# Patient Record
Sex: FEMALE | Race: WHITE | NOT HISPANIC OR LATINO | Employment: FULL TIME | URBAN - NONMETROPOLITAN AREA
[De-identification: names, ages, dates, MRNs, and addresses within clinical notes are randomized per-mention and may not be internally consistent; named-entity substitution may affect disease eponyms.]

---

## 2017-01-09 DIAGNOSIS — B00.1 COLD SORE: Primary | ICD-10-CM

## 2017-01-10 RX ORDER — VALACYCLOVIR HYDROCHLORIDE 500 MG/1
TABLET, FILM COATED ORAL
Qty: 6 TABLET | Refills: 3 | Status: SHIPPED | OUTPATIENT
Start: 2017-01-10 | End: 2017-04-10

## 2017-01-25 ENCOUNTER — OFFICE VISIT (OUTPATIENT)
Dept: FAMILY MEDICINE | Facility: OTHER | Age: 27
End: 2017-01-25
Attending: FAMILY MEDICINE
Payer: COMMERCIAL

## 2017-01-25 VITALS
SYSTOLIC BLOOD PRESSURE: 132 MMHG | WEIGHT: 177 LBS | HEIGHT: 63 IN | RESPIRATION RATE: 16 BRPM | TEMPERATURE: 98.7 F | BODY MASS INDEX: 31.36 KG/M2 | DIASTOLIC BLOOD PRESSURE: 86 MMHG | HEART RATE: 80 BPM

## 2017-01-25 DIAGNOSIS — E28.2 PCOS (POLYCYSTIC OVARIAN SYNDROME): ICD-10-CM

## 2017-01-25 DIAGNOSIS — E11.9 TYPE 2 DIABETES MELLITUS WITHOUT COMPLICATION, WITHOUT LONG-TERM CURRENT USE OF INSULIN (H): ICD-10-CM

## 2017-01-25 DIAGNOSIS — E61.1 IRON DEFICIENCY: ICD-10-CM

## 2017-01-25 DIAGNOSIS — E53.8 VITAMIN B12 DEFICIENCY (NON ANEMIC): Primary | ICD-10-CM

## 2017-01-25 DIAGNOSIS — E55.9 HYPOVITAMINOSIS D: ICD-10-CM

## 2017-01-25 LAB
ALBUMIN SERPL-MCNC: 4.2 G/DL (ref 3.4–5)
ALP SERPL-CCNC: 70 U/L (ref 40–150)
ALT SERPL W P-5'-P-CCNC: 30 U/L (ref 0–50)
ANION GAP SERPL CALCULATED.3IONS-SCNC: 8 MMOL/L (ref 3–14)
AST SERPL W P-5'-P-CCNC: 12 U/L (ref 0–45)
BILIRUB SERPL-MCNC: 0.2 MG/DL (ref 0.2–1.3)
BUN SERPL-MCNC: 11 MG/DL (ref 7–30)
CALCIUM SERPL-MCNC: 9.1 MG/DL (ref 8.5–10.1)
CHLORIDE SERPL-SCNC: 107 MMOL/L (ref 94–109)
CO2 SERPL-SCNC: 23 MMOL/L (ref 20–32)
CREAT SERPL-MCNC: 0.82 MG/DL (ref 0.52–1.04)
EST. AVERAGE GLUCOSE BLD GHB EST-MCNC: 120 MG/DL
GFR SERPL CREATININE-BSD FRML MDRD: 82 ML/MIN/1.7M2
GLUCOSE SERPL-MCNC: 95 MG/DL (ref 70–99)
HBA1C MFR BLD: 5.8 % (ref 4.3–6)
IRON SATN MFR SERPL: 7 % (ref 15–46)
IRON SERPL-MCNC: 27 UG/DL (ref 35–180)
POTASSIUM SERPL-SCNC: 3.6 MMOL/L (ref 3.4–5.3)
PROT SERPL-MCNC: 8.6 G/DL (ref 6.8–8.8)
SODIUM SERPL-SCNC: 138 MMOL/L (ref 133–144)
TIBC SERPL-MCNC: 396 UG/DL (ref 240–430)
TSH SERPL DL<=0.005 MIU/L-ACNC: 0.85 MU/L (ref 0.4–4)

## 2017-01-25 PROCEDURE — 80053 COMPREHEN METABOLIC PANEL: CPT | Performed by: FAMILY MEDICINE

## 2017-01-25 PROCEDURE — 83550 IRON BINDING TEST: CPT | Performed by: FAMILY MEDICINE

## 2017-01-25 PROCEDURE — 83540 ASSAY OF IRON: CPT | Performed by: FAMILY MEDICINE

## 2017-01-25 PROCEDURE — 36415 COLL VENOUS BLD VENIPUNCTURE: CPT | Performed by: FAMILY MEDICINE

## 2017-01-25 PROCEDURE — 83036 HEMOGLOBIN GLYCOSYLATED A1C: CPT | Performed by: FAMILY MEDICINE

## 2017-01-25 PROCEDURE — 99000 SPECIMEN HANDLING OFFICE-LAB: CPT | Performed by: FAMILY MEDICINE

## 2017-01-25 PROCEDURE — 82306 VITAMIN D 25 HYDROXY: CPT | Mod: 90 | Performed by: FAMILY MEDICINE

## 2017-01-25 PROCEDURE — 99214 OFFICE O/P EST MOD 30 MIN: CPT | Performed by: FAMILY MEDICINE

## 2017-01-25 PROCEDURE — 84443 ASSAY THYROID STIM HORMONE: CPT | Performed by: FAMILY MEDICINE

## 2017-01-25 PROCEDURE — 82607 VITAMIN B-12: CPT | Mod: 90 | Performed by: FAMILY MEDICINE

## 2017-01-25 ASSESSMENT — PAIN SCALES - GENERAL: PAINLEVEL: NO PAIN (0)

## 2017-01-25 NOTE — NURSING NOTE
"Chief Complaint   Patient presents with     Other       Initial /86 mmHg  Pulse 80  Temp(Src) 98.7  F (37.1  C) (Tympanic)  Resp 16  Ht 5' 3.25\" (1.607 m)  Wt 177 lb (80.287 kg)  BMI 31.09 kg/m2 Estimated body mass index is 31.09 kg/(m^2) as calculated from the following:    Height as of this encounter: 5' 3.25\" (1.607 m).    Weight as of this encounter: 177 lb (80.287 kg).  BP completed using cuff size: brook Paredes    "

## 2017-01-25 NOTE — PATIENT INSTRUCTIONS
1.  Smarty pants vitamins (purple color) with activated (methyl b12 and methyl folate) -- 4 gummies daily  2.  Continue progesterone  3.  Check with insurance to see if follicle study is covered by insurance

## 2017-01-25 NOTE — PROGRESS NOTES
SUBJECTIVE:                                                    Radha Carl is a 26 year old female who presents to clinic today for the following health issues:    PCOS        Duration: years    Description (location/character/radiation): n/a    Intensity:  mild    Accompanying signs and symptoms: none    History (similar episodes/previous evaluation): None    Precipitating or alleviating factors: None  Therapies tried and outcome: charting, NAC, vitamin b6      Pt would like vitamin b12 rechecked.  Her last injection was Dec 14 and will need new orders.      Diabetes Follow-up    Patient is checking blood sugars: once daily.  Results are as follows:    Diabetic concerns: None     Symptoms of hypoglycemia (low blood sugar): none     Paresthesias (numbness or burning in feet) or sores: No     Date of last diabetic eye exam: UTD     Hypothyroidism Follow-up      Since last visit, patient describes the following symptoms: Weight stable, no hair loss, no skin changes, no constipation, no loose stools     She is under a lot of stress right now    Problem list and histories reviewed & adjusted, as indicated.  Additional history: as documented    Current Outpatient Prescriptions   Medication Sig Dispense Refill     progesterone (PROMETRIUM) 100 MG capsule Take 3 capsules (300 mg) by mouth daily Starting on peak +3 for 10 days out of month 90 capsule 3     valACYclovir (VALTREX) 500 MG tablet TAKE ONE TABLET BY MOUTH TWICE DAILY 6 tablet 3     vitamin D (ERGOCALCIFEROL) 98268 UNIT capsule TAKE ONE CAPSULE BY MOUTH ONCE A WEEK ON  THE  SAME  DAY 4 capsule 0     lisdexamfetamine (VYVANSE) 20 MG capsule Take 1 capsule (20 mg) by mouth every morning 30 capsule 0     ferrous sulfate (IRON) 325 (65 FE) MG tablet Take 1 tablet by mouth every 48 hours       penciclovir (DENAVIR) 1 % cream Apply topically every 2 hours (while awake) 1.5 g 3     ACETYLCYSTEINE PO Take 600 mg by mouth 2 times daily       Pyridoxine HCl  "(VITAMIN  B-6) 500 MG TABS Take 1 tablet by mouth daily       blood glucose (ONE TOUCH ULTRA) test strip Use to test blood sugar 3 times daily or as directed. 1 Month 12     blood glucose monitoring (ONE TOUCH DELICA) lancets Use to test blood sugar 3 times daily or as directed. 1 Box 4     ranitidine (ZANTAC) 150 MG tablet Take 1 tablet by mouth as needed       acetaminophen (TYLENOL) 500 MG tablet Take 2 tablets by mouth. Every 6 hours as needed       ibuprofen (ADVIL,MOTRIN) 200 MG tablet Take 2 tablets by mouth. Every 6 hours as needed with food       [DISCONTINUED] norethindrone-ethinyl estradiol (MICROGESTIN) 1-20 MG-MCG per tablet Take 1 tablet by mouth daily.       Problem list, Medication list, Allergies, and Medical/Social/Surgical histories reviewed in EPIC and updated as appropriate.    ROS:  Constitutional, HEENT, cardiovascular, pulmonary, gi and gu systems are negative, except as otherwise noted.    OBJECTIVE:                                                    /86 mmHg  Pulse 80  Temp(Src) 98.7  F (37.1  C) (Tympanic)  Resp 16  Ht 5' 3.25\" (1.607 m)  Wt 177 lb (80.287 kg)  BMI 31.09 kg/m2  Body mass index is 31.09 kg/(m^2).  GENERAL APPEARANCE: healthy, alert and no distress  RESP: lungs clear to auscultation - no rales, rhonchi or wheezes  CV: regular rates and rhythm, normal S1 S2, no S3 or S4 and no murmur, click or rub  PSYCH: mentation appears normal and affect normal/bright       ASSESSMENT/PLAN:                                                    1. Vitamin B12 deficiency (non anemic)  Needs recheck she has not been taking methyl b12  - Vitamin B12    2. Type 2 diabetes mellitus without complication, without long-term current use of insulin (H)  stable  - Hemoglobin A1c  - TSH with free T4 reflex  - Comprehensive metabolic panel    3. PCOS (polycystic ovarian syndrome)  Continue progesterone, may need to look into follicle u/s study; they will check with insurance  - Comprehensive " metabolic panel  - progesterone (PROMETRIUM) 100 MG capsule; Take 3 capsules (300 mg) by mouth daily Starting on peak +3 for 10 days out of month  Dispense: 90 capsule; Refill: 3    4. Iron deficiency    - Iron and iron binding capacity    5. Hypovitaminosis D    - Vitamin D Deficiency    Patient was agreeable to this plan and had no further questions.  See Patient Instructions    Muriel Jones MD  [facility]

## 2017-01-26 LAB — VIT B12 SERPL-MCNC: 937 PG/ML (ref 193–986)

## 2017-01-27 ENCOUNTER — MYC MEDICAL ADVICE (OUTPATIENT)
Dept: FAMILY MEDICINE | Facility: OTHER | Age: 27
End: 2017-01-27

## 2017-01-27 LAB — DEPRECATED CALCIDIOL+CALCIFEROL SERPL-MC: 27 UG/L (ref 20–75)

## 2017-02-03 ENCOUNTER — TELEPHONE (OUTPATIENT)
Dept: FAMILY MEDICINE | Facility: OTHER | Age: 27
End: 2017-02-03

## 2017-02-03 DIAGNOSIS — E28.2 PCOS (POLYCYSTIC OVARIAN SYNDROME): Primary | ICD-10-CM

## 2017-02-03 DIAGNOSIS — E28.8 LUTEINIZED UNRUPTURED FOLLICLE SYNDROME: ICD-10-CM

## 2017-02-03 NOTE — TELEPHONE ENCOUNTER
She was going to check with her insurance for coverage  It will be very expensive because it is a series of ultrasounds not just one

## 2017-02-03 NOTE — TELEPHONE ENCOUNTER
Pt is wondering about the follicle u/s, she states she would like to have it done ever if insurance does not cover.

## 2017-02-06 NOTE — TELEPHONE ENCOUNTER
No not yet, I hadn't talked to radiology yet b/c if it was covered by insurance it wouldn't matter

## 2017-02-07 ENCOUNTER — OFFICE VISIT (OUTPATIENT)
Dept: CARE COORDINATION | Facility: OTHER | Age: 27
End: 2017-02-07
Attending: PODIATRIST
Payer: COMMERCIAL

## 2017-02-07 VITALS — HEART RATE: 84 BPM | RESPIRATION RATE: 99 BRPM | SYSTOLIC BLOOD PRESSURE: 142 MMHG | DIASTOLIC BLOOD PRESSURE: 90 MMHG

## 2017-02-07 DIAGNOSIS — F90.0 ATTENTION DEFICIT HYPERACTIVITY DISORDER (ADHD), PREDOMINANTLY INATTENTIVE TYPE: Primary | ICD-10-CM

## 2017-02-07 PROCEDURE — 99212 OFFICE O/P EST SF 10 MIN: CPT | Performed by: PODIATRIST

## 2017-02-07 RX ORDER — LISDEXAMFETAMINE DIMESYLATE 20 MG/1
20 CAPSULE ORAL EVERY MORNING
Qty: 30 CAPSULE | Refills: 0 | Status: SHIPPED | OUTPATIENT
Start: 2017-02-07 | End: 2017-03-10

## 2017-02-07 NOTE — TELEPHONE ENCOUNTER
zion      Last Written Prescription Date: 12/27/16  Last Fill Quantity: 30,  # refills: 0   Last Office Visit with [facility], [facility] or [facility] prescribing provider: 1/25/17

## 2017-02-09 NOTE — TELEPHONE ENCOUNTER
Pt notified that the written RX is ready at the [facility] [place]  registration to be picked up.

## 2017-02-13 ENCOUNTER — TELEPHONE (OUTPATIENT)
Dept: FAMILY MEDICINE | Facility: OTHER | Age: 27
End: 2017-02-13

## 2017-02-13 DIAGNOSIS — E28.8 LUTEINIZED UNRUPTURED FOLLICLE SYNDROME: ICD-10-CM

## 2017-02-13 DIAGNOSIS — E28.2 PCOS (POLYCYSTIC OVARIAN SYNDROME): Primary | ICD-10-CM

## 2017-02-13 NOTE — TELEPHONE ENCOUNTER
Pt called and states that pt will need her ultrasounds as a standing order for her to have it daily.  Please sign off.

## 2017-02-17 ENCOUNTER — TELEPHONE (OUTPATIENT)
Dept: ULTRASOUND IMAGING | Facility: HOSPITAL | Age: 27
End: 2017-02-17

## 2017-02-20 ENCOUNTER — TELEPHONE (OUTPATIENT)
Dept: FAMILY MEDICINE | Facility: OTHER | Age: 27
End: 2017-02-20

## 2017-02-20 DIAGNOSIS — E28.2 PCOS (POLYCYSTIC OVARIAN SYNDROME): ICD-10-CM

## 2017-02-20 DIAGNOSIS — E28.8 LUTEINIZED UNRUPTURED FOLLICLE SYNDROME: ICD-10-CM

## 2017-02-20 NOTE — TELEPHONE ENCOUNTER
Sienna in Radiology states pt cancelled today's US because she states she is not ovulating. Want to know what you are looking for on daily USs? Please advise

## 2017-02-22 ENCOUNTER — HOSPITAL ENCOUNTER (OUTPATIENT)
Dept: ULTRASOUND IMAGING | Facility: HOSPITAL | Age: 27
Discharge: HOME | End: 2017-02-22
Attending: FAMILY MEDICINE | Admitting: FAMILY MEDICINE
Payer: COMMERCIAL

## 2017-02-22 PROCEDURE — 76830 TRANSVAGINAL US NON-OB: CPT | Mod: TC | Performed by: RADIOLOGY

## 2017-02-22 PROCEDURE — 76856 US EXAM PELVIC COMPLETE: CPT | Mod: TC | Performed by: RADIOLOGY

## 2017-02-23 ENCOUNTER — HOSPITAL ENCOUNTER (OUTPATIENT)
Dept: ULTRASOUND IMAGING | Facility: HOSPITAL | Age: 27
Discharge: HOME | End: 2017-02-23
Attending: FAMILY MEDICINE | Admitting: FAMILY MEDICINE
Payer: COMMERCIAL

## 2017-02-23 PROCEDURE — 76830 TRANSVAGINAL US NON-OB: CPT | Mod: TC

## 2017-02-24 ENCOUNTER — MYC MEDICAL ADVICE (OUTPATIENT)
Dept: FAMILY MEDICINE | Facility: OTHER | Age: 27
End: 2017-02-24

## 2017-02-24 ENCOUNTER — HOSPITAL ENCOUNTER (OUTPATIENT)
Dept: ULTRASOUND IMAGING | Facility: HOSPITAL | Age: 27
End: 2017-02-24
Attending: FAMILY MEDICINE
Payer: COMMERCIAL

## 2017-02-24 NOTE — TELEPHONE ENCOUNTER
Pt had first U/S this morning and they told her that all other ultrasounds would be cancelled and not needed.  Please advise.

## 2017-03-02 ENCOUNTER — OFFICE VISIT (OUTPATIENT)
Dept: FAMILY MEDICINE | Facility: OTHER | Age: 27
End: 2017-03-02
Attending: FAMILY MEDICINE
Payer: COMMERCIAL

## 2017-03-02 VITALS
DIASTOLIC BLOOD PRESSURE: 78 MMHG | TEMPERATURE: 98.6 F | WEIGHT: 174 LBS | SYSTOLIC BLOOD PRESSURE: 120 MMHG | BODY MASS INDEX: 30.83 KG/M2 | RESPIRATION RATE: 14 BRPM | HEIGHT: 63 IN | HEART RATE: 72 BPM

## 2017-03-02 DIAGNOSIS — N83.209 CYST OF OVARY, UNSPECIFIED LATERALITY: ICD-10-CM

## 2017-03-02 DIAGNOSIS — Q51.3 BICORNATE UTERUS: Primary | ICD-10-CM

## 2017-03-02 PROCEDURE — 99213 OFFICE O/P EST LOW 20 MIN: CPT | Performed by: FAMILY MEDICINE

## 2017-03-02 ASSESSMENT — PAIN SCALES - GENERAL: PAINLEVEL: NO PAIN (0)

## 2017-03-02 NOTE — NURSING NOTE
"Chief Complaint   Patient presents with     Clinic Care Coordination - Follow-up     charting follow up      *_* Health Care Directive *_*     declined        Initial /78 (BP Location: Left arm, Patient Position: Chair, Cuff Size: Adult Large)  Pulse 72  Temp 98.6  F (37  C) (Tympanic)  Resp 14  Ht 5' 3.24\" (1.606 m)  Wt 174 lb (78.9 kg)  BMI 30.59 kg/m2 Estimated body mass index is 30.59 kg/(m^2) as calculated from the following:    Height as of this encounter: 5' 3.24\" (1.606 m).    Weight as of this encounter: 174 lb (78.9 kg).  Medication Reconciliation: nicole HANNAH      "

## 2017-03-02 NOTE — PROGRESS NOTES
SUBJECTIVE:                                                    Radha Carl is a 26 year old female who presents to clinic today for the following health issues:       Natural family planning       Duration: 3 years    Description (location/character/radiation):          Intensity:  na    Accompanying signs and symptoms: Na    History (similar episodes/previous evaluation): None    Precipitating or alleviating factors: None    Therapies tried and outcome: see chart     Follow up on follicle ultrasound study    Problem list and histories reviewed & adjusted, as indicated.  Additional history: as documented    Current Outpatient Prescriptions   Medication Sig Dispense Refill     lisdexamfetamine (VYVANSE) 20 MG capsule Take 1 capsule (20 mg) by mouth every morning 30 capsule 0     vitamin D (ERGOCALCIFEROL) 26681 UNIT capsule TAKE ONE CAPSULE BY MOUTH ONCE A WEEK 4 capsule 0     progesterone (PROMETRIUM) 100 MG capsule Take 3 capsules (300 mg) by mouth daily Starting on peak +3 for 10 days out of month 90 capsule 3     valACYclovir (VALTREX) 500 MG tablet TAKE ONE TABLET BY MOUTH TWICE DAILY 6 tablet 3     ferrous sulfate (IRON) 325 (65 FE) MG tablet Take 1 tablet by mouth every 48 hours       penciclovir (DENAVIR) 1 % cream Apply topically every 2 hours (while awake) 1.5 g 3     ACETYLCYSTEINE PO Take 600 mg by mouth 2 times daily       Pyridoxine HCl (VITAMIN  B-6) 500 MG TABS Take 1 tablet by mouth daily       blood glucose (ONE TOUCH ULTRA) test strip Use to test blood sugar 3 times daily or as directed. 1 Month 12     blood glucose monitoring (ONE TOUCH DELICA) lancets Use to test blood sugar 3 times daily or as directed. 1 Box 4     ranitidine (ZANTAC) 150 MG tablet Take 1 tablet by mouth as needed       acetaminophen (TYLENOL) 500 MG tablet Take 2 tablets by mouth. Every 6 hours as needed       ibuprofen (ADVIL,MOTRIN) 200 MG tablet Take 2 tablets by mouth. Every 6 hours as needed with food        "[DISCONTINUED] norethindrone-ethinyl estradiol (MICROGESTIN) 1-20 MG-MCG per tablet Take 1 tablet by mouth daily.       Labs reviewed in EPIC    Reviewed and updated as needed this visit by clinical staff  Tobacco  Allergies  Meds  Med Hx  Surg Hx  Fam Hx  Soc Hx      Reviewed and updated as needed this visit by Provider         ROS:  Constitutional, HEENT, cardiovascular, pulmonary, gi and gu systems are negative, except as otherwise noted.    OBJECTIVE:                                                    /78 (BP Location: Left arm, Patient Position: Chair, Cuff Size: Adult Large)  Pulse 72  Temp 98.6  F (37  C) (Tympanic)  Resp 14  Ht 5' 3.24\" (1.606 m)  Wt 174 lb (78.9 kg)  BMI 30.59 kg/m2  Body mass index is 30.59 kg/(m^2).  GENERAL APPEARANCE: healthy, alert and no distress  PSYCH: mentation appears normal and affect normal/bright       ASSESSMENT/PLAN:                                                    1. Bicornate uterus  Follow up after MRI, and will possible send down to  for further eval  - MR Pelvis w/o Contrast; Future  - MR Pelvis w/o Contrast    2. Cyst of ovary, unspecified laterality    - MR Pelvis w/o Contrast; Future  - MR Pelvis w/o Contrast    Patient was agreeable to this plan and had no further questions.  See Patient Instructions    Muriel Jones MD  [facility] [place]  "

## 2017-03-02 NOTE — MR AVS SNAPSHOT
"              After Visit Summary   3/2/2017    Radha Carl    MRN: [id number]           Patient Information     Date Of Birth          1990        Visit Information        Provider Department      3/2/2017 11:15 AM Muriel Jones MD [facility]        Today's Diagnoses     Bicornate uterus    -  1    Cyst of ovary, unspecified laterality           Follow-ups after your visit        Who to contact     If you have questions or need follow up information about today's clinic visit or your schedule please contact [facility] directly at [phone].  Normal or non-critical lab and imaging results will be communicated to you by Visualanthart, letter or phone within 4 business days after the clinic has received the results. If you do not hear from us within 7 days, please contact the clinic through Visualanthart or phone. If you have a critical or abnormal lab result, we will notify you by phone as soon as possible.  Submit refill requests through [employer] or call your pharmacy and they will forward the refill request to us. Please allow 3 business days for your refill to be completed.          Additional Information About Your Visit        MyChart Information     [employer] gives you secure access to your electronic health record. If you see a primary care provider, you can also send messages to your care team and make appointments. If you have questions, please call your primary care clinic.  If you do not have a primary care provider, please call [phone] and they will assist you.        Care EveryWhere ID     This is your Care EveryWhere ID. This could be used by other organizations to access your [place] medical records  [id number]        Your Vitals Were     Pulse Temperature Respirations Height BMI (Body Mass Index)       72 98.6  F (37  C) (Tympanic) 14 5' 3.24\" (1.606 m) 30.59 kg/m2        Blood Pressure from Last 3 Encounters:   03/02/17 120/78   02/07/17 142/90   01/25/17 " 132/86    Weight from Last 3 Encounters:   03/02/17 174 lb (78.9 kg)   01/25/17 177 lb (80.3 kg)   12/13/16 173 lb (78.5 kg)              We Performed the Following     MR Pelvis w/o Contrast        Primary Care Provider Office Phone # Fax #    Muriel Jones [phone]       [facility] [place] [address]  [facility] [place] [address]        Thank you!     [facility]  [facility]. Our goal is always to provide you with excellent care. Hearing back from our patients is one way we can continue to improve our services. Please take a few minutes to complete the written survey that you may receive in the mail after your visit with us. Thank you!             Your Updated Medication List - Protect others around you: Learn how to safely use, store and throw away your medicines at www.disposemymeds.org.          This list is accurate as of: 3/2/17  1:26 PM.  Always use your most recent med list.                   Brand Name Dispense Instructions for use    ACETYLCYSTEINE PO      Take 600 mg by mouth 2 times daily       blood glucose monitoring lancets     1 Box    Use to test blood sugar 3 times daily or as directed.       blood glucose monitoring test strip    ONE TOUCH ULTRA    1 Month    Use to test blood sugar 3 times daily or as directed.       ferrous sulfate 325 (65 FE) MG tablet    IRON     Take 1 tablet by mouth every 48 hours       ibuprofen 200 MG tablet    ADVIL/MOTRIN     Take 2 tablets by mouth. Every 6 hours as needed with food       lisdexamfetamine 20 MG capsule    VYVANSE    30 capsule    Take 1 capsule (20 mg) by mouth every morning       penciclovir 1 % cream    DENAVIR    1.5 g    Apply topically every 2 hours (while awake)       progesterone 100 MG capsule    PROMETRIUM    90 capsule    Take 3 capsules (300 mg) by mouth daily Starting on peak +3 for 10 days out of month       TYLENOL 500 MG tablet   Generic drug:  acetaminophen      Take 2  tablets by mouth. Every 6 hours as needed       valACYclovir 500 MG tablet    VALTREX    6 tablet    TAKE ONE TABLET BY MOUTH TWICE DAILY       vitamin  B-6 500 MG Tabs      Take 1 tablet by mouth daily       vitamin D 47944 UNIT capsule    ERGOCALCIFEROL    4 capsule    TAKE ONE CAPSULE BY MOUTH ONCE A WEEK       ZANTAC 150 MG tablet   Generic drug:  ranitidine      Take 1 tablet by mouth as needed

## 2017-03-03 DIAGNOSIS — Z01.812 BLOOD TESTS PRIOR TO TREATMENT OR PROCEDURE: Primary | ICD-10-CM

## 2017-03-08 ENCOUNTER — HOSPITAL ENCOUNTER (OUTPATIENT)
Dept: MRI IMAGING | Facility: HOSPITAL | Age: 27
Discharge: HOME | End: 2017-03-08
Attending: FAMILY MEDICINE | Admitting: FAMILY MEDICINE
Payer: COMMERCIAL

## 2017-03-08 PROCEDURE — 72197 MRI PELVIS W/O & W/DYE: CPT | Mod: TC | Performed by: RADIOLOGY

## 2017-03-08 PROCEDURE — A9585 GADOBUTROL INJECTION: HCPCS | Mod: TC

## 2017-03-08 RX ORDER — GADOBUTROL 604.72 MG/ML
7.5 INJECTION INTRAVENOUS ONCE
Status: COMPLETED | OUTPATIENT
Start: 2017-03-08 | End: 2017-03-08

## 2017-03-08 RX ADMIN — GADOBUTROL 7.5 ML: 604.72 INJECTION INTRAVENOUS at 13:35

## 2017-03-10 DIAGNOSIS — F90.0 ATTENTION DEFICIT HYPERACTIVITY DISORDER (ADHD), PREDOMINANTLY INATTENTIVE TYPE: ICD-10-CM

## 2017-03-10 RX ORDER — LISDEXAMFETAMINE DIMESYLATE 20 MG/1
20 CAPSULE ORAL EVERY MORNING
Qty: 30 CAPSULE | Refills: 0 | Status: SHIPPED | OUTPATIENT
Start: 2017-03-10 | End: 2017-04-11

## 2017-03-10 NOTE — TELEPHONE ENCOUNTER
Left message that the written RX is ready at the [facility] [place]  registration to be picked up.

## 2017-03-10 NOTE — TELEPHONE ENCOUNTER
Reason for call:  Medication    1. Medication Name? vyvanse  2. Is this request for a refill? Yes  3. What Pharmacy do you use? [facility]  4. Have you contacted your pharmacy?yesIf yes, when?3/8  (Please note that the turn-around-time for prescriptions is 72 business hours; I am sending your request at this time. SEND TO  [facility]  )  Description: need refill called pharmacy  Was an appointment offered for this a call? No   Preferred method for responding to this messageTelephone Call  If we cannot reach you directly, may we leave a detailed response at the number you provided? yes  Can this message wait until your PCP/Provider returns if not available today? Not applicable

## 2017-03-14 ENCOUNTER — OFFICE VISIT (OUTPATIENT)
Dept: FAMILY MEDICINE | Facility: OTHER | Age: 27
End: 2017-03-14
Attending: FAMILY MEDICINE
Payer: COMMERCIAL

## 2017-03-14 VITALS
WEIGHT: 176 LBS | TEMPERATURE: 97.6 F | RESPIRATION RATE: 17 BRPM | DIASTOLIC BLOOD PRESSURE: 76 MMHG | SYSTOLIC BLOOD PRESSURE: 122 MMHG | BODY MASS INDEX: 30.94 KG/M2 | HEART RATE: 78 BPM

## 2017-03-14 DIAGNOSIS — N83.291 COMPLEX CYST OF RIGHT OVARY: Primary | ICD-10-CM

## 2017-03-14 PROCEDURE — 99213 OFFICE O/P EST LOW 20 MIN: CPT | Performed by: FAMILY MEDICINE

## 2017-03-14 ASSESSMENT — PAIN SCALES - GENERAL: PAINLEVEL: NO PAIN (0)

## 2017-03-14 NOTE — NURSING NOTE
"Chief Complaint   Patient presents with     Results       Initial /76  Pulse 78  Temp 97.6  F (36.4  C)  Resp 17  Wt 176 lb (79.8 kg)  BMI 30.94 kg/m2 Estimated body mass index is 30.94 kg/(m^2) as calculated from the following:    Height as of 3/2/17: 5' 3.24\" (1.606 m).    Weight as of this encounter: 176 lb (79.8 kg).  Medication Reconciliation: complete  "

## 2017-03-14 NOTE — PROGRESS NOTES
SUBJECTIVE:                                                    Radha Carl is a 26 year old female who presents to clinic today for the following health issues:        Ovarian cyst,       Duration: follow up     Description (location/character/radiation): n/a     Intensity:  mild    Accompanying signs and symptoms:     History (similar episodes/previous evaluation): here to review MRI results     Precipitating or alleviating factors: None    Therapies tried and outcome: see chart       Problem list and histories reviewed & adjusted, as indicated.  Additional history: as documented    Current Outpatient Prescriptions   Medication Sig Dispense Refill     lisdexamfetamine (VYVANSE) 20 MG capsule Take 1 capsule (20 mg) by mouth every morning 30 capsule 0     vitamin D (ERGOCALCIFEROL) 44437 UNIT capsule TAKE ONE CAPSULE BY MOUTH ONCE A WEEK 4 capsule 0     progesterone (PROMETRIUM) 100 MG capsule Take 3 capsules (300 mg) by mouth daily Starting on peak +3 for 10 days out of month 90 capsule 3     valACYclovir (VALTREX) 500 MG tablet TAKE ONE TABLET BY MOUTH TWICE DAILY 6 tablet 3     ferrous sulfate (IRON) 325 (65 FE) MG tablet Take 1 tablet by mouth every 48 hours       penciclovir (DENAVIR) 1 % cream Apply topically every 2 hours (while awake) 1.5 g 3     ACETYLCYSTEINE PO Take 600 mg by mouth 2 times daily       Pyridoxine HCl (VITAMIN  B-6) 500 MG TABS Take 1 tablet by mouth daily       blood glucose (ONE TOUCH ULTRA) test strip Use to test blood sugar 3 times daily or as directed. 1 Month 12     blood glucose monitoring (ONE TOUCH DELICA) lancets Use to test blood sugar 3 times daily or as directed. 1 Box 4     ranitidine (ZANTAC) 150 MG tablet Take 1 tablet by mouth as needed       acetaminophen (TYLENOL) 500 MG tablet Take 2 tablets by mouth. Every 6 hours as needed       ibuprofen (ADVIL,MOTRIN) 200 MG tablet Take 2 tablets by mouth. Every 6 hours as needed with food       [DISCONTINUED]  norethindrone-ethinyl estradiol (MICROGESTIN) 1-20 MG-MCG per tablet Take 1 tablet by mouth daily.       Labs reviewed in EPIC    ROS:  Constitutional, HEENT, cardiovascular, pulmonary, gi and gu systems are negative, except as otherwise noted.    OBJECTIVE:                                                    /76  Pulse 78  Temp 97.6  F (36.4  C)  Resp 17  Wt 176 lb (79.8 kg)  BMI 30.94 kg/m2  Body mass index is 30.94 kg/(m^2).  GENERAL APPEARANCE: healthy, alert and no distress  PSYCH: mentation appears normal and affect normal/bright       ASSESSMENT/PLAN:                                                    1. Complex cyst of right ovary  Resolved now, f/u 2 mos  Discussed MRI with Dr rao, arcuate uterus ok, no need to deal with fibroid at this time.    Patient was agreeable to this plan and had no further questions.  See Patient Instructions    Muriel Jones MD  [facility]

## 2017-03-14 NOTE — MR AVS SNAPSHOT
After Visit Summary   3/14/2017    Radha Carl    MRN: [id number]           Patient Information     Date Of Birth          1990        Visit Information        Provider Department      3/14/2017 3:30 PM Muriel Jones MD [facility]        Today's Diagnoses     Complex cyst of right ovary    -  1       Follow-ups after your visit        Who to contact     If you have questions or need follow up information about today's clinic visit or your schedule please contact [facility] directly at [phone].  Normal or non-critical lab and imaging results will be communicated to you by Kiiohart, letter or phone within 4 business days after the clinic has received the results. If you do not hear from us within 7 days, please contact the clinic through CallRestot or phone. If you have a critical or abnormal lab result, we will notify you by phone as soon as possible.  Submit refill requests through [employer] or call your pharmacy and they will forward the refill request to us. Please allow 3 business days for your refill to be completed.          Additional Information About Your Visit        MyChart Information     [employer] gives you secure access to your electronic health record. If you see a primary care provider, you can also send messages to your care team and make appointments. If you have questions, please call your primary care clinic.  If you do not have a primary care provider, please call [phone] and they will assist you.        Care EveryWhere ID     This is your Care EveryWhere ID. This could be used by other organizations to access your [place] medical records  [id number]        Your Vitals Were     Pulse Temperature Respirations BMI (Body Mass Index)          78 97.6  F (36.4  C) 17 30.94 kg/m2         Blood Pressure from Last 3 Encounters:   03/14/17 122/76   03/02/17 120/78   02/07/17 142/90    Weight from Last 3 Encounters:   03/14/17 176 lb (79.8  kg)   03/02/17 174 lb (78.9 kg)   01/25/17 177 lb (80.3 kg)              Today, you had the following     No orders found for display       Primary Care Provider Office Phone # Fax #    Muriel Jones [phone]       [facility] [place] [address]  [place] [address]        Thank you!     Thank you for choosing [facility] [place]  for your care. Our goal is always to provide you with excellent care. Hearing back from our patients is one way we can continue to improve our services. Please take a few minutes to complete the written survey that you may receive in the mail after your visit with us. Thank you!             Your Updated Medication List - Protect others around you: Learn how to safely use, store and throw away your medicines at www.disposemymeds.org.          This list is accurate as of: 3/14/17  5:19 PM.  Always use your most recent med list.                   Brand Name Dispense Instructions for use    ACETYLCYSTEINE PO      Take 600 mg by mouth 2 times daily       blood glucose monitoring lancets     1 Box    Use to test blood sugar 3 times daily or as directed.       blood glucose monitoring test strip    ONE TOUCH ULTRA    1 Month    Use to test blood sugar 3 times daily or as directed.       ferrous sulfate 325 (65 FE) MG tablet    IRON     Take 1 tablet by mouth every 48 hours       ibuprofen 200 MG tablet    ADVIL/MOTRIN     Take 2 tablets by mouth. Every 6 hours as needed with food       lisdexamfetamine 20 MG capsule    VYVANSE    30 capsule    Take 1 capsule (20 mg) by mouth every morning       penciclovir 1 % cream    DENAVIR    1.5 g    Apply topically every 2 hours (while awake)       progesterone 100 MG capsule    PROMETRIUM    90 capsule    Take 3 capsules (300 mg) by mouth daily Starting on peak +3 for 10 days out of month       TYLENOL 500 MG tablet   Generic drug:  acetaminophen      Take 2 tablets by mouth. Every 6 hours as needed       valACYclovir  500 MG tablet    VALTREX    6 tablet    TAKE ONE TABLET BY MOUTH TWICE DAILY       vitamin  B-6 500 MG Tabs      Take 1 tablet by mouth daily       vitamin D 00568 UNIT capsule    ERGOCALCIFEROL    4 capsule    TAKE ONE CAPSULE BY MOUTH ONCE A WEEK       ZANTAC 150 MG tablet   Generic drug:  ranitidine      Take 1 tablet by mouth as needed

## 2017-04-10 DIAGNOSIS — B00.1 COLD SORE: ICD-10-CM

## 2017-04-11 DIAGNOSIS — F90.0 ATTENTION DEFICIT HYPERACTIVITY DISORDER (ADHD), PREDOMINANTLY INATTENTIVE TYPE: ICD-10-CM

## 2017-04-11 RX ORDER — VALACYCLOVIR HYDROCHLORIDE 500 MG/1
TABLET, FILM COATED ORAL
Qty: 6 TABLET | Refills: 1 | Status: SHIPPED | OUTPATIENT
Start: 2017-04-11 | End: 2017-05-24

## 2017-04-11 NOTE — TELEPHONE ENCOUNTER
zion      Last Written Prescription Date: 3/10/17  Last Fill Quantity: 30,  # refills: 0   Last Office Visit with [facility], [facility] or [facility] prescribing provider: 3/14/17

## 2017-04-12 RX ORDER — LISDEXAMFETAMINE DIMESYLATE 20 MG/1
20 CAPSULE ORAL EVERY MORNING
Qty: 30 CAPSULE | Refills: 0 | Status: SHIPPED | OUTPATIENT
Start: 2017-04-12 | End: 2017-05-22

## 2017-04-12 NOTE — TELEPHONE ENCOUNTER
Left message that the written RX is ready at the [facility] [place]  registration to be picked up.

## 2017-04-18 ENCOUNTER — TELEPHONE (OUTPATIENT)
Dept: FAMILY MEDICINE | Facility: OTHER | Age: 27
End: 2017-04-18

## 2017-04-18 NOTE — TELEPHONE ENCOUNTER
Patient phoned back to give explanation on WHY she needed to be seen: She fell down flight of stairs and is having leg/arm/neck pain

## 2017-04-18 NOTE — TELEPHONE ENCOUNTER
1:11 PM    Reason for Call: OVERBOOK    Patient is having the following symptoms: PT is requesting to be seen this Thursday, she prefers after 3 pm/ Personal    The patient is requesting an appointment for Thursday with Dr. Jones.    Was an appointment offered for this call? Yes    Preferred method for responding to this message: Telephone Call [phone]    If we cannot reach you directly, may we leave a detailed response at the number you provided? Yes    Can this message wait until your PCP/provider returns, if unavailable today? YES/ PT prefers a call back as soon as possible    Laura Downs

## 2017-04-21 ENCOUNTER — OFFICE VISIT (OUTPATIENT)
Dept: FAMILY MEDICINE | Facility: OTHER | Age: 27
End: 2017-04-21
Attending: FAMILY MEDICINE
Payer: COMMERCIAL

## 2017-04-21 VITALS
BODY MASS INDEX: 30.77 KG/M2 | OXYGEN SATURATION: 100 % | HEART RATE: 92 BPM | TEMPERATURE: 98.5 F | DIASTOLIC BLOOD PRESSURE: 74 MMHG | WEIGHT: 175 LBS | SYSTOLIC BLOOD PRESSURE: 123 MMHG | RESPIRATION RATE: 17 BRPM

## 2017-04-21 DIAGNOSIS — M76.31 IT BAND SYNDROME, RIGHT: ICD-10-CM

## 2017-04-21 DIAGNOSIS — M54.2 CERVICALGIA: Primary | ICD-10-CM

## 2017-04-21 PROCEDURE — 99213 OFFICE O/P EST LOW 20 MIN: CPT | Performed by: FAMILY MEDICINE

## 2017-04-21 ASSESSMENT — PATIENT HEALTH QUESTIONNAIRE - PHQ9: 5. POOR APPETITE OR OVEREATING: SEVERAL DAYS

## 2017-04-21 ASSESSMENT — ANXIETY QUESTIONNAIRES
2. NOT BEING ABLE TO STOP OR CONTROL WORRYING: NOT AT ALL
3. WORRYING TOO MUCH ABOUT DIFFERENT THINGS: NOT AT ALL
7. FEELING AFRAID AS IF SOMETHING AWFUL MIGHT HAPPEN: NOT AT ALL
6. BECOMING EASILY ANNOYED OR IRRITABLE: SEVERAL DAYS
IF YOU CHECKED OFF ANY PROBLEMS ON THIS QUESTIONNAIRE, HOW DIFFICULT HAVE THESE PROBLEMS MADE IT FOR YOU TO DO YOUR WORK, TAKE CARE OF THINGS AT HOME, OR GET ALONG WITH OTHER PEOPLE: NOT DIFFICULT AT ALL
5. BEING SO RESTLESS THAT IT IS HARD TO SIT STILL: NOT AT ALL
GAD7 TOTAL SCORE: 2
1. FEELING NERVOUS, ANXIOUS, OR ON EDGE: NOT AT ALL

## 2017-04-21 ASSESSMENT — PAIN SCALES - GENERAL: PAINLEVEL: NO PAIN (0)

## 2017-04-21 NOTE — PROGRESS NOTES
SUBJECTIVE:                                                    Radha Carl is a 26 year old female who presents to clinic today for the following health issues:        Musculoskeletal problem/pain      Duration: Monday    Description  Location:  Neck     Intensity:  mild    Accompanying signs and symptoms: right shoulder, neck, headaches     History  Previous similar problem: no   Previous evaluation:  none    Precipitating or alleviating factors:  Trauma or overuse: YES- patient fell down stairs   Aggravating factors include: none    Therapies tried and outcome: NSAID - IBU with some relief        Problem list and histories reviewed & adjusted, as indicated.  Additional history: as documented    Current Outpatient Prescriptions   Medication Sig Dispense Refill     lisdexamfetamine (VYVANSE) 20 MG capsule Take 1 capsule (20 mg) by mouth every morning 30 capsule 0     valACYclovir (VALTREX) 500 MG tablet TAKE ONE TABLET BY MOUTH TWICE DAILY 6 tablet 1     vitamin D (ERGOCALCIFEROL) 80274 UNIT capsule TAKE ONE CAPSULE BY MOUTH ONCE A WEEK 4 capsule 0     progesterone (PROMETRIUM) 100 MG capsule Take 3 capsules (300 mg) by mouth daily Starting on peak +3 for 10 days out of month 90 capsule 3     ferrous sulfate (IRON) 325 (65 FE) MG tablet Take 1 tablet by mouth every 48 hours       penciclovir (DENAVIR) 1 % cream Apply topically every 2 hours (while awake) 1.5 g 3     ACETYLCYSTEINE PO Take 600 mg by mouth 2 times daily       Pyridoxine HCl (VITAMIN  B-6) 500 MG TABS Take 1 tablet by mouth daily       blood glucose (ONE TOUCH ULTRA) test strip Use to test blood sugar 3 times daily or as directed. 1 Month 12     blood glucose monitoring (ONE TOUCH DELICA) lancets Use to test blood sugar 3 times daily or as directed. 1 Box 4     ranitidine (ZANTAC) 150 MG tablet Take 1 tablet by mouth as needed       acetaminophen (TYLENOL) 500 MG tablet Take 2 tablets by mouth. Every 6 hours as needed       ibuprofen  (ADVIL,MOTRIN) 200 MG tablet Take 2 tablets by mouth. Every 6 hours as needed with food       [DISCONTINUED] norethindrone-ethinyl estradiol (MICROGESTIN) 1-20 MG-MCG per tablet Take 1 tablet by mouth daily.       Labs reviewed in EPIC    ROS:  Constitutional, HEENT, cardiovascular, pulmonary, gi and gu systems are negative, except as otherwise noted.    OBJECTIVE:                                                    /74  Pulse 92  Temp 98.5  F (36.9  C)  Resp 17  Wt 175 lb (79.4 kg)  SpO2 100%  BMI 30.77 kg/m2  Body mass index is 30.77 kg/(m^2).  GENERAL APPEARANCE: healthy, alert and no distress  RESP: lungs clear to auscultation - no rales, rhonchi or wheezes  CV: regular rates and rhythm, normal S1 S2, no S3 or S4 and no murmur, click or rub  PSYCH: mentation appears normal and affect normal/bright       ASSESSMENT/PLAN:                                                    1. Cervicalgia  S/p fall  - PHYSICAL THERAPY REFERRAL    2. IT band syndrome, right  Chronic, feels like her right side is always weak  - PHYSICAL THERAPY REFERRAL    Patient was agreeable to this plan and had no further questions.  See Patient Instructions    Muriel Jones MD  [facility]

## 2017-04-21 NOTE — MR AVS SNAPSHOT
After Visit Summary   4/21/2017    Radha Carl    MRN: [id number]           Patient Information     Date Of Birth          1990        Visit Information        Provider Department      4/21/2017 2:45 PM Muriel Jones MD [facility]        Today's Diagnoses     Cervicalgia    -  1    IT band syndrome, right           Follow-ups after your visit        Additional Services     PHYSICAL THERAPY REFERRAL       *This therapy referral will be filtered to a centralized scheduling office at [facility] and the patient will receive a call to schedule an appointment at a [facility] location most convenient for them. *     [facility] provides Physical Therapy evaluation and treatment and many specialty services across the [facility] system.  If requesting a specialty program, please choose from the list below.    If you have not heard from the scheduling office within 2 business days, please call [phone] for all locations, with the exception of [place], please call [phone].  Treatment: Evaluation & Treatment  Special Instructions/Modalities:   Special Programs:   Prefers [facility]    Please be aware that coverage of these services is subject to the terms and limitations of your health insurance plan.  Call member services at your health plan with any benefit or coverage questions.      **Note to Provider:  If you are referring outside of [facility] for the therapy appointment, please list the name of the location in the  special instructions  above, print the referral and give to the patient to schedule the appointment.                  Who to contact     If you have questions or need follow up information about today's clinic visit or your schedule please contact [facility] directly at [phone].  Normal or non-critical lab and imaging results will be communicated to you by MyChart, letter or phone within 4 business days  after the clinic has received the results. If you do not hear from us within 7 days, please contact the clinic through [facility] or phone. If you have a critical or abnormal lab result, we will notify you by phone as soon as possible.  Submit refill requests through [facility] or call your pharmacy and they will forward the refill request to us. Please allow 3 business days for your refill to be completed.          Additional Information About Your Visit        [employer] Information     [facility] gives you secure access to your electronic health record. If you see a primary care provider, you can also send messages to your care team and make appointments. If you have questions, please call your primary care clinic.  If you do not have a primary care provider, please call [phone] and they will assist you.        Care EveryWhere ID     This is your Care EveryWhere ID. This could be used by other organizations to access your [place] medical records  [id number]        Your Vitals Were     Pulse Temperature Respirations Pulse Oximetry BMI (Body Mass Index)       92 98.5  F (36.9  C) 17 100% 30.77 kg/m2        Blood Pressure from Last 3 Encounters:   04/21/17 123/74   03/14/17 122/76   03/02/17 120/78    Weight from Last 3 Encounters:   04/21/17 175 lb (79.4 kg)   03/14/17 176 lb (79.8 kg)   03/02/17 174 lb (78.9 kg)              We Performed the Following     PHYSICAL THERAPY REFERRAL        Primary Care Provider Office Phone # Fax #    Muriel Jones [phone]       [facility] [place] [address] [place], [place] [address]  [facility] [id number]        Thank you!     [facility]  [facility]. Our goal is always to provide you with excellent care. Hearing back from our patients is one way we can continue to improve our services. Please take a few minutes to complete the written survey that you may receive in the mail after your visit with us. Thank you!             Your Updated  Medication List - Protect others around you: Learn how to safely use, store and throw away your medicines at www.disposemymeds.org.          This list is accurate as of: 4/21/17  4:11 PM.  Always use your most recent med list.                   Brand Name Dispense Instructions for use    ACETYLCYSTEINE PO      Take 600 mg by mouth 2 times daily       blood glucose monitoring lancets     1 Box    Use to test blood sugar 3 times daily or as directed.       blood glucose monitoring test strip    ONE TOUCH ULTRA    1 Month    Use to test blood sugar 3 times daily or as directed.       ferrous sulfate 325 (65 FE) MG tablet    IRON     Take 1 tablet by mouth every 48 hours       ibuprofen 200 MG tablet    ADVIL/MOTRIN     Take 2 tablets by mouth. Every 6 hours as needed with food       lisdexamfetamine 20 MG capsule    VYVANSE    30 capsule    Take 1 capsule (20 mg) by mouth every morning       penciclovir 1 % cream    DENAVIR    1.5 g    Apply topically every 2 hours (while awake)       progesterone 100 MG capsule    PROMETRIUM    90 capsule    Take 3 capsules (300 mg) by mouth daily Starting on peak +3 for 10 days out of month       TYLENOL 500 MG tablet   Generic drug:  acetaminophen      Take 2 tablets by mouth. Every 6 hours as needed       valACYclovir 500 MG tablet    VALTREX    6 tablet    TAKE ONE TABLET BY MOUTH TWICE DAILY       vitamin  B-6 500 MG Tabs      Take 1 tablet by mouth daily       vitamin D 17446 UNIT capsule    ERGOCALCIFEROL    4 capsule    TAKE ONE CAPSULE BY MOUTH ONCE A WEEK       ZANTAC 150 MG tablet   Generic drug:  ranitidine      Take 1 tablet by mouth as needed

## 2017-04-21 NOTE — NURSING NOTE
"Chief Complaint   Patient presents with     Neck Pain       Initial /74  Pulse 92  Temp 98.5  F (36.9  C)  Resp 17  Wt 175 lb (79.4 kg)  SpO2 100%  BMI 30.77 kg/m2 Estimated body mass index is 30.77 kg/(m^2) as calculated from the following:    Height as of 3/2/17: 5' 3.24\" (1.606 m).    Weight as of this encounter: 175 lb (79.4 kg).  Medication Reconciliation: complete  "

## 2017-04-22 ASSESSMENT — ANXIETY QUESTIONNAIRES: GAD7 TOTAL SCORE: 2

## 2017-04-22 ASSESSMENT — PATIENT HEALTH QUESTIONNAIRE - PHQ9: SUM OF ALL RESPONSES TO PHQ QUESTIONS 1-9: 4

## 2017-05-02 ENCOUNTER — HOSPITAL ENCOUNTER (OUTPATIENT)
Dept: PHYSICAL THERAPY | Facility: HOSPITAL | Age: 27
Setting detail: THERAPIES SERIES
End: 2017-05-02
Attending: FAMILY MEDICINE
Payer: COMMERCIAL

## 2017-05-02 PROCEDURE — 97530 THERAPEUTIC ACTIVITIES: CPT | Mod: GP

## 2017-05-02 PROCEDURE — 97140 MANUAL THERAPY 1/> REGIONS: CPT | Mod: GP

## 2017-05-02 PROCEDURE — 40000718 ZZHC STATISTIC PT DEPARTMENT ORTHO VISIT

## 2017-05-02 PROCEDURE — 97161 PT EVAL LOW COMPLEX 20 MIN: CPT | Mod: GP

## 2017-05-02 NOTE — PROGRESS NOTES
05/02/17 1300   Neck Disability Index (  Ben VAZQUEZ. and Zachary DOMINIQUE. 1991. All rights reserved.; used with permission)   SECTION 1 - PAIN INTENSITY 3   SECTION 2 - PERSONAL CARE 0   SECTION 3 - LIFTING 0   SECTION 4 - READING 2   SECTION 5 - HEADACHES 3   SECTION 6 - CONCENTRATION 1   SECTION 7 - WORK 2   SECTION 8 - DRIVING 2   SECTION 9 - SLEEPING 2   SECTION 10 - RECREATION 1   Count 10   Sum 16   Raw Score: /50 32   Neck Disability Index Score: (%) 32 %

## 2017-05-03 NOTE — PROGRESS NOTES
05/02/17 1107   General Information   Type of Visit Initial OP Ortho PT Evaluation   Start of Care Date 05/02/17   Referring Physician Dr. Jones   Patient/Family Goals Statement Decreased cervical pain, decreased HA, increased R LE strength   Orders Evaluate and Treat   Date of Order 04/21/17   Insurance Type [employer]   Insurance Comments/Visits Authorized 60   Medical Diagnosis Cervicalgia, IT Band Syndrome, R   Surgical/Medical history reviewed Yes   Precautions/Limitations no known precautions/limitations   General Information Comments PMH: Type II Diabetes   Body Part(s)   Body Part(s) Cervical Spine;Hip   Presentation and Etiology   Pertinent history of current problem (include personal factors and/or comorbidities that impact the POC) Patient had fall several weeks ago on stairs and fell landing directly on gluts. Had increased pain on R side of neck and into R UE from that fall and pain has continued. Mainly pain on that R side, but occasional mild pain on L of neck, but Right sided symptoms is primary region of complaint. Is getting intermittent headaches on R side in temporal region sometimes including subocciput region. Patient was getting dizziness originally with HA and this has now resolved. Patient states that R LE is giving patient problems. Patient states she feels less stable and increased weakness on R leg. No significant trauma, but patient had fall several years ago with injury to ankle. Patient is in school and is reading and studying often in cervial flexion and this is aggravating. States that she has noted R LE weakness for several years. Does not feel as confident on that side.    Impairments A. Pain;F. Decreased strength and endurance;H. Impaired gait;G. Impaired balance   Functional Limitations perform activities of daily living;perform required work activities;perform desired leisure / sports activities   Symptom Location Cervical region, R upper shoulder, R LE   How/Where did it  occur With a fall   Onset date of current episode/exacerbation (several weeks ago)   Chronicity New   Pain rating (0-10 point scale) Other   Best (/10) 3   Worst (/10) 8   Pain rating comment 3-4/10 on average   Pain quality B. Dull;A. Sharp   Frequency of pain/symptoms C. With activity   Pain/symptoms are: Worse during the night   Pain/symptoms exacerbated by G. Certain positions  (school tasks)   Pain/symptoms eased by I. OTC medication(s)   Progression of symptoms since onset: Unchanged   Prior Level of Function   Prior Level of Function-Mobility Was not having cervical pain prior to fall. Had been dealing with R LE weakness for past several years. Mainly functional weakness versus true weakness pattern.    Current Level of Function   Patient role/employment history A. Employed   Employment Comments  at clinic, going to school for nursing   Fall Risk Screen   Per patient - Fall 2 or more times in past year? No   Per patient - Fall with injury in past year? Yes   Is patient a fall risk? No   Knee Objective Findings   Side (if bilateral, select both right and left) Right   Gait/Locomotion No obvious gait impairment   Right Knee Flexion Strength 4+/5   Right Knee Extension Strength 4+/5   Right Hip Abduction Strength 4+/5   Knee ROM Comment Full motion present.    Right Hamstring Flexibility No significant tension noted.    Knee Flexibility Comments Patient positive for hip adduction drop test. Positive SLR for HS length. Negative Forward flexion, able to touch hands to floor.    Knee Special Test Comments Weakness present at bilateral hip ER with R hip weakness greater than L. R hip IR weakness also present.    Hip Objective Findings   Side (if bilateral, select both right and left) Right   Hip Flexibility Comments No significant limitation of hip mobility other than positive adduction drop test bilaterally.    CARLOS Test Negative   FADIR Test Negative   Accessory Motion/Joint Mobility WNL    Obers/ITB Flexibility Positive bilaterally.    Right Hamstring Flexibility No significant tension appreciated with no difference between R and L LE.    Cervical Spine   Observation No acute distress   Integumentary  Red response at scalenes   Posture Mild forward head and forward rounded shoulders. Patient is a student so is often in position of cervical flexion.    Cervical Flexion ROM Full   Cervical Extension ROM Full   Cervical Right Side Bending ROM Full   Cervical Left Side Bending ROM 70% limited by R cervical muscle tension   Cervical Right Rotation ROM Full   Cervical Left Rotation ROM 80% limited by R cervical muscle tension   Thoracic Flexion ROM Full   Thoracic Extension ROM Full   Shoulder AROM Screen Full painfree motion present.    Cervical/Thoracic/Shoulder ROM Comments Limitation present with cervical L sided motions due to muscle tension and pain on R. Full shoulder motion prsent.    Shoulder Shrug (C2-C4) Strength 5   Shoulder Abd (C5) Strength 5   Shoulder Add (C7) Strength 5   Shoulder ER (C5, C6) Strength 5   Shoulder IR (C5, C6) Strength 5   Elbow Flexion (C5, C6) Strength 5   Elbow Extension (C7) Strength 5   Wrist Extension (C6) Strength 4/5 on R, 5/5 on L   Wrist Flexion (C7) Strength 5   Thumb Abd (C8) Strength 5   5th Finger Add (T1) Strength 5   Shoulder/Wrist/Hand Strength Comments Mild weakness present at wrist extension on R compared to L. All other motions full strength.    Upper Trapezius Flexibility Tension present R greater than L   Levator Scapula Flexibility Tension present on R   Scalene Flexibility Significant tension present at R scalenes, red response noted after palpation to R scalenes   Pectoralis Minor Flexibility No significant tension noted.    Segmental Mobility-Cervical No significant hypomobility appreciated today.    Palpation Tenderness and red response with palpation to scalenes. Tenderness at R cervical paraspinals and suboccipitals with palpation. Point  tenderness to palpation to R UT.    UE Neural Tension UE Neural Tension Tests   ULTT II (Median and Musculocutaneous and Axillary) Negative   ULTT III (Radial) Negative   ULTT IV (Ulnar) Negative   Planned Therapy Interventions   Planned Therapy Interventions strengthening;stretching;ROM;balance training   Planned Therapy Interventions Comment Ther ex and manual to decrease cervical pain, increase mobility and restore motion. Strengthening and functional strengthening to LEs.    Planned Modality Interventions   Planned Modality Interventions Ultrasound;Traction   Planned Modality Interventions Comments as needed.    Clinical Impression   Criteria for Skilled Therapeutic Interventions Met yes, treatment indicated   PT Diagnosis Patient presents with subacute cervical strain and long standing history of R LE functional weakness.    Influenced by the following impairments Cervical tension, pain, muscle tension, functional weakness and decreaed dynamic stability at R LE.    Functional limitations due to impairments Pain with studying tasks, HA with sleeping, Cervical tension with work tasks, R LE instability with daily activities with falls history.    Clinical Presentation Stable/Uncomplicated   Clinical Presentation Rationale CLinical judgement   Clinical Decision Making (Complexity) Low complexity   Therapy Frequency (1-2x/week)   Predicted Duration of Therapy Intervention (days/wks) 8 weeks   Risk & Benefits of therapy have been explained Yes   Patient, Family & other staff in agreement with plan of care Yes   Clinical Impression Comments Patient presents with subacute cervical strain from recent fall resulting in muscle tension, pain and HA. Patient also describes decreased functional strength and stability of R LE. This has been something that she has been dealing with and noticing for past several years. Patient does have falls history in the past several years as well. With most recent on stairs resulting in  cervical strain. Patient does not have buckling of R LE, but feels less stable and weaker. TIme not permitting for functional LE screen. Will perform screens at next session. Patient tests at 5/5 strength for major muscle groups but may have functional weakness or decreased endurance between the LEs. PMH: lists MS as diagnosis, but patient does not mention on intake. Will ask additional questions at next session.    Education Assessment   Barriers to Learning No barriers   ORTHO GOALS   PT Ortho Eval Goals 1;3;2;4   Ortho Goal 1   Goal Identifier STG 1   Goal Description Patient will be independent and compliant with HEP.    Target Date 05/17/17   Ortho Goal 2   Goal Identifier LTG 1   Goal Description Patient will be able to perform essential work duties and study tasks without limitation of cervical pain or HA during or after.    Target Date 06/28/17   Ortho Goal 3   Goal Identifier LTG 2   Goal Description Patient will be able to consistently sleep through out the night without limitation from HA or cervical pain.    Target Date 06/28/17   Ortho Goal 4   Goal Identifier LTG 3   Goal Description Patient will have full and compareable neuromuscular control and dynamic stability of R LE compared to L allowing patient to have full confidence and strength in R LE during functional activities including bending and squatting.    Target Date 06/28/17   Total Evaluation Time   Total Evaluation Time 30   I certify the need for these services furnished under this plan of treatment and while under my care. (Physician co-signature of this document indicates review and certification of the therapy plan).

## 2017-05-18 ENCOUNTER — TELEPHONE (OUTPATIENT)
Dept: FAMILY MEDICINE | Facility: OTHER | Age: 27
End: 2017-05-18

## 2017-05-18 ENCOUNTER — MYC MEDICAL ADVICE (OUTPATIENT)
Dept: FAMILY MEDICINE | Facility: OTHER | Age: 27
End: 2017-05-18

## 2017-05-18 NOTE — TELEPHONE ENCOUNTER
11:15 AM    Reason for Call: OVERBOOK    Patient is having the following symptoms: bleeding mole for 1 day.    The patient is requesting an appointment for Friday, May19th am possible, has other appt at 10:00 with  Dr Jones.    Was an appointment offered for this call? No    Preferred method for responding to this message: Telephone Call    If we cannot reach you directly, may we leave a detailed response at the number you provided? Yes    Can this message wait until your PCP/provider returns, if unavailable today? Not applicable,     Lynda Rai

## 2017-05-19 ENCOUNTER — OFFICE VISIT (OUTPATIENT)
Dept: FAMILY MEDICINE | Facility: OTHER | Age: 27
End: 2017-05-19
Attending: FAMILY MEDICINE
Payer: COMMERCIAL

## 2017-05-19 ENCOUNTER — HOSPITAL ENCOUNTER (OUTPATIENT)
Dept: PHYSICAL THERAPY | Facility: HOSPITAL | Age: 27
Setting detail: THERAPIES SERIES
End: 2017-05-19
Attending: FAMILY MEDICINE
Payer: COMMERCIAL

## 2017-05-19 VITALS
SYSTOLIC BLOOD PRESSURE: 134 MMHG | HEIGHT: 63 IN | BODY MASS INDEX: 30.3 KG/M2 | WEIGHT: 171 LBS | DIASTOLIC BLOOD PRESSURE: 84 MMHG | HEART RATE: 64 BPM

## 2017-05-19 DIAGNOSIS — D22.9 ATYPICAL NEVI: Primary | ICD-10-CM

## 2017-05-19 PROCEDURE — 88305 TISSUE EXAM BY PATHOLOGIST: CPT | Mod: TC | Performed by: FAMILY MEDICINE

## 2017-05-19 PROCEDURE — 97140 MANUAL THERAPY 1/> REGIONS: CPT | Mod: GP

## 2017-05-19 PROCEDURE — 40000718 ZZHC STATISTIC PT DEPARTMENT ORTHO VISIT

## 2017-05-19 PROCEDURE — 11300 SHAVE SKIN LESION 0.5 CM/<: CPT | Performed by: FAMILY MEDICINE

## 2017-05-19 PROCEDURE — 97110 THERAPEUTIC EXERCISES: CPT | Mod: GP

## 2017-05-19 PROCEDURE — 97750 PHYSICAL PERFORMANCE TEST: CPT | Mod: GP

## 2017-05-19 ASSESSMENT — PAIN SCALES - GENERAL: PAINLEVEL: NO PAIN (0)

## 2017-05-19 NOTE — PROGRESS NOTES
SUBJECTIVE:                                                    Radha Carl is a 26 year old female who presents to clinic today for the following health issues:      Skin lesion      Duration: yesterday     Description (location/character/radiation): right upper arm    Intensity:  mild    Accompanying signs and symptoms: bleeding    History (similar episodes/previous evaluation): None    Precipitating or alleviating factors: None    Therapies tried and outcome: None     Has had numerous lesions that she picks at and they bleed, she is concerned about cancer    Problem list and histories reviewed & adjusted, as indicated.  Additional history: as documented    Current Outpatient Prescriptions   Medication Sig Dispense Refill     lisdexamfetamine (VYVANSE) 20 MG capsule Take 1 capsule (20 mg) by mouth every morning 30 capsule 0     valACYclovir (VALTREX) 500 MG tablet TAKE ONE TABLET BY MOUTH TWICE DAILY 6 tablet 1     vitamin D (ERGOCALCIFEROL) 16035 UNIT capsule TAKE ONE CAPSULE BY MOUTH ONCE A WEEK 4 capsule 0     progesterone (PROMETRIUM) 100 MG capsule Take 3 capsules (300 mg) by mouth daily Starting on peak +3 for 10 days out of month 90 capsule 3     penciclovir (DENAVIR) 1 % cream Apply topically every 2 hours (while awake) 1.5 g 3     ACETYLCYSTEINE PO Take 600 mg by mouth 2 times daily       Pyridoxine HCl (VITAMIN  B-6) 500 MG TABS Take 1 tablet by mouth daily       blood glucose (ONE TOUCH ULTRA) test strip Use to test blood sugar 3 times daily or as directed. 1 Month 12     blood glucose monitoring (ONE TOUCH DELICA) lancets Use to test blood sugar 3 times daily or as directed. 1 Box 4     ranitidine (ZANTAC) 150 MG tablet Take 1 tablet by mouth as needed       [DISCONTINUED] norethindrone-ethinyl estradiol (MICROGESTIN) 1-20 MG-MCG per tablet Take 1 tablet by mouth daily.       acetaminophen (TYLENOL) 500 MG tablet Take 2 tablets by mouth. Every 6 hours as needed       ibuprofen (ADVIL,MOTRIN)  "200 MG tablet Take 2 tablets by mouth. Every 6 hours as needed with food       Labs reviewed in EPIC    ROS:  Constitutional, HEENT, cardiovascular, pulmonary, gi and gu systems are negative, except as otherwise noted.    OBJECTIVE:                                                    /84  Pulse 64  Ht 5' 3\" (1.6 m)  Wt 171 lb (77.6 kg)  BMI 30.29 kg/m2  Body mass index is 30.29 kg/(m^2).  GENERAL APPEARANCE: healthy, alert and no distress  SKIN: macule - scattered and papule - scattered  PSYCH: mentation appears normal and affect normal/bright       ASSESSMENT/PLAN:                                                    1. Atypical nevi  Will remove today  - SURGICAL PATHOLOGY EXAM [[id number]]    Patient was agreeable to this plan and had no further questions.  See Patient Instructions    Muriel Jones MD  [facility] [place]      Subjective: Radha Carl a 26 year old female who presents today for lesion removal. The lesion(s) is/are located on the abdomen, arms and back, number 5. She The patient reports the lesion is asymptomatic and denies other significant symptoms on ROS. Medications reviewed.    Pause for the cause has been completed prior to the prceedure.   1. Radha was identified by both name and date of birth   2. The correct site was identified   3. Site was marked by provider    4. Written informed consent correct and signed or verbal authorization  to proceed was obtained   5. Verifed necessary supplies, equipment, and diagnostics are available    6. Time out was performed immediately prior to procedure    Objective: The lesion(s) is/are of the above mentioned size and location and is/are typical. The area was prepped and appropriately anesthetized. Using the usual technique, shave excision was performed. An appropriate dressing was applied. The procedure was well tolerated and without complications.   No silver nitrate available so portable cautery was utilized    Assessment: " atypical nevus    Plan: Follow up: The specimen is labelled and sent to pathology for evaluation., The patient may return prn.. Wound care instructions provided.  Patient was instructed to be alert for any signs of cutaneous infection.

## 2017-05-19 NOTE — NURSING NOTE
"Chief Complaint   Patient presents with     Lesion       Initial /84  Pulse 64  Ht 5' 3\" (1.6 m)  Wt 171 lb (77.6 kg)  BMI 30.29 kg/m2 Estimated body mass index is 30.29 kg/(m^2) as calculated from the following:    Height as of this encounter: 5' 3\" (1.6 m).    Weight as of this encounter: 171 lb (77.6 kg).  Medication Reconciliation: complete   Tanna Paredes    "

## 2017-05-19 NOTE — MR AVS SNAPSHOT
After Visit Summary   5/19/2017    Radha Carl    MRN: [id number]           Patient Information     Date Of Birth          1990        Visit Information        Provider Department      5/19/2017 11:30 AM Muriel Jones MD [facility]        Today's Diagnoses     Atypical nevi    -  1       Follow-ups after your visit        Your next 10 appointments already scheduled     May 26, 2017  1:00 PM CDT   Treatment 60 with Elizabeth Norton, PT   [facility] ([facility] )    [address] [place] [id number]   [phone]              Who to contact     If you have questions or need follow up information about today's clinic visit or your schedule please contact [facility] directly at [phone].  Normal or non-critical lab and imaging results will be communicated to you by MyChart, letter or phone within 4 business days after the clinic has received the results. If you do not hear from us within 7 days, please contact the clinic through MyChart or phone. If you have a critical or abnormal lab result, we will notify you by phone as soon as possible.  Submit refill requests through [employer] or call your pharmacy and they will forward the refill request to us. Please allow 3 business days for your refill to be completed.          Additional Information About Your Visit        MyChart Information     [employer] gives you secure access to your electronic health record. If you see a primary care provider, you can also send messages to your care team and make appointments. If you have questions, please call your primary care clinic.  If you do not have a primary care provider, please call [phone] and they will assist you.        Care EveryWhere ID     This is your Care EveryWhere ID. This could be used by other organizations to access your [facility] medical records  [id number]        Your Vitals Were     Pulse Height BMI (Body Mass Index)     "         64 5' 3\" (1.6 m) 30.29 kg/m2          Blood Pressure from Last 3 Encounters:   05/19/17 134/84   04/21/17 123/74   03/14/17 122/76    Weight from Last 3 Encounters:   05/19/17 171 lb (77.6 kg)   04/21/17 175 lb (79.4 kg)   03/14/17 176 lb (79.8 kg)              We Performed the Following     SURGICAL PATHOLOGY EXAM [[id number]]        Primary Care Provider Office Phone # Fax #    Muriel Jones [phone]       [facility] [place] [address] [facility]  [place] [address]        Thank you!     [facility] you for choosing [facility]  for your care. Our goal is always to provide you with excellent care. Hearing back from our patients is one way we can continue to improve our services. Please take a few minutes to complete the written survey that you may receive in the mail after your visit with us. Thank you!             Your Updated Medication List - Protect others around you: Learn how to safely use, store and throw away your medicines at www.disposemymeds.org.          This list is accurate as of: 5/19/17  5:54 PM.  Always use your most recent med list.                   Brand Name Dispense Instructions for use    ACETYLCYSTEINE PO      Take 600 mg by mouth 2 times daily       blood glucose monitoring lancets     1 Box    Use to test blood sugar 3 times daily or as directed.       blood glucose monitoring test strip    ONE TOUCH ULTRA    1 Month    Use to test blood sugar 3 times daily or as directed.       ibuprofen 200 MG tablet    ADVIL/MOTRIN     Take 2 tablets by mouth. Every 6 hours as needed with food       lisdexamfetamine 20 MG capsule    VYVANSE    30 capsule    Take 1 capsule (20 mg) by mouth every morning       penciclovir 1 % cream    DENAVIR    1.5 g    Apply topically every 2 hours (while awake)       progesterone 100 MG capsule    PROMETRIUM    90 capsule    Take 3 capsules (300 mg) by mouth daily Starting on peak +3 for 10 days out of month       TYLENOL 500 MG " tablet   Generic drug:  acetaminophen      Take 2 tablets by mouth. Every 6 hours as needed       valACYclovir 500 MG tablet    VALTREX    6 tablet    TAKE ONE TABLET BY MOUTH TWICE DAILY       vitamin  B-6 500 MG Tabs      Take 1 tablet by mouth daily       vitamin D 31799 UNIT capsule    ERGOCALCIFEROL    4 capsule    TAKE ONE CAPSULE BY MOUTH ONCE A WEEK       ZANTAC 150 MG tablet   Generic drug:  ranitidine      Take 1 tablet by mouth as needed

## 2017-05-22 DIAGNOSIS — F90.0 ATTENTION DEFICIT HYPERACTIVITY DISORDER (ADHD), PREDOMINANTLY INATTENTIVE TYPE: ICD-10-CM

## 2017-05-22 RX ORDER — LISDEXAMFETAMINE DIMESYLATE 20 MG/1
20 CAPSULE ORAL EVERY MORNING
Qty: 30 CAPSULE | Refills: 0 | Status: SHIPPED | OUTPATIENT
Start: 2017-05-22 | End: 2018-06-25

## 2017-05-22 NOTE — TELEPHONE ENCOUNTER
Pt notified that the written RX is ready at the [facility]  registration to be picked up.

## 2017-05-24 DIAGNOSIS — B00.1 COLD SORE: ICD-10-CM

## 2017-05-24 LAB — COPATH REPORT: NORMAL

## 2017-05-26 ENCOUNTER — HOSPITAL ENCOUNTER (OUTPATIENT)
Dept: PHYSICAL THERAPY | Facility: HOSPITAL | Age: 27
Setting detail: THERAPIES SERIES
End: 2017-05-26
Attending: FAMILY MEDICINE
Payer: COMMERCIAL

## 2017-05-26 PROCEDURE — 97110 THERAPEUTIC EXERCISES: CPT | Mod: GP

## 2017-05-26 PROCEDURE — 40000718 ZZHC STATISTIC PT DEPARTMENT ORTHO VISIT

## 2017-05-26 RX ORDER — VALACYCLOVIR HYDROCHLORIDE 500 MG/1
TABLET, FILM COATED ORAL
Qty: 6 TABLET | Refills: 0 | Status: SHIPPED | OUTPATIENT
Start: 2017-05-26 | End: 2017-06-07

## 2017-05-26 NOTE — TELEPHONE ENCOUNTER
Valtrex      Last Written Prescription Date: 4/11/17  Last Fill Quantity: 6,  # refills: 1   Last Office Visit with [facility], P or [facility] prescribing provider: 5/19/17

## 2017-05-31 ENCOUNTER — HOSPITAL ENCOUNTER (OUTPATIENT)
Dept: PHYSICAL THERAPY | Facility: HOSPITAL | Age: 27
Setting detail: THERAPIES SERIES
End: 2017-05-31
Attending: FAMILY MEDICINE
Payer: COMMERCIAL

## 2017-05-31 PROCEDURE — 40000718 ZZHC STATISTIC PT DEPARTMENT ORTHO VISIT

## 2017-05-31 PROCEDURE — 97110 THERAPEUTIC EXERCISES: CPT | Mod: GP

## 2017-06-01 NOTE — PROGRESS NOTES
06/01/17 1200   Neck Disability Index (  [employer]ZQUE[employer]. and [employer]INI[employer]. 1991. All rights reserved.; used with permission)   SECTION 1 - PAIN INTENSITY 2   SECTION 2 - PERSONAL CARE 0   SECTION 3 - LIFTING 1   SECTION 4 - READING 1   SECTION 5 - HEADACHES 2   SECTION 6 - CONCENTRATION 1   SECTION 7 - WORK 1   SECTION 8 - DRIVING 1   SECTION 9 - SLEEPING 3   SECTION 10 - RECREATION 2   Count 10   Sum 14   Raw Score: /50 28   Neck Disability Index Score: (%) 28 %

## 2017-06-05 ENCOUNTER — HOSPITAL ENCOUNTER (OUTPATIENT)
Dept: PHYSICAL THERAPY | Facility: HOSPITAL | Age: 27
Setting detail: THERAPIES SERIES
End: 2017-06-05
Attending: FAMILY MEDICINE
Payer: COMMERCIAL

## 2017-06-05 PROCEDURE — 40000718 ZZHC STATISTIC PT DEPARTMENT ORTHO VISIT

## 2017-06-05 PROCEDURE — 97110 THERAPEUTIC EXERCISES: CPT | Mod: GP

## 2017-06-07 ENCOUNTER — OFFICE VISIT (OUTPATIENT)
Dept: FAMILY MEDICINE | Facility: OTHER | Age: 27
End: 2017-06-07
Attending: FAMILY MEDICINE
Payer: COMMERCIAL

## 2017-06-07 ENCOUNTER — HOSPITAL ENCOUNTER (OUTPATIENT)
Dept: PHYSICAL THERAPY | Facility: HOSPITAL | Age: 27
Setting detail: THERAPIES SERIES
End: 2017-06-07
Attending: FAMILY MEDICINE
Payer: COMMERCIAL

## 2017-06-07 VITALS
DIASTOLIC BLOOD PRESSURE: 76 MMHG | HEIGHT: 63 IN | WEIGHT: 171 LBS | TEMPERATURE: 97.9 F | BODY MASS INDEX: 30.3 KG/M2 | SYSTOLIC BLOOD PRESSURE: 128 MMHG | HEART RATE: 84 BPM

## 2017-06-07 DIAGNOSIS — D48.5 NEOPLASM OF UNCERTAIN BEHAVIOR OF SKIN: Primary | ICD-10-CM

## 2017-06-07 DIAGNOSIS — B00.1 COLD SORE: ICD-10-CM

## 2017-06-07 PROCEDURE — 11100 HC BIOPSY SKIN/SUBQ/MUC MEM, SINGLE LESION: CPT | Performed by: FAMILY MEDICINE

## 2017-06-07 PROCEDURE — 97110 THERAPEUTIC EXERCISES: CPT | Mod: GP

## 2017-06-07 PROCEDURE — 88305 TISSUE EXAM BY PATHOLOGIST: CPT | Mod: TC | Performed by: FAMILY MEDICINE

## 2017-06-07 PROCEDURE — 40000718 ZZHC STATISTIC PT DEPARTMENT ORTHO VISIT

## 2017-06-07 RX ORDER — VALACYCLOVIR HYDROCHLORIDE 500 MG/1
500 TABLET, FILM COATED ORAL 2 TIMES DAILY
Qty: 30 TABLET | Refills: 3 | Status: SHIPPED | OUTPATIENT
Start: 2017-06-07 | End: 2018-06-13

## 2017-06-07 ASSESSMENT — PAIN SCALES - GENERAL: PAINLEVEL: NO PAIN (0)

## 2017-06-07 NOTE — PROGRESS NOTES
SUBJECTIVE:                                                    Radha Carl is a 26 year old female who presents to clinic today for the following health issues:      Mole Removal      Duration: Unknown    Description (location/character/radiation): The mole is located on her left chest/rib area---surgical pathology showed mild atypia. She presents back for further excision    Intensity:  mild    Accompanying signs and symptoms: None    History (similar episodes/previous evaluation): None    Precipitating or alleviating factors: None    Therapies tried and outcome: None     Wants refill on valtrex    Problem list and histories reviewed & adjusted, as indicated.  Additional history: as documented    Current Outpatient Prescriptions   Medication Sig Dispense Refill     valACYclovir (VALTREX) 500 MG tablet Take 1 tablet (500 mg) by mouth 2 times daily With active lesion 30 tablet 3     lisdexamfetamine (VYVANSE) 20 MG capsule Take 1 capsule (20 mg) by mouth every morning 30 capsule 0     vitamin D (ERGOCALCIFEROL) 30839 UNIT capsule TAKE ONE CAPSULE BY MOUTH ONCE A WEEK 4 capsule 0     progesterone (PROMETRIUM) 100 MG capsule Take 3 capsules (300 mg) by mouth daily Starting on peak +3 for 10 days out of month 90 capsule 3     penciclovir (DENAVIR) 1 % cream Apply topically every 2 hours (while awake) 1.5 g 3     ACETYLCYSTEINE PO Take 600 mg by mouth 2 times daily       Pyridoxine HCl (VITAMIN  B-6) 500 MG TABS Take 1 tablet by mouth daily       blood glucose (ONE TOUCH ULTRA) test strip Use to test blood sugar 3 times daily or as directed. 1 Month 12     blood glucose monitoring (ONE TOUCH DELICA) lancets Use to test blood sugar 3 times daily or as directed. 1 Box 4     ranitidine (ZANTAC) 150 MG tablet Take 1 tablet by mouth as needed       acetaminophen (TYLENOL) 500 MG tablet Take 2 tablets by mouth. Every 6 hours as needed       ibuprofen (ADVIL,MOTRIN) 200 MG tablet Take 2 tablets by mouth. Every 6 hours  "as needed with food       [DISCONTINUED] valACYclovir (VALTREX) 500 MG tablet TAKE ONE TABLET BY MOUTH TWICE DAILY 6 tablet 0     [DISCONTINUED] norethindrone-ethinyl estradiol (MICROGESTIN) 1-20 MG-MCG per tablet Take 1 tablet by mouth daily.       Labs reviewed in EPIC    Reviewed and updated as needed this visit by clinical staff       Reviewed and updated as needed this visit by Provider         ROS:  Constitutional, HEENT, cardiovascular, pulmonary, gi and gu systems are negative, except as otherwise noted.    OBJECTIVE:                                                    /76 (Cuff Size: Adult Regular)  Pulse 84  Temp 97.9  F (36.6  C) (Tympanic)  Ht 5' 3.25\" (1.607 m)  Wt 171 lb (77.6 kg)  BMI 30.05 kg/m2  Body mass index is 30.05 kg/(m^2).  GENERAL APPEARANCE: healthy, alert and no distress  SKIN: scar - left flank/rib/back  PSYCH: mentation appears normal and affect normal/bright       ASSESSMENT/PLAN:                                                    1. Cold sore  rx sent  - valACYclovir (VALTREX) 500 MG tablet; Take 1 tablet (500 mg) by mouth 2 times daily With active lesion  Dispense: 30 tablet; Refill: 3    See procedure note    Patient was agreeable to this plan and had no further questions.  See Patient Instructions    Muriel Jones MD  [facility] [place]    Subjective: Radha Carl a 26 year old female who presents today for lesion removal. The lesion(s) is/are located on the left flank/rib/back, number 1 and measures 0.6cm She The patient reports the lesion is positive for atypical cells and denies other significant symptoms on ROS. Medications reviewed.    Pause for the cause has been completed prior to the prceedure.   1. Radha was identified by both name and date of birth   2. The correct site was identified   3. Site was marked by provider    4. Written informed consent correct and signed or verbal authorization  to proceed was obtained   5. Verifed necessary " supplies, equipment, and diagnostics are available    6. Time out was performed immediately prior to procedure    Objective: The lesion(s) is/are of the above mentioned size and location and is/are typical. The area was prepped and appropriately anesthetized. Using the usual technique, punch biopsy size 6 mm was performed. An appropriate dressing was applied. The procedure was well tolerated and without complications.     Assessment: atypical nevus    Plan: Follow up: Return for suture removal in 6 days.. Wound care instructions provided.  Patient was instructed to be alert for any signs of cutaneous infection.

## 2017-06-07 NOTE — MR AVS SNAPSHOT
After Visit Summary   6/7/2017    Radha Carl    MRN: [id number]           Patient Information     Date Of Birth          1990        Visit Information        Provider Department      6/7/2017 11:30 AM Muriel Jones MD [facility]        Today's Diagnoses     Neoplasm of uncertain behavior of skin    -  1    Cold sore           Follow-ups after your visit        Your next 10 appointments already scheduled     Jun 14, 2017  8:00 AM CDT   Treatment with Irasema Kunz, PTA   [facility] ([facility] )    [address] [address]   [phone]            Jun 16, 2017 11:00 AM CDT   Treatment with Elizabeth Norton, PT   [facility] ([facility] )    [address] [address]   [phone]            Jun 19, 2017  1:00 PM CDT   Treatment with Elizabeth Norton, PT   [facility] ([facility] )    [address] [address]   [phone]              Who to contact     If you have questions or need follow up information about today's clinic visit or your schedule please contact [facility] directly at [phone].  Normal or non-critical lab and imaging results will be communicated to you by MyChart, letter or phone within 4 business days after the clinic has received the results. If you do not hear from us within 7 days, please contact the clinic through MyChart or phone. If you have a critical or abnormal lab result, we will notify you by phone as soon as possible.  Submit refill requests through [employer] or call your pharmacy and they will forward the refill request to us. Please allow 3 business days for your refill to be completed.          Additional Information About Your Visit        MyChart Information     [employer] gives you secure access to your electronic health record. If you see a primary care provider, you can also send messages to your care team and make  "appointments. If you have questions, please call your primary care clinic.  If you do not have a primary care provider, please call [phone] and they will assist you.        Care EveryWhere ID     This is your Care EveryWhere ID. This could be used by other organizations to access your [facility] medical records  [id number]        Your Vitals Were     Pulse Temperature Height BMI (Body Mass Index)          84 97.9  F (36.6  C) (Tympanic) 5' 3.25\" (1.607 m) 30.05 kg/m2         Blood Pressure from Last 3 Encounters:   06/07/17 128/76   05/19/17 134/84   04/21/17 123/74    Weight from Last 3 Encounters:   06/07/17 171 lb (77.6 kg)   05/19/17 171 lb (77.6 kg)   04/21/17 175 lb (79.4 kg)              We Performed the Following     Single Lesion     SURGICAL PATHOLOGY EXAM [[id number]]          Today's Medication Changes          These changes are accurate as of: 6/7/17 12:00 PM.  If you have any questions, ask your nurse or doctor.               These medicines have changed or have updated prescriptions.        Dose/Directions    valACYclovir 500 MG tablet   Commonly known as:  VALTREX   This may have changed:  See the new instructions.   Used for:  Cold sore   Changed by:  Muriel Jones MD        Dose:  500 mg   Take 1 tablet (500 mg) by mouth 2 times daily With active lesion   Quantity:  30 tablet   Refills:  3            Where to get your medicines      These medications were sent to [id number] - [place] - [address]  [address] [address], [place] [address]     Phone:  [phone]     valACYclovir 500 MG tablet                Primary Care Provider Office Phone # Fax #    Muriel Jones [phone]       [facility] [place] [address] [address]        Thank you!     Thank you for choosing [facility]  for your care. Our goal is always to provide you with excellent care. Hearing back from our patients is one way we can continue to improve our " services. Please take a few minutes to complete the written survey that you may receive in the mail after your visit with us. Thank you!             Your Updated Medication List - Protect others around you: Learn how to safely use, store and throw away your medicines at www.disposemymeds.org.          This list is accurate as of: 6/7/17 12:00 PM.  Always use your most recent med list.                   Brand Name Dispense Instructions for use    ACETYLCYSTEINE PO      Take 600 mg by mouth 2 times daily       blood glucose monitoring lancets     1 Box    Use to test blood sugar 3 times daily or as directed.       blood glucose monitoring test strip    ONE TOUCH ULTRA    1 Month    Use to test blood sugar 3 times daily or as directed.       ibuprofen 200 MG tablet    ADVIL/MOTRIN     Take 2 tablets by mouth. Every 6 hours as needed with food       lisdexamfetamine 20 MG capsule    VYVANSE    30 capsule    Take 1 capsule (20 mg) by mouth every morning       penciclovir 1 % cream    DENAVIR    1.5 g    Apply topically every 2 hours (while awake)       progesterone 100 MG capsule    PROMETRIUM    90 capsule    Take 3 capsules (300 mg) by mouth daily Starting on peak +3 for 10 days out of month       TYLENOL 500 MG tablet   Generic drug:  acetaminophen      Take 2 tablets by mouth. Every 6 hours as needed       valACYclovir 500 MG tablet    VALTREX    30 tablet    Take 1 tablet (500 mg) by mouth 2 times daily With active lesion       vitamin  B-6 500 MG Tabs      Take 1 tablet by mouth daily       vitamin D 83033 UNIT capsule    ERGOCALCIFEROL    4 capsule    TAKE ONE CAPSULE BY MOUTH ONCE A WEEK       ZANTAC 150 MG tablet   Generic drug:  ranitidine      Take 1 tablet by mouth as needed

## 2017-06-07 NOTE — NURSING NOTE
"Chief Complaint   Patient presents with     Lesion Removal       Initial /76 (Cuff Size: Adult Regular)  Pulse 84  Temp 97.9  F (36.6  C) (Tympanic)  Ht 5' 3.25\" (1.607 m)  Wt 171 lb (77.6 kg)  BMI 30.05 kg/m2 Estimated body mass index is 30.05 kg/(m^2) as calculated from the following:    Height as of this encounter: 5' 3.25\" (1.607 m).    Weight as of this encounter: 171 lb (77.6 kg).  Medication Reconciliation: complete   Jacqueline Montero      "

## 2017-06-09 LAB — COPATH REPORT: NORMAL

## 2017-06-14 ENCOUNTER — HOSPITAL ENCOUNTER (OUTPATIENT)
Dept: PHYSICAL THERAPY | Facility: HOSPITAL | Age: 27
Setting detail: THERAPIES SERIES
End: 2017-06-14
Attending: FAMILY MEDICINE
Payer: COMMERCIAL

## 2017-06-14 PROCEDURE — 97110 THERAPEUTIC EXERCISES: CPT | Mod: GP

## 2017-06-14 PROCEDURE — 40000718 ZZHC STATISTIC PT DEPARTMENT ORTHO VISIT

## 2017-06-16 ENCOUNTER — HOSPITAL ENCOUNTER (OUTPATIENT)
Dept: PHYSICAL THERAPY | Facility: HOSPITAL | Age: 27
Setting detail: THERAPIES SERIES
End: 2017-06-16
Attending: FAMILY MEDICINE
Payer: COMMERCIAL

## 2017-06-16 PROCEDURE — 40000718 ZZHC STATISTIC PT DEPARTMENT ORTHO VISIT

## 2017-06-16 PROCEDURE — 97110 THERAPEUTIC EXERCISES: CPT | Mod: GP

## 2017-07-17 ENCOUNTER — TELEPHONE (OUTPATIENT)
Dept: FAMILY MEDICINE | Facility: OTHER | Age: 27
End: 2017-07-17

## 2017-07-17 ENCOUNTER — OFFICE VISIT (OUTPATIENT)
Dept: FAMILY MEDICINE | Facility: OTHER | Age: 27
End: 2017-07-17
Attending: FAMILY MEDICINE
Payer: COMMERCIAL

## 2017-07-17 VITALS
TEMPERATURE: 97.5 F | WEIGHT: 171 LBS | SYSTOLIC BLOOD PRESSURE: 130 MMHG | HEART RATE: 75 BPM | HEIGHT: 63 IN | OXYGEN SATURATION: 98 % | BODY MASS INDEX: 30.3 KG/M2 | DIASTOLIC BLOOD PRESSURE: 88 MMHG

## 2017-07-17 DIAGNOSIS — N39.0 URINARY TRACT INFECTION, SITE UNSPECIFIED: ICD-10-CM

## 2017-07-17 DIAGNOSIS — R30.0 DYSURIA: Primary | ICD-10-CM

## 2017-07-17 DIAGNOSIS — R82.90 ABNORMAL URINE FINDINGS: ICD-10-CM

## 2017-07-17 LAB
ALBUMIN UR-MCNC: NEGATIVE MG/DL
APPEARANCE UR: CLEAR
BACTERIA #/AREA URNS HPF: ABNORMAL /HPF
BILIRUB UR QL STRIP: NEGATIVE
COLOR UR AUTO: ABNORMAL
GLUCOSE UR STRIP-MCNC: NEGATIVE MG/DL
HGB UR QL STRIP: ABNORMAL
KETONES UR STRIP-MCNC: NEGATIVE MG/DL
LEUKOCYTE ESTERASE UR QL STRIP: ABNORMAL
NITRATE UR QL: POSITIVE
PH UR STRIP: 6.5 PH (ref 4.7–8)
RBC #/AREA URNS AUTO: <1 /HPF (ref 0–2)
SP GR UR STRIP: 1 (ref 1–1.03)
SQUAMOUS #/AREA URNS AUTO: 2 /HPF (ref 0–1)
URN SPEC COLLECT METH UR: ABNORMAL
UROBILINOGEN UR STRIP-MCNC: NORMAL MG/DL (ref 0–2)
WBC #/AREA URNS AUTO: 14 /HPF (ref 0–2)

## 2017-07-17 PROCEDURE — 81001 URINALYSIS AUTO W/SCOPE: CPT | Performed by: FAMILY MEDICINE

## 2017-07-17 PROCEDURE — 87088 URINE BACTERIA CULTURE: CPT | Performed by: FAMILY MEDICINE

## 2017-07-17 PROCEDURE — 99213 OFFICE O/P EST LOW 20 MIN: CPT | Performed by: FAMILY MEDICINE

## 2017-07-17 PROCEDURE — 87086 URINE CULTURE/COLONY COUNT: CPT | Performed by: FAMILY MEDICINE

## 2017-07-17 RX ORDER — CIPROFLOXACIN 250 MG/1
250 TABLET, FILM COATED ORAL 2 TIMES DAILY
Qty: 6 TABLET | Refills: 0 | Status: SHIPPED | OUTPATIENT
Start: 2017-07-17 | End: 2017-08-30

## 2017-07-17 RX ORDER — TAMSULOSIN HYDROCHLORIDE 0.4 MG/1
0.4 CAPSULE ORAL DAILY
Qty: 7 CAPSULE | Refills: 0 | Status: SHIPPED | OUTPATIENT
Start: 2017-07-17 | End: 2017-08-30

## 2017-07-17 ASSESSMENT — ANXIETY QUESTIONNAIRES
3. WORRYING TOO MUCH ABOUT DIFFERENT THINGS: NOT AT ALL
1. FEELING NERVOUS, ANXIOUS, OR ON EDGE: NOT AT ALL
5. BEING SO RESTLESS THAT IT IS HARD TO SIT STILL: NOT AT ALL
2. NOT BEING ABLE TO STOP OR CONTROL WORRYING: NOT AT ALL
7. FEELING AFRAID AS IF SOMETHING AWFUL MIGHT HAPPEN: NOT AT ALL
6. BECOMING EASILY ANNOYED OR IRRITABLE: SEVERAL DAYS
GAD7 TOTAL SCORE: 2
IF YOU CHECKED OFF ANY PROBLEMS ON THIS QUESTIONNAIRE, HOW DIFFICULT HAVE THESE PROBLEMS MADE IT FOR YOU TO DO YOUR WORK, TAKE CARE OF THINGS AT HOME, OR GET ALONG WITH OTHER PEOPLE: NOT DIFFICULT AT ALL

## 2017-07-17 ASSESSMENT — PATIENT HEALTH QUESTIONNAIRE - PHQ9: 5. POOR APPETITE OR OVEREATING: SEVERAL DAYS

## 2017-07-17 ASSESSMENT — PAIN SCALES - GENERAL: PAINLEVEL: MODERATE PAIN (4)

## 2017-07-17 NOTE — MR AVS SNAPSHOT
After Visit Summary   7/17/2017    Radha Carl    MRN: [id number]           Patient Information     Date Of Birth          1990        Visit Information        Provider Department      7/17/2017 8:45 AM Laquita Holder MD [facility]        Today's Diagnoses     Dysuria    -  1    Urinary tract infection, site unspecified          Care Instructions    Push fluids.  Complete antibiotics.  Will follow up culture/sensitivity results.  Trial of Flomax to help pass suspected stone.  If not resolving or if worsening, will need CT to evaluate for stone.          Follow-ups after your visit        Your next 10 appointments already scheduled     Aug 02, 2017 11:00 AM CDT   (Arrive by 10:45 AM)   SHORT with Muriel Jones MD   [facility] ([facility] )    [address]  [place] [address]   [phone]              Who to contact     If you have questions or need follow up information about today's clinic visit or your schedule please contact [facility] directly at [phone].  Normal or non-critical lab and imaging results will be communicated to you by MyChart, letter or phone within 4 business days after the clinic has received the results. If you do not hear from us within 7 days, please contact the clinic through [employer] or phone. If you have a critical or abnormal lab result, we will notify you by phone as soon as possible.  Submit refill requests through [employer] or call your pharmacy and they will forward the refill request to us. Please allow 3 business days for your refill to be completed.          Additional Information About Your Visit        MyChart Information     [employer] gives you secure access to your electronic health record. If you see a primary care provider, you can also send messages to your care team and make appointments. If you have questions, please call your primary care clinic.  If you do not have a  "primary care provider, please call [phone] and they will assist you.        Care EveryWhere ID     This is your Care EveryWhere ID. This could be used by other organizations to access your [facility] medical records  [id number]        Your Vitals Were     Pulse Temperature Height Pulse Oximetry BMI (Body Mass Index)       75 97.5  F (36.4  C) (Tympanic) 5' 3.25\" (1.607 m) 98% 30.05 kg/m2        Blood Pressure from Last 3 Encounters:   07/17/17 130/88   06/07/17 128/76   05/19/17 134/84    Weight from Last 3 Encounters:   07/17/17 171 lb (77.6 kg)   06/07/17 171 lb (77.6 kg)   05/19/17 171 lb (77.6 kg)              We Performed the Following     UA reflex to Microscopic and Culture - [place]          Today's Medication Changes          These changes are accurate as of: 7/17/17 10:15 AM.  If you have any questions, ask your nurse or doctor.               Start taking these medicines.        Dose/Directions    ciprofloxacin 250 MG tablet   Commonly known as:  CIPRO   Used for:  Urinary tract infection, site unspecified   Started by:  Laquita Holder MD        Dose:  250 mg   Take 1 tablet (250 mg) by mouth 2 times daily   Quantity:  6 tablet   Refills:  0       tamsulosin 0.4 MG capsule   Commonly known as:  FLOMAX   Used for:  Urinary tract infection, site unspecified   Started by:  Laquita Holder MD        Dose:  0.4 mg   Take 1 capsule (0.4 mg) by mouth daily   Quantity:  7 capsule   Refills:  0            Where to get your medicines      These medications were sent to [id number] - [place], [place] - [address]   [id number] , [place] [address]     Phone:  [phone]     ciprofloxacin 250 MG tablet    tamsulosin 0.4 MG capsule                Primary Care Provider Office Phone # Fax #    Muriel Jones [phone]       [facility] [place] [address]  [place] [address]        Equal Access to Services     [facility] ISHAN AH: Hadii isidro Haley " nura raciel nickievert dixon. So [facility] [phone].    ATENCIÓN: Si precious nicholson, tiene a jama disposición servicios gratuitos de asistencia lingüística. Chris al [phone].    We comply with applicable federal civil rights laws and [place] laws. We do not discriminate on the basis of race, color, national origin, age, disability sex, sexual orientation or gender identity.            Thank you!     Thank you for choosing [facility]  [facility] your [facility]. Our goal is always to provide you with excellent care. Hearing back from our patients is one way we can continue to improve our services. Please take a few minutes to complete the written survey that you may receive in the mail after your visit with us. Thank you!             Your Updated Medication List - Protect others around you: Learn how to safely use, store and throw away your medicines at www.disposemymeds.org.          This list is accurate as of: 7/17/17 10:15 AM.  Always use your most recent med list.                   Brand Name Dispense Instructions for use Diagnosis    ACETYLCYSTEINE PO      Take 600 mg by mouth 2 times daily        blood glucose monitoring lancets     1 Box    Use to test blood sugar 3 times daily or as directed.    Diabetes mellitus, type 2 (H)       blood glucose monitoring test strip    ONE TOUCH ULTRA    1 Month    Use to test blood sugar 3 times daily or as directed.    Diabetes mellitus, type 2 (H)       ciprofloxacin 250 MG tablet    CIPRO    6 tablet    Take 1 tablet (250 mg) by mouth 2 times daily    Urinary tract infection, site unspecified       ibuprofen 200 MG tablet    ADVIL/MOTRIN     Take 2 tablets by mouth. Every 6 hours as needed with food        lisdexamfetamine 20 MG capsule    VYVANSE    30 capsule    Take 1 capsule (20 mg) by mouth every morning    Attention deficit hyperactivity disorder (ADHD), predominantly inattentive type       penciclovir 1 % cream     DENAVIR    1.5 g    Apply topically every 2 hours (while awake)    Recurrent cold sores       progesterone 100 MG capsule    PROMETRIUM    90 capsule    Take 3 capsules (300 mg) by mouth daily Starting on peak +3 for 10 days out of month    PCOS (polycystic ovarian syndrome)       tamsulosin 0.4 MG capsule    FLOMAX    7 capsule    Take 1 capsule (0.4 mg) by mouth daily    Urinary tract infection, site unspecified       TYLENOL 500 MG tablet   Generic drug:  acetaminophen      Take 2 tablets by mouth. Every 6 hours as needed        valACYclovir 500 MG tablet    VALTREX    30 tablet    Take 1 tablet (500 mg) by mouth 2 times daily With active lesion    Cold sore       vitamin  B-6 500 MG Tabs      Take 1 tablet by mouth daily        vitamin D 01033 UNIT capsule    ERGOCALCIFEROL    4 capsule    TAKE ONE CAPSULE BY MOUTH ONCE A WEEK    MS (multiple sclerosis) (H)       ZANTAC 150 MG tablet   Generic drug:  ranitidine      Take 1 tablet by mouth as needed

## 2017-07-17 NOTE — PATIENT INSTRUCTIONS
Push fluids.  Complete antibiotics.  Will follow up culture/sensitivity results.  Trial of Flomax to help pass suspected stone.  If not resolving or if worsening, will need CT to evaluate for stone.

## 2017-07-17 NOTE — PROGRESS NOTES
SUBJECTIVE:  Radha Carl is a 26 year old female who  presents today for a possible UTI. Symptoms of frequency, back pain, nausea and voiding in small amounts have been going on for 4day(s).  Hematuria no.  sudden onsetand moderate.  There is no history of fever, chills, nausea or vomiting.  No history of vaginal or penile discharge. This patient does have a history of occasional urinary tract infections. Patient denies long duration, rigors, temperature > 101 degrees F. and Vomiting, significant nausea or diarrhea or vaginal discharge, vaginal odor, vaginal itching and dyspareunia (pain in labia/pelvis).  Urine is cloudy.      Past Medical History:   Diagnosis Date     ADHD (attention deficit hyperactivity disorder)      Cold sore      Demyelinating changes in brain (H)     saw Dr Gilbert 5/2013 -- ? of MS, but not Dx'd yet.     DM2 (diabetes mellitus, type 2) (H)      Elevated liver enzymes 2014    resolved 2015     Fibrocystic breast      History of left salpingo-oophorectomy     still has L ovary, but missing L tube     Neuropathy (H)     feet     Obesity      PCOS (polycystic ovarian syndrome)      Current Outpatient Prescriptions   Medication Sig Dispense Refill     ciprofloxacin (CIPRO) 250 MG tablet Take 1 tablet (250 mg) by mouth 2 times daily 6 tablet 0     tamsulosin (FLOMAX) 0.4 MG capsule Take 1 capsule (0.4 mg) by mouth daily 7 capsule 0     valACYclovir (VALTREX) 500 MG tablet Take 1 tablet (500 mg) by mouth 2 times daily With active lesion 30 tablet 3     lisdexamfetamine (VYVANSE) 20 MG capsule Take 1 capsule (20 mg) by mouth every morning 30 capsule 0     vitamin D (ERGOCALCIFEROL) 33189 UNIT capsule TAKE ONE CAPSULE BY MOUTH ONCE A WEEK 4 capsule 0     progesterone (PROMETRIUM) 100 MG capsule Take 3 capsules (300 mg) by mouth daily Starting on peak +3 for 10 days out of month 90 capsule 3     penciclovir (DENAVIR) 1 % cream Apply topically every 2 hours (while awake) 1.5 g 3      "ACETYLCYSTEINE PO Take 600 mg by mouth 2 times daily       Pyridoxine HCl (VITAMIN  B-6) 500 MG TABS Take 1 tablet by mouth daily       blood glucose (ONE TOUCH ULTRA) test strip Use to test blood sugar 3 times daily or as directed. 1 Month 12     blood glucose monitoring (ONE TOUCH DELICA) lancets Use to test blood sugar 3 times daily or as directed. 1 Box 4     ranitidine (ZANTAC) 150 MG tablet Take 1 tablet by mouth as needed       acetaminophen (TYLENOL) 500 MG tablet Take 2 tablets by mouth. Every 6 hours as needed       ibuprofen (ADVIL,MOTRIN) 200 MG tablet Take 2 tablets by mouth. Every 6 hours as needed with food       [DISCONTINUED] norethindrone-ethinyl estradiol (MICROGESTIN) 1-20 MG-MCG per tablet Take 1 tablet by mouth daily.       Social History   Substance Use Topics     Smoking status: Never Smoker     Smokeless tobacco: Never Used     Alcohol use 0.0 oz/week     0 Standard drinks or equivalent per week      Comment: rare       ROS:   CONSTITUTIONAL:NEGATIVE  for fever or chills  INTEGUMENTARY/SKIN: NEGATIVE for rash  GI: POSITIVE for nausea and NEGATIVE for abdominal pain generalized, constipation and diarrhea  : as above; started with with back pain that has focused on her right side; gets paroxysms of pain    OBJECTIVE:  /88 (BP Location: Left arm, Patient Position: Chair, Cuff Size: Adult Large)  Pulse 75  Temp 97.5  F (36.4  C) (Tympanic)  Ht 5' 3.25\" (1.607 m)  Wt 171 lb (77.6 kg)  SpO2 98%  BMI 30.05 kg/m2  GENERAL APPEARANCE: healthy, alert and no distress  RESP: lungs clear to auscultation - no rales, rhonchi or wheezes  CV: regular rates and rhythm, normal S1 S2, no murmur noted  ABDOMEN:  soft, nontender, no HSM or masses and bowel sounds normal  BACK: No CVA tenderness, but some lower right sided back pain  SKIN: no suspicious lesions or rashes  PSYCH: mentation appears normal and affect normal/bright    Results for orders placed or performed in visit on 07/17/17 (from the " past 24 hour(s))   UA reflex to Microscopic and Culture - [place]   Result Value Ref Range    Color Urine Light Yellow     Appearance Urine Clear     Glucose Urine Negative NEG mg/dL    Bilirubin Urine Negative NEG    Ketones Urine Negative NEG mg/dL    Specific Gravity Urine 1.004 1.003 - 1.035    Blood Urine Moderate (A) NEG    pH Urine 6.5 4.7 - 8.0 pH    Protein Albumin Urine Negative NEG mg/dL    Urobilinogen mg/dL Normal 0.0 - 2.0 mg/dL    Nitrite Urine Positive (A) NEG    Leukocyte Esterase Urine Moderate (A) NEG    Source Midstream Urine     RBC Urine <1 0 - 2 /HPF    WBC Urine 14 (H) 0 - 2 /HPF    Bacteria Urine None (A) NEG /HPF    Squamous Epithelial /HPF Urine 2 (H) 0 - 1 /HPF         ASSESSMENT:   (R30.0) Dysuria  (primary encounter diagnosis)  Plan: UA reflex to Microscopic and Culture - [place]    (N39.0) Urinary tract infection, site unspecified  Comment: possible underlying stone  Plan: ciprofloxacin (CIPRO) 250 MG tablet, tamsulosin        (FLOMAX) 0.4 MG capsule              PLAN:  Patient Instructions   Push fluids.  Complete antibiotics.  Will follow up culture/sensitivity results.  Trial of Flomax to help pass suspected stone.  If not resolving or if worsening, will need CT to evaluate for stone.      Drink plenty of fluids.  Prevention and treatment of UTI's discussed.Signs and symptoms of pyelonephritis mentioned.  Follow up with primary care provider if not improving.ut

## 2017-07-17 NOTE — NURSING NOTE
"Chief Complaint   Patient presents with     UTI     Back Pain       Initial /88 (BP Location: Left arm, Patient Position: Chair, Cuff Size: Adult Large)  Pulse 75  Temp 97.5  F (36.4  C) (Tympanic)  Ht 5' 3.25\" (1.607 m)  Wt 171 lb (77.6 kg)  SpO2 98%  BMI 30.05 kg/m2 Estimated body mass index is 30.05 kg/(m^2) as calculated from the following:    Height as of this encounter: 5' 3.25\" (1.607 m).    Weight as of this encounter: 171 lb (77.6 kg).  Medication Reconciliation: complete   Meme Bull    "

## 2017-07-17 NOTE — LETTER
Radha Carl     [facility] [address]        July 17, 2017           To Whom It May Concern:    Radha Carl  was seen on ***.  Please excuse her  until *** due to {WORK EXCUSE:026366}.        Sincerely,        Laquita Macedo MD

## 2017-07-17 NOTE — TELEPHONE ENCOUNTER
10:38 AM    Reason for Call: Phone Call    Description: Pt called and said she forgot to get a note that she was in today, if you could please call her back at [phone]    Was an appointment offered for this call? Yes    Preferred method for responding to this message: Telephone Call    If we cannot reach you directly, may we leave a detailed response at the number you provided? Yes    Can this message wait until your PCP/provider returns, if available today? Dr Laquita Holder is in    Laura Downs

## 2017-07-17 NOTE — LETTER
Radha Carl     [facility] [address]        July 17, 2017           To Whom It May Concern:    Radha Carl  was seen on 7/17/2017.  Please excuse her due to illness.        Sincerely,        Laquita Macedo MD

## 2017-07-18 ENCOUNTER — TELEPHONE (OUTPATIENT)
Dept: FAMILY MEDICINE | Facility: OTHER | Age: 27
End: 2017-07-18

## 2017-07-18 ASSESSMENT — ANXIETY QUESTIONNAIRES: GAD7 TOTAL SCORE: 2

## 2017-07-18 ASSESSMENT — PATIENT HEALTH QUESTIONNAIRE - PHQ9: SUM OF ALL RESPONSES TO PHQ QUESTIONS 1-9: 2

## 2017-07-18 NOTE — TELEPHONE ENCOUNTER
2:21 PM    Reason for Call: OVERBOOK    Patient is having the following symptoms: rolled ankle needs an xray for pt and new order   for 3 days.    The patient is requesting an appointment for future overbook for Weds or Thursday  with Karen    Was an appointment offered for this call? Yes, 08/26 but has pt appt on 08/25 and needs xrays prior    Preferred method for responding to this message: Telephone Call    If we cannot reach you directly, may we leave a detailed response at the number you provided? Yes    Can this message wait until your PCP/provider returns, if unavailable today? Yes    Lynda Rai

## 2017-07-19 ENCOUNTER — OFFICE VISIT (OUTPATIENT)
Dept: FAMILY MEDICINE | Facility: OTHER | Age: 27
End: 2017-07-19
Attending: FAMILY MEDICINE
Payer: COMMERCIAL

## 2017-07-19 VITALS
WEIGHT: 171 LBS | DIASTOLIC BLOOD PRESSURE: 82 MMHG | TEMPERATURE: 98 F | HEIGHT: 63 IN | SYSTOLIC BLOOD PRESSURE: 126 MMHG | OXYGEN SATURATION: 99 % | BODY MASS INDEX: 30.3 KG/M2 | HEART RATE: 78 BPM

## 2017-07-19 DIAGNOSIS — S93.401A SPRAIN OF RIGHT ANKLE, UNSPECIFIED LIGAMENT, INITIAL ENCOUNTER: Primary | ICD-10-CM

## 2017-07-19 DIAGNOSIS — S93.491A SPRAIN OF OTHER LIGAMENT OF RIGHT ANKLE, INITIAL ENCOUNTER: Primary | ICD-10-CM

## 2017-07-19 LAB
BACTERIA SPEC CULT: ABNORMAL
MICRO REPORT STATUS: ABNORMAL
SPECIMEN SOURCE: ABNORMAL

## 2017-07-19 PROCEDURE — 99213 OFFICE O/P EST LOW 20 MIN: CPT | Performed by: FAMILY MEDICINE

## 2017-07-19 PROCEDURE — 73610 X-RAY EXAM OF ANKLE: CPT | Mod: TC | Performed by: RADIOLOGY

## 2017-07-19 ASSESSMENT — ANXIETY QUESTIONNAIRES
6. BECOMING EASILY ANNOYED OR IRRITABLE: SEVERAL DAYS
3. WORRYING TOO MUCH ABOUT DIFFERENT THINGS: SEVERAL DAYS
5. BEING SO RESTLESS THAT IT IS HARD TO SIT STILL: SEVERAL DAYS
2. NOT BEING ABLE TO STOP OR CONTROL WORRYING: NOT AT ALL
1. FEELING NERVOUS, ANXIOUS, OR ON EDGE: NOT AT ALL
IF YOU CHECKED OFF ANY PROBLEMS ON THIS QUESTIONNAIRE, HOW DIFFICULT HAVE THESE PROBLEMS MADE IT FOR YOU TO DO YOUR WORK, TAKE CARE OF THINGS AT HOME, OR GET ALONG WITH OTHER PEOPLE: SOMEWHAT DIFFICULT
4. TROUBLE RELAXING: SEVERAL DAYS
GAD7 TOTAL SCORE: 4
7. FEELING AFRAID AS IF SOMETHING AWFUL MIGHT HAPPEN: NOT AT ALL

## 2017-07-19 ASSESSMENT — PAIN SCALES - GENERAL: PAINLEVEL: MODERATE PAIN (4)

## 2017-07-19 NOTE — MR AVS SNAPSHOT
After Visit Summary   7/19/2017    Radha Carl    MRN: [id number]           Patient Information     Date Of Birth          1990        Visit Information        Provider Department      7/19/2017 1:15 PM HC FP NURSE [facility]        Today's Diagnoses     Sprain of right ankle, unspecified ligament, initial encounter    -  1       Follow-ups after your visit        Your next 10 appointments already scheduled     Jul 25, 2017 10:00 AM CDT   Treatment with Elizabeth Norton PT   [facility] ([facility] )    [address] [id number] [phone]            Aug 02, 2017 11:00 AM CDT   (Arrive by 10:45 AM)   SHORT with Muriel Jones MD   [facility] ([facility] )    [address] [place] [address]   [phone]              Who to contact     If you have questions or need follow up information about today's clinic visit or your schedule please contact [facility] directly at [phone].  Normal or non-critical lab and imaging results will be communicated to you by MyChart, letter or phone within 4 business days after the clinic has received the results. If you do not hear from us within 7 days, please contact the clinic through [employer] or phone. If you have a critical or abnormal lab result, we will notify you by phone as soon as possible.  Submit refill requests through [employer] or call your pharmacy and they will forward the refill request to us. Please allow 3 business days for your refill to be completed.          Additional Information About Your Visit        MyChart Information     [employer] gives you secure access to your electronic health record. If you see a primary care provider, you can also send messages to your care team and make appointments. If you have questions, please call your primary care clinic.  If you do not have a primary care provider, please call [phone] and  they will assist you.        Care EveryWhere ID     This is your Care EveryWhere ID. This could be used by other organizations to access your [facility] medical records  [id number]         Blood Pressure from Last 3 Encounters:   07/19/17 126/82   07/17/17 130/88   06/07/17 128/76    Weight from Last 3 Encounters:   07/19/17 171 lb (77.6 kg)   07/17/17 171 lb (77.6 kg)   06/07/17 171 lb (77.6 kg)              Today, you had the following     No orders found for display         Today's Medication Changes          These changes are accurate as of: 7/19/17 11:59 PM.  If you have any questions, ask your nurse or doctor.               Start taking these medicines.        Dose/Directions    order for DME   Used for:  Sprain of other ligament of right ankle, initial encounter   Started by:  Muriel Jones MD        Right ankle ASO   Quantity:  1 Device   Refills:  0            Where to get your medicines      Some of these will need a paper prescription and others can be bought over the counter.  Ask your nurse if you have questions.     Bring a paper prescription for each of these medications     order for DME                Primary Care Provider Office Phone # Fax #    Muriel Jones [phone]       [facility] [place] [facility] [address]        Equal Access to Services     TOMMY OLMSTEAD AH: Hadii ratna ku hadasho Soomaali, waaxda luqadaha, qaybta kaalmada adeegyada, waxay idiin haykiaran teddy jiménez. So [facility] [phone].    ATENCIÓN: Si habla español, tiene a jama disposición servicios gratuitos de asistencia lingüística. Llame al [phone].    We comply with applicable federal civil rights laws and [place] laws. We do not discriminate on the basis of race, color, national origin, age, disability sex, sexual orientation or gender identity.            Thank you!     Thank you for choosing [facility]  [facility]. Our goal is always to provide you with  excellent care. Hearing back from our patients is one way we can continue to improve our services. Please take a few minutes to complete the written survey that you may receive in the mail after your visit with us. Thank you!             Your Updated Medication List - Protect others around you: Learn how to safely use, store and throw away your medicines at www.disposemymeds.org.          This list is accurate as of: 7/19/17 11:59 PM.  Always use your most recent med list.                   Brand Name Dispense Instructions for use Diagnosis    ACETYLCYSTEINE PO      Take 600 mg by mouth 2 times daily        blood glucose monitoring lancets     1 Box    Use to test blood sugar 3 times daily or as directed.    Diabetes mellitus, type 2 (H)       blood glucose monitoring test strip    ONE TOUCH ULTRA    1 Month    Use to test blood sugar 3 times daily or as directed.    Diabetes mellitus, type 2 (H)       ciprofloxacin 250 MG tablet    CIPRO    6 tablet    Take 1 tablet (250 mg) by mouth 2 times daily    Urinary tract infection, site unspecified       ibuprofen 200 MG tablet    ADVIL/MOTRIN     Take 2 tablets by mouth. Every 6 hours as needed with food        lisdexamfetamine 20 MG capsule    VYVANSE    30 capsule    Take 1 capsule (20 mg) by mouth every morning    Attention deficit hyperactivity disorder (ADHD), predominantly inattentive type       order for DME     1 Device    Right ankle ASO    Sprain of other ligament of right ankle, initial encounter       penciclovir 1 % cream    DENAVIR    1.5 g    Apply topically every 2 hours (while awake)    Recurrent cold sores       progesterone 100 MG capsule    PROMETRIUM    90 capsule    Take 3 capsules (300 mg) by mouth daily Starting on peak +3 for 10 days out of month    PCOS (polycystic ovarian syndrome)       tamsulosin 0.4 MG capsule    FLOMAX    7 capsule    Take 1 capsule (0.4 mg) by mouth daily    Urinary tract infection, site unspecified       TYLENOL 500 MG  tablet   Generic drug:  acetaminophen      Take 2 tablets by mouth. Every 6 hours as needed        valACYclovir 500 MG tablet    VALTREX    30 tablet    Take 1 tablet (500 mg) by mouth 2 times daily With active lesion    Cold sore       vitamin  B-6 500 MG Tabs      Take 1 tablet by mouth daily        vitamin D 23470 UNIT capsule    ERGOCALCIFEROL    4 capsule    TAKE ONE CAPSULE BY MOUTH ONCE A WEEK    MS (multiple sclerosis) (H)       ZANTAC 150 MG tablet   Generic drug:  ranitidine      Take 1 tablet by mouth as needed

## 2017-07-19 NOTE — NURSING NOTE
"Chief Complaint   Patient presents with     Ankle Trauma       Initial /82 (BP Location: Left arm, Patient Position: Chair, Cuff Size: Adult Regular)  Pulse 78  Temp 98  F (36.7  C) (Tympanic)  Ht 5' 3.25\" (1.607 m)  Wt 171 lb (77.6 kg)  SpO2 99%  BMI 30.05 kg/m2 Estimated body mass index is 30.05 kg/(m^2) as calculated from the following:    Height as of this encounter: 5' 3.25\" (1.607 m).    Weight as of this encounter: 171 lb (77.6 kg).  Medication Reconciliation: complete     Susi Garg   "

## 2017-07-19 NOTE — PROGRESS NOTES
SUBJECTIVE:                                                    Radha Carl is a 26 year old female who presents to clinic today for the following health issues:      Rolled Ankle      Duration: 7/16/17    Description (location/character/radiation): Rolled right ankle stepping off the cement wrong    Intensity:  mild    Accompanying signs and symptoms: there was swelling at first, currently no swelling or redness    History (similar episodes/previous evaluation): None    Precipitating or alleviating factors: None    Therapies tried and outcome: None     Has been doing PT which has helped but then rolled it again    Problem list and histories reviewed & adjusted, as indicated.  Additional history: as documented    Current Outpatient Prescriptions   Medication Sig Dispense Refill     order for DME Right ankle ASO 1 Device 0     ciprofloxacin (CIPRO) 250 MG tablet Take 1 tablet (250 mg) by mouth 2 times daily 6 tablet 0     tamsulosin (FLOMAX) 0.4 MG capsule Take 1 capsule (0.4 mg) by mouth daily 7 capsule 0     valACYclovir (VALTREX) 500 MG tablet Take 1 tablet (500 mg) by mouth 2 times daily With active lesion 30 tablet 3     lisdexamfetamine (VYVANSE) 20 MG capsule Take 1 capsule (20 mg) by mouth every morning 30 capsule 0     vitamin D (ERGOCALCIFEROL) 62187 UNIT capsule TAKE ONE CAPSULE BY MOUTH ONCE A WEEK 4 capsule 0     progesterone (PROMETRIUM) 100 MG capsule Take 3 capsules (300 mg) by mouth daily Starting on peak +3 for 10 days out of month 90 capsule 3     penciclovir (DENAVIR) 1 % cream Apply topically every 2 hours (while awake) 1.5 g 3     ACETYLCYSTEINE PO Take 600 mg by mouth 2 times daily       Pyridoxine HCl (VITAMIN  B-6) 500 MG TABS Take 1 tablet by mouth daily       blood glucose (ONE TOUCH ULTRA) test strip Use to test blood sugar 3 times daily or as directed. 1 Month 12     blood glucose monitoring (ONE TOUCH DELICA) lancets Use to test blood sugar 3 times daily or as directed. 1 Box 4  "    ranitidine (ZANTAC) 150 MG tablet Take 1 tablet by mouth as needed       acetaminophen (TYLENOL) 500 MG tablet Take 2 tablets by mouth. Every 6 hours as needed       ibuprofen (ADVIL,MOTRIN) 200 MG tablet Take 2 tablets by mouth. Every 6 hours as needed with food       [DISCONTINUED] norethindrone-ethinyl estradiol (MICROGESTIN) 1-20 MG-MCG per tablet Take 1 tablet by mouth daily.       Labs reviewed in EPIC    Reviewed and updated as needed this visit by clinical staffTobacco  Allergies  Meds  Med Hx  Surg Hx  Fam Hx  Soc Hx      Reviewed and updated as needed this visit by Provider         ROS:  Constitutional, HEENT, cardiovascular, pulmonary, gi and gu systems are negative, except as otherwise noted.      OBJECTIVE:                                                    /82 (BP Location: Left arm, Patient Position: Chair, Cuff Size: Adult Regular)  Pulse 78  Temp 98  F (36.7  C) (Tympanic)  Ht 5' 3.25\" (1.607 m)  Wt 171 lb (77.6 kg)  SpO2 99%  BMI 30.05 kg/m2  Body mass index is 30.05 kg/(m^2).  GENERAL APPEARANCE: healthy, alert and no distress  MS: extremities normal- no gross deformities noted  ORTHO: Lower Leg Exam: Inspection; no swelling, no ecchymosis, no deformity  Palpation: Tender: middle 1/3 tibia  Non-tender: proximal 1/3 tibia, distal 1/3 tibia, proximal fibula, distal fibula  Strength: wnl  Special tests: negative hop test  Ankle Exam:   Knee:not done  Lower leg:see above    ANKLE  Inspection:Swelling:medial , but closer to plantar surface than malleolus   Tender:lateral malleolus, medial malleolus  Non-tender:ATFL, CFL, PTFL  Range of Motion:dorsiflexion:  full, plantarflexion:  full, inversion:  full, painful, eversion:  full, painful  Strength:not tested  Special tests:negative anterior drawer, negative talar tilt, negative valgus stress  Stance: pes cavus weightbearing    FOOT  foot exam : Inspection Palpation:   Swelling: no swelling  Non-tender:throughout  Range of " Motion:flexion of toes:  full, extension of toes  full          SKIN: no suspicious lesions or rashes  PSYCH: mentation appears normal and affect normal/bright       ASSESSMENT/PLAN:                                                    1. Sprain of other ligament of right ankle, initial encounter    - XR ANKLE RT G/E 3 VW (Clinic Performed); Future  - XR ANKLE RT G/E 3 VW (Clinic Performed)  - PHYSICAL THERAPY REFERRAL  - order for DME; Right ankle ASO  Dispense: 1 Device; Refill: 0    Patient was agreeable to this plan and had no further questions.  See Patient Instructions    Muriel Jones MD  [facility]

## 2017-07-19 NOTE — MR AVS SNAPSHOT
"              After Visit Summary   7/19/2017    Radha Carl    MRN: [id number]           Patient Information     Date Of Birth          1990        Visit Information        Provider Department      7/19/2017 10:30 AM Muriel Jones MD [facility]        Today's Diagnoses     Sprain of other ligament of right ankle, initial encounter    -  1       Follow-ups after your visit        Additional Services     PHYSICAL THERAPY REFERRAL       *This therapy referral will be filtered to a centralized scheduling office at [facility] and the patient will receive a call to schedule an appointment at a [place] location most convenient for them. *     [facility] provides Physical Therapy evaluation and treatment and many specialty services across the [place] system.  If requesting a specialty program, please choose from the list below.    If you have not heard from the scheduling office within 2 business days, please call [phone] for all locations, with the exception of [place], please call [phone].  Treatment: Evaluation & Treatment  Special Instructions/Modalities:   Special Programs:     Please be aware that coverage of these services is subject to the terms and limitations of your health insurance plan.  Call member services at your health plan with any benefit or coverage questions.      **Note to Provider:  If you are referring outside of [place] for the therapy appointment, please list the name of the location in the \"special instructions\" above, print the referral and give to the patient to schedule the appointment.                  Your next 10 appointments already scheduled     Jul 25, 2017 10:00 AM CDT   Treatment with Elizabeth Norton PT   [facility] ([facility] )    [address] [place] [id number]   [phone]            Aug 02, 2017 11:00 AM CDT   (Arrive by 10:45 AM)   SHORT with Muriel Jones, " "MD   [facility] ([facility] )    [place] [address]   [phone]              Who to contact     If you have questions or need follow up information about today's clinic visit or your schedule please contact [facility] directly at [phone].  Normal or non-critical lab and imaging results will be communicated to you by MyChart, letter or phone within 4 business days after the clinic has received the results. If you do not hear from us within 7 days, please contact the clinic through [employer] or phone. If you have a critical or abnormal lab result, we will notify you by phone as soon as possible.  Submit refill requests through [employer] or call your pharmacy and they will forward the refill request to us. Please allow 3 business days for your refill to be completed.          Additional Information About Your Visit        [employer] Information     [employer] gives you secure access to your electronic health record. If you see a primary care provider, you can also send messages to your care team and make appointments. If you have questions, please call your primary care clinic.  If you do not have a primary care provider, please call [phone] and they will assist you.        Care EveryWhere ID     This is your Care EveryWhere ID. This could be used by other organizations to access your [facility] medical records  [id number]        Your Vitals Were     Pulse Temperature Height Pulse Oximetry BMI (Body Mass Index)       78 98  F (36.7  C) (Tympanic) 5' 3.25\" (1.607 m) 99% 30.05 kg/m2        Blood Pressure from Last 3 Encounters:   07/19/17 126/82   07/17/17 130/88   06/07/17 128/76    Weight from Last 3 Encounters:   07/19/17 171 lb (77.6 kg)   07/17/17 171 lb (77.6 kg)   06/07/17 171 lb (77.6 kg)              We Performed the Following     PHYSICAL THERAPY REFERRAL     XR ANKLE RT G/E 3 VW (Clinic Performed)          Today's Medication Changes        "   These changes are accurate as of: 7/19/17  1:19 PM.  If you have any questions, ask your nurse or doctor.               Start taking these medicines.        Dose/Directions    order for DME   Used for:  Sprain of other ligament of right ankle, initial encounter   Started by:  Muriel Jones MD        Right ankle ASO   Quantity:  1 Device   Refills:  0            Where to get your medicines      Some of these will need a paper prescription and others can be bought over the counter.  Ask your nurse if you have questions.     Bring a paper prescription for each of these medications     order for DME                Primary Care Provider Office Phone # Fax #    Muriel Jones [phone]       [facility] [place] [address] [facility] [address]        Equal Access to Services     [facility] Center[facility]QU[facility] : Hadii ratna starkey hadasho Soomaali, waaxda luqadaha, qaybta kaalmada adeegyada, waxay idiin haykiaran teddy monge . So [facility] [phone].    ATENCIÓN: Si habla español, tiene a jama disposición servicios gratuitos de asistencia lingüística. Llame al [phone].    We comply with applicable federal civil rights laws and [place] laws. We do not discriminate on the basis of race, color, national origin, age, disability sex, sexual orientation or gender identity.            Thank you!     [facility] you [facility]. Our goal is always to provide you with excellent care. Hearing back from our patients is one way we can continue to improve our services. Please take a few minutes to complete the written survey that you may receive in the mail after your visit with us. Thank you!             Your Updated Medication List - Protect others around you: Learn how to safely use, store and throw away your medicines at www.disposemymeds.org.          This list is accurate as of: 7/19/17  1:19 PM.  Always use your most recent med list.                   Brand Name Dispense  Instructions for use Diagnosis    ACETYLCYSTEINE PO      Take 600 mg by mouth 2 times daily        blood glucose monitoring lancets     1 Box    Use to test blood sugar 3 times daily or as directed.    Diabetes mellitus, type 2 (H)       blood glucose monitoring test strip    ONE TOUCH ULTRA    1 Month    Use to test blood sugar 3 times daily or as directed.    Diabetes mellitus, type 2 (H)       ciprofloxacin 250 MG tablet    CIPRO    6 tablet    Take 1 tablet (250 mg) by mouth 2 times daily    Urinary tract infection, site unspecified       ibuprofen 200 MG tablet    ADVIL/MOTRIN     Take 2 tablets by mouth. Every 6 hours as needed with food        lisdexamfetamine 20 MG capsule    VYVANSE    30 capsule    Take 1 capsule (20 mg) by mouth every morning    Attention deficit hyperactivity disorder (ADHD), predominantly inattentive type       order for DME     1 Device    Right ankle ASO    Sprain of other ligament of right ankle, initial encounter       penciclovir 1 % cream    DENAVIR    1.5 g    Apply topically every 2 hours (while awake)    Recurrent cold sores       progesterone 100 MG capsule    PROMETRIUM    90 capsule    Take 3 capsules (300 mg) by mouth daily Starting on peak +3 for 10 days out of month    PCOS (polycystic ovarian syndrome)       tamsulosin 0.4 MG capsule    FLOMAX    7 capsule    Take 1 capsule (0.4 mg) by mouth daily    Urinary tract infection, site unspecified       TYLENOL 500 MG tablet   Generic drug:  acetaminophen      Take 2 tablets by mouth. Every 6 hours as needed        valACYclovir 500 MG tablet    VALTREX    30 tablet    Take 1 tablet (500 mg) by mouth 2 times daily With active lesion    Cold sore       vitamin  B-6 500 MG Tabs      Take 1 tablet by mouth daily        vitamin D 22795 UNIT capsule    ERGOCALCIFEROL    4 capsule    TAKE ONE CAPSULE BY MOUTH ONCE A WEEK    MS (multiple sclerosis) (H)       ZANTAC 150 MG tablet   Generic drug:  ranitidine      Take 1 tablet by mouth  as needed

## 2017-07-20 ASSESSMENT — ANXIETY QUESTIONNAIRES: GAD7 TOTAL SCORE: 4

## 2017-07-20 ASSESSMENT — PATIENT HEALTH QUESTIONNAIRE - PHQ9: SUM OF ALL RESPONSES TO PHQ QUESTIONS 1-9: 4

## 2017-07-25 ENCOUNTER — HOSPITAL ENCOUNTER (OUTPATIENT)
Dept: PHYSICAL THERAPY | Facility: HOSPITAL | Age: 27
Setting detail: THERAPIES SERIES
End: 2017-07-25
Attending: FAMILY MEDICINE
Payer: COMMERCIAL

## 2017-07-25 PROCEDURE — 97110 THERAPEUTIC EXERCISES: CPT | Mod: GP

## 2017-07-25 PROCEDURE — 97140 MANUAL THERAPY 1/> REGIONS: CPT | Mod: GP

## 2017-07-25 PROCEDURE — 40000718 ZZHC STATISTIC PT DEPARTMENT ORTHO VISIT

## 2017-08-30 ENCOUNTER — OFFICE VISIT (OUTPATIENT)
Dept: FAMILY MEDICINE | Facility: OTHER | Age: 27
End: 2017-08-30
Attending: FAMILY MEDICINE
Payer: COMMERCIAL

## 2017-08-30 VITALS
TEMPERATURE: 98.2 F | BODY MASS INDEX: 30.48 KG/M2 | DIASTOLIC BLOOD PRESSURE: 70 MMHG | HEIGHT: 63 IN | WEIGHT: 172 LBS | HEART RATE: 91 BPM | SYSTOLIC BLOOD PRESSURE: 100 MMHG

## 2017-08-30 DIAGNOSIS — E28.2 PCOS (POLYCYSTIC OVARIAN SYNDROME): Primary | ICD-10-CM

## 2017-08-30 DIAGNOSIS — E61.1 IRON DEFICIENCY: ICD-10-CM

## 2017-08-30 DIAGNOSIS — E11.9 TYPE 2 DIABETES MELLITUS WITHOUT COMPLICATION, WITHOUT LONG-TERM CURRENT USE OF INSULIN (H): ICD-10-CM

## 2017-08-30 DIAGNOSIS — E53.8 VITAMIN B12 DEFICIENCY (NON ANEMIC): ICD-10-CM

## 2017-08-30 DIAGNOSIS — E78.5 HYPERLIPIDEMIA WITH TARGET LDL LESS THAN 100: ICD-10-CM

## 2017-08-30 PROCEDURE — 99214 OFFICE O/P EST MOD 30 MIN: CPT | Performed by: FAMILY MEDICINE

## 2017-08-30 ASSESSMENT — PATIENT HEALTH QUESTIONNAIRE - PHQ9
SUM OF ALL RESPONSES TO PHQ QUESTIONS 1-9: 4
5. POOR APPETITE OR OVEREATING: SEVERAL DAYS

## 2017-08-30 ASSESSMENT — ANXIETY QUESTIONNAIRES
6. BECOMING EASILY ANNOYED OR IRRITABLE: SEVERAL DAYS
1. FEELING NERVOUS, ANXIOUS, OR ON EDGE: SEVERAL DAYS
5. BEING SO RESTLESS THAT IT IS HARD TO SIT STILL: SEVERAL DAYS
IF YOU CHECKED OFF ANY PROBLEMS ON THIS QUESTIONNAIRE, HOW DIFFICULT HAVE THESE PROBLEMS MADE IT FOR YOU TO DO YOUR WORK, TAKE CARE OF THINGS AT HOME, OR GET ALONG WITH OTHER PEOPLE: NOT DIFFICULT AT ALL
2. NOT BEING ABLE TO STOP OR CONTROL WORRYING: SEVERAL DAYS
GAD7 TOTAL SCORE: 6
3. WORRYING TOO MUCH ABOUT DIFFERENT THINGS: SEVERAL DAYS
7. FEELING AFRAID AS IF SOMETHING AWFUL MIGHT HAPPEN: NOT AT ALL

## 2017-08-30 ASSESSMENT — PAIN SCALES - GENERAL: PAINLEVEL: MODERATE PAIN (4)

## 2017-08-30 NOTE — MR AVS SNAPSHOT
After Visit Summary   8/30/2017    Radha Carl    MRN: [id number]           Patient Information     Date Of Birth          1990        Visit Information        Provider Department      8/30/2017 2:30 PM Muriel Jones MD [facility]        Today's Diagnoses     PCOS (polycystic ovarian syndrome)    -  1    Hyperlipidemia with target LDL less than 100        Vitamin B12 deficiency (non anemic)        Type 2 diabetes mellitus without complication, without long-term current use of insulin (H)           Follow-ups after your visit        Future tests that were ordered for you today     Open Standing Orders        Priority Remaining Interval Expires Ordered    Hemoglobin A1c Routine 4/4 8/30/2018 8/30/2017          Open Future Orders        Priority Expected Expires Ordered    Vitamin B12 Routine 8/31/2017 8/30/2018 8/30/2017    Albumin Random Urine Quantitative with Creat Ratio Routine 8/31/2017 8/3/2018 8/30/2017    Lipid Profile (Chol, Trig, HDL, LDL calc) Routine 8/31/2017 8/3/2018 8/30/2017    Comprehensive metabolic panel Routine 8/31/2017 8/3/2018 8/30/2017            Who to contact     If you have questions or need follow up information about today's clinic visit or your schedule please contact [facility] directly at [phone].  Normal or non-critical lab and imaging results will be communicated to you by MyChart, letter or phone within 4 business days after the clinic has received the results. If you do not hear from us within 7 days, please contact the clinic through [employer] or phone. If you have a critical or abnormal lab result, we will notify you by phone as soon as possible.  Submit refill requests through [employer] or call your pharmacy and they will forward the refill request to us. Please allow 3 business days for your refill to be completed.          Additional Information About Your Visit        MyChart Information     [employer] gives you  "secure access to your electronic health record. If you see a primary care provider, you can also send messages to your care team and make appointments. If you have questions, please call your primary care clinic.  If you do not have a primary care provider, please call [phone] and they will assist you.        Care EveryWhere ID     This is your Care EveryWhere ID. This could be used by other organizations to access your [place] medical records  [id number]        Your Vitals Were     Pulse Temperature Height BMI (Body Mass Index)          91 98.2  F (36.8  C) (Tympanic) 5' 3\" (1.6 m) 30.47 kg/m2         Blood Pressure from Last 3 Encounters:   08/30/17 100/70   07/19/17 126/82   07/17/17 130/88    Weight from Last 3 Encounters:   08/30/17 172 lb (78 kg)   07/19/17 171 lb (77.6 kg)   07/17/17 171 lb (77.6 kg)               Primary Care Provider Office Phone # Fax #    Muriel Jones [phone]       [facility] [place] [address] [facility] [address]        Equal Access to Services     [facility]TEQUIL[facility] AH: Hadii aad ku hadasho Soomaali, waaxda luqadaha, qaybta kaalmada adeegyada, waxay daytonin hayaan adeeg moearasusan la'aan ah. So [facility] [phone].    ATENCIÓN: Si habla español, tiene a jama disposición servicios gratuitos de asistencia lingüística. Llame al [phone].    We comply with applicable federal civil rights laws and [place] laws. We do not discriminate on the basis of race, color, national origin, age, disability sex, sexual orientation or gender identity.            Thank you!     Thank you for choosing [facility] [place]  for your care. Our goal is always to provide you with excellent care. Hearing back from our patients is one way we can continue to improve our services. Please take a few minutes to complete the written survey that you may receive in the mail after your visit with us. Thank you!             Your Updated Medication List - Protect others around you: Learn " how to safely use, store and throw away your medicines at www.disposemymeds.org.          This list is accurate as of: 8/30/17  3:28 PM.  Always use your most recent med list.                   Brand Name Dispense Instructions for use Diagnosis    ACETYLCYSTEINE PO      Take 600 mg by mouth 2 times daily        blood glucose monitoring lancets     1 Box    Use to test blood sugar 3 times daily or as directed.    Diabetes mellitus, type 2 (H)       blood glucose monitoring test strip    ONE TOUCH ULTRA    1 Month    Use to test blood sugar 3 times daily or as directed.    Diabetes mellitus, type 2 (H)       ibuprofen 200 MG tablet    ADVIL/MOTRIN     Take 2 tablets by mouth. Every 6 hours as needed with food        lisdexamfetamine 20 MG capsule    VYVANSE    30 capsule    Take 1 capsule (20 mg) by mouth every morning    Attention deficit hyperactivity disorder (ADHD), predominantly inattentive type       order for DME     1 Device    Right ankle ASO    Sprain of other ligament of right ankle, initial encounter       penciclovir 1 % cream    DENAVIR    1.5 g    Apply topically every 2 hours (while awake)    Recurrent cold sores       progesterone 100 MG capsule    PROMETRIUM    90 capsule    Take 3 capsules (300 mg) by mouth daily Starting on peak +3 for 10 days out of month    PCOS (polycystic ovarian syndrome)       TYLENOL 500 MG tablet   Generic drug:  acetaminophen      Take 2 tablets by mouth. Every 6 hours as needed        valACYclovir 500 MG tablet    VALTREX    30 tablet    Take 1 tablet (500 mg) by mouth 2 times daily With active lesion    Cold sore       vitamin  B-6 500 MG Tabs      Take 1 tablet by mouth daily        vitamin D 72292 UNIT capsule    ERGOCALCIFEROL    4 capsule    TAKE ONE CAPSULE BY MOUTH ONCE A WEEK    MS (multiple sclerosis) (H)       ZANTAC 150 MG tablet   Generic drug:  ranitidine      Take 1 tablet by mouth as needed

## 2017-08-30 NOTE — NURSING NOTE
"Chief Complaint   Patient presents with     Follow Up For     Charting.       Initial /70 (BP Location: Left arm, Patient Position: Chair, Cuff Size: Adult Regular)  Pulse 91  Temp 98.2  F (36.8  C) (Tympanic)  Ht 5' 3\" (1.6 m)  Wt 172 lb (78 kg)  BMI 30.47 kg/m2 Estimated body mass index is 30.47 kg/(m^2) as calculated from the following:    Height as of this encounter: 5' 3\" (1.6 m).    Weight as of this encounter: 172 lb (78 kg).  Medication Reconciliation: complete   CHERIE LERMA      "

## 2017-08-30 NOTE — PROGRESS NOTES
SUBJECTIVE:                                                    Radha Carl is a 26 year old female who presents to clinic today for the following health issues:      PCOS      Duration: f/u    Description (location/character/radiation): n/a    Intensity:  moderate    Accompanying signs and symptoms:     History (similar episodes/previous evaluation): follicle US study,hormone labs, US    Precipitating or alleviating factors: None    Therapies tried and outcome: progesterone, NAC, B-6, charting         Diabetes Follow-up      Patient is checking blood sugars: not at all    Diabetic concerns: None     Symptoms of hypoglycemia (low blood sugar): none     Paresthesias (numbness or burning in feet) or sores: No     Date of last diabetic eye exam: 5/2016      Problem list and histories reviewed & adjusted, as indicated.  Additional history: as documented    Current Outpatient Prescriptions   Medication Sig Dispense Refill     order for DME Right ankle ASO 1 Device 0     valACYclovir (VALTREX) 500 MG tablet Take 1 tablet (500 mg) by mouth 2 times daily With active lesion 30 tablet 3     lisdexamfetamine (VYVANSE) 20 MG capsule Take 1 capsule (20 mg) by mouth every morning 30 capsule 0     vitamin D (ERGOCALCIFEROL) 60163 UNIT capsule TAKE ONE CAPSULE BY MOUTH ONCE A WEEK 4 capsule 0     progesterone (PROMETRIUM) 100 MG capsule Take 3 capsules (300 mg) by mouth daily Starting on peak +3 for 10 days out of month 90 capsule 3     penciclovir (DENAVIR) 1 % cream Apply topically every 2 hours (while awake) 1.5 g 3     ACETYLCYSTEINE PO Take 600 mg by mouth 2 times daily       Pyridoxine HCl (VITAMIN  B-6) 500 MG TABS Take 1 tablet by mouth daily       blood glucose (ONE TOUCH ULTRA) test strip Use to test blood sugar 3 times daily or as directed. 1 Month 12     blood glucose monitoring (ONE TOUCH DELICA) lancets Use to test blood sugar 3 times daily or as directed. 1 Box 4     ranitidine (ZANTAC) 150 MG tablet Take 1  "tablet by mouth as needed       acetaminophen (TYLENOL) 500 MG tablet Take 2 tablets by mouth. Every 6 hours as needed       ibuprofen (ADVIL,MOTRIN) 200 MG tablet Take 2 tablets by mouth. Every 6 hours as needed with food       [DISCONTINUED] norethindrone-ethinyl estradiol (MICROGESTIN) 1-20 MG-MCG per tablet Take 1 tablet by mouth daily.       Labs reviewed in EPIC    ROS:  Constitutional, HEENT, cardiovascular, pulmonary, gi and gu systems are negative, except as otherwise noted.      OBJECTIVE:                                                    /70 (BP Location: Left arm, Patient Position: Chair, Cuff Size: Adult Regular)  Pulse 91  Temp 98.2  F (36.8  C) (Tympanic)  Ht 5' 3\" (1.6 m)  Wt 172 lb (78 kg)  BMI 30.47 kg/m2  Body mass index is 30.47 kg/(m^2).  GENERAL APPEARANCE: healthy, alert and no distress  RESP: lungs clear to auscultation - no rales, rhonchi or wheezes  CV: regular rates and rhythm, normal S1 S2, no S3 or S4 and no murmur, click or rub  PSYCH: mentation appears normal and affect normal/bright       ASSESSMENT/PLAN:                                                    1. PCOS (polycystic ovarian syndrome)  F/u 6 wks at physical, needs peak +7 labs this month  Bleeding has been better since being at higher progesterone dose  Has been on prometrium 300mg since 1/2017 with no labs    2. Hyperlipidemia with target LDL less than 100  Fasting in the next few weeks  - Lipid Profile (Chol, Trig, HDL, LDL calc); Future  - Comprehensive metabolic panel; Future    3. Vitamin B12 deficiency (non anemic)    - Vitamin B12; Future    4. Type 2 diabetes mellitus without complication, without long-term current use of insulin (H)  Schedule eye exam soon  - Hemoglobin A1c; Standing  - Albumin Random Urine Quantitative with Creat Ratio; Future    Patient was agreeable to this plan and had no further questions.  See Patient Instructions    Muriel Jones MD  [facility] [place]  "

## 2017-08-31 ASSESSMENT — ANXIETY QUESTIONNAIRES: GAD7 TOTAL SCORE: 6

## 2017-09-05 ENCOUNTER — OFFICE VISIT (OUTPATIENT)
Dept: FAMILY MEDICINE | Facility: OTHER | Age: 27
End: 2017-09-05
Attending: FAMILY MEDICINE
Payer: COMMERCIAL

## 2017-09-05 VITALS
TEMPERATURE: 98.6 F | OXYGEN SATURATION: 99 % | SYSTOLIC BLOOD PRESSURE: 118 MMHG | WEIGHT: 172 LBS | HEIGHT: 63 IN | HEART RATE: 103 BPM | DIASTOLIC BLOOD PRESSURE: 70 MMHG | BODY MASS INDEX: 30.48 KG/M2

## 2017-09-05 DIAGNOSIS — J20.9 ACUTE BRONCHITIS, UNSPECIFIED ORGANISM: Primary | ICD-10-CM

## 2017-09-05 DIAGNOSIS — E78.5 HYPERLIPIDEMIA WITH TARGET LDL LESS THAN 100: ICD-10-CM

## 2017-09-05 DIAGNOSIS — E61.1 IRON DEFICIENCY: ICD-10-CM

## 2017-09-05 DIAGNOSIS — J98.01 BRONCHOSPASM: ICD-10-CM

## 2017-09-05 DIAGNOSIS — E11.9 TYPE 2 DIABETES MELLITUS WITHOUT COMPLICATION, WITHOUT LONG-TERM CURRENT USE OF INSULIN (H): ICD-10-CM

## 2017-09-05 DIAGNOSIS — E53.8 VITAMIN B12 DEFICIENCY (NON ANEMIC): ICD-10-CM

## 2017-09-05 LAB
ALBUMIN SERPL-MCNC: 3.8 G/DL (ref 3.4–5)
ALP SERPL-CCNC: 69 U/L (ref 40–150)
ALT SERPL W P-5'-P-CCNC: 20 U/L (ref 0–50)
ANION GAP SERPL CALCULATED.3IONS-SCNC: 8 MMOL/L (ref 3–14)
AST SERPL W P-5'-P-CCNC: 10 U/L (ref 0–45)
BILIRUB SERPL-MCNC: 0.4 MG/DL (ref 0.2–1.3)
BUN SERPL-MCNC: 11 MG/DL (ref 7–30)
CALCIUM SERPL-MCNC: 8.5 MG/DL (ref 8.5–10.1)
CHLORIDE SERPL-SCNC: 106 MMOL/L (ref 94–109)
CHOLEST SERPL-MCNC: 179 MG/DL
CO2 SERPL-SCNC: 23 MMOL/L (ref 20–32)
CREAT SERPL-MCNC: 0.62 MG/DL (ref 0.52–1.04)
CREAT UR-MCNC: 86 MG/DL
EST. AVERAGE GLUCOSE BLD GHB EST-MCNC: 120 MG/DL
GFR SERPL CREATININE-BSD FRML MDRD: >90 ML/MIN/1.7M2
GLUCOSE SERPL-MCNC: 102 MG/DL (ref 70–99)
HBA1C MFR BLD: 5.8 % (ref 4.3–6)
HDLC SERPL-MCNC: 38 MG/DL
IRON SATN MFR SERPL: 15 % (ref 15–46)
IRON SERPL-MCNC: 59 UG/DL (ref 35–180)
LDLC SERPL CALC-MCNC: 119 MG/DL
MICROALBUMIN UR-MCNC: 6 MG/L
MICROALBUMIN/CREAT UR: 6.59 MG/G CR (ref 0–25)
NONHDLC SERPL-MCNC: 141 MG/DL
POTASSIUM SERPL-SCNC: 3.8 MMOL/L (ref 3.4–5.3)
PROT SERPL-MCNC: 7.9 G/DL (ref 6.8–8.8)
SODIUM SERPL-SCNC: 137 MMOL/L (ref 133–144)
TIBC SERPL-MCNC: 391 UG/DL (ref 240–430)
TRIGL SERPL-MCNC: 111 MG/DL
VIT B12 SERPL-MCNC: 513 PG/ML (ref 193–986)

## 2017-09-05 PROCEDURE — 82043 UR ALBUMIN QUANTITATIVE: CPT | Performed by: FAMILY MEDICINE

## 2017-09-05 PROCEDURE — 40000788 ZZHCL STATISTIC ESTIMATED AVERAGE GLUCOSE: Performed by: FAMILY MEDICINE

## 2017-09-05 PROCEDURE — 82607 VITAMIN B-12: CPT | Mod: 90 | Performed by: FAMILY MEDICINE

## 2017-09-05 PROCEDURE — 83540 ASSAY OF IRON: CPT | Performed by: FAMILY MEDICINE

## 2017-09-05 PROCEDURE — 99000 SPECIMEN HANDLING OFFICE-LAB: CPT | Performed by: FAMILY MEDICINE

## 2017-09-05 PROCEDURE — 80061 LIPID PANEL: CPT | Performed by: FAMILY MEDICINE

## 2017-09-05 PROCEDURE — 36415 COLL VENOUS BLD VENIPUNCTURE: CPT | Performed by: FAMILY MEDICINE

## 2017-09-05 PROCEDURE — 99213 OFFICE O/P EST LOW 20 MIN: CPT | Performed by: FAMILY MEDICINE

## 2017-09-05 PROCEDURE — 83550 IRON BINDING TEST: CPT | Performed by: FAMILY MEDICINE

## 2017-09-05 PROCEDURE — 83036 HEMOGLOBIN GLYCOSYLATED A1C: CPT | Performed by: FAMILY MEDICINE

## 2017-09-05 PROCEDURE — 80053 COMPREHEN METABOLIC PANEL: CPT | Performed by: FAMILY MEDICINE

## 2017-09-05 RX ORDER — AZITHROMYCIN 250 MG/1
TABLET, FILM COATED ORAL
Qty: 6 TABLET | Refills: 0 | Status: SHIPPED | OUTPATIENT
Start: 2017-09-05 | End: 2018-02-05

## 2017-09-05 RX ORDER — METHYLPREDNISOLONE 4 MG
TABLET, DOSE PACK ORAL
Qty: 21 TABLET | Refills: 0 | Status: SHIPPED | OUTPATIENT
Start: 2017-09-05 | End: 2018-02-05

## 2017-09-05 ASSESSMENT — ANXIETY QUESTIONNAIRES
IF YOU CHECKED OFF ANY PROBLEMS ON THIS QUESTIONNAIRE, HOW DIFFICULT HAVE THESE PROBLEMS MADE IT FOR YOU TO DO YOUR WORK, TAKE CARE OF THINGS AT HOME, OR GET ALONG WITH OTHER PEOPLE: SOMEWHAT DIFFICULT
5. BEING SO RESTLESS THAT IT IS HARD TO SIT STILL: SEVERAL DAYS
3. WORRYING TOO MUCH ABOUT DIFFERENT THINGS: NOT AT ALL
GAD7 TOTAL SCORE: 3
6. BECOMING EASILY ANNOYED OR IRRITABLE: SEVERAL DAYS
1. FEELING NERVOUS, ANXIOUS, OR ON EDGE: NOT AT ALL
2. NOT BEING ABLE TO STOP OR CONTROL WORRYING: NOT AT ALL
7. FEELING AFRAID AS IF SOMETHING AWFUL MIGHT HAPPEN: NOT AT ALL
4. TROUBLE RELAXING: SEVERAL DAYS

## 2017-09-05 ASSESSMENT — PATIENT HEALTH QUESTIONNAIRE - PHQ9: SUM OF ALL RESPONSES TO PHQ QUESTIONS 1-9: 3

## 2017-09-05 ASSESSMENT — PAIN SCALES - GENERAL: PAINLEVEL: NO PAIN (0)

## 2017-09-05 NOTE — NURSING NOTE
"Chief Complaint   Patient presents with     URI       Initial /70 (BP Location: Right arm, Patient Position: Chair, Cuff Size: Adult Regular)  Pulse 103  Temp 98.6  F (37  C) (Tympanic)  Ht 5' 3\" (1.6 m)  Wt 172 lb (78 kg)  SpO2 99%  BMI 30.47 kg/m2 Estimated body mass index is 30.47 kg/(m^2) as calculated from the following:    Height as of this encounter: 5' 3\" (1.6 m).    Weight as of this encounter: 172 lb (78 kg).  Medication Reconciliation: complete     Susi Garg     "

## 2017-09-05 NOTE — MR AVS SNAPSHOT
After Visit Summary   9/5/2017    Radha Carl    MRN: [id number]           Patient Information     Date Of Birth          1990        Visit Information        Provider Department      9/5/2017 1:30 PM Ananda Carlson MD [facility]        Today's Diagnoses     Acute bronchitis, unspecified organism    -  1    Bronchospasm          Care Instructions    Return if worsen.           Follow-ups after your visit        Your next 10 appointments already scheduled     Oct 11, 2017  1:30 PM CDT   (Arrive by 1:15 PM)   PHYSICAL with Muriel Jones MD   [facility] ([facility] [place] )    [address]  [place] [address]   [phone]              Who to contact     If you have questions or need follow up information about today's clinic visit or your schedule please contact [facility] directly at [phone].  Normal or non-critical lab and imaging results will be communicated to you by MyChart, letter or phone within 4 business days after the clinic has received the results. If you do not hear from us within 7 days, please contact the clinic through MyChart or phone. If you have a critical or abnormal lab result, we will notify you by phone as soon as possible.  Submit refill requests through [employer] or call your pharmacy and they will forward the refill request to us. Please allow 3 business days for your refill to be completed.          Additional Information About Your Visit        MyChart Information     [employer] gives you secure access to your electronic health record. If you see a primary care provider, you can also send messages to your care team and make appointments. If you have questions, please call your primary care clinic.  If you do not have a primary care provider, please call [phone] and they will assist you.        Care EveryWhere ID     This is your Care EveryWhere ID. This could be used by other organizations to  "access your [place] medical records  [id number]        Your Vitals Were     Pulse Temperature Height Pulse Oximetry BMI (Body Mass Index)       103 98.6  F (37  C) (Tympanic) 5' 3\" (1.6 m) 99% 30.47 kg/m2        Blood Pressure from Last 3 Encounters:   09/05/17 118/70   08/30/17 100/70   07/19/17 126/82    Weight from Last 3 Encounters:   09/05/17 172 lb (78 kg)   08/30/17 172 lb (78 kg)   07/19/17 171 lb (77.6 kg)              Today, you had the following     No orders found for display         Today's Medication Changes          These changes are accurate as of: 9/5/17  1:47 PM.  If you have any questions, ask your nurse or doctor.               Start taking these medicines.        Dose/Directions    azithromycin 250 MG tablet   Commonly known as:  ZITHROMAX   Used for:  Acute bronchitis, unspecified organism, Bronchospasm   Started by:  Ananda Carlson MD        As directed   Quantity:  6 tablet   Refills:  0       methylPREDNISolone 4 MG tablet   Commonly known as:  MEDROL DOSEPAK   Used for:  Acute bronchitis, unspecified organism, Bronchospasm   Started by:  Ananda Carlson MD        Follow package instructions   Quantity:  21 tablet   Refills:  0            Where to get your medicines      These medications were sent to [id number] [address] [address]   [id number] , [place] [address]     Phone:  [phone]     azithromycin 250 MG tablet    methylPREDNISolone 4 MG tablet                Primary Care Provider Office Phone # Fax #    Muriel LUIS M Jones [phone]       [facility] [place] [address]  [place] [address]        Equal Access to Services     [facility]: Hadii ratna starkey hadasho Soomaali, waaxda luqadaha, qaybta kaalmada adeegyada, evert jiménez. So [facility] [phone].    ATENCIÓN: Si habla español, tiene a jama disposición servicios gratuitos de asistencia lingüística. Llame al [phone].    We comply with applicable " federal civil rights laws and [place] laws. We do not discriminate on the basis of race, color, national origin, age, disability sex, sexual orientation or gender identity.            Thank you!     Thank you for choosing [facility]  [facility] your [facility]. Our goal is always to provide you with excellent care. Hearing back from our patients is one way we can continue to improve our services. Please take a few minutes to complete the written survey that you may receive in the mail after your visit with us. Thank you!             Your Updated Medication List - Protect others around you: Learn how to safely use, store and throw away your medicines at www.disposemymeds.org.          This list is accurate as of: 9/5/17  1:47 PM.  Always use your most recent med list.                   Brand Name Dispense Instructions for use Diagnosis    ACETYLCYSTEINE PO      Take 600 mg by mouth 2 times daily        azithromycin 250 MG tablet    ZITHROMAX    6 tablet    As directed    Acute bronchitis, unspecified organism, Bronchospasm       blood glucose monitoring lancets     1 Box    Use to test blood sugar 3 times daily or as directed.    Diabetes mellitus, type 2 (H)       blood glucose monitoring test strip    ONE TOUCH ULTRA    1 Month    Use to test blood sugar 3 times daily or as directed.    Diabetes mellitus, type 2 (H)       ibuprofen 200 MG tablet    ADVIL/MOTRIN     Take 2 tablets by mouth. Every 6 hours as needed with food        lisdexamfetamine 20 MG capsule    VYVANSE    30 capsule    Take 1 capsule (20 mg) by mouth every morning    Attention deficit hyperactivity disorder (ADHD), predominantly inattentive type       methylPREDNISolone 4 MG tablet    MEDROL DOSEPAK    21 tablet    Follow package instructions    Acute bronchitis, unspecified organism, Bronchospasm       order for DME     1 Device    Right ankle ASO    Sprain of other ligament of right ankle, initial encounter       penciclovir 1 % cream     DENAVIR    1.5 g    Apply topically every 2 hours (while awake)    Recurrent cold sores       progesterone 100 MG capsule    PROMETRIUM    90 capsule    Take 3 capsules (300 mg) by mouth daily Starting on peak +3 for 10 days out of month    PCOS (polycystic ovarian syndrome)       TYLENOL 500 MG tablet   Generic drug:  acetaminophen      Take 2 tablets by mouth. Every 6 hours as needed        valACYclovir 500 MG tablet    VALTREX    30 tablet    Take 1 tablet (500 mg) by mouth 2 times daily With active lesion    Cold sore       vitamin  B-6 500 MG Tabs      Take 1 tablet by mouth daily        vitamin D 07720 UNIT capsule    ERGOCALCIFEROL    4 capsule    TAKE ONE CAPSULE BY MOUTH ONCE A WEEK    MS (multiple sclerosis) (H)       ZANTAC 150 MG tablet   Generic drug:  ranitidine      Take 1 tablet by mouth as needed

## 2017-09-05 NOTE — PROGRESS NOTES
"  SUBJECTIVE:   Radha Carl is a 26 year old female who presents to clinic today for the following health issues:      RESPIRATORY SYMPTOMS      Duration: 1 week    Description  cough, wheezing and headache    Severity: mild    Accompanying signs and symptoms: chest pain with the cough. Not getting sleep at night due to cough/ wheezing.    History (predisposing factors):  none    Precipitating or alleviating factors: None    Therapies tried and outcome:  Cough drops, OTC cough suppressant - no relief.   Dry cough   No seasonal allergries  Never wheezes in past   No tobacco  Mild SOB  Some improvement except cough           Problem list and histories reviewed & adjusted, as indicated.  Additional history: as documented    Labs reviewed in EPIC    Reviewed and updated as needed this visit by clinical staffTobacco  Allergies  Meds  Problems  Med Hx  Surg Hx  Fam Hx  Soc Hx        Reviewed and updated as needed this visit by Provider         ROS:  C: NEGATIVE for fever, chills, change in weight  CV: NEGATIVE for chest pain, palpitations or peripheral edema    OBJECTIVE:                                                    /70 (BP Location: Right arm, Patient Position: Chair, Cuff Size: Adult Regular)  Pulse 103  Temp 98.6  F (37  C) (Tympanic)  Ht 5' 3\" (1.6 m)  Wt 172 lb (78 kg)  SpO2 99%  BMI 30.47 kg/m2  Body mass index is 30.47 kg/(m^2).   GENERAL: healthy, alert, well nourished, well hydrated, no distress  HENT: ear canals- normal; TMs- normal; Nose- normal; Mouth- no ulcers, no lesions  NECK: no tenderness, no adenopathy, no asymmetry, no masses, no stiffness; thyroid- normal to palpation  RESP: lungs not clear to auscultation - no rales, few rhonchi, few exp wheezes         ASSESSMENT/PLAN:                                                        ICD-10-CM    1. Acute bronchitis, unspecified organism J20.9 azithromycin (ZITHROMAX) 250 MG tablet     methylPREDNISolone (MEDROL DOSEPAK) 4 MG " tablet   2. Bronchospasm J98.01 azithromycin (ZITHROMAX) 250 MG tablet     methylPREDNISolone (MEDROL DOSEPAK) 4 MG tablet     Will treat with above. Risk and benefits of steroids/ abx was discussed and verbal consent to proceed was given.   Symptomatic treatment was discussed along when patient should call and/or come back into the clinic or go to ER/Urgent care. All questions answered.   Symptomatic treatment was discussed along what is available for OTC medications for symptomatic relief.       See Patient Instructions    Ananda Carlson MD  [facility]

## 2017-09-06 ASSESSMENT — ANXIETY QUESTIONNAIRES: GAD7 TOTAL SCORE: 3

## 2017-09-14 ENCOUNTER — MYC MEDICAL ADVICE (OUTPATIENT)
Dept: FAMILY MEDICINE | Facility: OTHER | Age: 27
End: 2017-09-14

## 2017-09-14 DIAGNOSIS — E53.8 VITAMIN B12 DEFICIENCY: Primary | ICD-10-CM

## 2017-09-14 NOTE — TELEPHONE ENCOUNTER
Doesn't need thyroid  As 0.85 is actually on the high end  Ok to try b12 monthly x 2 mos and recheck labs

## 2017-09-14 NOTE — TELEPHONE ENCOUNTER
Yes do hormone labs next month  b12 is really good, wouldn't recommend injections -- she could try a sublingual once weekly to see if this helps

## 2017-09-18 RX ORDER — CYANOCOBALAMIN 1000 UG/ML
1 INJECTION, SOLUTION INTRAMUSCULAR; SUBCUTANEOUS
Qty: 1 ML | Refills: 1 | OUTPATIENT
Start: 2017-09-18 | End: 2017-12-18

## 2017-09-27 ENCOUNTER — TELEPHONE (OUTPATIENT)
Dept: FAMILY MEDICINE | Facility: OTHER | Age: 27
End: 2017-09-27

## 2017-09-27 NOTE — TELEPHONE ENCOUNTER
10:18 AM    Reason for Call: Phone Call    Description: Radha is wondering if her B-12 order  is in and if she needs to go to hospital lab for progesterone lab since the day it is due is a Sunday. Please call Radha to advise.    Was an appointment offered for this call? No    Preferred method for responding to this message: [phone]    If we cannot reach you directly, may we leave a detailed response at the number you provided?  Yes    Elzbieta Brady

## 2017-09-27 NOTE — TELEPHONE ENCOUNTER
Talked with patient and she said she spoke with [facility] about getting 2 months worth of b-12 injections but it looks like she only has 1 injection which I believe is only one month dose. Just want to clarify if the order is correct. Also wondering if she can go into the hospital to have her progesterone level checked since it falls on a Sunday and we are not open. Please advise.

## 2017-09-28 ENCOUNTER — HOSPITAL ENCOUNTER (OUTPATIENT)
Dept: PHYSICAL THERAPY | Facility: HOSPITAL | Age: 27
Setting detail: THERAPIES SERIES
End: 2017-09-28
Attending: FAMILY MEDICINE
Payer: COMMERCIAL

## 2017-09-28 DIAGNOSIS — M54.50 MIDLINE LOW BACK PAIN WITHOUT SCIATICA, UNSPECIFIED CHRONICITY: Primary | ICD-10-CM

## 2017-09-28 PROCEDURE — 97110 THERAPEUTIC EXERCISES: CPT | Mod: GP

## 2017-09-28 PROCEDURE — 40000718 ZZHC STATISTIC PT DEPARTMENT ORTHO VISIT

## 2017-09-29 ENCOUNTER — ALLIED HEALTH/NURSE VISIT (OUTPATIENT)
Dept: FAMILY MEDICINE | Facility: OTHER | Age: 27
End: 2017-09-29
Attending: FAMILY MEDICINE
Payer: COMMERCIAL

## 2017-09-29 DIAGNOSIS — E53.8 VITAMIN B12 DEFICIENCY (NON ANEMIC): Primary | ICD-10-CM

## 2017-09-29 PROCEDURE — 96372 THER/PROPH/DIAG INJ SC/IM: CPT

## 2017-09-29 NOTE — NURSING NOTE
The following medication was given:     MEDICATION: Vitamin B12  1000mcg  ROUTE: IM  SITE: Deltoid - Right  DOSE: 1000 mcg  LOT #: [id number]  :  [employer]  EXPIRATION DATE:  9/30/17  NDC#: 5797-8463-58  Alisson Sr

## 2017-09-29 NOTE — MR AVS SNAPSHOT
After Visit Summary   9/29/2017    Radha Carl    MRN: [id number]           Patient Information     Date Of Birth          1990        Visit Information        Provider Department      9/29/2017 11:00 AM [facility] NURSE [facility]        Today's Diagnoses     Vitamin B12 deficiency (non anemic)    -  1       Follow-ups after your visit        Your next 10 appointments already scheduled     Oct 04, 2017 10:30 AM CDT   Evaluation with Elizabeth Norton PT   [facility] ([facility] )    [address] [address]   [phone]            Oct 11, 2017  1:30 PM CDT   (Arrive by 1:15 PM)   PHYSICAL with Muriel Jones MD   [facility] ([facility] )    [address] [facility] [address]   [phone]            Oct 18, 2017  2:30 PM CDT   Treatment with Elizabeth Norton PT   [facility] ([facility] )    [address] [address]   [phone]            Oct 25, 2017  2:30 PM CDT   Treatment with Elizabeth Norton PT   [facility] ([facility] )    [address] [address]   [phone]              Who to contact     If you have questions or need follow up information about today's clinic visit or your schedule please contact [facility] directly at [phone].  Normal or non-critical lab and imaging results will be communicated to you by MyChart, letter or phone within 4 business days after the clinic has received the results. If you do not hear from us within 7 days, please contact the clinic through MyChart or phone. If you have a critical or abnormal lab result, we will notify you by phone as soon as possible.  Submit refill requests through [employer] or call your pharmacy and they will forward the refill request to us. Please allow 3 business days for your refill to be completed.          Additional Information About Your  Visit        MyChart Information     [employer] gives you secure access to your electronic health record. If you see a primary care provider, you can also send messages to your care team and make appointments. If you have questions, please call your primary care clinic.  If you do not have a primary care provider, please call [phone] and they will assist you.        Care EveryWhere ID     This is your Care EveryWhere ID. This could be used by other organizations to access your [place] medical records  [id number]         Blood Pressure from Last 3 Encounters:   09/05/17 118/70   08/30/17 100/70   07/19/17 126/82    Weight from Last 3 Encounters:   09/05/17 172 lb (78 kg)   08/30/17 172 lb (78 kg)   07/19/17 171 lb (77.6 kg)              We Performed the Following     B12 - 1000 MCG     INJECTION INTRAMUSCULAR OR SUB-Q        Primary Care Provider Office Phone # Fax #    Muriel Jones [phone]       [facility] [place] [address]  [place] [address]        Equal Access to Services     [facility]: Hadii aad ku hadasho Soomaali, waaxda luqadaha, qaybta kaalmada adeegyada, waxay taurus hayjulio monge . So [facility] [phone].    ATENCIÓN: Si habla español, tiene a jama disposición servicios gratuitos de asistencia lingüística. Llame al [phone].    We comply with applicable federal civil rights laws and [place] laws. We do not discriminate on the basis of race, color, national origin, age, disability, sex, sexual orientation, or gender identity.            Thank you!     Thank you for choosing [facility]  for your care. Our goal is always to provide you with excellent care. Hearing back from our patients is one way we can continue to improve our services. Please take a few minutes to complete the written survey that you may receive in the mail after your visit with us. Thank you!             Your Updated Medication List - Protect others around you:  Learn how to safely use, store and throw away your medicines at www.disposemymeds.org.          This list is accurate as of: 9/29/17  2:39 PM.  Always use your most recent med list.                   Brand Name Dispense Instructions for use Diagnosis    ACETYLCYSTEINE PO      Take 600 mg by mouth 2 times daily        azithromycin 250 MG tablet    ZITHROMAX    6 tablet    As directed    Acute bronchitis, unspecified organism, Bronchospasm       blood glucose monitoring lancets     1 Box    Use to test blood sugar 3 times daily or as directed.    Diabetes mellitus, type 2 (H)       blood glucose monitoring test strip    ONE TOUCH ULTRA    1 Month    Use to test blood sugar 3 times daily or as directed.    Diabetes mellitus, type 2 (H)       cyanocobalamin 1000 MCG/ML injection    VITAMIN B12    1 mL    Inject 1 mL (1,000 mcg) into the muscle every 30 days    Vitamin B12 deficiency       ibuprofen 200 MG tablet    ADVIL/MOTRIN     Take 2 tablets by mouth. Every 6 hours as needed with food        lisdexamfetamine 20 MG capsule    VYVANSE    30 capsule    Take 1 capsule (20 mg) by mouth every morning    Attention deficit hyperactivity disorder (ADHD), predominantly inattentive type       methylPREDNISolone 4 MG tablet    MEDROL DOSEPAK    21 tablet    Follow package instructions    Acute bronchitis, unspecified organism, Bronchospasm       order for DME     1 Device    Right ankle ASO    Sprain of other ligament of right ankle, initial encounter       penciclovir 1 % cream    DENAVIR    1.5 g    Apply topically every 2 hours (while awake)    Recurrent cold sores       progesterone 100 MG capsule    PROMETRIUM    90 capsule    Take 3 capsules (300 mg) by mouth daily Starting on peak +3 for 10 days out of month    PCOS (polycystic ovarian syndrome)       TYLENOL 500 MG tablet   Generic drug:  acetaminophen      Take 2 tablets by mouth. Every 6 hours as needed        valACYclovir 500 MG tablet    VALTREX    30 tablet     Take 1 tablet (500 mg) by mouth 2 times daily With active lesion    Cold sore       vitamin  B-6 500 MG Tabs      Take 1 tablet by mouth daily        vitamin D 80468 UNIT capsule    ERGOCALCIFEROL    4 capsule    TAKE ONE CAPSULE BY MOUTH ONCE A WEEK    MS (multiple sclerosis) (H)       ZANTAC 150 MG tablet   Generic drug:  ranitidine      Take 1 tablet by mouth as needed

## 2017-10-01 DIAGNOSIS — E28.2 PCOS (POLYCYSTIC OVARIAN SYNDROME): ICD-10-CM

## 2017-10-01 PROCEDURE — 82670 ASSAY OF TOTAL ESTRADIOL: CPT | Performed by: FAMILY MEDICINE

## 2017-10-01 PROCEDURE — 84144 ASSAY OF PROGESTERONE: CPT | Performed by: FAMILY MEDICINE

## 2017-10-01 PROCEDURE — 36415 COLL VENOUS BLD VENIPUNCTURE: CPT | Performed by: FAMILY MEDICINE

## 2017-10-02 ENCOUNTER — TELEPHONE (OUTPATIENT)
Dept: FAMILY MEDICINE | Facility: OTHER | Age: 27
End: 2017-10-02

## 2017-10-02 ENCOUNTER — APPOINTMENT (OUTPATIENT)
Dept: LAB | Facility: OTHER | Age: 27
End: 2017-10-02
Attending: FAMILY MEDICINE
Payer: COMMERCIAL

## 2017-10-02 ENCOUNTER — OFFICE VISIT (OUTPATIENT)
Dept: FAMILY MEDICINE | Facility: OTHER | Age: 27
End: 2017-10-02
Attending: FAMILY MEDICINE
Payer: COMMERCIAL

## 2017-10-02 VITALS
HEART RATE: 90 BPM | OXYGEN SATURATION: 100 % | DIASTOLIC BLOOD PRESSURE: 80 MMHG | TEMPERATURE: 98 F | BODY MASS INDEX: 30.65 KG/M2 | SYSTOLIC BLOOD PRESSURE: 118 MMHG | HEIGHT: 63 IN | WEIGHT: 173 LBS

## 2017-10-02 DIAGNOSIS — R30.0 DYSURIA: Primary | ICD-10-CM

## 2017-10-02 PROBLEM — E11.9 TYPE 2 DIABETES MELLITUS WITHOUT COMPLICATION, WITHOUT LONG-TERM CURRENT USE OF INSULIN (H): Status: RESOLVED | Noted: 2017-01-25 | Resolved: 2017-10-02

## 2017-10-02 LAB
ALBUMIN UR-MCNC: 10 MG/DL
APPEARANCE UR: ABNORMAL
BACTERIA #/AREA URNS HPF: ABNORMAL /HPF
BILIRUB UR QL STRIP: NEGATIVE
COLOR UR AUTO: ABNORMAL
ESTRADIOL SERPL-MCNC: 75 PG/ML
GLUCOSE UR STRIP-MCNC: NEGATIVE MG/DL
HGB UR QL STRIP: ABNORMAL
KETONES UR STRIP-MCNC: NEGATIVE MG/DL
LEUKOCYTE ESTERASE UR QL STRIP: ABNORMAL
MUCOUS THREADS #/AREA URNS LPF: PRESENT /LPF
NITRATE UR QL: POSITIVE
PH UR STRIP: 6 PH (ref 4.7–8)
PROGEST SERPL-MCNC: 57.2 NG/ML
RBC #/AREA URNS AUTO: 2 /HPF (ref 0–2)
SOURCE: ABNORMAL
SP GR UR STRIP: 1.01 (ref 1–1.03)
SQUAMOUS #/AREA URNS AUTO: 13 /HPF (ref 0–1)
UROBILINOGEN UR STRIP-MCNC: NORMAL MG/DL (ref 0–2)
WBC #/AREA URNS AUTO: 3 /HPF (ref 0–2)

## 2017-10-02 PROCEDURE — 81003 URINALYSIS AUTO W/O SCOPE: CPT | Performed by: FAMILY MEDICINE

## 2017-10-02 PROCEDURE — 87086 URINE CULTURE/COLONY COUNT: CPT | Performed by: FAMILY MEDICINE

## 2017-10-02 PROCEDURE — 99213 OFFICE O/P EST LOW 20 MIN: CPT | Performed by: FAMILY MEDICINE

## 2017-10-02 PROCEDURE — 87088 URINE BACTERIA CULTURE: CPT | Performed by: FAMILY MEDICINE

## 2017-10-02 PROCEDURE — 87186 SC STD MICRODIL/AGAR DIL: CPT | Performed by: FAMILY MEDICINE

## 2017-10-02 RX ORDER — NITROFURANTOIN 25; 75 MG/1; MG/1
100 CAPSULE ORAL 2 TIMES DAILY
Qty: 14 CAPSULE | Refills: 0 | Status: SHIPPED | OUTPATIENT
Start: 2017-10-02 | End: 2018-02-05

## 2017-10-02 ASSESSMENT — ANXIETY QUESTIONNAIRES
GAD7 TOTAL SCORE: 5
3. WORRYING TOO MUCH ABOUT DIFFERENT THINGS: SEVERAL DAYS
IF YOU CHECKED OFF ANY PROBLEMS ON THIS QUESTIONNAIRE, HOW DIFFICULT HAVE THESE PROBLEMS MADE IT FOR YOU TO DO YOUR WORK, TAKE CARE OF THINGS AT HOME, OR GET ALONG WITH OTHER PEOPLE: SOMEWHAT DIFFICULT
7. FEELING AFRAID AS IF SOMETHING AWFUL MIGHT HAPPEN: NOT AT ALL
4. TROUBLE RELAXING: SEVERAL DAYS
6. BECOMING EASILY ANNOYED OR IRRITABLE: SEVERAL DAYS
5. BEING SO RESTLESS THAT IT IS HARD TO SIT STILL: SEVERAL DAYS
1. FEELING NERVOUS, ANXIOUS, OR ON EDGE: SEVERAL DAYS
2. NOT BEING ABLE TO STOP OR CONTROL WORRYING: NOT AT ALL

## 2017-10-02 ASSESSMENT — PAIN SCALES - GENERAL: PAINLEVEL: EXTREME PAIN (8)

## 2017-10-02 ASSESSMENT — PATIENT HEALTH QUESTIONNAIRE - PHQ9: SUM OF ALL RESPONSES TO PHQ QUESTIONS 1-9: 5

## 2017-10-02 NOTE — MR AVS SNAPSHOT
After Visit Summary   10/2/2017    Radha Carl    MRN: [id number]           Patient Information     Date Of Birth          1990        Visit Information        Provider Department      10/2/2017 1:45 PM Muriel Jones MD [facility]        Today's Diagnoses     Dysuria    -  1       Follow-ups after your visit        Your next 10 appointments already scheduled     Oct 04, 2017 10:30 AM CDT   Evaluation with Elizabeth Norton PT   [facility] ([facility] )    [address] [address]   [phone]            Oct 11, 2017  1:30 PM CDT   (Arrive by 1:15 PM)   PHYSICAL with Muriel Jones MD   [facility] ([facility] )    [address] [facility] [address]   [phone]            Oct 18, 2017  2:30 PM CDT   Treatment with Elizabeth Norton PT   [facility] ([facility] )    [address] [address]   [phone]            Oct 25, 2017  2:30 PM CDT   Treatment with Elizabeth Norton PT   [facility] ([facility] )    [address] [address]   [phone]              Who to contact     If you have questions or need follow up information about today's clinic visit or your schedule please contact [facility] directly at [phone].  Normal or non-critical lab and imaging results will be communicated to you by MyChart, letter or phone within 4 business days after the clinic has received the results. If you do not hear from us within 7 days, please contact the clinic through MyChart or phone. If you have a critical or abnormal lab result, we will notify you by phone as soon as possible.  Submit refill requests through Nazar or call your pharmacy and they will forward the refill request to us. Please allow 3 business days for your refill to be completed.          Additional Information About Your Visit        [facility]  "Information     Thais gives you secure access to your electronic health record. If you see a primary care provider, you can also send messages to your care team and make appointments. If you have questions, please call your primary care clinic.  If you do not have a primary care provider, please call [phone] and they will assist you.        Care EveryWhere ID     This is your Care EveryWhere ID. This could be used by other organizations to access your [place] medical records  [id number]        Your Vitals Were     Pulse Temperature Height Pulse Oximetry BMI (Body Mass Index)       90 98  F (36.7  C) (Tympanic) 5' 3\" (1.6 m) 100% 30.65 kg/m2        Blood Pressure from Last 3 Encounters:   10/02/17 118/80   09/05/17 118/70   08/30/17 100/70    Weight from Last 3 Encounters:   10/02/17 173 lb (78.5 kg)   09/05/17 172 lb (78 kg)   08/30/17 172 lb (78 kg)              We Performed the Following     UA reflex to Microscopic and Culture - [place]     Urine Culture Aerobic Bacterial          Today's Medication Changes          These changes are accurate as of: 10/2/17  2:48 PM.  If you have any questions, ask your nurse or doctor.               Start taking these medicines.        Dose/Directions    nitroFURantoin (macrocrystal-monohydrate) 100 MG capsule   Commonly known as:  MACROBID   Used for:  Dysuria   Started by:  Muriel Jones MD        Dose:  100 mg   Take 1 capsule (100 mg) by mouth 2 times daily   Quantity:  14 capsule   Refills:  0            Where to get your medicines      These medications were sent to [id number] [address] [id number]   [address] , [place] [address]     Phone:  [phone]     nitroFURantoin (macrocrystal-monohydrate) 100 MG capsule                Primary Care Provider Office Phone # Fax #    Muriel Jones [phone]       [facility] [place] [address]  [place] [address]        Equal Access to Services     TOMMY OLMSTEAD AH: " Hadii aad ku hadstepano Sohildaali, waaxda luqadaha, qaybta kaalmada jayce, evert daytonin hayaan juanitojennifer laureano lanicole jiménez. So [facility] [phone].    ATENCIÓN: Si precious nicholson, tiene a jama disposición servicios gratuitos de asistencia lingüística. Llame al [phone].    We comply with applicable federal civil rights laws and [place] laws. We do not discriminate on the basis of race, color, national origin, age, disability, sex, sexual orientation, or gender identity.            Thank you!     [facility] you for [facility]  for your [facility]. Our goal is always to provide you with excellent care. Hearing back from our patients is one way we can continue to improve our services. Please take a few minutes to complete the written survey that you may receive in the mail after your visit with us. Thank you!             Your Updated Medication List - Protect others around you: Learn how to safely use, store and throw away your medicines at www.disposemymeds.org.          This list is accurate as of: 10/2/17  2:48 PM.  Always use your most recent med list.                   Brand Name Dispense Instructions for use Diagnosis    ACETYLCYSTEINE PO      Take 600 mg by mouth 2 times daily        azithromycin 250 MG tablet    ZITHROMAX    6 tablet    As directed    Acute bronchitis, unspecified organism, Bronchospasm       blood glucose monitoring lancets     1 Box    Use to test blood sugar 3 times daily or as directed.    Diabetes mellitus, type 2 (H)       blood glucose monitoring test strip    ONE TOUCH ULTRA    1 Month    Use to test blood sugar 3 times daily or as directed.    Diabetes mellitus, type 2 (H)       cyanocobalamin 1000 MCG/ML injection    VITAMIN B12    1 mL    Inject 1 mL (1,000 mcg) into the muscle every 30 days    Vitamin B12 deficiency       ibuprofen 200 MG tablet    ADVIL/MOTRIN     Take 2 tablets by mouth. Every 6 hours as needed with food        lisdexamfetamine 20 MG capsule    VYVANSE     30 capsule    Take 1 capsule (20 mg) by mouth every morning    Attention deficit hyperactivity disorder (ADHD), predominantly inattentive type       methylPREDNISolone 4 MG tablet    MEDROL DOSEPAK    21 tablet    Follow package instructions    Acute bronchitis, unspecified organism, Bronchospasm       nitroFURantoin (macrocrystal-monohydrate) 100 MG capsule    MACROBID    14 capsule    Take 1 capsule (100 mg) by mouth 2 times daily    Dysuria       order for DME     1 Device    Right ankle ASO    Sprain of other ligament of right ankle, initial encounter       penciclovir 1 % cream    DENAVIR    1.5 g    Apply topically every 2 hours (while awake)    Recurrent cold sores       progesterone 100 MG capsule    PROMETRIUM    90 capsule    Take 3 capsules (300 mg) by mouth daily Starting on peak +3 for 10 days out of month    PCOS (polycystic ovarian syndrome)       TYLENOL 500 MG tablet   Generic drug:  acetaminophen      Take 2 tablets by mouth. Every 6 hours as needed        valACYclovir 500 MG tablet    VALTREX    30 tablet    Take 1 tablet (500 mg) by mouth 2 times daily With active lesion    Cold sore       vitamin  B-6 500 MG Tabs      Take 1 tablet by mouth daily        vitamin D 53733 UNIT capsule    ERGOCALCIFEROL    4 capsule    TAKE ONE CAPSULE BY MOUTH ONCE A WEEK    MS (multiple sclerosis) (H)       ZANTAC 150 MG tablet   Generic drug:  ranitidine      Take 1 tablet by mouth as needed

## 2017-10-02 NOTE — NURSING NOTE
"Chief Complaint   Patient presents with     UTI       Initial /80 (BP Location: Right arm, Patient Position: Chair, Cuff Size: Adult Regular)  Pulse 90  Temp 98  F (36.7  C) (Tympanic)  Ht 5' 3\" (1.6 m)  Wt 173 lb (78.5 kg)  SpO2 100%  BMI 30.65 kg/m2 Estimated body mass index is 30.65 kg/(m^2) as calculated from the following:    Height as of this encounter: 5' 3\" (1.6 m).    Weight as of this encounter: 173 lb (78.5 kg).  Medication Reconciliation: complete     Susi Garg     "

## 2017-10-02 NOTE — TELEPHONE ENCOUNTER
12:06 PM    Reason for Call: OVERBOOK    Patient is having the following symptoms: Poss UTI for 4 days.    The patient is requesting an appointment for ASAP with Dr Jones. (Pt stated she gets these often and wondering if something can be just called in)    Was an appointment offered for this call? Yes  If yes : Appointment type short              Date 10/17/17    Preferred method for responding to this message: Telephone Call  What is your phone number ?  [phone]    If we cannot reach you directly, may we leave a detailed response at the number you provided? Yes    Can this message wait until your PCP/provider returns, if unavailable today? Not applicable    Torie Taylor

## 2017-10-02 NOTE — PROGRESS NOTES
SUBJECTIVE:   Radha Carl is a 27 year old female who presents to clinic today for the following health issues:      URINARY TRACT SYMPTOMS      Duration: Since Friday 9/29/17    Description  odor and back pain    Intensity:  mild    Accompanying signs and symptoms:  Fever/chills: no   Flank pain no   Nausea and vomiting: no   Vaginal symptoms: odor  Abdominal/Pelvic Pain: no     History  History of frequent UTI's: no   History of kidney stones: no   Sexually Active: YES  Possibility of pregnancy: No    Precipitating or alleviating factors: None    Therapies tried and outcome: cranberry juice  and increase fluid intake   Outcome: helps when drinking a lot of fluids, but then comes back. Strong ammonia smell.       Problem list and histories reviewed & adjusted, as indicated.  Additional history: as documented    Current Outpatient Prescriptions   Medication Sig Dispense Refill     nitroFURantoin, macrocrystal-monohydrate, (MACROBID) 100 MG capsule Take 1 capsule (100 mg) by mouth 2 times daily 14 capsule 0     cyanocobalamin (VITAMIN B12) 1000 MCG/ML injection Inject 1 mL (1,000 mcg) into the muscle every 30 days 1 mL 1     azithromycin (ZITHROMAX) 250 MG tablet As directed 6 tablet 0     methylPREDNISolone (MEDROL DOSEPAK) 4 MG tablet Follow package instructions 21 tablet 0     order for DME Right ankle ASO 1 Device 0     valACYclovir (VALTREX) 500 MG tablet Take 1 tablet (500 mg) by mouth 2 times daily With active lesion 30 tablet 3     lisdexamfetamine (VYVANSE) 20 MG capsule Take 1 capsule (20 mg) by mouth every morning 30 capsule 0     vitamin D (ERGOCALCIFEROL) 78980 UNIT capsule TAKE ONE CAPSULE BY MOUTH ONCE A WEEK 4 capsule 0     progesterone (PROMETRIUM) 100 MG capsule Take 3 capsules (300 mg) by mouth daily Starting on peak +3 for 10 days out of month 90 capsule 3     penciclovir (DENAVIR) 1 % cream Apply topically every 2 hours (while awake) 1.5 g 3     ACETYLCYSTEINE PO Take 600 mg by mouth 2  "times daily       Pyridoxine HCl (VITAMIN  B-6) 500 MG TABS Take 1 tablet by mouth daily       blood glucose (ONE TOUCH ULTRA) test strip Use to test blood sugar 3 times daily or as directed. 1 Month 12     blood glucose monitoring (ONE TOUCH DELICA) lancets Use to test blood sugar 3 times daily or as directed. 1 Box 4     ranitidine (ZANTAC) 150 MG tablet Take 1 tablet by mouth as needed       acetaminophen (TYLENOL) 500 MG tablet Take 2 tablets by mouth. Every 6 hours as needed       ibuprofen (ADVIL,MOTRIN) 200 MG tablet Take 2 tablets by mouth. Every 6 hours as needed with food       [DISCONTINUED] norethindrone-ethinyl estradiol (MICROGESTIN) 1-20 MG-MCG per tablet Take 1 tablet by mouth daily.       Labs reviewed in EPIC    Reviewed and updated as needed this visit by clinical staffTobacco  Allergies  Meds  Problems  Med Hx  Surg Hx  Fam Hx  Soc Hx        Reviewed and updated as needed this visit by Provider         ROS:  Constitutional, HEENT, cardiovascular, pulmonary, gi and gu systems are negative, except as otherwise noted.      OBJECTIVE:                                                    /80 (BP Location: Right arm, Patient Position: Chair, Cuff Size: Adult Regular)  Pulse 90  Temp 98  F (36.7  C) (Tympanic)  Ht 5' 3\" (1.6 m)  Wt 173 lb (78.5 kg)  SpO2 100%  BMI 30.65 kg/m2  Body mass index is 30.65 kg/(m^2).  GENERAL APPEARANCE: healthy, alert and no distress  ABDOMEN: no hepatosplenomegaly or masses and soft, but suprapubic tenderness  MS: extremities normal- no gross deformities noted and no CVA tenderness  PSYCH: mentation appears normal and affect normal/bright       ASSESSMENT/PLAN:                                                    1. Dysuria    - UA reflex to Microscopic and Culture - [place]  - Urine Culture Aerobic Bacterial  - nitroFURantoin, macrocrystal-monohydrate, (MACROBID) 100 MG capsule; Take 1 capsule (100 mg) by mouth 2 times daily  Dispense: 14 capsule; Refill: " 0    Patient was agreeable to this plan and had no further questions.  See Patient Instructions    Muriel Jonse MD  [facility]

## 2017-10-03 ASSESSMENT — ANXIETY QUESTIONNAIRES: GAD7 TOTAL SCORE: 5

## 2017-10-05 LAB
BACTERIA SPEC CULT: ABNORMAL
BACTERIA SPEC CULT: ABNORMAL
SPECIMEN SOURCE: ABNORMAL

## 2017-10-11 ENCOUNTER — OFFICE VISIT (OUTPATIENT)
Dept: FAMILY MEDICINE | Facility: OTHER | Age: 27
End: 2017-10-11
Attending: FAMILY MEDICINE
Payer: COMMERCIAL

## 2017-10-11 VITALS
TEMPERATURE: 98.1 F | HEIGHT: 63 IN | SYSTOLIC BLOOD PRESSURE: 110 MMHG | HEART RATE: 79 BPM | DIASTOLIC BLOOD PRESSURE: 70 MMHG | WEIGHT: 172 LBS | BODY MASS INDEX: 30.48 KG/M2 | OXYGEN SATURATION: 99 %

## 2017-10-11 DIAGNOSIS — Z00.00 ROUTINE GENERAL MEDICAL EXAMINATION AT A HEALTH CARE FACILITY: ICD-10-CM

## 2017-10-11 DIAGNOSIS — E53.8 VITAMIN B12 DEFICIENCY (NON ANEMIC): ICD-10-CM

## 2017-10-11 DIAGNOSIS — F41.1 GAD (GENERALIZED ANXIETY DISORDER): ICD-10-CM

## 2017-10-11 DIAGNOSIS — M25.562 ACUTE PAIN OF LEFT KNEE: ICD-10-CM

## 2017-10-11 DIAGNOSIS — Z23 NEED FOR PROPHYLACTIC VACCINATION AND INOCULATION AGAINST INFLUENZA: Primary | ICD-10-CM

## 2017-10-11 PROCEDURE — 87624 HPV HI-RISK TYP POOLED RSLT: CPT | Mod: 90 | Performed by: FAMILY MEDICINE

## 2017-10-11 PROCEDURE — 90471 IMMUNIZATION ADMIN: CPT | Performed by: FAMILY MEDICINE

## 2017-10-11 PROCEDURE — 99395 PREV VISIT EST AGE 18-39: CPT | Mod: 25 | Performed by: FAMILY MEDICINE

## 2017-10-11 PROCEDURE — G0123 SCREEN CERV/VAG THIN LAYER: HCPCS | Performed by: FAMILY MEDICINE

## 2017-10-11 PROCEDURE — 90686 IIV4 VACC NO PRSV 0.5 ML IM: CPT | Performed by: FAMILY MEDICINE

## 2017-10-11 PROCEDURE — 99000 SPECIMEN HANDLING OFFICE-LAB: CPT | Performed by: FAMILY MEDICINE

## 2017-10-11 ASSESSMENT — PAIN SCALES - GENERAL: PAINLEVEL: SEVERE PAIN (6)

## 2017-10-11 ASSESSMENT — ANXIETY QUESTIONNAIRES
IF YOU CHECKED OFF ANY PROBLEMS ON THIS QUESTIONNAIRE, HOW DIFFICULT HAVE THESE PROBLEMS MADE IT FOR YOU TO DO YOUR WORK, TAKE CARE OF THINGS AT HOME, OR GET ALONG WITH OTHER PEOPLE: SOMEWHAT DIFFICULT
4. TROUBLE RELAXING: NEARLY EVERY DAY
6. BECOMING EASILY ANNOYED OR IRRITABLE: NEARLY EVERY DAY
2. NOT BEING ABLE TO STOP OR CONTROL WORRYING: SEVERAL DAYS
5. BEING SO RESTLESS THAT IT IS HARD TO SIT STILL: MORE THAN HALF THE DAYS
1. FEELING NERVOUS, ANXIOUS, OR ON EDGE: MORE THAN HALF THE DAYS
7. FEELING AFRAID AS IF SOMETHING AWFUL MIGHT HAPPEN: NOT AT ALL
3. WORRYING TOO MUCH ABOUT DIFFERENT THINGS: MORE THAN HALF THE DAYS
GAD7 TOTAL SCORE: 13

## 2017-10-11 ASSESSMENT — PATIENT HEALTH QUESTIONNAIRE - PHQ9: SUM OF ALL RESPONSES TO PHQ QUESTIONS 1-9: 11

## 2017-10-11 NOTE — MR AVS SNAPSHOT
After Visit Summary   10/11/2017    Radha aCrl    MRN: [id number]           Patient Information     Date Of Birth          1990        Visit Information        Provider Department      10/11/2017 1:30 PM Muriel Jones MD [facility]        Today's Diagnoses     Need for prophylactic vaccination and inoculation against influenza    -  1    Routine general medical examination at a health care facility        Acute pain of left knee        MARIBETH (generalized anxiety disorder)        Vitamin B12 deficiency (non anemic)          Care Instructions      Preventive Health Recommendations  Female Ages 26 - 39  Yearly exam:   See your health care provider every year in order to    Review health changes.     Discuss preventive care.      Review your medicines if you your doctor has prescribed any.    Until age 30: Get a Pap test every three years (more often if you have had an abnormal result).    After age 30: Talk to your doctor about whether you should have a Pap test every 3 years or have a Pap test with HPV screening every 5 years.   You do not need a Pap test if your uterus was removed (hysterectomy) and you have not had cancer.  You should be tested each year for STDs (sexually transmitted diseases), if you're at risk.   Talk to your provider about how often to have your cholesterol checked.  If you are at risk for diabetes, you should have a diabetes test (fasting glucose).  Shots: Get a flu shot each year. Get a tetanus shot every 10 years.   Nutrition:     Eat at least 5 servings of fruits and vegetables each day.    Eat whole-grain bread, whole-wheat pasta and brown rice instead of white grains and rice.    Talk to your provider about Calcium and Vitamin D.     Lifestyle    Exercise at least 150 minutes a week (30 minutes a day, 5 days of the week). This will help you control your weight and prevent disease.    Limit alcohol to one drink per day.    No smoking.     Wear  "sunscreen to prevent skin cancer.    See your dentist every six months for an exam and cleaning.            Follow-ups after your visit        Additional Services     PHYSICAL THERAPY REFERRAL       *This therapy referral will be filtered to a centralized scheduling office at [facility] and the patient will receive a call to schedule an appointment at a [place] location most convenient for them. *     [facility] provides Physical Therapy evaluation and treatment and many specialty services across the [place] system.  If requesting a specialty program, please choose from the list below.    If you have not heard from the scheduling office within 2 business days, please call [phone] for all locations, with the exception of [place], please call [phone].  Treatment: Evaluation & Treatment  Special Instructions/Modalities:   Special Programs:     Please be aware that coverage of these services is subject to the terms and limitations of your health insurance plan.  Call member services at your health plan with any benefit or coverage questions.      **Note to Provider:  If you are referring outside of [place] for the therapy appointment, please list the name of the location in the \"special instructions\" above, print the referral and give to the patient to schedule the appointment.                  Your next 10 appointments already scheduled     Oct 20, 2017  3:30 PM CDT   Evaluation with [facility] ([facility] )    [address] [id number] [phone]            Oct 25, 2017  2:30 PM CDT   Treatment with [facility] ([facility] )    [address] [id number] [phone]              Who to contact     If you have questions or need follow up information about today's clinic visit or your schedule please contact [facility] directly at " "[phone].  Normal or non-critical lab and imaging results will be communicated to you by MyChart, letter or phone within 4 business days after the clinic has received the results. If you do not hear from us within 7 days, please contact the clinic through [employer] or phone. If you have a critical or abnormal lab result, we will notify you by phone as soon as possible.  Submit refill requests through GroupZoom or call your pharmacy and they will forward the refill request to us. Please allow 3 business days for your refill to be completed.          Additional Information About Your Visit        [employer] Information     [employer] gives you secure access to your electronic health record. If you see a primary care provider, you can also send messages to your care team and make appointments. If you have questions, please call your primary care clinic.  If you do not have a primary care provider, please call [phone] and they will assist you.        Care EveryWhere ID     This is your Care EveryWhere ID. This could be used by other organizations to access your [place] medical records  [id number]        Your Vitals Were     Pulse Temperature Height Pulse Oximetry BMI (Body Mass Index)       79 98.1  F (36.7  C) (Tympanic) 5' 3\" (1.6 m) 99% 30.47 kg/m2        Blood Pressure from Last 3 Encounters:   10/11/17 110/70   10/02/17 118/80   09/05/17 118/70    Weight from Last 3 Encounters:   10/11/17 172 lb (78 kg)   10/02/17 173 lb (78.5 kg)   09/05/17 172 lb (78 kg)              We Performed the Following     A pap thin layer screen with  HPV - recommended age 30 - 65 years (select HPV order below)     HC FLU VAC PRESRV FREE QUAD SPLIT VIR 3+YRS IM     HPV High Risk Types DNA Cervical     PHYSICAL THERAPY REFERRAL     Vaccine Administration, Initial [[id number]]        Primary Care Provider Office Phone # Fax #    Muriel Jones [phone]       [facility] [place] [address]  [place] [address]   "      Equal Access to Services     [facility] : Hadii aad ku hadstepanerika Claudiatomer, wajoseda luqadaha, qaybta kamaria doloresevert ross. So [facility] [phone].    ATENCIÓN: Si habla español, tiene a jama disposición servicios gratuitos de asistencia lingüística. Llame al [phone].    We comply with applicable federal civil rights laws and [place] laws. We do not discriminate on the basis of race, color, national origin, age, disability, sex, sexual orientation, or gender identity.            Thank you!     Thank you for choosing [facility]  for your [facility]. Our goal is always to provide you with excellent care. Hearing back from our patients is one way we can continue to improve our services. Please take a few minutes to complete the written survey that you may receive in the mail after your visit with us. Thank you!             Your Updated Medication List - Protect others around you: Learn how to safely use, store and throw away your medicines at www.disposemymeds.org.          This list is accurate as of: 10/11/17  5:23 PM.  Always use your most recent med list.                   Brand Name Dispense Instructions for use Diagnosis    ACETYLCYSTEINE PO      Take 600 mg by mouth 2 times daily        azithromycin 250 MG tablet    ZITHROMAX    6 tablet    As directed    Acute bronchitis, unspecified organism, Bronchospasm       blood glucose monitoring lancets     1 Box    Use to test blood sugar 3 times daily or as directed.    Diabetes mellitus, type 2 (H)       blood glucose monitoring test strip    ONE TOUCH ULTRA    1 Month    Use to test blood sugar 3 times daily or as directed.    Diabetes mellitus, type 2 (H)       cyanocobalamin 1000 MCG/ML injection    VITAMIN B12    1 mL    Inject 1 mL (1,000 mcg) into the muscle every 30 days    Vitamin B12 deficiency       ibuprofen 200 MG tablet    ADVIL/MOTRIN     Take 2 tablets by mouth. Every 6 hours as needed with food         lisdexamfetamine 20 MG capsule    VYVANSE    30 capsule    Take 1 capsule (20 mg) by mouth every morning    Attention deficit hyperactivity disorder (ADHD), predominantly inattentive type       methylPREDNISolone 4 MG tablet    MEDROL DOSEPAK    21 tablet    Follow package instructions    Acute bronchitis, unspecified organism, Bronchospasm       nitroFURantoin (macrocrystal-monohydrate) 100 MG capsule    MACROBID    14 capsule    Take 1 capsule (100 mg) by mouth 2 times daily    Dysuria       order for DME     1 Device    Right ankle ASO    Sprain of other ligament of right ankle, initial encounter       penciclovir 1 % cream    DENAVIR    1.5 g    Apply topically every 2 hours (while awake)    Recurrent cold sores       progesterone 100 MG capsule    PROMETRIUM    90 capsule    Take 3 capsules (300 mg) by mouth daily Starting on peak +3 for 10 days out of month    PCOS (polycystic ovarian syndrome)       TYLENOL 500 MG tablet   Generic drug:  acetaminophen      Take 2 tablets by mouth. Every 6 hours as needed        valACYclovir 500 MG tablet    VALTREX    30 tablet    Take 1 tablet (500 mg) by mouth 2 times daily With active lesion    Cold sore       vitamin  B-6 500 MG Tabs      Take 1 tablet by mouth daily        vitamin D 24463 UNIT capsule    ERGOCALCIFEROL    4 capsule    TAKE ONE CAPSULE BY MOUTH ONCE A WEEK    MS (multiple sclerosis) (H)       ZANTAC 150 MG tablet   Generic drug:  ranitidine      Take 1 tablet by mouth as needed

## 2017-10-11 NOTE — NURSING NOTE
"Chief Complaint   Patient presents with     Physical       Initial /70 (BP Location: Left arm, Patient Position: Chair, Cuff Size: Adult Regular)  Pulse 79  Temp 98.1  F (36.7  C) (Tympanic)  Ht 5' 3\" (1.6 m)  Wt 172 lb (78 kg)  SpO2 99%  BMI 30.47 kg/m2 Estimated body mass index is 30.47 kg/(m^2) as calculated from the following:    Height as of this encounter: 5' 3\" (1.6 m).    Weight as of this encounter: 172 lb (78 kg).  Medication Reconciliation: complete     Susi Garg     "

## 2017-10-11 NOTE — PROGRESS NOTES
SUBJECTIVE:   CC: Radha Carl is an 27 year old woman who presents for preventive health visit.     Healthy Habits:    Do you get at least three servings of calcium containing foods daily (dairy, green leafy vegetables, etc.)? yes    Amount of exercise or daily activities, outside of work: 2 day(s) per week    Problems taking medications regularly No    Medication side effects: No    Have you had an eye exam in the past two years? yes    Do you see a dentist twice per year? yes    Do you have sleep apnea, excessive snoring or daytime drowsiness?yes - drowsiness          Charting      Duration: 3 years    Description (location/character/radiation): charting cycles    Intensity:  NA    Accompanying signs and symptoms: none    History (similar episodes/previous evaluation): None    Precipitating or alleviating factors: None    Therapies tried and outcome: None         Today's PHQ-2 Score: PHQ-2 ( 1999 Pf[employer]) 7/23/2015 7/3/2013   Q1: Little interest or pleasure in doing things 0 0   Q2: Feeling down, depressed or hopeless 0 0   PHQ-2 Score 0 0         Abuse: Current or Past(Physical, Sexual or Emotional)- No  Do you feel safe in your environment - Yes    Social History   Substance Use Topics     Smoking status: Never Smoker     Smokeless tobacco: Never Used     Alcohol use 0.0 oz/week     0 Standard drinks or equivalent per week      Comment: rare     The patient does not drink >3 drinks per day nor >7 drinks per week.    Reviewed orders with patient.  Reviewed health maintenance and updated orders accordingly - Yes  BP Readings from Last 3 Encounters:   10/11/17 110/70   10/02/17 118/80   09/05/17 118/70    Wt Readings from Last 3 Encounters:   10/11/17 172 lb (78 kg)   10/02/17 173 lb (78.5 kg)   09/05/17 172 lb (78 kg)                  Patient Active Problem List   Diagnosis     Attention deficit hyperactivity disorder (ADHD)     Hyperlipidemia with target LDL less than 100     PCOS (polycystic ovarian  syndrome)     Elevated liver enzymes     MS (multiple sclerosis) (H)     Vitamin B12 deficiency (non anemic)     Adiposity     Skin sensation disturbance     Tubal pregnancy without intrauterine pregnancy     Visual field scotoma     ACP (advance care planning)     Dysmenorrhea     MARIBETH (generalized anxiety disorder)     Past Surgical History:   Procedure Laterality Date     GYN SURGERY  5/2010    ectopic pregnancy ruptured -L salpingectomy     wisdom teeth  2014       Social History   Substance Use Topics     Smoking status: Never Smoker     Smokeless tobacco: Never Used     Alcohol use 0.0 oz/week     0 Standard drinks or equivalent per week      Comment: rare     Family History   Problem Relation Age of Onset     Family History Negative Mother      Family History Negative Father      Family History Negative Sister      Family History Negative Sister      Obesity Sister      Breast Cancer Maternal Grandmother 64     Unknown/Adopted Maternal Grandfather      severe gerd     Lung Cancer Paternal Grandmother      +tobacco     HEART DISEASE Paternal Grandfather      AMI/CAD or CVA     Family History Negative Brother      Psychotic Disorder Sister      ADHD         Current Outpatient Prescriptions   Medication Sig Dispense Refill     nitroFURantoin, macrocrystal-monohydrate, (MACROBID) 100 MG capsule Take 1 capsule (100 mg) by mouth 2 times daily 14 capsule 0     cyanocobalamin (VITAMIN B12) 1000 MCG/ML injection Inject 1 mL (1,000 mcg) into the muscle every 30 days 1 mL 1     azithromycin (ZITHROMAX) 250 MG tablet As directed 6 tablet 0     methylPREDNISolone (MEDROL DOSEPAK) 4 MG tablet Follow package instructions 21 tablet 0     order for DME Right ankle ASO 1 Device 0     valACYclovir (VALTREX) 500 MG tablet Take 1 tablet (500 mg) by mouth 2 times daily With active lesion 30 tablet 3     lisdexamfetamine (VYVANSE) 20 MG capsule Take 1 capsule (20 mg) by mouth every morning 30 capsule 0     vitamin D (ERGOCALCIFEROL)  96574 UNIT capsule TAKE ONE CAPSULE BY MOUTH ONCE A WEEK 4 capsule 0     progesterone (PROMETRIUM) 100 MG capsule Take 3 capsules (300 mg) by mouth daily Starting on peak +3 for 10 days out of month 90 capsule 3     penciclovir (DENAVIR) 1 % cream Apply topically every 2 hours (while awake) 1.5 g 3     ACETYLCYSTEINE PO Take 600 mg by mouth 2 times daily       Pyridoxine HCl (VITAMIN  B-6) 500 MG TABS Take 1 tablet by mouth daily       blood glucose (ONE TOUCH ULTRA) test strip Use to test blood sugar 3 times daily or as directed. 1 Month 12     blood glucose monitoring (ONE TOUCH DELICA) lancets Use to test blood sugar 3 times daily or as directed. 1 Box 4     ranitidine (ZANTAC) 150 MG tablet Take 1 tablet by mouth as needed       acetaminophen (TYLENOL) 500 MG tablet Take 2 tablets by mouth. Every 6 hours as needed       ibuprofen (ADVIL,MOTRIN) 200 MG tablet Take 2 tablets by mouth. Every 6 hours as needed with food       [DISCONTINUED] norethindrone-ethinyl estradiol (MICROGESTIN) 1-20 MG-MCG per tablet Take 1 tablet by mouth daily.             Mammogram not appropriate for this patient based on age.    Pertinent mammograms are reviewed under the imaging tab.  History of abnormal Pap smear: NO - age 21-29 PAP every 3 years recommended    Reviewed and updated as needed this visit by clinical staffTobacco  Allergies  Meds  Med Hx  Surg Hx  Fam Hx  Soc Hx        Reviewed and updated as needed this visit by Provider        Past Medical History:   Diagnosis Date     ADHD (attention deficit hyperactivity disorder)      Cold sore      Demyelinating changes in brain (H)     saw Dr Gilbert 5/2013 -- ? of MS, but not Dx'd yet.     DM2 (diabetes mellitus, type 2) (H)     resolved 2016     Elevated liver enzymes 2014    resolved 2015     Fibrocystic breast      History of left salpingo-oophorectomy     still has L ovary, but missing L tube     Neuropathy     feet     Obesity      PCOS (polycystic ovarian syndrome)      "  Past Surgical History:   Procedure Laterality Date     GYN SURGERY  2010    ectopic pregnancy ruptured -L salpingectomy     wisdom teeth       Obstetric History       T0      L0     SAB0   TAB0   Ectopic1   Multiple0   Live Births0       # Outcome Date GA Lbr Sage/2nd Weight Sex Delivery Anes PTL Lv   1 Ectopic 2010                  ROS:  C: NEGATIVE for fever, chills, change in weight  I: NEGATIVE for worrisome rashes, moles or lesions  E: NEGATIVE for vision changes or irritation  ENT: NEGATIVE for ear, mouth and throat problems  R: NEGATIVE for significant cough or SOB  B: NEGATIVE for masses, tenderness or discharge  CV: NEGATIVE for chest pain, palpitations or peripheral edema  GI: NEGATIVE for nausea, abdominal pain, heartburn, or change in bowel habits  : NEGATIVE for unusual urinary or vaginal symptoms. Periods are regular.  M: NEGATIVE for significant arthralgias or myalgia  N: NEGATIVE for weakness, dizziness or paresthesias  P: NEGATIVE for changes in mood or affect    OBJECTIVE:   /70 (BP Location: Left arm, Patient Position: Chair, Cuff Size: Adult Regular)  Pulse 79  Temp 98.1  F (36.7  C) (Tympanic)  Ht 5' 3\" (1.6 m)  Wt 172 lb (78 kg)  SpO2 99%  BMI 30.47 kg/m2  EXAM:  GENERAL: healthy, alert and no distress  EYES: Eyes grossly normal to inspection, PERRL and conjunctivae and sclerae normal  HENT: ear canals and TM's normal, nose and mouth without ulcers or lesions  NECK: no adenopathy, no asymmetry, masses, or scars and thyroid normal to palpation  RESP: lungs clear to auscultation - no rales, rhonchi or wheezes  BREAST: normal without masses, tenderness or nipple discharge and no palpable axillary masses or adenopathy  CV: regular rate and rhythm, normal S1 S2, no S3 or S4, no murmur, click or rub, no peripheral edema and peripheral pulses strong  ABDOMEN: soft, nontender, no hepatosplenomegaly, no masses and bowel sounds normal   (female): normal female " "external genitalia, normal urethral meatus, vaginal mucosa pink, moist, well rugated, and normal cervix/adnexa/uterus without masses or discharge, pap done  MS: no gross musculoskeletal defects noted, no edema  SKIN: no suspicious lesions or rashes  NEURO: Normal strength and tone, mentation intact and speech normal  PSYCH: mentation appears normal, affect normal/bright    ASSESSMENT/PLAN:   1. Need for prophylactic vaccination and inoculation against influenza    - HC FLU VAC PRESRV FREE QUAD SPLIT VIR 3+YRS IM  - Vaccine Administration, Initial [58011]    2. Routine general medical examination at a health care facility    - A pap thin layer screen with  HPV - recommended age 30 - 65 years (select HPV order below)  - HPV High Risk Types DNA Cervical    3. Acute pain of left knee    - PHYSICAL THERAPY REFERRAL    4. MARIBETH (generalized anxiety disorder)  Discussed counselors    5. Vitamin B12 deficiency (non anemic)  Discussed labs but actually I looked back and she had these labs done and they were all normal, I'm ok with b12 injection q omonth      COUNSELING:   Reviewed preventive health counseling, as reflected in patient instructions  Special attention given to:        Regular exercise       Healthy diet/nutrition       Vision screening       Hearing screening         reports that she has never smoked. She has never used smokeless tobacco.    Estimated body mass index is 30.47 kg/(m^2) as calculated from the following:    Height as of this encounter: 5' 3\" (1.6 m).    Weight as of this encounter: 172 lb (78 kg).       Counseling Resources:  ATP IV Guidelines  Pooled Cohorts Equation Calculator  Breast Cancer Risk Calculator  FRAX Risk Assessment  ICSI Preventive Guidelines  Dietary Guidelines for Americans, 2010  USDA's MyPlate  ASA Prophylaxis  Lung CA Screening    Muriel Jones MD  [facility] HIBBINGInjectable Influenza Immunization Documentation    1.  Is the person to be vaccinated sick today?   " No    2. Does the person to be vaccinated have an allergy to a component   of the vaccine?   No    3. Has the person to be vaccinated ever had a serious reaction   to influenza vaccine in the past?   No    4. Has the person to be vaccinated ever had Guillain-Barré syndrome?   No    Form completed by Susi Garg

## 2017-10-12 ASSESSMENT — ANXIETY QUESTIONNAIRES: GAD7 TOTAL SCORE: 13

## 2017-10-17 ENCOUNTER — TELEPHONE (OUTPATIENT)
Dept: FAMILY MEDICINE | Facility: OTHER | Age: 27
End: 2017-10-17

## 2017-10-17 DIAGNOSIS — R30.0 DYSURIA: ICD-10-CM

## 2017-10-17 DIAGNOSIS — R30.0 DYSURIA: Primary | ICD-10-CM

## 2017-10-17 LAB
ALBUMIN UR-MCNC: NEGATIVE MG/DL
APPEARANCE UR: ABNORMAL
BACTERIA #/AREA URNS HPF: ABNORMAL /HPF
BILIRUB UR QL STRIP: NEGATIVE
COLOR UR AUTO: YELLOW
GLUCOSE UR STRIP-MCNC: NEGATIVE MG/DL
HGB UR QL STRIP: ABNORMAL
KETONES UR STRIP-MCNC: ABNORMAL MG/DL
LEUKOCYTE ESTERASE UR QL STRIP: NEGATIVE
NITRATE UR QL: POSITIVE
NON-SQ EPI CELLS #/AREA URNS LPF: ABNORMAL /LPF
PH UR STRIP: 5.5 PH (ref 5–7)
RBC #/AREA URNS AUTO: ABNORMAL /HPF
SOURCE: ABNORMAL
SP GR UR STRIP: >1.03 (ref 1–1.03)
UROBILINOGEN UR STRIP-ACNC: 0.2 EU/DL (ref 0.2–1)
WBC #/AREA URNS AUTO: ABNORMAL /HPF

## 2017-10-17 PROCEDURE — 81001 URINALYSIS AUTO W/SCOPE: CPT | Performed by: FAMILY MEDICINE

## 2017-10-17 PROCEDURE — 87086 URINE CULTURE/COLONY COUNT: CPT | Performed by: FAMILY MEDICINE

## 2017-10-17 PROCEDURE — 87186 SC STD MICRODIL/AGAR DIL: CPT | Performed by: FAMILY MEDICINE

## 2017-10-17 PROCEDURE — 87088 URINE BACTERIA CULTURE: CPT | Performed by: FAMILY MEDICINE

## 2017-10-17 RX ORDER — CIPROFLOXACIN 250 MG/1
250 TABLET, FILM COATED ORAL 2 TIMES DAILY
Qty: 10 TABLET | Refills: 0 | Status: SHIPPED | OUTPATIENT
Start: 2017-10-17 | End: 2017-10-22

## 2017-10-17 NOTE — TELEPHONE ENCOUNTER
12:58 PM    Reason for Call: Phone Call    Description: Radha had a uti and was prescribed antiobiotics.  She finished them on Monday 10/09/2017 and feels it may have come back due to odor coming back on 10/11/17, Wednesday.  She is experiencing some pain.  Could an order be place to have another urine sample done for recheck to make sure it went away    Was an appointment offered for this call? No  If yes : Appointment type              Date    Preferred method for responding to this message: Telephone Call  What is your phone number ?[phone]    If we cannot reach you directly, may we leave a detailed response at the number you provided? Yes    Can this message wait until your PCP/provider returns, if available today? Not applicable,     Lynda Rai

## 2017-10-18 LAB
COPATH REPORT: NORMAL
PAP: NORMAL

## 2017-10-19 LAB
BACTERIA SPEC CULT: ABNORMAL
Lab: ABNORMAL
SPECIMEN SOURCE: ABNORMAL

## 2017-10-20 ENCOUNTER — HOSPITAL ENCOUNTER (OUTPATIENT)
Dept: PHYSICAL THERAPY | Facility: HOSPITAL | Age: 27
Setting detail: THERAPIES SERIES
End: 2017-10-20
Attending: FAMILY MEDICINE
Payer: COMMERCIAL

## 2017-10-20 LAB
FINAL DIAGNOSIS: NORMAL
HPV HR 12 DNA CVX QL NAA+PROBE: NEGATIVE
HPV16 DNA SPEC QL NAA+PROBE: NEGATIVE
HPV18 DNA SPEC QL NAA+PROBE: NEGATIVE
SPECIMEN DESCRIPTION: NORMAL

## 2017-10-20 PROCEDURE — 97110 THERAPEUTIC EXERCISES: CPT | Mod: GP

## 2017-10-20 PROCEDURE — 97750 PHYSICAL PERFORMANCE TEST: CPT | Mod: GP

## 2017-10-20 PROCEDURE — 40000718 ZZHC STATISTIC PT DEPARTMENT ORTHO VISIT

## 2017-10-21 NOTE — PROGRESS NOTES
Outpatient Physical Therapy Progress Note     Patient: Radha Clarosicle  : 1990    Beginning/End Dates of Reporting Period:  17 to 10/21/2017    Referring Provider: Dr. Jones    Therapy Diagnosis: Cervical pain, R LE weakness, LBP     Client Self Report: Patient describes that she experiences intermittent symptoms where back feels locked up and LEs go into spasm and are actually pulled into hyperextension. Patient notes a symptom of tearing present at bilateral medial knees both when back is aggravated and when going into a squat position. Patient states that at times she has difficulty picking up feet and clumsy feet when walking. C/o clumsiness of LEs. Pins and needles sensation in bilateral feel at toes and plantar aspects of feet.    Objective Measurements:  Objective Measure: LE strength  Details: R LE strength has been consistently diminished compared to L LE despite strengthening and compliance to HEP. Strength difference is noticeable by a full grade.    Objective Measure: Sensation  Details: Light touch intact, but patient has bilateral numbness and tingling in feet that does not follow a radicular pattern.      Details: Significant HS tension present today bilaterally. R greater than L LE.   Objective Measure: Gait assessment  Details: Gait reveals R LE held behind patient with decreased stride length on R LE.            Goals:  Goal Identifier STG 1   Goal Description Patient will be independent and compliant with HEP.    Target Date 17   Date Met      Progress:     Goal Identifier LTG 1   Goal Description Patient will be able to perform essential work duties and study tasks without limitation of cervical pain or HA during or after.    Target Date 17   Date Met      Progress:     Goal Identifier LTG 2   Goal Description Patient will be able to consistently sleep through out the night without limitation from HA or cervical pain.    Target Date 17   Date Met      Progress:  "    Goal Identifier LTG 3   Goal Description Patient will have full and compareable neuromuscular control and dynamic stability of R LE compared to L allowing patient to have full confidence and strength in R LE during functional activities including bending and squatting.    Target Date 06/28/17   Date Met      Progress:     Goal Identifier     Goal Description     Target Date     Date Met      Progress:     Goal Identifier     Goal Description     Target Date     Date Met      Progress:     Goal Identifier     Goal Description     Target Date     Date Met      Progress:     Goal Identifier     Goal Description     Target Date     Date Met      Progress:     Progress Toward Goals:   Concern present that some of the symptoms that patient is describing coupled with past history of being screened for Multiple Sclerosis.     Patient describes pins and needles in feet that do not follow a radicular pattern, has consistent R LE weakness compared to L LE despite strengthening exercies, has \"tearing pains\" near knees, describes a pulling back/hyperextension of bilateral LEs that may be associated with spasticity or severe muscle spasm. Patient has balance issues and describes clumsiness and difficulty lifting feet intermittently. Patient states she has noted some concentration and memory problems. She apologizes for missing last apporintment as she got times mixed up and reports she has also noted some forgetfulness and time management issues when going to work. Review of medical record does reveal some visual changes. Will contact primary physician to recommend another consult with neurology. Patient had one in the past, but with current symptoms presentation would benefit from consult. Call placed to Dr. Jones to discuss.       Symptoms are not presenting musculoskeletal in nature. A more serious pathology may be at play with findings.       Plan:  Changes to therapy plan of care: Discuss with Dr. Jones and establish " future POC.     Discharge:  No

## 2017-10-25 ENCOUNTER — HOSPITAL ENCOUNTER (OUTPATIENT)
Dept: PHYSICAL THERAPY | Facility: HOSPITAL | Age: 27
Setting detail: THERAPIES SERIES
End: 2017-10-25
Attending: FAMILY MEDICINE
Payer: COMMERCIAL

## 2017-10-25 PROCEDURE — 97110 THERAPEUTIC EXERCISES: CPT | Mod: GP

## 2017-10-25 PROCEDURE — 40000718 ZZHC STATISTIC PT DEPARTMENT ORTHO VISIT

## 2017-10-31 ENCOUNTER — HOSPITAL ENCOUNTER (OUTPATIENT)
Dept: MRI IMAGING | Facility: HOSPITAL | Age: 27
End: 2017-10-31
Attending: CHIROPRACTOR
Payer: COMMERCIAL

## 2017-10-31 ENCOUNTER — HOSPITAL ENCOUNTER (OUTPATIENT)
Dept: MRI IMAGING | Facility: HOSPITAL | Age: 27
Discharge: HOME | End: 2017-10-31
Attending: CHIROPRACTOR | Admitting: CHIROPRACTOR
Payer: COMMERCIAL

## 2017-10-31 ENCOUNTER — MYC MEDICAL ADVICE (OUTPATIENT)
Dept: FAMILY MEDICINE | Facility: OTHER | Age: 27
End: 2017-10-31

## 2017-10-31 DIAGNOSIS — M54.50 LOW BACK PAIN: ICD-10-CM

## 2017-10-31 DIAGNOSIS — E53.8 VITAMIN B12 DEFICIENCY (NON ANEMIC): Primary | ICD-10-CM

## 2017-10-31 DIAGNOSIS — M54.2 NECK PAIN: ICD-10-CM

## 2017-10-31 PROCEDURE — 72141 MRI NECK SPINE W/O DYE: CPT | Mod: TC

## 2017-10-31 PROCEDURE — 72148 MRI LUMBAR SPINE W/O DYE: CPT | Mod: TC

## 2017-11-01 NOTE — TELEPHONE ENCOUNTER
Psychologists/ counselors  Gallo Briones  [phone]  Dr. Shaun Harris [phone]  [facility]  [phone]  [facility] [phone]  German Dubois  [phone]   [facility]  [phone]  (kids)  [facility] [phone]  (teens)  [facility]   [phone]  Counseling  [facility] [phone]  [facility]      [phone]  [facility] [phone]  [facility]  [phone]  [facility]     [phone]   [facility] counseling [phone]  Luis Molang  [phone]  Randy Salcido [phone]  Aleksandra Faith [phone]  [facility]     [phone]  [facility]     [phone]  [facility]     [phone]  [place]  Joshua Shaw  [phone]  [facility]     [phone]  [facility] Dr. TEQUILA Garnica     [phone]  [facility]     [phone]          See list above  Labs are in -- she needs A1c, creat, b12 and cbc

## 2017-11-01 NOTE — TELEPHONE ENCOUNTER
I see you guys chatted about counselors but she was under the assumption you were going to give her recommendations or referrals to someone and there is only a physical therapy referral.

## 2017-11-01 NOTE — TELEPHONE ENCOUNTER
Psychologists/ counselors  Gallo Briones  [phone]  Dr. Shaun Harris [phone]  [facility]  [phone]  [facility] [phone]  German Dubois  [phone]   [facility]  [phone]  (kids)  [facility] [phone]  (teens)  [facility]   [phone]  Counseling  [facility] [phone]  [facility]      [phone]  [facility] [phone]  [facility]  [phone]  [facility]     [phone]   [facility] counseling [phone]  Luis Molang  [phone]  Randy Salcido [phone]  Aleksandra Faith [phone]  [facility]     [phone]  [facility]     [phone]  [facility]     [phone]  [place]  Joshua Shaw  [phone]  [facility]     [phone]  [facility] Dr. TEQUILA Garnica     [phone]  [facility] Services     [phone]          See list above  Labs are in -- she needs A1c, creat, b12 and cbc

## 2017-11-03 DIAGNOSIS — E53.8 VITAMIN B12 DEFICIENCY (NON ANEMIC): ICD-10-CM

## 2017-11-03 LAB
ERYTHROCYTE [DISTWIDTH] IN BLOOD BY AUTOMATED COUNT: 14.3 % (ref 10–15)
HCT VFR BLD AUTO: 38.7 % (ref 35–47)
HGB BLD-MCNC: 12.7 G/DL (ref 11.7–15.7)
MCH RBC QN AUTO: 26.6 PG (ref 26.5–33)
MCHC RBC AUTO-ENTMCNC: 32.8 G/DL (ref 31.5–36.5)
MCV RBC AUTO: 81 FL (ref 78–100)
PLATELET # BLD AUTO: 507 10E9/L (ref 150–450)
RBC # BLD AUTO: 4.77 10E12/L (ref 3.8–5.2)
WBC # BLD AUTO: 12.7 10E9/L (ref 4–11)

## 2017-11-03 PROCEDURE — 36415 COLL VENOUS BLD VENIPUNCTURE: CPT | Performed by: FAMILY MEDICINE

## 2017-11-03 PROCEDURE — 85027 COMPLETE CBC AUTOMATED: CPT | Performed by: FAMILY MEDICINE

## 2017-11-24 DIAGNOSIS — E28.2 PCOS (POLYCYSTIC OVARIAN SYNDROME): ICD-10-CM

## 2017-11-24 LAB — PROGEST SERPL-MCNC: 43.7 NG/ML

## 2017-11-24 PROCEDURE — 36415 COLL VENOUS BLD VENIPUNCTURE: CPT | Performed by: FAMILY MEDICINE

## 2017-11-24 PROCEDURE — 99000 SPECIMEN HANDLING OFFICE-LAB: CPT | Performed by: FAMILY MEDICINE

## 2017-11-24 PROCEDURE — 84144 ASSAY OF PROGESTERONE: CPT | Mod: 90 | Performed by: FAMILY MEDICINE

## 2017-12-06 NOTE — PROGRESS NOTES
"Outpatient Physical Therapy Discharge Note     Patient: Radha DELAROSA Mericle  : 1990    Beginning/End Dates of Reporting Period:  5/2/15 to 2017    Referring Provider: Dr. Jones    Therapy Diagnosis: Cervicalgia, IT Band Syndrome R     Client Self Report: Patient reports that she had flare up of symptoms after vaccuming and thinks R \"hip is out\".  Has been doing the exercises and thinks that they have been helpful. Patient thinks her symptoms are from bulging discs and would like an MRI. Therapist has some concerns that this may be neurologic based. Telephone encounter sent to Dr. Jones.            Goals:  Goal Identifier STG 1   Goal Description Patient will be independent and compliant with HEP.    Target Date 17   Date Met      Progress:     Goal Identifier LTG 1   Goal Description Patient will be able to perform essential work duties and study tasks without limitation of cervical pain or HA during or after.    Target Date 17   Date Met      Progress:     Goal Identifier LTG 2   Goal Description Patient will be able to consistently sleep through out the night without limitation from HA or cervical pain.    Target Date 17   Date Met      Progress:     Goal Identifier LTG 3   Goal Description Patient will have full and compareable neuromuscular control and dynamic stability of R LE compared to L allowing patient to have full confidence and strength in R LE during functional activities including bending and squatting.    Target Date 17   Date Met      Progress:     Goal Identifier     Goal Description     Target Date     Date Met      Progress:     Goal Identifier     Goal Description     Target Date     Date Met      Progress:     Goal Identifier     Goal Description     Target Date     Date Met      Progress:     Goal Identifier     Goal Description     Target Date     Date Met      Progress:     Progress Toward Goals:   Progress limited due to Discussed concerns over " possibly neurologic cause of symptoms. Informed patient that message left with Dr. Jones.             Plan:  Discharge from therapy.    Discharge:    Reason for Discharge: No further expectation of progress.    Equipment Issued: HEP    Discharge Plan: Other services: defer to Dr. Jones for management of symptoms.

## 2017-12-12 ENCOUNTER — ALLIED HEALTH/NURSE VISIT (OUTPATIENT)
Dept: FAMILY MEDICINE | Facility: OTHER | Age: 27
End: 2017-12-12
Attending: FAMILY MEDICINE
Payer: COMMERCIAL

## 2017-12-12 DIAGNOSIS — Z11.1 SCREENING EXAMINATION FOR PULMONARY TUBERCULOSIS: Primary | ICD-10-CM

## 2017-12-12 PROCEDURE — 86580 TB INTRADERMAL TEST: CPT

## 2017-12-14 ENCOUNTER — ALLIED HEALTH/NURSE VISIT (OUTPATIENT)
Dept: FAMILY MEDICINE | Facility: OTHER | Age: 27
End: 2017-12-14
Attending: FAMILY MEDICINE
Payer: COMMERCIAL

## 2017-12-14 DIAGNOSIS — Z11.1 SCREENING EXAMINATION FOR PULMONARY TUBERCULOSIS: Primary | ICD-10-CM

## 2017-12-14 NOTE — MR AVS SNAPSHOT
After Visit Summary   12/14/2017    Radha Carl    MRN: [id number]           Patient Information     Date Of Birth          1990        Visit Information        Provider Department      12/14/2017 10:15 AM NISHA CALLEJAS NURSE [facility]        Today's Diagnoses     Screening examination for pulmonary tuberculosis    -  1       Follow-ups after your visit        Who to contact     If you have questions or need follow up information about today's clinic visit or your schedule please contact [facility] directly at [phone].  Normal or non-critical lab and imaging results will be communicated to you by Gen110hart, letter or phone within 4 business days after the clinic has received the results. If you do not hear from us within 7 days, please contact the clinic through Websenset or phone. If you have a critical or abnormal lab result, we will notify you by phone as soon as possible.  Submit refill requests through Ondango or call your pharmacy and they will forward the refill request to us. Please allow 3 business days for your refill to be completed.          Additional Information About Your Visit        MyChart Information     Ondango gives you secure access to your electronic health record. If you see a primary care provider, you can also send messages to your care team and make appointments. If you have questions, please call your primary care clinic.  If you do not have a primary care provider, please call [phone] and they will assist you.        Care EveryWhere ID     This is your Care EveryWhere ID. This could be used by other organizations to access your [facility] medical records  [id number]         Blood Pressure from Last 3 Encounters:   10/11/17 110/70   10/02/17 118/80   09/05/17 118/70    Weight from Last 3 Encounters:   10/11/17 172 lb (78 kg)   10/02/17 173 lb (78.5 kg)   09/05/17 172 lb (78 kg)              Today, you had the following     No orders  found for display       Primary Care Provider Office Phone # Fax #    Muriel Jones [phone]       [facility] [place] [address]  [place] [address]        Equal Access to Services     TOMMY OLMSTEAD : Hadii ratna ku hadstepano Soomaali, waaxda luqadaha, qaybta kaalmada adeegyada, waxradha idiin kashn teddy laureano laMiguel Ajulio jiménez. So [facility] [phone].    ATENCIÓN: Si habla español, tiene a jama disposición servicios gratuitos de asistencia lingüística. Llame al [phone].    We comply with applicable federal civil rights laws and [place] laws. We do not discriminate on the basis of race, color, national origin, age, disability, sex, sexual orientation, or gender identity.            Thank you!     Thank you for choosing [facility]  for your care. Our goal is always to provide you with excellent care. Hearing back from our patients is one way we can continue to improve our services. Please take a few minutes to complete the written survey that you may receive in the mail after your visit with us. Thank you!             Your Updated Medication List - Protect others around you: Learn how to safely use, store and throw away your medicines at www.disposemymeds.org.          This list is accurate as of: 12/14/17 11:07 AM.  Always use your most recent med list.                   Brand Name Dispense Instructions for use Diagnosis    ACETYLCYSTEINE PO      Take 600 mg by mouth 2 times daily        azithromycin 250 MG tablet    ZITHROMAX    6 tablet    As directed    Acute bronchitis, unspecified organism, Bronchospasm       blood glucose monitoring lancets     1 Box    Use to test blood sugar 3 times daily or as directed.    Diabetes mellitus, type 2 (H)       blood glucose monitoring test strip    ONETOUCH ULTRA    1 Month    Use to test blood sugar 3 times daily or as directed.    Diabetes mellitus, type 2 (H)       cyanocobalamin 1000 MCG/ML injection    VITAMIN B12    1 mL    Inject 1  mL (1,000 mcg) into the muscle every 30 days    Vitamin B12 deficiency       ibuprofen 200 MG tablet    ADVIL/MOTRIN     Take 2 tablets by mouth. Every 6 hours as needed with food        lisdexamfetamine 20 MG capsule    VYVANSE    30 capsule    Take 1 capsule (20 mg) by mouth every morning    Attention deficit hyperactivity disorder (ADHD), predominantly inattentive type       methylPREDNISolone 4 MG tablet    MEDROL DOSEPAK    21 tablet    Follow package instructions    Acute bronchitis, unspecified organism, Bronchospasm       nitroFURantoin (macrocrystal-monohydrate) 100 MG capsule    MACROBID    14 capsule    Take 1 capsule (100 mg) by mouth 2 times daily    Dysuria       order for DME     1 Device    Right ankle ASO    Sprain of other ligament of right ankle, initial encounter       penciclovir 1 % cream    DENAVIR    1.5 g    Apply topically every 2 hours (while awake)    Recurrent cold sores       progesterone 100 MG capsule    PROMETRIUM    90 capsule    Take 3 capsules (300 mg) by mouth daily Starting on peak +3 for 10 days out of month    PCOS (polycystic ovarian syndrome)       TYLENOL 500 MG tablet   Generic drug:  acetaminophen      Take 2 tablets by mouth. Every 6 hours as needed        valACYclovir 500 MG tablet    VALTREX    30 tablet    Take 1 tablet (500 mg) by mouth 2 times daily With active lesion    Cold sore       vitamin  B-6 500 MG Tabs      Take 1 tablet by mouth daily        vitamin D 04985 UNIT capsule    ERGOCALCIFEROL    4 capsule    TAKE ONE CAPSULE BY MOUTH ONCE A WEEK    MS (multiple sclerosis) (H)       ZANTAC 150 MG tablet   Generic drug:  ranitidine      Take 1 tablet by mouth as needed

## 2017-12-22 DIAGNOSIS — E28.2 PCOS (POLYCYSTIC OVARIAN SYNDROME): ICD-10-CM

## 2017-12-22 LAB — PROGEST SERPL-MCNC: 8.4 NG/ML

## 2017-12-22 PROCEDURE — 36415 COLL VENOUS BLD VENIPUNCTURE: CPT | Performed by: FAMILY MEDICINE

## 2017-12-22 PROCEDURE — 84144 ASSAY OF PROGESTERONE: CPT | Mod: 90 | Performed by: FAMILY MEDICINE

## 2017-12-22 PROCEDURE — 99000 SPECIMEN HANDLING OFFICE-LAB: CPT | Performed by: FAMILY MEDICINE

## 2017-12-27 ENCOUNTER — ALLIED HEALTH/NURSE VISIT (OUTPATIENT)
Dept: FAMILY MEDICINE | Facility: OTHER | Age: 27
End: 2017-12-27
Attending: FAMILY MEDICINE
Payer: COMMERCIAL

## 2017-12-27 DIAGNOSIS — Z11.1 SCREENING EXAMINATION FOR PULMONARY TUBERCULOSIS: Primary | ICD-10-CM

## 2017-12-27 PROCEDURE — 86580 TB INTRADERMAL TEST: CPT

## 2017-12-28 DIAGNOSIS — E53.8 VITAMIN B12 DEFICIENCY: ICD-10-CM

## 2017-12-28 PROCEDURE — 36415 COLL VENOUS BLD VENIPUNCTURE: CPT | Performed by: FAMILY MEDICINE

## 2017-12-28 PROCEDURE — 82607 VITAMIN B-12: CPT | Mod: 90 | Performed by: FAMILY MEDICINE

## 2017-12-28 PROCEDURE — 99000 SPECIMEN HANDLING OFFICE-LAB: CPT | Performed by: FAMILY MEDICINE

## 2017-12-29 ENCOUNTER — ALLIED HEALTH/NURSE VISIT (OUTPATIENT)
Dept: ALLERGY | Facility: OTHER | Age: 27
End: 2017-12-29
Attending: FAMILY MEDICINE
Payer: COMMERCIAL

## 2017-12-29 DIAGNOSIS — Z11.1 SCREENING EXAMINATION FOR PULMONARY TUBERCULOSIS: Primary | ICD-10-CM

## 2017-12-29 LAB
PPDINDURATION: 0 MM (ref 0–5)
PPDREDNESS: 0 MM
VIT B12 SERPL-MCNC: 606 PG/ML (ref 193–986)

## 2017-12-29 NOTE — PROGRESS NOTES
Prior to injection verified patient identity using patient's name and date of birth.    Mantoux results:     No induration.  No swelling.  No redness.    Left Arm.

## 2017-12-29 NOTE — MR AVS SNAPSHOT
After Visit Summary   12/29/2017    Radha Carl    MRN: [id number]           Patient Information     Date Of Birth          1990        Visit Information        Provider Department      12/29/2017 4:15 PM HC SHOT ROOM [facility] [place]        Today's Diagnoses     Screening examination for pulmonary tuberculosis    -  1       Follow-ups after your visit        Your next 10 appointments already scheduled     Dec 29, 2017  4:15 PM CST   Nurse Only with HC SHOT ROOM   [facility] [place] ([facility] [place] )    [address]  [place] [address]   [phone]              Who to contact     If you have questions or need follow up information about today's clinic visit or your schedule please contact [facility] directly at [phone].  Normal or non-critical lab and imaging results will be communicated to you by Applied Optoelectronicshart, letter or phone within 4 business days after the clinic has received the results. If you do not hear from us within 7 days, please contact the clinic through Applied Optoelectronicshart or phone. If you have a critical or abnormal lab result, we will notify you by phone as soon as possible.  Submit refill requests through AudioCatch or call your pharmacy and they will forward the refill request to us. Please allow 3 business days for your refill to be completed.          Additional Information About Your Visit        MyChart Information     AudioCatch gives you secure access to your electronic health record. If you see a primary care provider, you can also send messages to your care team and make appointments. If you have questions, please call your primary care clinic.  If you do not have a primary care provider, please call [phone] and they will assist you.        Care EveryWhere ID     This is your Care EveryWhere ID. This could be used by other organizations to access your [facility] medical records  [id number]         Blood Pressure from Last 3  Encounters:   10/11/17 110/70   10/02/17 118/80   09/05/17 118/70    Weight from Last 3 Encounters:   10/11/17 172 lb (78 kg)   10/02/17 173 lb (78.5 kg)   09/05/17 172 lb (78 kg)              Today, you had the following     No orders found for display       Primary Care Provider Office Phone # Fax #    Muriel LUIS M Jones [phone]       [facility] [place] [address]  [place] [address]        Equal Access to Services     [facility]: Hadii aad ku hadasho Soomaali, waaxda luqadaha, qaybta kaalmada adeegyada, waxay daytonin hayaan teddy monge . So [facility] [phone].    ATENCIÓN: Si habla español, tiene a jama disposición servicios gratuitos de asistencia lingüística. [facility] [phone].    We comply with applicable federal civil rights laws and [place] laws. We do not discriminate on the basis of race, color, national origin, age, disability, sex, sexual orientation, or gender identity.            Thank you!     Thank you for choosing [facility]  for your care. Our goal is always to provide you with excellent care. Hearing back from our patients is one way we can continue to improve our services. Please take a few minutes to complete the written survey that you may receive in the mail after your visit with us. Thank you!             Your Updated Medication List - Protect others around you: Learn how to safely use, store and throw away your medicines at www.disposemymeds.org.          This list is accurate as of: 12/29/17  3:43 PM.  Always use your most recent med list.                   Brand Name Dispense Instructions for use Diagnosis    ACETYLCYSTEINE PO      Take 600 mg by mouth 2 times daily        azithromycin 250 MG tablet    ZITHROMAX    6 tablet    As directed    Acute bronchitis, unspecified organism, Bronchospasm       blood glucose monitoring lancets     1 Box    Use to test blood sugar 3 times daily or as directed.    Diabetes mellitus, type 2 (H)        blood glucose monitoring test strip    ONETOUCH ULTRA    1 Month    Use to test blood sugar 3 times daily or as directed.    Diabetes mellitus, type 2 (H)       ibuprofen 200 MG tablet    ADVIL/MOTRIN     Take 2 tablets by mouth. Every 6 hours as needed with food        lisdexamfetamine 20 MG capsule    VYVANSE    30 capsule    Take 1 capsule (20 mg) by mouth every morning    Attention deficit hyperactivity disorder (ADHD), predominantly inattentive type       methylPREDNISolone 4 MG tablet    MEDROL DOSEPAK    21 tablet    Follow package instructions    Acute bronchitis, unspecified organism, Bronchospasm       nitroFURantoin (macrocrystal-monohydrate) 100 MG capsule    MACROBID    14 capsule    Take 1 capsule (100 mg) by mouth 2 times daily    Dysuria       order for DME     1 Device    Right ankle ASO    Sprain of other ligament of right ankle, initial encounter       penciclovir 1 % cream    DENAVIR    1.5 g    Apply topically every 2 hours (while awake)    Recurrent cold sores       progesterone 100 MG capsule    PROMETRIUM    90 capsule    Take 3 capsules (300 mg) by mouth daily Starting on peak +3 for 10 days out of month    PCOS (polycystic ovarian syndrome)       TYLENOL 500 MG tablet   Generic drug:  acetaminophen      Take 2 tablets by mouth. Every 6 hours as needed        valACYclovir 500 MG tablet    VALTREX    30 tablet    Take 1 tablet (500 mg) by mouth 2 times daily With active lesion    Cold sore       vitamin  B-6 500 MG Tabs      Take 1 tablet by mouth daily        vitamin D 97512 UNIT capsule    ERGOCALCIFEROL    4 capsule    TAKE ONE CAPSULE BY MOUTH ONCE A WEEK    MS (multiple sclerosis) (H)       ZANTAC 150 MG tablet   Generic drug:  ranitidine      Take 1 tablet by mouth as needed

## 2018-01-12 NOTE — PROGRESS NOTES
Mantoux was read on 12/14/17.   No induration.  No redness.  No swelling.  Right arm.    Error with original nursing note. Spoke with patient and she stated it was right arm on 12-14-17 and left arm was read on 12-29-17.  Kia Lee

## 2018-02-05 ENCOUNTER — OFFICE VISIT (OUTPATIENT)
Dept: FAMILY MEDICINE | Facility: OTHER | Age: 28
End: 2018-02-05
Attending: FAMILY MEDICINE
Payer: COMMERCIAL

## 2018-02-05 VITALS
OXYGEN SATURATION: 100 % | HEIGHT: 63 IN | WEIGHT: 175 LBS | BODY MASS INDEX: 31.01 KG/M2 | DIASTOLIC BLOOD PRESSURE: 80 MMHG | HEART RATE: 87 BPM | TEMPERATURE: 99 F | SYSTOLIC BLOOD PRESSURE: 118 MMHG

## 2018-02-05 DIAGNOSIS — G35 MS (MULTIPLE SCLEROSIS) (H): ICD-10-CM

## 2018-02-05 DIAGNOSIS — N97.0 OLIGO-OVULATION: Primary | ICD-10-CM

## 2018-02-05 PROCEDURE — 99214 OFFICE O/P EST MOD 30 MIN: CPT | Performed by: FAMILY MEDICINE

## 2018-02-05 RX ORDER — CLOMIPHENE CITRATE 50 MG/1
50 TABLET ORAL DAILY
Qty: 5 TABLET | Refills: 5 | Status: SHIPPED | OUTPATIENT
Start: 2018-02-05 | End: 2018-06-13

## 2018-02-05 ASSESSMENT — ANXIETY QUESTIONNAIRES
5. BEING SO RESTLESS THAT IT IS HARD TO SIT STILL: SEVERAL DAYS
3. WORRYING TOO MUCH ABOUT DIFFERENT THINGS: SEVERAL DAYS
6. BECOMING EASILY ANNOYED OR IRRITABLE: MORE THAN HALF THE DAYS
IF YOU CHECKED OFF ANY PROBLEMS ON THIS QUESTIONNAIRE, HOW DIFFICULT HAVE THESE PROBLEMS MADE IT FOR YOU TO DO YOUR WORK, TAKE CARE OF THINGS AT HOME, OR GET ALONG WITH OTHER PEOPLE: SOMEWHAT DIFFICULT
7. FEELING AFRAID AS IF SOMETHING AWFUL MIGHT HAPPEN: NOT AT ALL
1. FEELING NERVOUS, ANXIOUS, OR ON EDGE: SEVERAL DAYS
GAD7 TOTAL SCORE: 7
2. NOT BEING ABLE TO STOP OR CONTROL WORRYING: SEVERAL DAYS

## 2018-02-05 ASSESSMENT — PAIN SCALES - GENERAL: PAINLEVEL: SEVERE PAIN (7)

## 2018-02-05 ASSESSMENT — PATIENT HEALTH QUESTIONNAIRE - PHQ9: 5. POOR APPETITE OR OVEREATING: SEVERAL DAYS

## 2018-02-05 NOTE — PROGRESS NOTES
SUBJECTIVE:   Radha Carl is a 27 year old female who presents to clinic today for the following health issues:      Follow up on Charting and Progesterone Levels       Duration: 1 month ago     Re-checking progesterone levels from 1 month ago.             PCOs      Duration: 3-4 yrs    Description (location/character/radiation): ovaries    Intensity:  mild, moderate    Accompanying signs and symptoms: subfertility    History (similar episodes/previous evaluation): had ectopic pregnancy    Precipitating or alleviating factors: progesterone luteal phase has helped     Therapies tried and outcome: see above       Problem list and histories reviewed & adjusted, as indicated.  Additional history: as documented    Current Outpatient Prescriptions   Medication Sig Dispense Refill     clomiPHENE (CLOMID) 50 MG tablet Take 1 tablet (50 mg) by mouth daily Days 3-7 of your cycle 5 tablet 5     order for DME Right ankle ASO 1 Device 0     valACYclovir (VALTREX) 500 MG tablet Take 1 tablet (500 mg) by mouth 2 times daily With active lesion 30 tablet 3     lisdexamfetamine (VYVANSE) 20 MG capsule Take 1 capsule (20 mg) by mouth every morning 30 capsule 0     vitamin D (ERGOCALCIFEROL) 60141 UNIT capsule TAKE ONE CAPSULE BY MOUTH ONCE A WEEK 4 capsule 0     progesterone (PROMETRIUM) 100 MG capsule Take 3 capsules (300 mg) by mouth daily Starting on peak +3 for 10 days out of month 90 capsule 3     penciclovir (DENAVIR) 1 % cream Apply topically every 2 hours (while awake) 1.5 g 3     ACETYLCYSTEINE PO Take 600 mg by mouth 2 times daily       Pyridoxine HCl (VITAMIN  B-6) 500 MG TABS Take 1 tablet by mouth daily       ranitidine (ZANTAC) 150 MG tablet Take 1 tablet by mouth as needed       acetaminophen (TYLENOL) 500 MG tablet Take 2 tablets by mouth. Every 6 hours as needed       ibuprofen (ADVIL,MOTRIN) 200 MG tablet Take 2 tablets by mouth. Every 6 hours as needed with food       [DISCONTINUED] norethindrone-ethinyl  "estradiol (MICROGESTIN) 1-20 MG-MCG per tablet Take 1 tablet by mouth daily.       Allergies   Allergen Reactions     Cats      Sulfa Drugs Rash     Sulfonamide antibiotics     Recent Labs   Lab Test  09/05/17   0806  01/25/17   1700  07/08/16   0732  02/26/16   1148   03/20/15   0741   A1C  5.8  5.8  5.8  6.1*   < >  5.9   LDL  119*   --   118*   --    --   118   HDL  38*   --   38*   --    --   41*   TRIG  111   --   86   --    --   99   ALT  20  30  29  30   < >  150*   CR  0.62  0.82  0.55  0.59   < >  0.58   GFRESTIMATED  >90  82  >90  Non  GFR Calc    >90  Non  GFR Calc     < >  >90  Non  GFR Calc     GFRESTBLACK  >90  >90  African American GFR Calc    >90   GFR Calc    >90   GFR Calc     < >  >90   GFR Calc     POTASSIUM  3.8  3.6  4.1  3.9   < >  3.8   TSH   --   0.85   --   0.65   < >   --     < > = values in this interval not displayed.        Reviewed and updated as needed this visit by clinical staff  Tobacco  Allergies  Meds  Med Hx  Surg Hx  Fam Hx  Soc Hx      Reviewed and updated as needed this visit by Provider         ROS:  Constitutional, HEENT, cardiovascular, pulmonary, gi and gu systems are negative, except as otherwise noted.    OBJECTIVE:                                                    /80 (BP Location: Right arm, Patient Position: Chair, Cuff Size: Adult Large)  Pulse 87  Temp 99  F (37.2  C) (Tympanic)  Ht 5' 3\" (1.6 m)  Wt 175 lb (79.4 kg)  SpO2 100%  BMI 31 kg/m2  Body mass index is 31 kg/(m^2).  GENERAL APPEARANCE: healthy, alert and no distress  RESP: lungs clear to auscultation - no rales, rhonchi or wheezes  CV: regular rates and rhythm, normal S1 S2, no S3 or S4 and no murmur, click or rub  PSYCH: mentation appears normal and affect normal/bright       ASSESSMENT/PLAN:                                                    1. Oligo-ovulation  F/u 1 week after peak +7 " labs  Also discussed endoscopy maybe for possible endometriosis    - clomiPHENE (CLOMID) 50 MG tablet; Take 1 tablet (50 mg) by mouth daily Days 3-7 of your cycle  Dispense: 5 tablet; Refill: 5    2.  MS  She also mentioned fact she had herniated discs with MRI in [place] with neurologist, she is slipping down stairs and attributes it to lotion on her feet and beign slippery.  Advised to monitor for possible MS symptoms and progression  Patient was agreeable to this plan and had no further questions.  See Patient Instructions    Muriel Jones MD  [facility]

## 2018-02-05 NOTE — MR AVS SNAPSHOT
"              After Visit Summary   2/5/2018    Radha Carl    MRN: [id number]           Patient Information     Date Of Birth          1990        Visit Information        Provider Department      2/5/2018 2:30 PM [facility]        Today's Diagnoses     Oligo-ovulation    -  1    MS (multiple sclerosis) (H)           Follow-ups after your visit        Who to contact     If you have questions or need follow up information about today's clinic visit or your schedule please contact [facility] at [phone].  Normal or non-critical lab and imaging results will be communicated to you by AdviceScene Enterpriseshart, letter or phone within 4 business days after the clinic has received the results. If you do not hear from us within 7 days, please contact the clinic through Advice[employer] Enterpriseshart or phone. If you have a critical or abnormal lab result, we will notify you by phone as soon as possible.  Submit refill requests through TriplePulse or call your pharmacy and they will forward the refill request to us. Please allow 3 business days for your refill to be completed.          Additional Information About Your Visit        MyChart Information     TriplePulse gives you secure access to your electronic health record. If you see a primary care provider, you can also send messages to your care team and make appointments. If you have questions, please call your primary care clinic.  If you do not have a primary care provider, please call [phone] and they will assist you.        Care EveryWhere ID     This is your Care EveryWhere ID. This could be used by other organizations to access your [place] medical records  [id number]        Your Vitals Were     Pulse Temperature Height Pulse Oximetry BMI (Body Mass Index)       87 99  F (37.2  C) (Tympanic) 5' 3\" (1.6 m) 100% 31 kg/m2        Blood Pressure from Last 3 Encounters:   02/05/18 118/80   10/11/17 110/70   10/02/17 118/80    Weight " from Last 3 Encounters:   02/05/18 175 lb (79.4 kg)   10/11/17 172 lb (78 kg)   10/02/17 173 lb (78.5 kg)              Today, you had the following     No orders found for display         Today's Medication Changes          These changes are accurate as of 2/5/18  3:42 PM.  If you have any questions, ask your nurse or doctor.               Start taking these medicines.        Dose/Directions    clomiPHENE 50 MG tablet   Commonly known as:  CLOMID   Used for:  Oligo-ovulation   Started by:  Muriel Jones MD        Dose:  50 mg   Take 1 tablet (50 mg) by mouth daily Days 3-7 of your cycle   Quantity:  5 tablet   Refills:  5            Where to get your medicines      These medications were sent to [id number] - [place], [place] - [address]   [address] , [place] [address]     Phone:  [phone]     clomiPHENE 50 MG tablet                Primary Care Provider Office Phone # Fax #    Muriel Jones [phone]       [facility] [place] [address]  [place] [address]        Equal Access to Services     [facility]: Hadii aad ku hadasho Soomaali, waaxda luqadaha, qaybta kaalmada adeegyada, evert condon hayjulio monge . So [facility] [phone].    ATENCIÓN: Si habla español, tiene a jama disposición servicios gratuitos de asistencia lingüística. [facility] [phone].    We comply with applicable federal civil rights laws and [place] laws. We do not discriminate on the basis of race, color, national origin, age, disability, sex, sexual orientation, or gender identity.            Thank you!     Thank you for choosing [facility]  for your care. Our goal is always to provide you with excellent care. Hearing back from our patients is one way we can continue to improve our services. Please take a few minutes to complete the written survey that you may receive in the mail after your visit with us. Thank you!             Your Updated Medication List - Protect  others around you: Learn how to safely use, store and throw away your medicines at www.disposemymeds.org.          This list is accurate as of 2/5/18  3:42 PM.  Always use your most recent med list.                   Brand Name Dispense Instructions for use Diagnosis    ACETYLCYSTEINE PO      Take 600 mg by mouth 2 times daily        clomiPHENE 50 MG tablet    CLOMID    5 tablet    Take 1 tablet (50 mg) by mouth daily Days 3-7 of your cycle    Oligo-ovulation       ibuprofen 200 MG tablet    ADVIL/MOTRIN     Take 2 tablets by mouth. Every 6 hours as needed with food        lisdexamfetamine 20 MG capsule    VYVANSE    30 capsule    Take 1 capsule (20 mg) by mouth every morning    Attention deficit hyperactivity disorder (ADHD), predominantly inattentive type       order for DME     1 Device    Right ankle ASO    Sprain of other ligament of right ankle, initial encounter       penciclovir 1 % cream    DENAVIR    1.5 g    Apply topically every 2 hours (while awake)    Recurrent cold sores       progesterone 100 MG capsule    PROMETRIUM    90 capsule    Take 3 capsules (300 mg) by mouth daily Starting on peak +3 for 10 days out of month    PCOS (polycystic ovarian syndrome)       TYLENOL 500 MG tablet   Generic drug:  acetaminophen      Take 2 tablets by mouth. Every 6 hours as needed        valACYclovir 500 MG tablet    VALTREX    30 tablet    Take 1 tablet (500 mg) by mouth 2 times daily With active lesion    Cold sore       vitamin  B-6 500 MG Tabs      Take 1 tablet by mouth daily        vitamin D 77225 UNIT capsule    ERGOCALCIFEROL    4 capsule    TAKE ONE CAPSULE BY MOUTH ONCE A WEEK    MS (multiple sclerosis) (H)       ZANTAC 150 MG tablet   Generic drug:  ranitidine      Take 1 tablet by mouth as needed

## 2018-02-05 NOTE — NURSING NOTE
"Chief Complaint   Patient presents with     RECHECK     progesterone level and charting.        Initial /80 (BP Location: Right arm, Patient Position: Chair, Cuff Size: Adult Large)  Pulse 87  Temp 99  F (37.2  C) (Tympanic)  Ht 5' 3\" (1.6 m)  Wt 175 lb (79.4 kg)  SpO2 100%  BMI 31 kg/m2 Estimated body mass index is 31 kg/(m^2) as calculated from the following:    Height as of this encounter: 5' 3\" (1.6 m).    Weight as of this encounter: 175 lb (79.4 kg).  Medication Reconciliation: complete   Izabella Early LPN    "

## 2018-02-06 ASSESSMENT — ANXIETY QUESTIONNAIRES: GAD7 TOTAL SCORE: 7

## 2018-02-06 ASSESSMENT — PATIENT HEALTH QUESTIONNAIRE - PHQ9: SUM OF ALL RESPONSES TO PHQ QUESTIONS 1-9: 9

## 2018-02-09 ENCOUNTER — OFFICE VISIT (OUTPATIENT)
Dept: FAMILY MEDICINE | Facility: OTHER | Age: 28
End: 2018-02-09
Attending: NURSE PRACTITIONER
Payer: COMMERCIAL

## 2018-02-09 VITALS
BODY MASS INDEX: 31.01 KG/M2 | HEIGHT: 63 IN | DIASTOLIC BLOOD PRESSURE: 65 MMHG | HEART RATE: 85 BPM | TEMPERATURE: 97.2 F | OXYGEN SATURATION: 100 % | SYSTOLIC BLOOD PRESSURE: 117 MMHG | RESPIRATION RATE: 16 BRPM | WEIGHT: 175 LBS

## 2018-02-09 DIAGNOSIS — H65.191 OTHER ACUTE NONSUPPURATIVE OTITIS MEDIA OF RIGHT EAR, RECURRENCE NOT SPECIFIED: Primary | ICD-10-CM

## 2018-02-09 PROCEDURE — 99213 OFFICE O/P EST LOW 20 MIN: CPT | Performed by: NURSE PRACTITIONER

## 2018-02-09 RX ORDER — NEOMYCIN SULFATE, POLYMYXIN B SULFATE AND HYDROCORTISONE 10; 3.5; 1 MG/ML; MG/ML; [USP'U]/ML
4 SUSPENSION/ DROPS AURICULAR (OTIC) 4 TIMES DAILY
Qty: 4 ML | Refills: 0 | Status: SHIPPED | OUTPATIENT
Start: 2018-02-09 | End: 2018-02-14

## 2018-02-09 ASSESSMENT — PAIN SCALES - GENERAL: PAINLEVEL: SEVERE PAIN (7)

## 2018-02-09 NOTE — PROGRESS NOTES
SUBJECTIVE:   Radha Carl is a 27 year old female who presents to clinic today for the following health issues:      Acute Illness   Acute illness concerns: Right ear pain/ Sore throat   Onset: 5 days    Fever: no     Chills/Sweats: YES    Headache (location?): YES    Sinus Pressure:no    Conjunctivitis:  no    Ear Pain: YES: right    Rhinorrhea: no     Congestion: no     Sore Throat: YES     Cough: no    Wheeze: no     Decreased Appetite: no     Nausea: YES    Vomiting: no     Diarrhea:  no     Dysuria/Freq.: no     Fatigue/Achiness: YES    Sick/Strep Exposure: YES- work     Therapies Tried and outcome: over the counter cold medication           Problem list and histories reviewed & adjusted, as indicated.  Additional history: as documented    Patient Active Problem List   Diagnosis     Attention deficit hyperactivity disorder (ADHD)     Hyperlipidemia with target LDL less than 100     PCOS (polycystic ovarian syndrome)     Elevated liver enzymes     MS (multiple sclerosis) (H)     Vitamin B12 deficiency (non anemic)     Adiposity     Skin sensation disturbance     Tubal pregnancy without intrauterine pregnancy     Visual field scotoma     ACP (advance care planning)     Dysmenorrhea     MARIBETH (generalized anxiety disorder)     Past Surgical History:   Procedure Laterality Date     GYN SURGERY  5/2010    ectopic pregnancy ruptured -L salpingectomy     wisdom teeth  2014       Social History   Substance Use Topics     Smoking status: Never Smoker     Smokeless tobacco: Never Used     Alcohol use 0.0 oz/week     0 Standard drinks or equivalent per week      Comment: rare     Family History   Problem Relation Age of Onset     Family History Negative Mother      Family History Negative Father      Family History Negative Sister      Family History Negative Sister      Obesity Sister      Breast Cancer Maternal Grandmother 64     Unknown/Adopted Maternal Grandfather      severe gerd     Lung Cancer Paternal  Grandmother      +tobacco     HEART DISEASE Paternal Grandfather      AMI/CAD or CVA     Family History Negative Brother      Psychotic Disorder Sister      ADHD         Current Outpatient Prescriptions   Medication Sig Dispense Refill     clomiPHENE (CLOMID) 50 MG tablet Take 1 tablet (50 mg) by mouth daily Days 3-7 of your cycle 5 tablet 5     order for DME Right ankle ASO 1 Device 0     valACYclovir (VALTREX) 500 MG tablet Take 1 tablet (500 mg) by mouth 2 times daily With active lesion 30 tablet 3     lisdexamfetamine (VYVANSE) 20 MG capsule Take 1 capsule (20 mg) by mouth every morning 30 capsule 0     vitamin D (ERGOCALCIFEROL) 85042 UNIT capsule TAKE ONE CAPSULE BY MOUTH ONCE A WEEK 4 capsule 0     progesterone (PROMETRIUM) 100 MG capsule Take 3 capsules (300 mg) by mouth daily Starting on peak +3 for 10 days out of month 90 capsule 3     penciclovir (DENAVIR) 1 % cream Apply topically every 2 hours (while awake) 1.5 g 3     ACETYLCYSTEINE PO Take 600 mg by mouth 2 times daily       Pyridoxine HCl (VITAMIN  B-6) 500 MG TABS Take 1 tablet by mouth daily       ranitidine (ZANTAC) 150 MG tablet Take 1 tablet by mouth as needed       acetaminophen (TYLENOL) 500 MG tablet Take 2 tablets by mouth. Every 6 hours as needed       ibuprofen (ADVIL,MOTRIN) 200 MG tablet Take 2 tablets by mouth. Every 6 hours as needed with food       [DISCONTINUED] norethindrone-ethinyl estradiol (MICROGESTIN) 1-20 MG-MCG per tablet Take 1 tablet by mouth daily.       Allergies   Allergen Reactions     Cats      Sulfa Drugs Rash     Sulfonamide antibiotics     Recent Labs   Lab Test  09/05/17   0806  01/25/17   1700  07/08/16   0732  02/26/16   1148   03/20/15   0741   A1C  5.8  5.8  5.8  6.1*   < >  5.9   LDL  119*   --   118*   --    --   118   HDL  38*   --   38*   --    --   41*   TRIG  111   --   86   --    --   99   ALT  20  30  29  30   < >  150*   CR  0.62  0.82  0.55  0.59   < >  0.58   GFRESTIMATED  >90  82  >90  Non   "American GFR Calc    >90  Non  GFR Calc     < >  >90  Non  GFR Calc     GFRESTBLACK  >90  >90  African American GFR Calc    >90   GFR Calc    >90   GFR Calc     < >  >90   GFR Calc     POTASSIUM  3.8  3.6  4.1  3.9   < >  3.8   TSH   --   0.85   --   0.65   < >   --     < > = values in this interval not displayed.      BP Readings from Last 3 Encounters:   02/09/18 117/65   02/05/18 118/80   10/11/17 110/70    Wt Readings from Last 3 Encounters:   02/09/18 175 lb (79.4 kg)   02/05/18 175 lb (79.4 kg)   10/11/17 172 lb (78 kg)                    Reviewed and updated as needed this visit by clinical staff  Tobacco  Allergies  Meds  Problems       Reviewed and updated as needed this visit by Provider         ROS:  Constitutional, HEENT, cardiovascular, pulmonary, gi and gu systems are negative, except as otherwise noted.    OBJECTIVE:     /65  Pulse 85  Temp 97.2  F (36.2  C) (Tympanic)  Resp 16  Ht 5' 3\" (1.6 m)  Wt 175 lb (79.4 kg)  SpO2 100%  BMI 31 kg/m2  Body mass index is 31 kg/(m^2).  GENERAL: healthy, alert and no distress  HENT: normal cephalic/atraumatic, right ear: erythematous, clear effusion, left ear: normal: no effusions, no erythema, normal landmarks, nose and mouth without ulcers or lesions, rhinorrhea clear and oral mucous membranes moist, throat mildly erythematous without exudate  NECK: no adenopathy, no asymmetry, masses, or scars and thyroid normal to palpation  RESP: lungs clear to auscultation - no rales, rhonchi or wheezes  CV: regular rate and rhythm, normal S1 S2, no S3 or S4, no murmur, click or rub, no peripheral edema and peripheral pulses strong  MS: no gross musculoskeletal defects noted, no edema  PSYCH: mentation appears normal, affect normal/bright        ASSESSMENT/PLAN:       1. Other acute nonsuppurative otitis media of right ear, recurrence not specified  symptomatic cares  - " neomycin-polymyxin-hydrocortisone (CORTISPORIN) 3.5-78428-8 otic suspension; Place 4 drops into the right ear 4 times daily for 5 days  Dispense: 4 mL; Refill: 0    FUTURE APPOINTMENTS:       - Follow-up visit if no improvement     Kacey Desouza, NP  [facility]

## 2018-02-09 NOTE — NURSING NOTE
"Chief Complaint   Patient presents with     Pharyngitis     right ear pain/ sore throat x5 days        Initial /65  Pulse 85  Temp 97.2  F (36.2  C) (Tympanic)  Resp 16  Ht 5' 3\" (1.6 m)  Wt 175 lb (79.4 kg)  SpO2 100%  BMI 31 kg/m2 Estimated body mass index is 31 kg/(m^2) as calculated from the following:    Height as of this encounter: 5' 3\" (1.6 m).    Weight as of this encounter: 175 lb (79.4 kg).  Medication Reconciliation: complete     Maria Luisa Cabral LPN  "

## 2018-02-09 NOTE — MR AVS SNAPSHOT
After Visit Summary   2/9/2018    Radha Carl    MRN: [id number]           Patient Information     Date Of Birth          1990        Visit Information        Provider Department      2/9/2018 9:30 AM Kacey Desouza NP [facility]        Today's Diagnoses     Other acute nonsuppurative otitis media of right ear, recurrence not specified    -  1      Care Instructions      ASSESSMENT/PLAN:       1. Other acute nonsuppurative otitis media of right ear, recurrence not specified  symptomatic cares  - neomycin-polymyxin-hydrocortisone (CORTISPORIN) 3.5-80356-1 otic suspension; Place 4 drops into the right ear 4 times daily for 5 days  Dispense: 4 mL; Refill: 0    FUTURE APPOINTMENTS:       - Follow-up visit if no improvement     Kacey Desouza NP  [facility]              Follow-ups after your visit        Who to contact     If you have questions or need follow up information about today's clinic visit or your schedule please contact [facility] directly at [phone].  Normal or non-critical lab and imaging results will be communicated to you by [employer], letter or phone within 4 business days after the clinic has received the results. If you do not hear from us within 7 days, please contact the clinic through [employer] or phone. If you have a critical or abnormal lab result, we will notify you by phone as soon as possible.  Submit refill requests through [employer] or call your pharmacy and they will forward the refill request to us. Please allow 3 business days for your refill to be completed.          Additional Information About Your Visit        MyChart Information     [employer] gives you secure access to your electronic health record. If you see a primary care provider, you can also send messages to your care team and make appointments. If you have questions, please call your primary care clinic.  If you do not have a primary care  "provider, please call [phone] and they will assist you.        Care EveryWhere ID     This is your Care EveryWhere ID. This could be used by other organizations to access your [facility] medical records  [id number]        Your Vitals Were     Pulse Temperature Respirations Height Pulse Oximetry BMI (Body Mass Index)    85 97.2  F (36.2  C) (Tympanic) 16 5' 3\" (1.6 m) 100% 31 kg/m2       Blood Pressure from Last 3 Encounters:   02/09/18 117/65   02/05/18 118/80   10/11/17 110/70    Weight from Last 3 Encounters:   02/09/18 175 lb (79.4 kg)   02/05/18 175 lb (79.4 kg)   10/11/17 172 lb (78 kg)              Today, you had the following     No orders found for display         Today's Medication Changes          These changes are accurate as of 2/9/18  9:48 AM.  If you have any questions, ask your nurse or doctor.               Start taking these medicines.        Dose/Directions    neomycin-polymyxin-hydrocortisone 3.5-60201-4 otic suspension   Commonly known as:  CORTISPORIN   Used for:  Other acute nonsuppurative otitis media of right ear, recurrence not specified   Started by:  Kacey Desouza NP        Dose:  4 drop   Place 4 drops into the right ear 4 times daily for 5 days   Quantity:  4 mL   Refills:  0            Where to get your medicines      These medications were sent to [id number] - [place], [place] - [address]   [address] , [place] [address]     Phone:  [phone]     neomycin-polymyxin-hydrocortisone 3.5-28878-5 otic suspension                Primary Care Provider Office Phone # Fax #    Muriel Jones [phone]       [facility] [place] [address] [place] [address]        Equal Access to Services     [facility] AH: Armida Marino, waaxda luqadaha, qaybta kaalmada adejenniferyamichel, evert jiménez. [facility] [phone].    ATENCIÓN: Si habla español, tiene a jama disposición servicios gratuitos de asistencia " lingüística. Chris al [phone].    We comply with applicable federal civil rights laws and [place] laws. We do not discriminate on the basis of race, color, national origin, age, disability, sex, sexual orientation, or gender identity.            Thank you!     Thank you for choosing [facility]  for your care. Our goal is always to provide you with excellent care. Hearing back from our patients is one way we can continue to improve our services. Please take a few minutes to complete the written survey that you may receive in the mail after your visit with us. Thank you!             Your Updated Medication List - Protect others around you: Learn how to safely use, store and throw away your medicines at www.disposemymeds.org.          This list is accurate as of 2/9/18  9:48 AM.  Always use your most recent med list.                   Brand Name Dispense Instructions for use Diagnosis    ACETYLCYSTEINE PO      Take 600 mg by mouth 2 times daily        clomiPHENE 50 MG tablet    CLOMID    5 tablet    Take 1 tablet (50 mg) by mouth daily Days 3-7 of your cycle    Oligo-ovulation       ibuprofen 200 MG tablet    ADVIL/MOTRIN     Take 2 tablets by mouth. Every 6 hours as needed with food        lisdexamfetamine 20 MG capsule    VYVANSE    30 capsule    Take 1 capsule (20 mg) by mouth every morning    Attention deficit hyperactivity disorder (ADHD), predominantly inattentive type       neomycin-polymyxin-hydrocortisone 3.5-15689-1 otic suspension    CORTISPORIN    4 mL    Place 4 drops into the right ear 4 times daily for 5 days    Other acute nonsuppurative otitis media of right ear, recurrence not specified       order for DME     1 Device    Right ankle ASO    Sprain of other ligament of right ankle, initial encounter       penciclovir 1 % cream    DENAVIR    1.5 g    Apply topically every 2 hours (while awake)    Recurrent cold sores       progesterone 100 MG capsule    PROMETRIUM    90 capsule     Take 3 capsules (300 mg) by mouth daily Starting on peak +3 for 10 days out of month    PCOS (polycystic ovarian syndrome)       TYLENOL 500 MG tablet   Generic drug:  acetaminophen      Take 2 tablets by mouth. Every 6 hours as needed        valACYclovir 500 MG tablet    VALTREX    30 tablet    Take 1 tablet (500 mg) by mouth 2 times daily With active lesion    Cold sore       vitamin  B-6 500 MG Tabs      Take 1 tablet by mouth daily        vitamin D 64542 UNIT capsule    ERGOCALCIFEROL    4 capsule    TAKE ONE CAPSULE BY MOUTH ONCE A WEEK    MS (multiple sclerosis) (H)       ZANTAC 150 MG tablet   Generic drug:  ranitidine      Take 1 tablet by mouth as needed

## 2018-02-09 NOTE — PATIENT INSTRUCTIONS
ASSESSMENT/PLAN:       1. Other acute nonsuppurative otitis media of right ear, recurrence not specified  symptomatic cares  - neomycin-polymyxin-hydrocortisone (CORTISPORIN) 3.5-57121-6 otic suspension; Place 4 drops into the right ear 4 times daily for 5 days  Dispense: 4 mL; Refill: 0    FUTURE APPOINTMENTS:       - Follow-up visit if no improvement     Kacey Desouza, NP  [facility]

## 2018-04-23 ENCOUNTER — OFFICE VISIT (OUTPATIENT)
Dept: FAMILY MEDICINE | Facility: OTHER | Age: 28
End: 2018-04-23
Attending: PHYSICIAN ASSISTANT
Payer: COMMERCIAL

## 2018-04-23 ENCOUNTER — RADIANT APPOINTMENT (OUTPATIENT)
Dept: GENERAL RADIOLOGY | Facility: OTHER | Age: 28
End: 2018-04-23
Attending: PHYSICIAN ASSISTANT
Payer: COMMERCIAL

## 2018-04-23 VITALS
TEMPERATURE: 98.9 F | WEIGHT: 178 LBS | BODY MASS INDEX: 31.53 KG/M2 | SYSTOLIC BLOOD PRESSURE: 119 MMHG | OXYGEN SATURATION: 98 % | HEART RATE: 89 BPM | DIASTOLIC BLOOD PRESSURE: 70 MMHG

## 2018-04-23 DIAGNOSIS — J40 BRONCHITIS: ICD-10-CM

## 2018-04-23 DIAGNOSIS — R05.9 COUGH: ICD-10-CM

## 2018-04-23 DIAGNOSIS — R05.9 COUGH: Primary | ICD-10-CM

## 2018-04-23 PROCEDURE — 99213 OFFICE O/P EST LOW 20 MIN: CPT | Performed by: PHYSICIAN ASSISTANT

## 2018-04-23 PROCEDURE — 71046 X-RAY EXAM CHEST 2 VIEWS: CPT | Mod: TC

## 2018-04-23 RX ORDER — ALBUTEROL SULFATE 90 UG/1
2 AEROSOL, METERED RESPIRATORY (INHALATION) EVERY 4 HOURS PRN
Qty: 1 INHALER | Refills: 0 | Status: SHIPPED | OUTPATIENT
Start: 2018-04-23 | End: 2019-06-12

## 2018-04-23 RX ORDER — AZITHROMYCIN 250 MG/1
TABLET, FILM COATED ORAL
Qty: 6 TABLET | Refills: 0 | Status: SHIPPED | OUTPATIENT
Start: 2018-04-23 | End: 2018-06-13

## 2018-04-23 ASSESSMENT — ANXIETY QUESTIONNAIRES
IF YOU CHECKED OFF ANY PROBLEMS ON THIS QUESTIONNAIRE, HOW DIFFICULT HAVE THESE PROBLEMS MADE IT FOR YOU TO DO YOUR WORK, TAKE CARE OF THINGS AT HOME, OR GET ALONG WITH OTHER PEOPLE: SOMEWHAT DIFFICULT
1. FEELING NERVOUS, ANXIOUS, OR ON EDGE: NOT AT ALL
2. NOT BEING ABLE TO STOP OR CONTROL WORRYING: NOT AT ALL
3. WORRYING TOO MUCH ABOUT DIFFERENT THINGS: SEVERAL DAYS
5. BEING SO RESTLESS THAT IT IS HARD TO SIT STILL: SEVERAL DAYS
GAD7 TOTAL SCORE: 5
4. TROUBLE RELAXING: MORE THAN HALF THE DAYS
6. BECOMING EASILY ANNOYED OR IRRITABLE: SEVERAL DAYS
7. FEELING AFRAID AS IF SOMETHING AWFUL MIGHT HAPPEN: NOT AT ALL

## 2018-04-23 ASSESSMENT — PAIN SCALES - GENERAL: PAINLEVEL: NO PAIN (0)

## 2018-04-23 NOTE — NURSING NOTE
"Chief Complaint   Patient presents with     Cough     Nasal Congestion       Initial /70 (BP Location: Right arm, Patient Position: Chair, Cuff Size: Adult Regular)  Pulse 89  Temp 98.9  F (37.2  C) (Tympanic)  Wt 178 lb (80.7 kg)  SpO2 98%  BMI 31.53 kg/m2 Estimated body mass index is 31.53 kg/(m^2) as calculated from the following:    Height as of 2/9/18: 5' 3\" (1.6 m).    Weight as of this encounter: 178 lb (80.7 kg).  Medication Reconciliation: complete   Liz Erickson LPN  "

## 2018-04-23 NOTE — MR AVS SNAPSHOT
After Visit Summary   4/23/2018    Radha Carl    MRN: [id number]           Patient Information     Date Of Birth          1990        Visit Information        Provider Department      4/23/2018 1:15 PM Noris Leos PA [facility]        Today's Diagnoses     Cough    -  1    Bronchitis           Follow-ups after your visit        Your next 10 appointments already scheduled     May 07, 2018  9:30 AM CDT   Treatment 60 with Elizabeth Norton, [facility]   [facility] ([facility] )    [address] [place] [address]   [phone]              Who to contact     If you have questions or need follow up information about today's clinic visit or your schedule please contact [facility] directly at [phone].  Normal or non-critical lab and imaging results will be communicated to you by MyChart, letter or phone within 4 business days after the clinic has received the results. If you do not hear from us within 7 days, please contact the clinic through MyChart or phone. If you have a critical or abnormal lab result, we will notify you by phone as soon as possible.  Submit refill requests through [employer] or call your pharmacy and they will forward the refill request to us. Please allow 3 business days for your refill to be completed.          Additional Information About Your Visit        MyChart Information     [employer] gives you secure access to your electronic health record. If you see a primary care provider, you can also send messages to your care team and make appointments. If you have questions, please call your primary care clinic.  If you do not have a primary care provider, please call [phone] and they will assist you.        Care EveryWhere ID     This is your Care EveryWhere ID. This could be used by other organizations to access your [place] medical records  [id number]        Your Vitals Were     Pulse Temperature Pulse  Oximetry BMI (Body Mass Index)          89 98.9  F (37.2  C) (Tympanic) 98% 31.53 kg/m2         Blood Pressure from Last 3 Encounters:   04/23/18 119/70   02/09/18 117/65   02/05/18 118/80    Weight from Last 3 Encounters:   04/23/18 178 lb (80.7 kg)   02/09/18 175 lb (79.4 kg)   02/05/18 175 lb (79.4 kg)                 Today's Medication Changes          These changes are accurate as of 4/23/18  2:01 PM.  If you have any questions, ask your nurse or doctor.               Start taking these medicines.        Dose/Directions    albuterol 108 (90 Base) MCG/ACT Inhaler   Commonly known as:  PROAIR HFA/PROVENTIL HFA/VENTOLIN HFA   Used for:  Bronchitis   Started by:  Noris Leos PA        Dose:  2 puff   Inhale 2 puffs into the lungs every 4 hours as needed for shortness of breath / dyspnea or wheezing   Quantity:  1 Inhaler   Refills:  0       azithromycin 250 MG tablet   Commonly known as:  ZITHROMAX   Used for:  Bronchitis   Started by:  Noris Leos PA        Two tablets first day, then one tablet daily for four days.   Quantity:  6 tablet   Refills:  0            Where to get your medicines      These medications were sent to [id number] - [place], [place] - [address]   [address] , [place] [address]     Phone:  [phone]     albuterol 108 (90 Base) MCG/ACT Inhaler    azithromycin 250 MG tablet                Primary Care Provider Office Phone # Fax #    Murielscooter Jones [phone]       [facility] [place] [address]  [place] [address]        Equal Access to Services     [facility] AH: Hadii ratna howe Sotomer, waaxda luqadaha, qaybta kaalmada aderyley, evert jiménez. So [facility] [phone].    ATENCIÓN: Si habla español, tiene a jama disposición servicios gratuitos de asistencia lingüística. Llame al [phone].    We comply with applicable federal civil rights laws and [place] laws. We do not discriminate on the basis of  race, color, national origin, age, disability, sex, sexual orientation, or gender identity.            Thank you!     Thank you for choosing [facility]  for your care. Our goal is always to provide you with excellent care. Hearing back from our patients is one way we can continue to improve our services. Please take a few minutes to complete the written survey that you may receive in the mail after your visit with us. Thank you!             Your Updated Medication List - Protect others around you: Learn how to safely use, store and throw away your medicines at www.disposemymeds.org.          This list is accurate as of 4/23/18  2:01 PM.  Always use your most recent med list.                   Brand Name Dispense Instructions for use Diagnosis    ACETYLCYSTEINE PO      Take 600 mg by mouth 2 times daily        albuterol 108 (90 Base) MCG/ACT Inhaler    PROAIR HFA/PROVENTIL HFA/VENTOLIN HFA    1 Inhaler    Inhale 2 puffs into the lungs every 4 hours as needed for shortness of breath / dyspnea or wheezing    Bronchitis       azithromycin 250 MG tablet    ZITHROMAX    6 tablet    Two tablets first day, then one tablet daily for four days.    Bronchitis       clomiPHENE 50 MG tablet    CLOMID    5 tablet    Take 1 tablet (50 mg) by mouth daily Days 3-7 of your cycle    Oligo-ovulation       ibuprofen 200 MG tablet    ADVIL/MOTRIN     Take 2 tablets by mouth. Every 6 hours as needed with food        lisdexamfetamine 20 MG capsule    VYVANSE    30 capsule    Take 1 capsule (20 mg) by mouth every morning    Attention deficit hyperactivity disorder (ADHD), predominantly inattentive type       order for DME     1 Device    Right ankle ASO    Sprain of other ligament of right ankle, initial encounter       penciclovir 1 % cream    DENAVIR    1.5 g    Apply topically every 2 hours (while awake)    Recurrent cold sores       progesterone 100 MG capsule    PROMETRIUM    90 capsule    Take 3 capsules (300 mg) by  mouth daily Starting on peak +3 for 10 days out of month    PCOS (polycystic ovarian syndrome)       TYLENOL 500 MG tablet   Generic drug:  acetaminophen      Take 2 tablets by mouth. Every 6 hours as needed        valACYclovir 500 MG tablet    VALTREX    30 tablet    Take 1 tablet (500 mg) by mouth 2 times daily With active lesion    Cold sore       vitamin  B-6 500 MG Tabs      Take 1 tablet by mouth daily        vitamin D 58832 UNIT capsule    ERGOCALCIFEROL    4 capsule    TAKE ONE CAPSULE BY MOUTH ONCE A WEEK    MS (multiple sclerosis) (H)       ZANTAC 150 MG tablet   Generic drug:  ranitidine      Take 1 tablet by mouth as needed

## 2018-04-23 NOTE — PROGRESS NOTES
Chief complaint:   Chief Complaint   Patient presents with     Cough     Nasal Congestion       Subjective: Radha Carl is a 27 year old female who presents with a  2 month intermittent  history of ST, nasal congestion,cough, SOB, wheeze. Chills. No nausea, vomiting, diarrhea. Recent increase in symptoms.    PMH, PSH, FH reviewed and unchanged.    Current Outpatient Prescriptions   Medication Sig Dispense Refill     ACETYLCYSTEINE PO Take 600 mg by mouth 2 times daily       lisdexamfetamine (VYVANSE) 20 MG capsule Take 1 capsule (20 mg) by mouth every morning 30 capsule 0     order for DME Right ankle ASO 1 Device 0     Pyridoxine HCl (VITAMIN  B-6) 500 MG TABS Take 1 tablet by mouth daily       vitamin D (ERGOCALCIFEROL) 23770 UNIT capsule TAKE ONE CAPSULE BY MOUTH ONCE A WEEK 4 capsule 0     acetaminophen (TYLENOL) 500 MG tablet Take 2 tablets by mouth. Every 6 hours as needed       clomiPHENE (CLOMID) 50 MG tablet Take 1 tablet (50 mg) by mouth daily Days 3-7 of your cycle (Patient not taking: Reported on 4/23/2018) 5 tablet 5     ibuprofen (ADVIL,MOTRIN) 200 MG tablet Take 2 tablets by mouth. Every 6 hours as needed with food       penciclovir (DENAVIR) 1 % cream Apply topically every 2 hours (while awake) (Patient not taking: Reported on 4/23/2018) 1.5 g 3     progesterone (PROMETRIUM) 100 MG capsule Take 3 capsules (300 mg) by mouth daily Starting on peak +3 for 10 days out of month (Patient not taking: Reported on 4/23/2018) 90 capsule 3     ranitidine (ZANTAC) 150 MG tablet Take 1 tablet by mouth as needed       valACYclovir (VALTREX) 500 MG tablet Take 1 tablet (500 mg) by mouth 2 times daily With active lesion (Patient not taking: Reported on 4/23/2018) 30 tablet 3     [DISCONTINUED] norethindrone-ethinyl estradiol (MICROGESTIN) 1-20 MG-MCG per tablet Take 1 tablet by mouth daily.        Allergies   Allergen Reactions     Cats      Sulfa Drugs Rash     Sulfonamide antibiotics       Review Of  Systems  Skin: Denies rash  Eyes: Denies redness or discharge   Ears/Nose/Throat:as above  Respiratory: as above  Cardiovascular: Denies chest pain or palpitations   Gastrointestinal:Denies nausea, vomiting, diarrhea   Musculoskeletal: No myalgias    Objective:   B/P: 119/70, T: 98.9, P: 89, R: Data Unavailable    Physical Exam  General: Alert orientated. NAD  Skin is unremarkable.  HEENT:Posterior pharynx erythematous. EAC's clear. Right TM intact. Left TM intact. Neck supple. Anterior cervical lymphadenopathy palpable.   Lungs: Scattered rhonchi throughout. No wheeze, rales   Cardiac: RRR    Assessment:   (R05) Cough  (primary encounter diagnosis)  Comment: CXR normal  Plan: XR CHEST 2 VW (Clinic Performed)            (J40) Bronchitis  Plan: azithromycin (ZITHROMAX) 250 MG tablet,         albuterol (PROAIR HFA/PROVENTIL HFA/VENTOLIN         HFA) 108 (90 Base) MCG/ACT Inhaler              Rest. Increase fluids. Tylenol for fever or discomfort. Return if symptoms persist or worsen.    Noris Leos PA-C

## 2018-04-24 ASSESSMENT — PATIENT HEALTH QUESTIONNAIRE - PHQ9: SUM OF ALL RESPONSES TO PHQ QUESTIONS 1-9: 8

## 2018-04-24 ASSESSMENT — ANXIETY QUESTIONNAIRES: GAD7 TOTAL SCORE: 5

## 2018-05-07 ENCOUNTER — HOSPITAL ENCOUNTER (OUTPATIENT)
Dept: PHYSICAL THERAPY | Facility: HOSPITAL | Age: 28
Setting detail: THERAPIES SERIES
End: 2018-05-07
Attending: FAMILY MEDICINE
Payer: COMMERCIAL

## 2018-05-07 PROCEDURE — 97110 THERAPEUTIC EXERCISES: CPT | Mod: GP

## 2018-05-07 PROCEDURE — 97140 MANUAL THERAPY 1/> REGIONS: CPT | Mod: GP

## 2018-05-07 PROCEDURE — 97161 PT EVAL LOW COMPLEX 20 MIN: CPT | Mod: GP

## 2018-05-07 PROCEDURE — 40000718 ZZHC STATISTIC PT DEPARTMENT ORTHO VISIT

## 2018-05-09 ENCOUNTER — HOSPITAL ENCOUNTER (OUTPATIENT)
Dept: PHYSICAL THERAPY | Facility: HOSPITAL | Age: 28
Setting detail: THERAPIES SERIES
End: 2018-05-09
Attending: FAMILY MEDICINE
Payer: COMMERCIAL

## 2018-05-09 PROCEDURE — 97110 THERAPEUTIC EXERCISES: CPT | Mod: GP

## 2018-05-09 PROCEDURE — 97140 MANUAL THERAPY 1/> REGIONS: CPT | Mod: GP

## 2018-05-09 PROCEDURE — 40000718 ZZHC STATISTIC PT DEPARTMENT ORTHO VISIT

## 2018-05-10 NOTE — PROGRESS NOTES
05/07/18 1000   General Information   Type of Visit Initial OP Ortho PT Evaluation   Start of Care Date 05/07/18   Referring Physician Stef Edmonds DC   Patient/Family Goals Statement to decrease muscle tension in LEs. Decrease pain in back and hips.    Orders Evaluate and Treat   Orders Comment tightness and pain in posterior thigh   Date of Order 05/07/18   Insurance Type [employer]   Insurance Comments/Visits Authorized 40/60 PT and OT combined per calendar year   Medical Diagnosis LE tension   Surgical/Medical history reviewed Yes   Precautions/Limitations no known precautions/limitations   General Information Comments PMH: MS, ADHD, demyleinating changes in the brain, DM2, PCOS   Body Part(s)   Body Part(s) Knee;Lumbar Spine/SI   Presentation and Etiology   Pertinent history of current problem (include personal factors and/or comorbidities that impact the POC) Patient reports that she has noted increased tension in R leg and this seems to be creating pain in hip and back. Patient has history of falls related to R LE and slips on ice as well. Patient had recurrent R ankle sprains, but last episode of PT worked on strength and stability of R ankle and this helped greatly. Patient has not noted deficits in this region. Patient has history of back pain and has been seeing chiropractor for treatment. Patient describes very high level of HS tension and has not been able to release the tension with stretches that she has been trialing at home. Patient notes that tension is high enough that it interferes with gait. Patient notes difficulty with stairs and ambulation when R LE tightness is present. Patient has Multiple sclerosis in PMH and symptoms are consistent with this.    Impairments A. Pain;E. Decreased flexibility;F. Decreased strength and endurance;G. Impaired balance;H. Impaired gait   Functional Limitations perform activities of daily living;perform required work activities;perform desired leisure /  sports activities   Symptom Location Trunk and LEs   Onset date of current episode/exacerbation (increased tension in last 2-3 months)   Chronicity Recurrent   Pain quality G. Cramping;C. Aching  (tension)   Frequency of pain/symptoms A. Constant   Pain/symptoms exacerbated by B. Walking  (stairs)   Pain/symptoms eased by C. Rest;E. Changing positions   Progression of symptoms since onset: Worsened   Prior Level of Function   Prior Level of Function-Mobility Patient is independent in mobility. Is working and also going to college classes.    Current Level of Function   Patient role/employment history A. Employed   Employment Comments  at clinic, going to school for nursing   Fall Risk Screen   Have you fallen 2 or more times in the past year? Yes   Have you fallen and had an injury in the past year? Yes   Is patient a fall risk? No   Fall screen comments Patient has history of frequent falls. May be related to R LE weakness and tension or may be situational such as ice and slippery surfaces.    Knee Objective Findings   Side (if bilateral, select both right and left) Right;Left   Gait/Locomotion Gait dysfunction noted. Decreased coordination of R LE with swing phase and terminal stance phase of gait.    Balance/Proprioception (Single Leg Stance) Decreased on R LE. Consistent deficits in balance and strength present on R LE.    Knee/Hip Strength Comments Functional strength is present in bilateral LEs, but deficits are noted on R LE compared to L. Decreased WB acceptance and confidence during squat. Decreased strength and balance on R LE.    Right Hamstring Flexibility Significant tension present. HIgh tone noted. SLR test revealed approximately 45 degrees. With distal HS patient is approimxately 60 degrees.    Right Quadricep Flexibility Mild tension   Right ITB Flexibility Moderate to significant tension appreciated.    Palpation High tone and tension present on R LE compared to L LE.     Accessory Motion/Joint Mobility Hip mobility WFL bilaterally. Negative FADIR and CARLOS.    Left Gastrocnemius Flexibility WFL   Left Hamstring Flexibility Mild tension present, no high tone noted. SLR revealed apporximately 60 degrees. With distal HS patient is approximately 35 degrees.    Left Hip Flexor Flexibility Mild to moderate tension noted. No High tone noted.    Left Quadricep Flexibility WFL   Left ITB Flexibility Mild tension present.    Observation Large difference in tension between R and L LE.    Right Gastrocnemius Flexibility Mild to moderate tension present   Right Hip Flexor Flexibility Moderate tension present. High tone noted.    Lumbar Spine/SI Objective Findings   Flexion ROM Full motion present, Increased tension in HS R side, but patient able to perform fingertips to ankles.    Extension ROM Full motion present.    Right Side Bending ROM Limited mobility present   Left Side Bending ROM Limited mobility present.    Clinical Impression   Criteria for Skilled Therapeutic Interventions Met yes, treatment indicated   PT Diagnosis Patient presents with increased R LE tension negatively impacting functional mobility and gait.    Influenced by the following impairments LE muscle tension, decreased functional strength and balance   Functional limitations due to impairments Falls history, difficulty with gait and stairs, transfers, increased back and hip pain.    Clinical Presentation Evolving/Changing   Clinical Presentation Rationale CLinical judgement   Clinical Decision Making (Complexity) Low complexity   Therapy Frequency 2 times/Week   Predicted Duration of Therapy Intervention (days/wks) 8 weeks   Risk & Benefits of therapy have been explained Yes   Patient, Family & other staff in agreement with plan of care Yes   Clinical Impression Comments Patient presents with increased tension and decreased flexibility on R LE related to high tone and tightness in hamstrings and hip flexors with R side  much more effected that L. Presentation is consistent with high tone and muscle tension associated with multiple sclerosis. Patient also has history of chronic back pain.    ORTHO GOALS   PT Ortho Eval Goals 1;2;3;4   Ortho Goal 1   Goal Identifier STG 1   Goal Description Patient will be independent and compliant with HEP.    Target Date 05/21/18   Ortho Goal 2   Goal Identifier LTG 1   Goal Description Patient will be able to perform walking and stairs without limitation from R LE tension or pain.    Target Date 07/02/18   Ortho Goal 3   Goal Identifier LTG 2   Goal Description Patient will be able to continue independent stretching and strengthening routinue to maintain function.    Target Date 07/02/18   Ortho Goal 4   Goal Identifier LTG 3   Goal Description Patient will be no longer have limitation from LBP and R LE pain related to muscle tension.     Target Date 07/02/18   Total Evaluation Time   Total Evaluation Time 15

## 2018-05-11 ENCOUNTER — HOSPITAL ENCOUNTER (OUTPATIENT)
Dept: PHYSICAL THERAPY | Facility: HOSPITAL | Age: 28
Setting detail: THERAPIES SERIES
End: 2018-05-11
Attending: FAMILY MEDICINE
Payer: COMMERCIAL

## 2018-05-11 PROCEDURE — 40000718 ZZHC STATISTIC PT DEPARTMENT ORTHO VISIT

## 2018-05-11 PROCEDURE — 97140 MANUAL THERAPY 1/> REGIONS: CPT | Mod: GP

## 2018-05-11 PROCEDURE — 97110 THERAPEUTIC EXERCISES: CPT | Mod: GP

## 2018-05-15 ENCOUNTER — HOSPITAL ENCOUNTER (OUTPATIENT)
Dept: PHYSICAL THERAPY | Facility: HOSPITAL | Age: 28
Setting detail: THERAPIES SERIES
End: 2018-05-15
Attending: FAMILY MEDICINE
Payer: COMMERCIAL

## 2018-05-15 PROCEDURE — 40000718 ZZHC STATISTIC PT DEPARTMENT ORTHO VISIT

## 2018-05-15 PROCEDURE — 97110 THERAPEUTIC EXERCISES: CPT | Mod: GP

## 2018-05-15 PROCEDURE — 97140 MANUAL THERAPY 1/> REGIONS: CPT | Mod: GP

## 2018-05-23 ENCOUNTER — HOSPITAL ENCOUNTER (OUTPATIENT)
Dept: PHYSICAL THERAPY | Facility: HOSPITAL | Age: 28
Setting detail: THERAPIES SERIES
End: 2018-05-23
Attending: FAMILY MEDICINE
Payer: COMMERCIAL

## 2018-05-23 PROCEDURE — 40000718 ZZHC STATISTIC PT DEPARTMENT ORTHO VISIT

## 2018-05-23 PROCEDURE — 97140 MANUAL THERAPY 1/> REGIONS: CPT | Mod: GP

## 2018-05-25 ENCOUNTER — HOSPITAL ENCOUNTER (OUTPATIENT)
Dept: PHYSICAL THERAPY | Facility: HOSPITAL | Age: 28
Setting detail: THERAPIES SERIES
End: 2018-05-25
Attending: FAMILY MEDICINE
Payer: COMMERCIAL

## 2018-05-25 PROCEDURE — 97140 MANUAL THERAPY 1/> REGIONS: CPT | Mod: GP

## 2018-05-25 PROCEDURE — 40000718 ZZHC STATISTIC PT DEPARTMENT ORTHO VISIT

## 2018-05-30 ENCOUNTER — HOSPITAL ENCOUNTER (OUTPATIENT)
Dept: PHYSICAL THERAPY | Facility: HOSPITAL | Age: 28
Setting detail: THERAPIES SERIES
End: 2018-05-30
Attending: FAMILY MEDICINE
Payer: COMMERCIAL

## 2018-05-30 PROCEDURE — 40000718 ZZHC STATISTIC PT DEPARTMENT ORTHO VISIT

## 2018-05-30 PROCEDURE — 97140 MANUAL THERAPY 1/> REGIONS: CPT | Mod: GP

## 2018-06-04 ENCOUNTER — HOSPITAL ENCOUNTER (OUTPATIENT)
Dept: PHYSICAL THERAPY | Facility: HOSPITAL | Age: 28
Setting detail: THERAPIES SERIES
End: 2018-06-04
Attending: FAMILY MEDICINE
Payer: COMMERCIAL

## 2018-06-04 PROCEDURE — 97112 NEUROMUSCULAR REEDUCATION: CPT | Mod: GP

## 2018-06-04 PROCEDURE — 40000718 ZZHC STATISTIC PT DEPARTMENT ORTHO VISIT

## 2018-06-07 ENCOUNTER — HOSPITAL ENCOUNTER (OUTPATIENT)
Dept: PHYSICAL THERAPY | Facility: HOSPITAL | Age: 28
Setting detail: THERAPIES SERIES
End: 2018-06-07
Attending: FAMILY MEDICINE
Payer: COMMERCIAL

## 2018-06-07 PROCEDURE — 40000718 ZZHC STATISTIC PT DEPARTMENT ORTHO VISIT

## 2018-06-07 PROCEDURE — 97140 MANUAL THERAPY 1/> REGIONS: CPT | Mod: GP

## 2018-06-11 DIAGNOSIS — E28.2 PCOS (POLYCYSTIC OVARIAN SYNDROME): ICD-10-CM

## 2018-06-11 LAB
ESTRADIOL SERPL-MCNC: 297 PG/ML
PROGEST SERPL-MCNC: 33 NG/ML

## 2018-06-11 PROCEDURE — 82670 ASSAY OF TOTAL ESTRADIOL: CPT | Mod: 90 | Performed by: FAMILY MEDICINE

## 2018-06-11 PROCEDURE — 84144 ASSAY OF PROGESTERONE: CPT | Mod: 90 | Performed by: FAMILY MEDICINE

## 2018-06-11 PROCEDURE — 36415 COLL VENOUS BLD VENIPUNCTURE: CPT | Performed by: FAMILY MEDICINE

## 2018-06-11 PROCEDURE — 99000 SPECIMEN HANDLING OFFICE-LAB: CPT | Performed by: FAMILY MEDICINE

## 2018-06-13 ENCOUNTER — OFFICE VISIT (OUTPATIENT)
Dept: FAMILY MEDICINE | Facility: OTHER | Age: 28
End: 2018-06-13
Attending: FAMILY MEDICINE
Payer: COMMERCIAL

## 2018-06-13 VITALS
OXYGEN SATURATION: 99 % | HEIGHT: 63 IN | DIASTOLIC BLOOD PRESSURE: 84 MMHG | BODY MASS INDEX: 31.01 KG/M2 | WEIGHT: 175 LBS | TEMPERATURE: 98.2 F | SYSTOLIC BLOOD PRESSURE: 132 MMHG | HEART RATE: 99 BPM

## 2018-06-13 DIAGNOSIS — N97.0 OLIGO-OVULATION: ICD-10-CM

## 2018-06-13 DIAGNOSIS — R73.03 PREDIABETES: ICD-10-CM

## 2018-06-13 DIAGNOSIS — B00.1 COLD SORE: ICD-10-CM

## 2018-06-13 DIAGNOSIS — E28.2 PCOS (POLYCYSTIC OVARIAN SYNDROME): ICD-10-CM

## 2018-06-13 DIAGNOSIS — G35 MS (MULTIPLE SCLEROSIS) (H): ICD-10-CM

## 2018-06-13 DIAGNOSIS — G93.9 WHITE MATTER LESION OF CENTRAL NERVOUS SYSTEM: Primary | ICD-10-CM

## 2018-06-13 DIAGNOSIS — E55.9 HYPOVITAMINOSIS D: ICD-10-CM

## 2018-06-13 LAB
EST. AVERAGE GLUCOSE BLD GHB EST-MCNC: 126 MG/DL
HBA1C MFR BLD: 6 % (ref 0–5.6)

## 2018-06-13 PROCEDURE — 99214 OFFICE O/P EST MOD 30 MIN: CPT | Performed by: FAMILY MEDICINE

## 2018-06-13 PROCEDURE — 83036 HEMOGLOBIN GLYCOSYLATED A1C: CPT | Performed by: FAMILY MEDICINE

## 2018-06-13 PROCEDURE — 82306 VITAMIN D 25 HYDROXY: CPT | Mod: 90 | Performed by: FAMILY MEDICINE

## 2018-06-13 PROCEDURE — 99000 SPECIMEN HANDLING OFFICE-LAB: CPT | Performed by: FAMILY MEDICINE

## 2018-06-13 PROCEDURE — 36415 COLL VENOUS BLD VENIPUNCTURE: CPT | Performed by: FAMILY MEDICINE

## 2018-06-13 RX ORDER — CLOMIPHENE CITRATE 50 MG/1
50 TABLET ORAL DAILY
Qty: 5 TABLET | Refills: 5 | Status: SHIPPED | OUTPATIENT
Start: 2018-06-13 | End: 2018-08-15

## 2018-06-13 RX ORDER — VALACYCLOVIR HYDROCHLORIDE 500 MG/1
500 TABLET, FILM COATED ORAL 2 TIMES DAILY
Qty: 30 TABLET | Refills: 5 | Status: SHIPPED | OUTPATIENT
Start: 2018-06-13 | End: 2019-10-10

## 2018-06-13 ASSESSMENT — ANXIETY QUESTIONNAIRES
4. TROUBLE RELAXING: MORE THAN HALF THE DAYS
GAD7 TOTAL SCORE: 6
IF YOU CHECKED OFF ANY PROBLEMS ON THIS QUESTIONNAIRE, HOW DIFFICULT HAVE THESE PROBLEMS MADE IT FOR YOU TO DO YOUR WORK, TAKE CARE OF THINGS AT HOME, OR GET ALONG WITH OTHER PEOPLE: VERY DIFFICULT
5. BEING SO RESTLESS THAT IT IS HARD TO SIT STILL: NOT AT ALL
7. FEELING AFRAID AS IF SOMETHING AWFUL MIGHT HAPPEN: NOT AT ALL
3. WORRYING TOO MUCH ABOUT DIFFERENT THINGS: SEVERAL DAYS
1. FEELING NERVOUS, ANXIOUS, OR ON EDGE: SEVERAL DAYS
6. BECOMING EASILY ANNOYED OR IRRITABLE: SEVERAL DAYS
2. NOT BEING ABLE TO STOP OR CONTROL WORRYING: SEVERAL DAYS

## 2018-06-13 ASSESSMENT — PAIN SCALES - GENERAL: PAINLEVEL: EXTREME PAIN (8)

## 2018-06-13 NOTE — NURSING NOTE
"Chief Complaint   Patient presents with     Other     Family Planning        Initial /84 (BP Location: Right arm, Patient Position: Chair, Cuff Size: Adult Regular)  Pulse 99  Temp 98.2  F (36.8  C) (Tympanic)  Ht 5' 3\" (1.6 m)  Wt 175 lb (79.4 kg)  SpO2 99%  BMI 31 kg/m2 Estimated body mass index is 31 kg/(m^2) as calculated from the following:    Height as of this encounter: 5' 3\" (1.6 m).    Weight as of this encounter: 175 lb (79.4 kg).  Medication Reconciliation: complete    KELBY HANLEY LPN    "

## 2018-06-13 NOTE — PROGRESS NOTES
SUBJECTIVE:   Radha Carl is a 27 year old female who presents to clinic today for the following health issues:      Family Planning      Duration: 4 years     Description (location/character/radiation): follow up family planning charting     Intensity:  N/a    Accompanying signs and symptoms: none    History (similar episodes/previous evaluation): yes    Precipitating or alleviating factors: None    Therapies tried and outcome: None       White Matter Lesions/MS?  And chronic back pain and hamstring tension      Duration: 2 mos    Description (location/character/radiation): low back and legs, hips bilatearlly    Intensity:  moderate    Accompanying signs and symptoms: trouble walking    History (similar episodes/previous evaluation): 5 yrs ago she was thought to have MS but neurologist didn't think so yet.    Precipitating or alleviating factors: PT is helping     Therapies tried and outcome: chiro, PT, heat, ice       Problem list and histories reviewed & adjusted, as indicated.  Additional history: as documented    Patient Active Problem List   Diagnosis     Attention deficit hyperactivity disorder (ADHD)     Hyperlipidemia with target LDL less than 100     PCOS (polycystic ovarian syndrome)     Elevated liver enzymes     MS (multiple sclerosis) (H)     Vitamin B12 deficiency (non anemic)     Adiposity     Skin sensation disturbance     Tubal pregnancy without intrauterine pregnancy     Visual field scotoma     ACP (advance care planning)     Dysmenorrhea     MARIBTEH (generalized anxiety disorder)     Past Surgical History:   Procedure Laterality Date     GYN SURGERY  5/2010    ectopic pregnancy ruptured -L salpingectomy     wisdom teeth  2014       Social History   Substance Use Topics     Smoking status: Never Smoker     Smokeless tobacco: Never Used     Alcohol use 0.0 oz/week     0 Standard drinks or equivalent per week      Comment: rare     Family History   Problem Relation Age of Onset     Family  History Negative Mother      Family History Negative Father      Family History Negative Sister      Family History Negative Sister      Obesity Sister      Breast Cancer Maternal Grandmother 64     Unknown/Adopted Maternal Grandfather      severe gerd     Lung Cancer Paternal Grandmother      +tobacco     HEART DISEASE Paternal Grandfather      AMI/CAD or CVA     Family History Negative Brother      Psychotic Disorder Sister      ADHD         Current Outpatient Prescriptions   Medication Sig Dispense Refill     acetaminophen (TYLENOL) 500 MG tablet Take 2 tablets by mouth. Every 6 hours as needed       ACETYLCYSTEINE PO Take 600 mg by mouth 2 times daily       albuterol (PROAIR HFA/PROVENTIL HFA/VENTOLIN HFA) 108 (90 Base) MCG/ACT Inhaler Inhale 2 puffs into the lungs every 4 hours as needed for shortness of breath / dyspnea or wheezing 1 Inhaler 0     clomiPHENE (CLOMID) 50 MG tablet Take 1 tablet (50 mg) by mouth daily Days 3-6 of your cycle 5 tablet 5     ibuprofen (ADVIL,MOTRIN) 200 MG tablet Take 2 tablets by mouth. Every 6 hours as needed with food       lisdexamfetamine (VYVANSE) 20 MG capsule Take 1 capsule (20 mg) by mouth every morning 30 capsule 0     order for DME Right ankle ASO 1 Device 0     progesterone (PROMETRIUM) 100 MG capsule Take 1 capsule (100 mg) by mouth daily Starting on peak +3 for 10 days out of month 60 capsule 1     Pyridoxine HCl (VITAMIN  B-6) 500 MG TABS Take 1 tablet by mouth daily       ranitidine (ZANTAC) 150 MG tablet Take 1 tablet by mouth as needed       valACYclovir (VALTREX) 500 MG tablet Take 1 tablet (500 mg) by mouth 2 times daily With active lesion 30 tablet 5     vitamin D (ERGOCALCIFEROL) 92611 UNIT capsule TAKE ONE CAPSULE BY MOUTH ONCE A WEEK 4 capsule 0     [DISCONTINUED] clomiPHENE (CLOMID) 50 MG tablet Take 1 tablet (50 mg) by mouth daily Days 3-7 of your cycle 5 tablet 5     [DISCONTINUED] norethindrone-ethinyl estradiol (MICROGESTIN) 1-20 MG-MCG per tablet Take  "1 tablet by mouth daily.       [DISCONTINUED] valACYclovir (VALTREX) 500 MG tablet Take 1 tablet (500 mg) by mouth 2 times daily With active lesion 30 tablet 3     Allergies   Allergen Reactions     Cats      Sulfa Drugs Rash     Sulfonamide antibiotics     Labs reviewed in EPIC    Reviewed and updated as needed this visit by clinical staff       Reviewed and updated as needed this visit by Provider         ROS:  Constitutional, HEENT, cardiovascular, pulmonary, gi and gu systems are negative, except as otherwise noted.    OBJECTIVE:                                                    /84 (BP Location: Right arm, Patient Position: Chair, Cuff Size: Adult Regular)  Pulse 99  Temp 98.2  F (36.8  C) (Tympanic)  Ht 5' 3\" (1.6 m)  Wt 175 lb (79.4 kg)  SpO2 99%  BMI 31 kg/m2  Body mass index is 31 kg/(m^2).  GENERAL APPEARANCE: healthy, alert and no distress, antalgic gait  RESP: lungs clear to auscultation - no rales, rhonchi or wheezes  CV: regular rates and rhythm, normal S1 S2, no S3 or S4 and no murmur, click or rub  PSYCH: mentation appears normal and affect normal/bright       ASSESSMENT/PLAN:                                                    1. Oligo-ovulation  Change to days 3-6  F/u 2 mos  - clomiPHENE (CLOMID) 50 MG tablet; Take 1 tablet (50 mg) by mouth daily Days 3-6 of your cycle  Dispense: 5 tablet; Refill: 5    2. PCOS (polycystic ovarian syndrome)  refilled  - progesterone (PROMETRIUM) 100 MG capsule; Take 1 capsule (100 mg) by mouth daily Starting on peak +3 for 10 days out of month  Dispense: 60 capsule; Refill: 1    3. White matter lesion of central nervous system  Update MRI  - MR Brain w/o & w Contrast; Future  - NEUROLOGY ADULT REFERRAL  - Estimated Average Glucose    4. Cold sore  refilled  - valACYclovir (VALTREX) 500 MG tablet; Take 1 tablet (500 mg) by mouth 2 times daily With active lesion  Dispense: 30 tablet; Refill: 5    5. Hypovitaminosis D  labs  - Vitamin D Deficiency    6. " prediabetes  - Hemoglobin A1c    Patient was agreeable to this plan and had no further questions.  See Patient Instructions    Muriel Jones MD  [facility]

## 2018-06-13 NOTE — MR AVS SNAPSHOT
After Visit Summary   6/13/2018    Radha Carl    MRN: [id number]           Patient Information     Date Of Birth          1990        Visit Information        Provider Department      6/13/2018 3:30 PM Muriel Jones MD [facility] [place]        Today's Diagnoses     White matter lesion of central nervous system    -  1    Oligo-ovulation        PCOS (polycystic ovarian syndrome)        Cold sore        Hypovitaminosis D        Type 2 diabetes mellitus without complication, without long-term current use of insulin (H)           Follow-ups after your visit        Additional Services     NEUROLOGY ADULT REFERRAL       Your provider has referred you for the following:   Consult at [facility] -- Dr Rebecca Myerson    Please be aware that coverage of these services is subject to the terms and limitations of your health insurance plan.  Call member services at your health plan with any benefit or coverage questions.      Please bring the following with you to your appointment:    (1) Any X-Rays, CTs or MRIs which have been performed.  Contact the facility where they were done to arrange for  prior to your scheduled appointment.    (2) List of current medications  (3) This referral request   (4) Any documents/labs given to you for this referral                  Your next 10 appointments already scheduled     Aug 15, 2018 11:15 AM CDT   (Arrive by 11:00 AM)   SHORT with MD Anselmo Wolfe ([facility] )    [address] [place] [address]   [phone]              Future tests that were ordered for you today     Open Future Orders        Priority Expected Expires Ordered    MR Brain w/o & w Contrast Routine  6/13/2019 6/13/2018            Who to contact     If you have questions or need follow up information about today's clinic visit or your schedule please contact [facility] directly at [phone].  Normal  "or non-critical lab and imaging results will be communicated to you by Aerospikehart, letter or phone within 4 business days after the clinic has received the results. If you do not hear from us within 7 days, please contact the clinic through [facility] or phone. If you have a critical or abnormal lab result, we will notify you by phone as soon as possible.  Submit refill requests through [facility] or call your pharmacy and they will forward the refill request to us. Please allow 3 business days for your refill to be completed.          Additional Information About Your Visit        [employer] Information     [facility] gives you secure access to your electronic health record. If you see a primary care provider, you can also send messages to your care team and make appointments. If you have questions, please call your primary care clinic.  If you do not have a primary care provider, please call [phone] and they will assist you.        Care EveryWhere ID     This is your Care EveryWhere ID. This could be used by other organizations to access your [place] medical records  [id number]        Your Vitals Were     Pulse Temperature Height Pulse Oximetry BMI (Body Mass Index)       99 98.2  F (36.8  C) (Tympanic) 5' 3\" (1.6 m) 99% 31 kg/m2        Blood Pressure from Last 3 Encounters:   06/13/18 132/84   04/23/18 119/70   02/09/18 117/65    Weight from Last 3 Encounters:   06/13/18 175 lb (79.4 kg)   04/23/18 178 lb (80.7 kg)   02/09/18 175 lb (79.4 kg)              We Performed the Following     Estimated Average Glucose     Hemoglobin A1c     NEUROLOGY ADULT REFERRAL     Vitamin D Deficiency          Today's Medication Changes          These changes are accurate as of 6/13/18  5:26 PM.  If you have any questions, ask your nurse or doctor.               These medicines have changed or have updated prescriptions.        Dose/Directions    clomiPHENE 50 MG tablet   Commonly known as:  CLOMID   This may have changed:  additional " instructions   Used for:  Oligo-ovulation   Changed by:  Muriel Jones MD        Dose:  50 mg   Take 1 tablet (50 mg) by mouth daily Days 3-6 of your cycle   Quantity:  5 tablet   Refills:  5       progesterone 100 MG capsule   Commonly known as:  PROMETRIUM   This may have changed:  how much to take   Used for:  PCOS (polycystic ovarian syndrome)   Changed by:  Muriel Jones MD        Dose:  100 mg   Take 1 capsule (100 mg) by mouth daily Starting on peak +3 for 10 days out of month   Quantity:  60 capsule   Refills:  1         Stop taking these medicines if you haven't already. Please contact your care team if you have questions.     azithromycin 250 MG tablet   Commonly known as:  ZITHROMAX   Stopped by:  Muriel Jones MD           penciclovir 1 % cream   Commonly known as:  DENAVIR   Stopped by:  Muriel Jones MD                Where to get your medicines      These medications were sent to [id number] [place] [address]   [address], [place] [address]     Phone:  [phone]     clomiPHENE 50 MG tablet    progesterone 100 MG capsule    valACYclovir 500 MG tablet                Primary Care Provider Office Phone # Fax #    Muriel Jones [phone]       [facility] [place] [address] [facility] [place] [address]        Equal Access to Services     [facility] AH: Hadii aad ku hadasho Soomaali, waaxda luqadaha, qaybta kaalmada adeegyada, waxay taurus hayjulio mnoge . So [facility] [phone].    ATENCIÓN: Si habla español, tiene a jama disposición servicios gratuitos de asistencia lingüística. Llame al [phone].    We comply with applicable federal civil rights laws and [place] laws. We do not discriminate on the basis of race, color, national origin, age, disability, sex, sexual orientation, or gender identity.            Thank you!     Thank you for choosing [facility]  for your care. Our goal is always to provide  you with excellent care. Hearing back from our patients is one way we can continue to improve our services. Please take a few minutes to complete the written survey that you may receive in the mail after your visit with us. Thank you!             Your Updated Medication List - Protect others around you: Learn how to safely use, store and throw away your medicines at www.disposemymeds.org.          This list is accurate as of 6/13/18  5:26 PM.  Always use your most recent med list.                   Brand Name Dispense Instructions for use Diagnosis    ACETYLCYSTEINE PO      Take 600 mg by mouth 2 times daily        albuterol 108 (90 Base) MCG/ACT Inhaler    PROAIR HFA/PROVENTIL HFA/VENTOLIN HFA    1 Inhaler    Inhale 2 puffs into the lungs every 4 hours as needed for shortness of breath / dyspnea or wheezing    Bronchitis       clomiPHENE 50 MG tablet    CLOMID    5 tablet    Take 1 tablet (50 mg) by mouth daily Days 3-6 of your cycle    Oligo-ovulation       ibuprofen 200 MG tablet    ADVIL/MOTRIN     Take 2 tablets by mouth. Every 6 hours as needed with food        lisdexamfetamine 20 MG capsule    VYVANSE    30 capsule    Take 1 capsule (20 mg) by mouth every morning    Attention deficit hyperactivity disorder (ADHD), predominantly inattentive type       order for DME     1 Device    Right ankle ASO    Sprain of other ligament of right ankle, initial encounter       progesterone 100 MG capsule    PROMETRIUM    60 capsule    Take 1 capsule (100 mg) by mouth daily Starting on peak +3 for 10 days out of month    PCOS (polycystic ovarian syndrome)       TYLENOL 500 MG tablet   Generic drug:  acetaminophen      Take 2 tablets by mouth. Every 6 hours as needed        valACYclovir 500 MG tablet    VALTREX    30 tablet    Take 1 tablet (500 mg) by mouth 2 times daily With active lesion    Cold sore       vitamin  B-6 500 MG Tabs      Take 1 tablet by mouth daily        vitamin D 08941 UNIT capsule    ERGOCALCIFEROL    4  capsule    TAKE ONE CAPSULE BY MOUTH ONCE A WEEK    MS (multiple sclerosis) (H)       ZANTAC 150 MG tablet   Generic drug:  ranitidine      Take 1 tablet by mouth as needed

## 2018-06-14 ENCOUNTER — MYC MEDICAL ADVICE (OUTPATIENT)
Dept: FAMILY MEDICINE | Facility: OTHER | Age: 28
End: 2018-06-14

## 2018-06-14 DIAGNOSIS — G89.29 CHRONIC BILATERAL LOW BACK PAIN WITHOUT SCIATICA: ICD-10-CM

## 2018-06-14 DIAGNOSIS — M54.50 CHRONIC BILATERAL LOW BACK PAIN WITHOUT SCIATICA: ICD-10-CM

## 2018-06-14 DIAGNOSIS — G35 MS (MULTIPLE SCLEROSIS) (H): Primary | ICD-10-CM

## 2018-06-14 DIAGNOSIS — M25.559 CHRONIC HIP PAIN, UNSPECIFIED LATERALITY: ICD-10-CM

## 2018-06-14 DIAGNOSIS — G89.29 CHRONIC HIP PAIN, UNSPECIFIED LATERALITY: ICD-10-CM

## 2018-06-14 ASSESSMENT — PATIENT HEALTH QUESTIONNAIRE - PHQ9: SUM OF ALL RESPONSES TO PHQ QUESTIONS 1-9: 9

## 2018-06-14 ASSESSMENT — ANXIETY QUESTIONNAIRES: GAD7 TOTAL SCORE: 6

## 2018-06-15 LAB — DEPRECATED CALCIDIOL+CALCIFEROL SERPL-MC: 23 UG/L (ref 20–75)

## 2018-06-15 RX ORDER — ERGOCALCIFEROL 1.25 MG/1
CAPSULE, LIQUID FILLED ORAL
Qty: 4 CAPSULE | Refills: 1 | Status: SHIPPED | OUTPATIENT
Start: 2018-06-15 | End: 2018-08-09

## 2018-06-22 ENCOUNTER — HOSPITAL ENCOUNTER (OUTPATIENT)
Dept: MRI IMAGING | Facility: HOSPITAL | Age: 28
Discharge: HOME | End: 2018-06-22
Attending: FAMILY MEDICINE | Admitting: FAMILY MEDICINE
Payer: COMMERCIAL

## 2018-06-22 DIAGNOSIS — G93.9 WHITE MATTER LESION OF CENTRAL NERVOUS SYSTEM: ICD-10-CM

## 2018-06-22 PROCEDURE — 70553 MRI BRAIN STEM W/O & W/DYE: CPT | Mod: TC

## 2018-06-22 PROCEDURE — 25000128 H RX IP 250 OP 636: Performed by: RADIOLOGY

## 2018-06-22 PROCEDURE — A9585 GADOBUTROL INJECTION: HCPCS | Performed by: RADIOLOGY

## 2018-06-22 RX ORDER — GADOBUTROL 604.72 MG/ML
7.5 INJECTION INTRAVENOUS ONCE
Status: COMPLETED | OUTPATIENT
Start: 2018-06-22 | End: 2018-06-22

## 2018-06-22 RX ADMIN — GADOBUTROL 7.5 ML: 604.72 INJECTION INTRAVENOUS at 13:45

## 2018-06-25 ENCOUNTER — OFFICE VISIT (OUTPATIENT)
Dept: FAMILY MEDICINE | Facility: OTHER | Age: 28
End: 2018-06-25
Attending: FAMILY MEDICINE
Payer: COMMERCIAL

## 2018-06-25 ENCOUNTER — RADIANT APPOINTMENT (OUTPATIENT)
Dept: GENERAL RADIOLOGY | Facility: OTHER | Age: 28
End: 2018-06-25
Attending: FAMILY MEDICINE
Payer: COMMERCIAL

## 2018-06-25 VITALS
OXYGEN SATURATION: 100 % | HEART RATE: 85 BPM | DIASTOLIC BLOOD PRESSURE: 98 MMHG | SYSTOLIC BLOOD PRESSURE: 152 MMHG | BODY MASS INDEX: 31 KG/M2 | WEIGHT: 175 LBS | TEMPERATURE: 98.7 F

## 2018-06-25 DIAGNOSIS — M25.531 RIGHT WRIST PAIN: ICD-10-CM

## 2018-06-25 DIAGNOSIS — R20.9 SKIN SENSATION DISTURBANCE: ICD-10-CM

## 2018-06-25 DIAGNOSIS — G35 MS (MULTIPLE SCLEROSIS) (H): ICD-10-CM

## 2018-06-25 DIAGNOSIS — M25.531 RIGHT WRIST PAIN: Primary | ICD-10-CM

## 2018-06-25 PROCEDURE — 73110 X-RAY EXAM OF WRIST: CPT | Mod: TC

## 2018-06-25 PROCEDURE — 99214 OFFICE O/P EST MOD 30 MIN: CPT | Performed by: FAMILY MEDICINE

## 2018-06-25 ASSESSMENT — ANXIETY QUESTIONNAIRES
GAD7 TOTAL SCORE: 8
1. FEELING NERVOUS, ANXIOUS, OR ON EDGE: SEVERAL DAYS
IF YOU CHECKED OFF ANY PROBLEMS ON THIS QUESTIONNAIRE, HOW DIFFICULT HAVE THESE PROBLEMS MADE IT FOR YOU TO DO YOUR WORK, TAKE CARE OF THINGS AT HOME, OR GET ALONG WITH OTHER PEOPLE: SOMEWHAT DIFFICULT
5. BEING SO RESTLESS THAT IT IS HARD TO SIT STILL: MORE THAN HALF THE DAYS
6. BECOMING EASILY ANNOYED OR IRRITABLE: SEVERAL DAYS
3. WORRYING TOO MUCH ABOUT DIFFERENT THINGS: SEVERAL DAYS
7. FEELING AFRAID AS IF SOMETHING AWFUL MIGHT HAPPEN: NOT AT ALL
4. TROUBLE RELAXING: MORE THAN HALF THE DAYS
2. NOT BEING ABLE TO STOP OR CONTROL WORRYING: SEVERAL DAYS

## 2018-06-25 ASSESSMENT — PAIN SCALES - GENERAL: PAINLEVEL: EXTREME PAIN (8)

## 2018-06-25 NOTE — PROGRESS NOTES
SUBJECTIVE:                                                    Radha Carl is a 27 year old female who presents to clinic today for the following health issues:        Nauseous/headaches and dizziness and lightheadedness    Duration: about 2 weeks    Description (location/character/radiation): none    Intensity:  mild    Accompanying signs and symptoms: been feeling very nauseous, having headaches and feeling dizzy for the last 2 weeks. Has take pregnancy tests that came back negative. Just does not feel well.    History (similar episodes/previous evaluation): None    Precipitating or alleviating factors: None    Therapies tried and outcome: none       Musculoskeletal problem/pain      Duration: about a month    Description  Location: right wrist    Intensity:  moderate    Accompanying signs and symptoms: radiation of pain to fingers and up into the elbow, tingling, swelling and burning. Also itches and if she itches it it almost feels like a sunburn.    History  Previous similar problem: no   Previous evaluation:  none    Precipitating or alleviating factors:  Trauma or overuse: no   Aggravating factors include: bending certain way    Therapies tried and outcome: ice and Ibuprofen      Problem list and histories reviewed & adjusted, as indicated.  Additional history: as documented    Patient Active Problem List   Diagnosis     Attention deficit hyperactivity disorder (ADHD)     Hyperlipidemia with target LDL less than 100     PCOS (polycystic ovarian syndrome)     Elevated liver enzymes     MS (multiple sclerosis) (H)     Vitamin B12 deficiency (non anemic)     Adiposity     Skin sensation disturbance     Tubal pregnancy without intrauterine pregnancy     Visual field scotoma     ACP (advance care planning)     Dysmenorrhea     MARIBETH (generalized anxiety disorder)     Past Surgical History:   Procedure Laterality Date     GYN SURGERY  5/2010    ectopic pregnancy ruptured -L salpingectomy     wisdom teeth   2014       Social History   Substance Use Topics     Smoking status: Never Smoker     Smokeless tobacco: Never Used     Alcohol use 0.0 oz/week     0 Standard drinks or equivalent per week      Comment: rare     Family History   Problem Relation Age of Onset     Family History Negative Mother      Family History Negative Father      Family History Negative Sister      Family History Negative Sister      Obesity Sister      Breast Cancer Maternal Grandmother 64     Unknown/Adopted Maternal Grandfather      severe gerd     Lung Cancer Paternal Grandmother      +tobacco     HEART DISEASE Paternal Grandfather      AMI/CAD or CVA     Family History Negative Brother      Psychotic Disorder Sister      ADHD         Current Outpatient Prescriptions   Medication Sig Dispense Refill     acetaminophen (TYLENOL) 500 MG tablet Take 2 tablets by mouth. Every 6 hours as needed       ACETYLCYSTEINE PO Take 600 mg by mouth 2 times daily       albuterol (PROAIR HFA/PROVENTIL HFA/VENTOLIN HFA) 108 (90 Base) MCG/ACT Inhaler Inhale 2 puffs into the lungs every 4 hours as needed for shortness of breath / dyspnea or wheezing 1 Inhaler 0     clomiPHENE (CLOMID) 50 MG tablet Take 1 tablet (50 mg) by mouth daily Days 3-6 of your cycle 5 tablet 5     ibuprofen (ADVIL,MOTRIN) 200 MG tablet Take 2 tablets by mouth. Every 6 hours as needed with food       Meloxicam (MOBIC PO)        order for DME Equipment being ordered: 4 butterfield walker 1 Device 0     order for DME Right ankle ASO 1 Device 0     progesterone (PROMETRIUM) 100 MG capsule Take 1 capsule (100 mg) by mouth daily Starting on peak +3 for 10 days out of month 60 capsule 1     Pyridoxine HCl (VITAMIN  B-6) 500 MG TABS Take 1 tablet by mouth daily       ranitidine (ZANTAC) 150 MG tablet Take 1 tablet by mouth as needed       tiZANidine (ZANAFLEX) 4 MG tablet Take 1-2 tablets (4-8 mg) by mouth 3 times daily as needed for muscle spasms 30 tablet 1     valACYclovir (VALTREX) 500 MG tablet  Take 1 tablet (500 mg) by mouth 2 times daily With active lesion 30 tablet 5     vitamin D (ERGOCALCIFEROL) 50798 UNIT capsule TAKE ONE CAPSULE BY MOUTH ONCE A WEEK 4 capsule 1     [DISCONTINUED] norethindrone-ethinyl estradiol (MICROGESTIN) 1-20 MG-MCG per tablet Take 1 tablet by mouth daily.       Allergies   Allergen Reactions     Cats      Sulfa Drugs Rash     Sulfonamide antibiotics     Labs reviewed in EPIC    ROS:  Constitutional, HEENT, cardiovascular, pulmonary, gi and gu systems are negative, except as otherwise noted.    OBJECTIVE:                                                    BP (!) 152/98  Pulse 85  Temp 98.7  F (37.1  C) (Tympanic)  Wt 175 lb (79.4 kg)  SpO2 100%  BMI 31 kg/m2  Body mass index is 31 kg/(m^2).  GENERAL APPEARANCE: healthy, alert, no distress and fatigued  RESP: lungs clear to auscultation - no rales, rhonchi or wheezes  CV: regular rates and rhythm, normal S1 S2, no S3 or S4 and no murmur, click or rub  MS: extremities normal- no gross deformities noted  ORTHO: Elbow Exam: Inspection: no swelling, no ecchymosis  Tender: lateral epicondyle  Non-tender: medial epicondyle and common flexor tendon  Range of Motion: all normal but with tenderness  Strength: not tested  Special tests: not done     Wrist Exam: WRIST:  Inspection: swelling mild  Palpation: Tender: diffusely around wrist  Non-tender:   Range of Motion: normal, painful  Strength: not tested  Special tests: not done    PSYCH: mentation appears normal and affect normal/bright       ASSESSMENT/PLAN:                                                    1. Right wrist pain  Xray reviewed and negative,   - Meloxicam (MOBIC PO);   - XR Wrist Right G/E 3 Views; Future  - tiZANidine (ZANAFLEX) 4 MG tablet; Take 1-2 tablets (4-8 mg) by mouth 3 times daily as needed for muscle spasms  Dispense: 30 tablet; Refill: 1    2. MS (multiple sclerosis) (H)  Reviewed her brain MRI and it shows progression of her MS which has been eveident  over the past few months  - tiZANidine (ZANAFLEX) 4 MG tablet; Take 1-2 tablets (4-8 mg) by mouth 3 times daily as needed for muscle spasms  Dispense: 30 tablet; Refill: 1    3. Skin sensation disturbance  Will try muscle relaxer and she is awaiting the appt with neurologist in [place]    Patient was agreeable to this plan and had no further questions.  See Patient Instructions    Muriel Jones MD  [facility]

## 2018-06-25 NOTE — MR AVS SNAPSHOT
After Visit Summary   6/25/2018    Radha Carl    MRN: [id number]           Patient Information     Date Of Birth          1990        Visit Information        Provider Department      6/25/2018 3:30 PM Muriel Jones MD [facility]        Today's Diagnoses     Right wrist pain    -  1    MS (multiple sclerosis) (H)        Skin sensation disturbance           Follow-ups after your visit        Your next 10 appointments already scheduled     Aug 15, 2018 11:15 AM CDT   (Arrive by 11:00 AM)   SHORT with Muriel Jones MD   [facility] [place] ([facility] [place] )    [address]  [place] [address]   [phone]              Who to contact     If you have questions or need follow up information about today's clinic visit or your schedule please contact [facility] directly at [phone].  Normal or non-critical lab and imaging results will be communicated to you by MyChart, letter or phone within 4 business days after the clinic has received the results. If you do not hear from us within 7 days, please contact the clinic through MyChart or phone. If you have a critical or abnormal lab result, we will notify you by phone as soon as possible.  Submit refill requests through [employer] or call your pharmacy and they will forward the refill request to us. Please allow 3 business days for your refill to be completed.          Additional Information About Your Visit        MyChart Information     [employer] gives you secure access to your electronic health record. If you see a primary care provider, you can also send messages to your care team and make appointments. If you have questions, please call your primary care clinic.  If you do not have a primary care provider, please call [phone] and they will assist you.        Care EveryWhere ID     This is your Care EveryWhere ID. This could be used by other organizations to access your  [facility] medical records  [id number]        Your Vitals Were     Pulse Temperature Pulse Oximetry BMI (Body Mass Index)          85 98.7  F (37.1  C) (Tympanic) 100% 31 kg/m2         Blood Pressure from Last 3 Encounters:   06/25/18 (!) 152/98   06/13/18 132/84   04/23/18 119/70    Weight from Last 3 Encounters:   06/25/18 175 lb (79.4 kg)   06/13/18 175 lb (79.4 kg)   04/23/18 178 lb (80.7 kg)                 Today's Medication Changes          These changes are accurate as of 6/25/18  5:09 PM.  If you have any questions, ask your nurse or doctor.               Start taking these medicines.        Dose/Directions    tiZANidine 4 MG tablet   Commonly known as:  ZANAFLEX   Used for:  MS (multiple sclerosis) (H), Right wrist pain   Started by:  Muriel Jones MD        Dose:  4-8 mg   Take 1-2 tablets (4-8 mg) by mouth 3 times daily as needed for muscle spasms   Quantity:  30 tablet   Refills:  1            Where to get your medicines      These medications were sent to [id number] - [place], [place] - [address]   [address] , [place] [address]     Phone:  [phone]     tiZANidine 4 MG tablet                Primary Care Provider Office Phone # Fax #    Muriel Jones [phone]       [facility] [place] [address]  [place] [address]        Equal Access to Services     [facility]: Hadii aad ku hadasho Soomaali, waaxda luqadaha, qaybta kaalmada adeegyada, waxay taurus hayjulio jiménez. So [facility] [phone].    ATENCIÓN: Si habla lyn, tiene a jama disposición servicios gratuitos de asistencia lingüística. Llame al [phone].    We comply with applicable federal civil rights laws and [place] laws. We do not discriminate on the basis of race, color, national origin, age, disability, sex, sexual orientation, or gender identity.            Thank you!     Thank you for choosing [facility]  for your care. Our goal is always to provide you  with excellent care. Hearing back from our patients is one way we can continue to improve our services. Please take a few minutes to complete the written survey that you may receive in the mail after your visit with us. Thank you!             Your Updated Medication List - Protect others around you: Learn how to safely use, store and throw away your medicines at www.disposemymeds.org.          This list is accurate as of 6/25/18  5:09 PM.  Always use your most recent med list.                   Brand Name Dispense Instructions for use Diagnosis    ACETYLCYSTEINE PO      Take 600 mg by mouth 2 times daily        albuterol 108 (90 Base) MCG/ACT Inhaler    PROAIR HFA/PROVENTIL HFA/VENTOLIN HFA    1 Inhaler    Inhale 2 puffs into the lungs every 4 hours as needed for shortness of breath / dyspnea or wheezing    Bronchitis       clomiPHENE 50 MG tablet    CLOMID    5 tablet    Take 1 tablet (50 mg) by mouth daily Days 3-6 of your cycle    Oligo-ovulation       ibuprofen 200 MG tablet    ADVIL/MOTRIN     Take 2 tablets by mouth. Every 6 hours as needed with food        MOBIC PO       Right wrist pain       order for DME     1 Device    Right ankle ASO    Sprain of other ligament of right ankle, initial encounter       order for DME     1 Device    Equipment being ordered: 4 butterfield walker    MS (multiple sclerosis) (H), Chronic hip pain, unspecified laterality, Chronic bilateral low back pain without sciatica       progesterone 100 MG capsule    PROMETRIUM    60 capsule    Take 1 capsule (100 mg) by mouth daily Starting on peak +3 for 10 days out of month    PCOS (polycystic ovarian syndrome)       tiZANidine 4 MG tablet    ZANAFLEX    30 tablet    Take 1-2 tablets (4-8 mg) by mouth 3 times daily as needed for muscle spasms    MS (multiple sclerosis) (H), Right wrist pain       TYLENOL 500 MG tablet   Generic drug:  acetaminophen      Take 2 tablets by mouth. Every 6 hours as needed        valACYclovir 500 MG tablet     VALTREX    30 tablet    Take 1 tablet (500 mg) by mouth 2 times daily With active lesion    Cold sore       vitamin  B-6 500 MG Tabs      Take 1 tablet by mouth daily        vitamin D 59244 UNIT capsule    ERGOCALCIFEROL    4 capsule    TAKE ONE CAPSULE BY MOUTH ONCE A WEEK    MS (multiple sclerosis) (H), Hypovitaminosis D       ZANTAC 150 MG tablet   Generic drug:  ranitidine      Take 1 tablet by mouth as needed

## 2018-06-25 NOTE — NURSING NOTE
"Chief Complaint   Patient presents with     Musculoskeletal Problem     other     lightheadedness, headache adn nausea       Initial BP (!) 152/98  Pulse 85  Temp 98.7  F (37.1  C) (Tympanic)  Wt 175 lb (79.4 kg)  SpO2 100%  BMI 31 kg/m2 Estimated body mass index is 31 kg/(m^2) as calculated from the following:    Height as of 6/13/18: 5' 3\" (1.6 m).    Weight as of this encounter: 175 lb (79.4 kg).  Medication Reconciliation: complete    Kia Lee MA    "

## 2018-06-26 ASSESSMENT — PATIENT HEALTH QUESTIONNAIRE - PHQ9: SUM OF ALL RESPONSES TO PHQ QUESTIONS 1-9: 11

## 2018-06-26 ASSESSMENT — ANXIETY QUESTIONNAIRES: GAD7 TOTAL SCORE: 8

## 2018-07-10 DIAGNOSIS — E28.2 PCOS (POLYCYSTIC OVARIAN SYNDROME): ICD-10-CM

## 2018-07-10 LAB
ESTRADIOL SERPL-MCNC: 186 PG/ML
PROGEST SERPL-MCNC: 24.6 NG/ML

## 2018-07-10 PROCEDURE — 99000 SPECIMEN HANDLING OFFICE-LAB: CPT | Performed by: FAMILY MEDICINE

## 2018-07-10 PROCEDURE — 36415 COLL VENOUS BLD VENIPUNCTURE: CPT | Performed by: FAMILY MEDICINE

## 2018-07-10 PROCEDURE — 84144 ASSAY OF PROGESTERONE: CPT | Mod: 90 | Performed by: FAMILY MEDICINE

## 2018-07-10 PROCEDURE — 82670 ASSAY OF TOTAL ESTRADIOL: CPT | Mod: 90 | Performed by: FAMILY MEDICINE

## 2018-07-26 ENCOUNTER — TELEPHONE (OUTPATIENT)
Dept: FAMILY MEDICINE | Facility: OTHER | Age: 28
End: 2018-07-26

## 2018-07-26 DIAGNOSIS — E28.2 PCOS (POLYCYSTIC OVARIAN SYNDROME): ICD-10-CM

## 2018-07-26 NOTE — TELEPHONE ENCOUNTER
Reason for call:  Medication    1. Medication Name? Progesterone  2. Is this request for a refill? Yes  3. What Pharmacy do you use? [facility]  4. Have you contacted your pharmacy? Yes    5. If yes, when?  (Please note that the turn-around-time for prescriptions is 72 business hours; I am sending your request at this time. SEND TO  [facility]  )  Description: Pt stated she requested this med over a week ago. She is unable to request it through them again. Please call pt back at [phone] if any questions  Was an appointment offered for this a call? No   Preferred method for responding to this messageTelephone Call  If we cannot reach you directly, may we leave a detailed response at the number you provided? Yes  Can this message wait until your PCP/Provider returns if not available today? No, PCP out

## 2018-07-26 NOTE — TELEPHONE ENCOUNTER
Progesterone - PCP Karen  Last visit: 6.25.18  Last refill: 6.13.18    Next 5 appointments (look out 90 days)     Aug 15, 2018 11:15 AM CDT   (Arrive by 11:00 AM)   SHORT with Muriel Jones MD   [facility] ([facility] [place] )    [address] [place] [address]   [phone]

## 2018-08-07 DIAGNOSIS — E28.2 PCOS (POLYCYSTIC OVARIAN SYNDROME): ICD-10-CM

## 2018-08-07 LAB
ESTRADIOL SERPL-MCNC: 150 PG/ML
PROGEST SERPL-MCNC: 11.4 NG/ML

## 2018-08-07 PROCEDURE — 84144 ASSAY OF PROGESTERONE: CPT | Mod: 90 | Performed by: FAMILY MEDICINE

## 2018-08-07 PROCEDURE — 82670 ASSAY OF TOTAL ESTRADIOL: CPT | Mod: 90 | Performed by: FAMILY MEDICINE

## 2018-08-07 PROCEDURE — 99000 SPECIMEN HANDLING OFFICE-LAB: CPT | Performed by: FAMILY MEDICINE

## 2018-08-07 PROCEDURE — 36415 COLL VENOUS BLD VENIPUNCTURE: CPT | Performed by: FAMILY MEDICINE

## 2018-08-09 DIAGNOSIS — E55.9 HYPOVITAMINOSIS D: ICD-10-CM

## 2018-08-09 DIAGNOSIS — G35 MS (MULTIPLE SCLEROSIS) (H): ICD-10-CM

## 2018-08-09 NOTE — TELEPHONE ENCOUNTER
Vitamin D      Last Written Prescription Date:  6/15/18  Last Fill Quantity: 4 capsules,   # refills: 1  Last Office Visit: 6/25/18  Future Office visit:    Next 5 appointments (look out 90 days)     Aug 15, 2018 11:15 AM CDT   (Arrive by 11:00 AM)   SHORT with Muriel Jones MD   [facility] ([facility] )    [address] [place] [address]   [phone]            Aug 17, 2018  1:15 PM CDT   Nurse Only with HC SHOT ROOM   [facility] ([facility] )    [address] [place] [place] [address]   [phone]

## 2018-08-10 RX ORDER — ERGOCALCIFEROL 1.25 MG/1
CAPSULE, LIQUID FILLED ORAL
Qty: 4 CAPSULE | Refills: 1 | Status: SHIPPED | OUTPATIENT
Start: 2018-08-10 | End: 2018-10-03

## 2018-08-15 ENCOUNTER — ALLIED HEALTH/NURSE VISIT (OUTPATIENT)
Dept: ALLERGY | Facility: OTHER | Age: 28
End: 2018-08-15
Attending: FAMILY MEDICINE
Payer: COMMERCIAL

## 2018-08-15 ENCOUNTER — OFFICE VISIT (OUTPATIENT)
Dept: FAMILY MEDICINE | Facility: OTHER | Age: 28
End: 2018-08-15
Attending: FAMILY MEDICINE
Payer: COMMERCIAL

## 2018-08-15 VITALS
TEMPERATURE: 98.1 F | OXYGEN SATURATION: 99 % | WEIGHT: 175 LBS | SYSTOLIC BLOOD PRESSURE: 132 MMHG | DIASTOLIC BLOOD PRESSURE: 84 MMHG | BODY MASS INDEX: 31 KG/M2 | HEART RATE: 95 BPM

## 2018-08-15 DIAGNOSIS — G35 MS (MULTIPLE SCLEROSIS) (H): Primary | ICD-10-CM

## 2018-08-15 DIAGNOSIS — E28.2 PCOS (POLYCYSTIC OVARIAN SYNDROME): ICD-10-CM

## 2018-08-15 DIAGNOSIS — Z11.1 SCREENING FOR TUBERCULOSIS: Primary | ICD-10-CM

## 2018-08-15 DIAGNOSIS — E55.9 HYPOVITAMINOSIS D: ICD-10-CM

## 2018-08-15 PROCEDURE — 36415 COLL VENOUS BLD VENIPUNCTURE: CPT | Performed by: FAMILY MEDICINE

## 2018-08-15 PROCEDURE — 99214 OFFICE O/P EST MOD 30 MIN: CPT | Performed by: FAMILY MEDICINE

## 2018-08-15 PROCEDURE — 84207 ASSAY OF VITAMIN B-6: CPT | Mod: 90 | Performed by: FAMILY MEDICINE

## 2018-08-15 PROCEDURE — 86580 TB INTRADERMAL TEST: CPT

## 2018-08-15 PROCEDURE — 82306 VITAMIN D 25 HYDROXY: CPT | Mod: 90 | Performed by: FAMILY MEDICINE

## 2018-08-15 PROCEDURE — 99000 SPECIMEN HANDLING OFFICE-LAB: CPT | Performed by: FAMILY MEDICINE

## 2018-08-15 RX ORDER — CYCLOBENZAPRINE HCL 10 MG
5-10 TABLET ORAL DAILY PRN
Qty: 90 TABLET | Refills: 0 | Status: SHIPPED | OUTPATIENT
Start: 2018-08-15 | End: 2021-11-12

## 2018-08-15 ASSESSMENT — ANXIETY QUESTIONNAIRES
7. FEELING AFRAID AS IF SOMETHING AWFUL MIGHT HAPPEN: NOT AT ALL
5. BEING SO RESTLESS THAT IT IS HARD TO SIT STILL: SEVERAL DAYS
6. BECOMING EASILY ANNOYED OR IRRITABLE: SEVERAL DAYS
3. WORRYING TOO MUCH ABOUT DIFFERENT THINGS: SEVERAL DAYS
IF YOU CHECKED OFF ANY PROBLEMS ON THIS QUESTIONNAIRE, HOW DIFFICULT HAVE THESE PROBLEMS MADE IT FOR YOU TO DO YOUR WORK, TAKE CARE OF THINGS AT HOME, OR GET ALONG WITH OTHER PEOPLE: SOMEWHAT DIFFICULT
GAD7 TOTAL SCORE: 5
4. TROUBLE RELAXING: MORE THAN HALF THE DAYS
2. NOT BEING ABLE TO STOP OR CONTROL WORRYING: NOT AT ALL
1. FEELING NERVOUS, ANXIOUS, OR ON EDGE: NOT AT ALL

## 2018-08-15 ASSESSMENT — PAIN SCALES - GENERAL: PAINLEVEL: MODERATE PAIN (5)

## 2018-08-15 NOTE — MR AVS SNAPSHOT
After Visit Summary   8/15/2018    Radha Carl    MRN: [id number]           Patient Information     Date Of Birth          1990        Visit Information        Provider Department      8/15/2018 10:45 AM HC SHOT ROOM [facility]        Today's Diagnoses     Screening for tuberculosis    -  1       Follow-ups after your visit        Your next 10 appointments already scheduled     Aug 15, 2018 11:15 AM CDT   (Arrive by 11:00 AM)   SHORT with Muriel Jones MD   [facility] [place] ([facility] [place] [facility] [place] [facility]    [address]  [place] [address]   [phone]            Aug 17, 2018  1:15 PM CDT   Nurse Only with HC SHOT ROOM   [facility] [place] ([facility] [place] [facility] [place] )    [address]  [place] [address]   [phone]              Who to contact     If you have questions or need follow up information about today's clinic visit or your schedule please contact [facility] directly at [phone].  Normal or non-critical lab and imaging results will be communicated to you by Cloud[employer]pt[employer], letter or phone within 4 business days after the clinic has received the results. If you do not hear from us within 7 days, please contact the clinic through [employer]pt[employer] or phone. If you have a critical or abnormal lab result, we will notify you by phone as soon as possible.  Submit refill requests through [employer] or call your pharmacy and they will forward the refill request to us. Please allow 3 business days for your refill to be completed.          Additional Information About Your Visit        CloudApt[employer]hart Information     [employer] gives you secure access to your electronic health record. If you see a primary care provider, you can also send messages to your care team and make appointments. If you have questions, please call your primary care clinic.  If you do not have a primary care provider, please call [phone] and they will assist you.         Care EveryWhere ID     This is your Care EveryWhere ID. This could be used by other organizations to access your [facility] medical records  [id number]         Blood Pressure from Last 3 Encounters:   06/25/18 (!) 152/98   06/13/18 132/84   04/23/18 119/70    Weight from Last 3 Encounters:   06/25/18 175 lb (79.4 kg)   06/13/18 175 lb (79.4 kg)   04/23/18 178 lb (80.7 kg)              We Performed the Following     TB INTRADERMAL TEST        Primary Care Provider Office Phone # Fax #    Murielscooter Jones [phone]       [facility] [place] [address]  [place] [address]        Equal Access to Services     JUSTINA OLMSTEAD : Hadii aad ku hadasho Soomaali, waaxda luqadaha, qaybta kaalmada adeegyada, waxay idiin hayjulio monge . So [facility] [phone].    ATENCIÓN: Si habla español, tiene a jama disposición servicios gratuitos de asistencia lingüística. LlUniversity [facility] [phone].    We comply with applicable federal civil rights laws and [place] laws. We do not discriminate on the basis of race, color, national origin, age, disability, sex, sexual orientation, or gender identity.            Thank you!     Thank you for choosing [facility] [place]  for your care. Our goal is always to provide you with excellent care. Hearing back from our patients is one way we can continue to improve our services. Please take a few minutes to complete the written survey that you may receive in the mail after your visit with us. Thank you!             Your Updated Medication List - Protect others around you: Learn how to safely use, store and throw away your medicines at www.disposemymeds.org.          This list is accurate as of 8/15/18 11:13 AM.  Always use your most recent med list.                   Brand Name Dispense Instructions for use Diagnosis    ACETYLCYSTEINE PO      Take 600 mg by mouth 2 times daily        albuterol 108 (90 Base) MCG/ACT inhaler    PROAIR HFA/PROVENTIL HFA/VENTOLIN  HFA    1 Inhaler    Inhale 2 puffs into the lungs every 4 hours as needed for shortness of breath / dyspnea or wheezing    Bronchitis       clomiPHENE 50 MG tablet    CLOMID    5 tablet    Take 1 tablet (50 mg) by mouth daily Days 3-6 of your cycle    Oligo-ovulation       ibuprofen 200 MG tablet    ADVIL/MOTRIN     Take 2 tablets by mouth. Every 6 hours as needed with food        MOBIC PO       Right wrist pain       order for DME     1 Device    Right ankle ASO    Sprain of other ligament of right ankle, initial encounter       order for DME     1 Device    Equipment being ordered: 4 butterfield walker    MS (multiple sclerosis) (H), Chronic hip pain, unspecified laterality, Chronic bilateral low back pain without sciatica       progesterone 100 MG capsule    PROMETRIUM    120 capsule    Take 1 capsule (100 mg) by mouth daily Starting on peak +3 for 10 days out of month    PCOS (polycystic ovarian syndrome)       tiZANidine 4 MG tablet    ZANAFLEX    30 tablet    Take 1-2 tablets (4-8 mg) by mouth 3 times daily as needed for muscle spasms    MS (multiple sclerosis) (H), Right wrist pain       TYLENOL 500 MG tablet   Generic drug:  acetaminophen      Take 2 tablets by mouth. Every 6 hours as needed        valACYclovir 500 MG tablet    VALTREX    30 tablet    Take 1 tablet (500 mg) by mouth 2 times daily With active lesion    Cold sore       vitamin  B-6 500 MG Tabs      Take 1 tablet by mouth daily        vitamin D 08649 UNIT capsule    ERGOCALCIFEROL    4 capsule    TAKE 1 CAPSULE BY MOUTH ONCE A WEEK    MS (multiple sclerosis) (H), Hypovitaminosis D       ZANTAC 150 MG tablet   Generic drug:  ranitidine      Take 1 tablet by mouth as needed

## 2018-08-15 NOTE — NURSING NOTE
"Chief Complaint   Patient presents with     Musculoskeletal Problem     PCOS       Initial /84  Pulse 95  Temp 98.1  F (36.7  C) (Tympanic)  Wt 175 lb (79.4 kg)  SpO2 99%  BMI 31 kg/m2 Estimated body mass index is 31 kg/(m^2) as calculated from the following:    Height as of 6/13/18: 5' 3\" (1.6 m).    Weight as of this encounter: 175 lb (79.4 kg).  Medication Reconciliation: complete    Kia Lee MA    "

## 2018-08-15 NOTE — PROGRESS NOTES
Mantoux given in left forearm 8/15/18 at 1050 am. Pt will return in 48 to 72 hrs to have it checked.

## 2018-08-15 NOTE — PROGRESS NOTES
SUBJECTIVE:                                                    Radha Carl is a 27 year old female who presents to clinic today for the following health issues:      Musculoskeletal problem/pain      Duration: follow up     Description  Location: back pain    Intensity:  mild    Accompanying signs and symptoms: none    History  Previous similar problem: YES- ongoing  Previous evaluation:  x-ray, ultrasound and MRI    Precipitating or alleviating factors:  Trauma or overuse: no   Aggravating factors include: anything-housework    Therapies tried and outcome: heat, ice, Ibuprofen and chiropractor    PCOS      Duration: follow up    Description (location/character/radiation): none    Intensity:  none    Accompanying signs and symptoms: was spotting the entire time she was on the neww brand of prometrium. Feels she may have had a cyst on her right ovary and that may have burst causing the spotting. Very painful    History (similar episodes/previous evaluation): ongoing    Precipitating or alleviating factors: None    Therapies tried and outcome: charting on an dolores and seems to be normal aside from the spotting.       Problem list and histories reviewed & adjusted, as indicated.  Additional history: as documented    Patient Active Problem List   Diagnosis     Attention deficit hyperactivity disorder (ADHD)     Hyperlipidemia with target LDL less than 100     PCOS (polycystic ovarian syndrome)     Elevated liver enzymes     MS (multiple sclerosis) (H)     Vitamin B12 deficiency (non anemic)     Adiposity     Skin sensation disturbance     Tubal pregnancy without intrauterine pregnancy     Visual field scotoma     ACP (advance care planning)     Dysmenorrhea     MARIBETH (generalized anxiety disorder)     Past Surgical History:   Procedure Laterality Date     GYN SURGERY  5/2010    ectopic pregnancy ruptured -L salpingectomy     wisdom teeth  2014       Social History   Substance Use Topics     Smoking status: Never  Smoker     Smokeless tobacco: Never Used     Alcohol use 0.0 oz/week     0 Standard drinks or equivalent per week      Comment: rare     Family History   Problem Relation Age of Onset     Family History Negative Mother      Family History Negative Father      Family History Negative Sister      Family History Negative Sister      Obesity Sister      Breast Cancer Maternal Grandmother 64     Unknown/Adopted Maternal Grandfather      severe gerd     Lung Cancer Paternal Grandmother      +tobacco     HEART DISEASE Paternal Grandfather      AMI/CAD or CVA     Family History Negative Brother      Psychotic Disorder Sister      ADHD         Current Outpatient Prescriptions   Medication Sig Dispense Refill     acetaminophen (TYLENOL) 500 MG tablet Take 2 tablets by mouth. Every 6 hours as needed       ACETYLCYSTEINE PO Take 600 mg by mouth 2 times daily       albuterol (PROAIR HFA/PROVENTIL HFA/VENTOLIN HFA) 108 (90 Base) MCG/ACT Inhaler Inhale 2 puffs into the lungs every 4 hours as needed for shortness of breath / dyspnea or wheezing 1 Inhaler 0     cyclobenzaprine (FLEXERIL) 10 MG tablet Take 0.5-1 tablets (5-10 mg) by mouth daily as needed for muscle spasms 90 tablet 0     ibuprofen (ADVIL,MOTRIN) 200 MG tablet Take 2 tablets by mouth. Every 6 hours as needed with food       Meloxicam (MOBIC PO)        order for DME Equipment being ordered: 4 butterfield walker 1 Device 0     order for DME Right ankle ASO 1 Device 0     progesterone (PROMETRIUM) 100 MG capsule Take 1 capsule (100 mg) by mouth daily Starting on peak +3 for 10 days out of month 120 capsule 1     Pyridoxine HCl (VITAMIN  B-6) 500 MG TABS Take 1 tablet by mouth daily       ranitidine (ZANTAC) 150 MG tablet Take 1 tablet by mouth as needed       valACYclovir (VALTREX) 500 MG tablet Take 1 tablet (500 mg) by mouth 2 times daily With active lesion 30 tablet 5     vitamin D (ERGOCALCIFEROL) 95830 UNIT capsule TAKE 1 CAPSULE BY MOUTH ONCE A WEEK 4 capsule 1      [DISCONTINUED] norethindrone-ethinyl estradiol (MICROGESTIN) 1-20 MG-MCG per tablet Take 1 tablet by mouth daily.       Allergies   Allergen Reactions     Cats      Sulfa Drugs Rash     Sulfonamide antibiotics     Labs reviewed in EPIC    ROS:  Constitutional, HEENT, cardiovascular, pulmonary, gi and gu systems are negative, except as otherwise noted.    OBJECTIVE:                                                    /84  Pulse 95  Temp 98.1  F (36.7  C) (Tympanic)  Wt 175 lb (79.4 kg)  SpO2 99%  BMI 31 kg/m2  Body mass index is 31 kg/(m^2).  GENERAL APPEARANCE: healthy, alert and no distress  RESP: lungs clear to auscultation - no rales, rhonchi or wheezes  CV: regular rates and rhythm, normal S1 S2, no S3 or S4 and no murmur, click or rub  PSYCH: mentation appears normal and affect normal/bright       ASSESSMENT/PLAN:                                                    1. MS (multiple sclerosis) (H)  tizanadine made her sleepy  Lengthy discussion regarding her MS, she is seeing neurologist  Start the 9 c vegetables daily  Start nrf2 supplement and fish oil daily  Increase BM to daily  Letter written for the [facility] to help accomodate her with dz/dx  - cyclobenzaprine (FLEXERIL) 10 MG tablet; Take 0.5-1 tablets (5-10 mg) by mouth daily as needed for muscle spasms  Dispense: 90 tablet; Refill: 0  - Vitamin B6    2. PCOS (polycystic ovarian syndrome)  F/u 2 mos  - Vitamin B6    3. Hypovitaminosis D    - Vitamin D Deficiency    Patient was agreeable to this plan and had no further questions.  See Patient Instructions    Muriel Jones MD  [facility]

## 2018-08-15 NOTE — MR AVS SNAPSHOT
After Visit Summary   8/15/2018    Radha Carl    MRN: [id number]           Patient Information     Date Of Birth          1990        Visit Information        Provider Department      8/15/2018 11:15 AM Muriel Jones MD [facility]        Today's Diagnoses     MS (multiple sclerosis) (H)    -  1    PCOS (polycystic ovarian syndrome)        Hypovitaminosis D          Care Instructions    1.  Boogie Wahls -- book and protocol  2.  9 cups vegetables  3.  Nrf2 detox -- look for supplement  4.  Fish oil 2000mg daily  5.  intermittant fasting -- 7p-7a and once weekly 16-18 hr fast 7p-11am          Follow-ups after your visit        Your next 10 appointments already scheduled     Aug 17, 2018  1:15 PM CDT   Nurse Only with HC SHOT ROOM   [facility] ([facility] )    [address] [address]   [phone]            Nov 02, 2018  8:45 AM CDT   (Arrive by 8:30 AM)   PHYSICAL with Muriel Jones MD   [facility] ([facility] )    [address] [address]   [phone]              Who to contact     If you have questions or need follow up information about today's clinic visit or your schedule please contact [facility] directly at [phone].  Normal or non-critical lab and imaging results will be communicated to you by MyChart, letter or phone within 4 business days after the clinic has received the results. If you do not hear from us within 7 days, please contact the clinic through [employer]hart or phone. If you have a critical or abnormal lab result, we will notify you by phone as soon as possible.  Submit refill requests through Celery or call your pharmacy and they will forward the refill request to us. Please allow 3 business days for your refill to be completed.          Additional Information About Your Visit        [employer] Software Information     Cel[employer] gives you secure  access to your electronic health record. If you see a primary care provider, you can also send messages to your care team and make appointments. If you have questions, please call your primary care clinic.  If you do not have a primary care provider, please call [phone] and they will assist you.        Care EveryWhere ID     This is your Care EveryWhere ID. This could be used by other organizations to access your [facility] medical records  [id number]        Your Vitals Were     Pulse Temperature Pulse Oximetry BMI (Body Mass Index)          95 98.1  F (36.7  C) (Tympanic) 99% 31 kg/m2         Blood Pressure from Last 3 Encounters:   08/15/18 132/84   06/25/18 (!) 152/98   06/13/18 132/84    Weight from Last 3 Encounters:   08/15/18 175 lb (79.4 kg)   06/25/18 175 lb (79.4 kg)   06/13/18 175 lb (79.4 kg)              We Performed the Following     Vitamin B6     Vitamin D Deficiency          Today's Medication Changes          These changes are accurate as of 8/15/18 12:25 PM.  If you have any questions, ask your nurse or doctor.               Start taking these medicines.        Dose/Directions    cyclobenzaprine 10 MG tablet   Commonly known as:  FLEXERIL   Used for:  MS (multiple sclerosis) (H)   Started by:  Muriel Jones MD        Dose:  5-10 mg   Take 0.5-1 tablets (5-10 mg) by mouth daily as needed for muscle spasms   Quantity:  90 tablet   Refills:  0         Stop taking these medicines if you haven't already. Please contact your care team if you have questions.     tiZANidine 4 MG tablet   Commonly known as:  ZANAFLEX   Stopped by:  Muriel Jones MD                Where to get your medicines      These medications were sent to [id number] - [place], [place] - [address]   [address] [address]     Phone:  [phone]     cyclobenzaprine 10 MG tablet                Primary Care Provider Office Phone # Fax #    Muriel Jones [phone]       [facility]  CLINIC [place] [address]  [facility] [address]        Equal Access to Services     HANNAHJUSTINA ISHAN : Hadii aad ku hadasho Soomaali, waaxda luqadaha, qaybta kaalmada adejenniferyada, evert condon kashkavya rosashashanksusan jiménez. So [facility] [phone].    ATENCIÓN: Si habla español, tiene a jama disposición servicios gratuitos de asistencia lingüística. Llame al [phone].    We comply with applicable federal civil rights laws and [place] laws. We do not discriminate on the basis of race, color, national origin, age, disability, sex, sexual orientation, or gender identity.            Thank you!     Thank you for choosing [facility]  for your care. Our goal is always to provide you with excellent care. Hearing back from our patients is one way we can continue to improve our services. Please take a few minutes to complete the written survey that you may receive in the mail after your visit with us. Thank you!             Your Updated Medication List - Protect others around you: Learn how to safely use, store and throw away your medicines at www.disposemymeds.org.          This list is accurate as of 8/15/18 12:25 PM.  Always use your most recent med list.                   Brand Name Dispense Instructions for use Diagnosis    ACETYLCYSTEINE PO      Take 600 mg by mouth 2 times daily        albuterol 108 (90 Base) MCG/ACT inhaler    PROAIR HFA/PROVENTIL HFA/VENTOLIN HFA    1 Inhaler    Inhale 2 puffs into the lungs every 4 hours as needed for shortness of breath / dyspnea or wheezing    Bronchitis       cyclobenzaprine 10 MG tablet    FLEXERIL    90 tablet    Take 0.5-1 tablets (5-10 mg) by mouth daily as needed for muscle spasms    MS (multiple sclerosis) (H)       ibuprofen 200 MG tablet    ADVIL/MOTRIN     Take 2 tablets by mouth. Every 6 hours as needed with food        MOBIC PO       Right wrist pain       order for DME     1 Device    Right ankle ASO    Sprain of other ligament of right ankle, initial  encounter       order for DME     1 Device    Equipment being ordered: 4 butterfield walker    MS (multiple sclerosis) (H), Chronic hip pain, unspecified laterality, Chronic bilateral low back pain without sciatica       progesterone 100 MG capsule    PROMETRIUM    120 capsule    Take 1 capsule (100 mg) by mouth daily Starting on peak +3 for 10 days out of month    PCOS (polycystic ovarian syndrome)       TYLENOL 500 MG tablet   Generic drug:  acetaminophen      Take 2 tablets by mouth. Every 6 hours as needed        valACYclovir 500 MG tablet    VALTREX    30 tablet    Take 1 tablet (500 mg) by mouth 2 times daily With active lesion    Cold sore       vitamin  B-6 500 MG Tabs      Take 1 tablet by mouth daily        vitamin D 34318 UNIT capsule    ERGOCALCIFEROL    4 capsule    TAKE 1 CAPSULE BY MOUTH ONCE A WEEK    MS (multiple sclerosis) (H), Hypovitaminosis D       ZANTAC 150 MG tablet   Generic drug:  ranitidine      Take 1 tablet by mouth as needed

## 2018-08-16 ASSESSMENT — PATIENT HEALTH QUESTIONNAIRE - PHQ9: SUM OF ALL RESPONSES TO PHQ QUESTIONS 1-9: 8

## 2018-08-16 ASSESSMENT — ANXIETY QUESTIONNAIRES: GAD7 TOTAL SCORE: 5

## 2018-08-16 NOTE — PROGRESS NOTES
2018            Elzbieta Cerda   Director of Disabilities   [address] [address]      Patient:  Radha Carter   MRN:  [id number]   :  1990      Dear Elzbieta:        Thank you in advance for your attention to this letter.  I am writing with regards to Radha Carter who is a patient of mine.  With her current medical status and diagnosis of MS, she does need some help with accommodation in terms of her disabilities.  While this is different for every individual, and depending on her course work, please make the appropriate accommodations as you all see fit.        If there are further questions, please do not hesitate to contact me.         Sincerely,      SUZANNE NAIK MD             D: 08/15/2018   T: 08/15/2018   MT: CRISTO      Name:     RADHA CARTER   MRN:      [id number]        Account:      [id number]   :      1990      Document: [id number]

## 2018-08-17 ENCOUNTER — ALLIED HEALTH/NURSE VISIT (OUTPATIENT)
Dept: ALLERGY | Facility: OTHER | Age: 28
End: 2018-08-17
Attending: FAMILY MEDICINE
Payer: COMMERCIAL

## 2018-08-17 DIAGNOSIS — Z11.1 SCREENING EXAMINATION FOR PULMONARY TUBERCULOSIS: Primary | ICD-10-CM

## 2018-08-17 LAB
DEPRECATED CALCIDIOL+CALCIFEROL SERPL-MC: 24 UG/L (ref 20–75)
PPDINDURATION: NORMAL MM (ref 0–5)
PPDREDNESS: NORMAL MM

## 2018-08-17 NOTE — MR AVS SNAPSHOT
After Visit Summary   8/17/2018    Radha Carl    MRN: [id number]           Patient Information     Date Of Birth          1990        Visit Information        Provider Department      8/17/2018 1:15 PM HC SHOT ROOM [facility] [facility]        Today's Diagnoses     Screening examination for pulmonary tuberculosis    -  1       Follow-ups after your visit        Your next 10 appointments already scheduled     Nov 02, 2018  8:45 AM CDT   (Arrive by 8:30 AM)   PHYSICAL with Muriel Jones MD   [facility] [place] ([facility] )    [address]  [place] [address]   [phone]              Who to contact     If you have questions or need follow up information about today's clinic visit or your schedule please contact [facility] [place] directly at [phone].  Normal or non-critical lab and imaging results will be communicated to you by MyChart, letter or phone within 4 business days after the clinic has received the results. If you do not hear from us within 7 days, please contact the clinic through MyChart or phone. If you have a critical or abnormal lab result, we will notify you by phone as soon as possible.  Submit refill requests through [employer] or call your pharmacy and they will forward the refill request to us. Please allow 3 business days for your refill to be completed.          Additional Information About Your Visit        MyChart Information     [employer] gives you secure access to your electronic health record. If you see a primary care provider, you can also send messages to your care team and make appointments. If you have questions, please call your primary care clinic.  If you do not have a primary care provider, please call [phone] and they will assist you.        Care EveryWhere ID     This is your Care EveryWhere ID. This could be used by other organizations to access your [facility] medical records  [id number]          Blood Pressure from Last 3 Encounters:   08/15/18 132/84   06/25/18 (!) 152/98   06/13/18 132/84    Weight from Last 3 Encounters:   08/15/18 175 lb (79.4 kg)   06/25/18 175 lb (79.4 kg)   06/13/18 175 lb (79.4 kg)              Today, you had the following     No orders found for display       Primary Care Provider Office Phone # Fax #    Muriel Jones [phone]       [facility] [place] [address]  [place] [address]        Equal Access to Services     [facility]: Hadii aad ku hadasho Soomaali, waaxda luqadaha, qaybta kaalmada adeegyada, evert condon hayjulio monge . So [facility] [phone].    ATENCIÓN: Si habla español, tiene a jama disposición servicios gratuitos de asistencia lingüística. Llame al [phone].    We comply with applicable federal civil rights laws and [place] laws. We do not discriminate on the basis of race, color, national origin, age, disability, sex, sexual orientation, or gender identity.            Thank you!     Thank you for choosing [facility]  for your care. Our goal is always to provide you with excellent care. Hearing back from our patients is one way we can continue to improve our services. Please take a few minutes to complete the written survey that you may receive in the mail after your visit with us. Thank you!             Your Updated Medication List - Protect others around you: Learn how to safely use, store and throw away your medicines at www.disposemymeds.org.          This list is accurate as of 8/17/18  1:17 PM.  Always use your most recent med list.                   Brand Name Dispense Instructions for use Diagnosis    ACETYLCYSTEINE PO      Take 600 mg by mouth 2 times daily        albuterol 108 (90 Base) MCG/ACT inhaler    PROAIR HFA/PROVENTIL HFA/VENTOLIN HFA    1 Inhaler    Inhale 2 puffs into the lungs every 4 hours as needed for shortness of breath / dyspnea or wheezing    Bronchitis       cyclobenzaprine  10 MG tablet    FLEXERIL    90 tablet    Take 0.5-1 tablets (5-10 mg) by mouth daily as needed for muscle spasms    MS (multiple sclerosis) (H)       ibuprofen 200 MG tablet    ADVIL/MOTRIN     Take 2 tablets by mouth. Every 6 hours as needed with food        MOBIC PO       Right wrist pain       order for DME     1 Device    Right ankle ASO    Sprain of other ligament of right ankle, initial encounter       order for DME     1 Device    Equipment being ordered: 4 butterfield walker    MS (multiple sclerosis) (H), Chronic hip pain, unspecified laterality, Chronic bilateral low back pain without sciatica       progesterone 100 MG capsule    PROMETRIUM    120 capsule    Take 1 capsule (100 mg) by mouth daily Starting on peak +3 for 10 days out of month    PCOS (polycystic ovarian syndrome)       TYLENOL 500 MG tablet   Generic drug:  acetaminophen      Take 2 tablets by mouth. Every 6 hours as needed        valACYclovir 500 MG tablet    VALTREX    30 tablet    Take 1 tablet (500 mg) by mouth 2 times daily With active lesion    Cold sore       vitamin  B-6 500 MG Tabs      Take 1 tablet by mouth daily        vitamin D 85849 UNIT capsule    ERGOCALCIFEROL    4 capsule    TAKE 1 CAPSULE BY MOUTH ONCE A WEEK    MS (multiple sclerosis) (H), Hypovitaminosis D       ZANTAC 150 MG tablet   Generic drug:  ranitidine      Take 1 tablet by mouth as needed

## 2018-08-17 NOTE — PROGRESS NOTES
Mantoux results read on 8/17 @ 1317:   No induration.  No swelling.  No redness.  Mary Alice Montero

## 2018-08-20 LAB — VIT B6 SERPL-MCNC: 29.9 NMOL/L (ref 20–125)

## 2018-08-21 ENCOUNTER — TELEPHONE (OUTPATIENT)
Dept: FAMILY MEDICINE | Facility: OTHER | Age: 28
End: 2018-08-21

## 2018-08-21 NOTE — TELEPHONE ENCOUNTER
Left message for patient notifying that letter was sent to [facility].   Shonda Frances LPN

## 2018-08-21 NOTE — TELEPHONE ENCOUNTER
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 She is looking to get a disability services letter. Stated that Dr. Jones would know what she is looking for. Wouldn't give anymore information. Looking to  letter before Thursday.  Shonda Frances LPN

## 2018-09-29 DIAGNOSIS — E55.9 HYPOVITAMINOSIS D: ICD-10-CM

## 2018-09-29 DIAGNOSIS — G35 MS (MULTIPLE SCLEROSIS) (H): ICD-10-CM

## 2018-10-01 NOTE — TELEPHONE ENCOUNTER
Vitamin D      Last Written Prescription Date:  8/10/18  Last Fill Quantity: 4,   # refills: 1  Last Office Visit: 8/15/18  Future Office visit:    Next 5 appointments (look out 90 days)     Nov 02, 2018  8:45 AM CDT   (Arrive by 8:30 AM)   PHYSICAL with Muriel Jones MD   [facility] [place] ([facility] )    [address] [place] [address]   [phone]

## 2018-10-02 RX ORDER — ERGOCALCIFEROL 1.25 MG/1
CAPSULE, LIQUID FILLED ORAL
Qty: 4 CAPSULE | Refills: 1 | OUTPATIENT
Start: 2018-10-02

## 2018-10-03 RX ORDER — ERGOCALCIFEROL 1.25 MG/1
CAPSULE, LIQUID FILLED ORAL
Qty: 4 CAPSULE | Refills: 1 | Status: SHIPPED | OUTPATIENT
Start: 2018-10-03 | End: 2018-12-31

## 2018-10-03 NOTE — TELEPHONE ENCOUNTER
Spoke to patient - had Vitamin D ran 8/15/18. Was at 24. Wondering if she has to wait until her November 2nd appointment too get refill?    Shonda Frances LPN

## 2018-10-15 ENCOUNTER — TELEPHONE (OUTPATIENT)
Dept: FAMILY MEDICINE | Facility: OTHER | Age: 28
End: 2018-10-15

## 2018-10-15 NOTE — TELEPHONE ENCOUNTER
No openings with Dr. Jones today - please see if there are any other openings with any other providers or offer next available with Dr. Jones. Or - advise to go into ED/UC.    Thank you.    Shonda Hamilton LPN

## 2018-10-15 NOTE — TELEPHONE ENCOUNTER
7:34 AM    Reason for Call: OVERBOOK    Patient is having the following symptoms: knee pain for 1 months.    The patient is requesting an appointment for ASAP with .    Was an appointment offered for this call? No  If yes : Appointment type              Date    Preferred method for responding to this message: Telephone Call  What is your phone number ?[phone]    If we cannot reach you directly, may we leave a detailed response at the number you provided? Yes    Can this message wait until your PCP/provider returns, if unavailable today? Not applicable, pcp is in     [facility]

## 2018-10-31 ENCOUNTER — ALLIED HEALTH/NURSE VISIT (OUTPATIENT)
Dept: FAMILY MEDICINE | Facility: OTHER | Age: 28
End: 2018-10-31
Attending: FAMILY MEDICINE
Payer: COMMERCIAL

## 2018-10-31 DIAGNOSIS — Z23 NEED FOR PROPHYLACTIC VACCINATION AND INOCULATION AGAINST INFLUENZA: Primary | ICD-10-CM

## 2018-10-31 PROCEDURE — 90686 IIV4 VACC NO PRSV 0.5 ML IM: CPT

## 2018-10-31 PROCEDURE — 90471 IMMUNIZATION ADMIN: CPT

## 2018-10-31 NOTE — MR AVS SNAPSHOT
After Visit Summary   10/31/2018    Radha Carl    MRN: [id number]           Patient Information     Date Of Birth          1990        Visit Information        Provider Department      10/31/2018 1:45 PM HC FP NURSE [facility] [place]        Today's Diagnoses     Need for prophylactic vaccination and inoculation against influenza    -  1       Follow-ups after your visit        Your next 10 appointments already scheduled     Dec 31, 2018 11:00 AM CST   (Arrive by 10:45 AM)   SHORT with Muriel Jones MD   [facility] [place] ([facility] [place] )    [address]  [place] [address]   [phone]            Jan 11, 2019  8:45 AM CST   (Arrive by 8:30 AM)   PHYSICAL with Muriel Jones MD   [facility] [place] ([facility] [place] )    [address]  [place] [address]   [phone]              Who to contact     If you have questions or need follow up information about today's clinic visit or your schedule please contact [facility] directly at [phone].  Normal or non-critical lab and imaging results will be communicated to you by MyChart, letter or phone within 4 business days after the clinic has received the results. If you do not hear from us within 7 days, please contact the clinic through [facility] or phone. If you have a critical or abnormal lab result, we will notify you by phone as soon as possible.  Submit refill requests through [employer] or call your pharmacy and they will forward the refill request to us. Please allow 3 business days for your refill to be completed.          Additional Information About Your Visit        MyChart Information     [employer] gives you secure access to your electronic health record. If you see a primary care provider, you can also send messages to your care team and make appointments. If you have questions, please call your primary care  clinic.  If you do not have a primary care provider, please call [phone] and they will assist you.        Care EveryWhere ID     This is your Care EveryWhere ID. This could be used by other organizations to access your [place] medical records  [id number]         Blood Pressure from Last 3 Encounters:   08/15/18 132/84   06/25/18 (!) 152/98   06/13/18 132/84    Weight from Last 3 Encounters:   08/15/18 175 lb (79.4 kg)   06/25/18 175 lb (79.4 kg)   06/13/18 175 lb (79.4 kg)              We Performed the Following     HC FLU VAC PRESRV FREE QUAD SPLIT VIR 3+YRS IM     Vaccine Administration, Initial [[id number]]        Primary Care Provider Office Phone # Fax #    Muriel Jones [phone]       [facility] [place] [address]  [place] [address]        Equal Access to Services     TOMMY OLMSTEAD AH: Hadii aad ku hadasho Soomaali, waaxda luqadaha, qaybta kaalmada adeegyada, waxay daytonin haykiaran teddy monge . So [facility] [phone].    ATENCIÓN: Si habla español, tiene a jama disposición servicios gratuitos de asistencia lingüística. Llame al [phone].    We comply with applicable federal civil rights laws and [place] laws. We do not discriminate on the basis of race, color, national origin, age, disability, sex, sexual orientation, or gender identity.            Thank you!     Thank you for choosing [facility]  for your care. Our goal is always to provide you with excellent care. Hearing back from our patients is one way we can continue to improve our services. Please take a few minutes to complete the written survey that you may receive in the mail after your visit with us. Thank you!             Your Updated Medication List - Protect others around you: Learn how to safely use, store and throw away your medicines at www.disposemymeds.org.          This list is accurate as of 10/31/18  1:55 PM.  Always use your most recent med list.                   Brand  Name Dispense Instructions for use Diagnosis    ACETYLCYSTEINE PO      Take 600 mg by mouth 2 times daily        albuterol 108 (90 Base) MCG/ACT inhaler    PROAIR HFA/PROVENTIL HFA/VENTOLIN HFA    1 Inhaler    Inhale 2 puffs into the lungs every 4 hours as needed for shortness of breath / dyspnea or wheezing    Bronchitis       cyclobenzaprine 10 MG tablet    FLEXERIL    90 tablet    Take 0.5-1 tablets (5-10 mg) by mouth daily as needed for muscle spasms    MS (multiple sclerosis) (H)       ibuprofen 200 MG tablet    ADVIL/MOTRIN     Take 2 tablets by mouth. Every 6 hours as needed with food        MOBIC PO       Right wrist pain       order for DME     1 Device    Right ankle ASO    Sprain of other ligament of right ankle, initial encounter       order for DME     1 Device    Equipment being ordered: 4 butterfield walker    MS (multiple sclerosis) (H), Chronic hip pain, unspecified laterality, Chronic bilateral low back pain without sciatica       progesterone 100 MG capsule    PROMETRIUM    120 capsule    Take 1 capsule (100 mg) by mouth daily Starting on peak +3 for 10 days out of month    PCOS (polycystic ovarian syndrome)       TYLENOL 500 MG tablet   Generic drug:  acetaminophen      Take 2 tablets by mouth. Every 6 hours as needed        valACYclovir 500 MG tablet    VALTREX    30 tablet    Take 1 tablet (500 mg) by mouth 2 times daily With active lesion    Cold sore       vitamin  B-6 500 MG Tabs      Take 1 tablet by mouth daily        vitamin D2 57853 UNIT capsule    ERGOCALCIFEROL    4 capsule    TAKE 1 CAPSULE BY MOUTH ONCE A WEEK    MS (multiple sclerosis) (H), Hypovitaminosis D       ZANTAC 150 MG tablet   Generic drug:  ranitidine      Take 1 tablet by mouth as needed

## 2018-10-31 NOTE — PROGRESS NOTES
Injectable Influenza Immunization Documentation    1.  Is the person to be vaccinated sick today?   No    2. Does the person to be vaccinated have an allergy to a component   of the vaccine?   No  Egg Allergy Algorithm Link    3. Has the person to be vaccinated ever had a serious reaction   to influenza vaccine in the past?   No    4. Has the person to be vaccinated ever had Guillain-Barré syndrome?   No    Form completed by Shonda Hamilton LPN    Prior to injection verified patient identity using patient's name and date of birth.    Shonda Hamilton LPN        Injectable Influenza Immunization Documentation    1.  Is the person to be vaccinated sick today?   No    2. Does the person to be vaccinated have an allergy to a component   of the vaccine?   No  Egg Allergy Algorithm Link    3. Has the person to be vaccinated ever had a serious reaction   to influenza vaccine in the past?   No    4. Has the person to be vaccinated ever had Guillain-Barré syndrome?   No    Form completed by Shonda Hamilton LPN    Prior to injection verified patient identity using patient's name and date of birth.    Shonda Hamilton LPN

## 2018-12-27 ENCOUNTER — OFFICE VISIT (OUTPATIENT)
Dept: FAMILY MEDICINE | Facility: OTHER | Age: 28
End: 2018-12-27
Attending: FAMILY MEDICINE
Payer: COMMERCIAL

## 2018-12-27 ENCOUNTER — TELEPHONE (OUTPATIENT)
Dept: FAMILY MEDICINE | Facility: OTHER | Age: 28
End: 2018-12-27

## 2018-12-27 VITALS
BODY MASS INDEX: 30.82 KG/M2 | OXYGEN SATURATION: 100 % | HEART RATE: 85 BPM | TEMPERATURE: 97.3 F | WEIGHT: 174 LBS | DIASTOLIC BLOOD PRESSURE: 88 MMHG | SYSTOLIC BLOOD PRESSURE: 140 MMHG

## 2018-12-27 DIAGNOSIS — E55.9 HYPOVITAMINOSIS D: Primary | ICD-10-CM

## 2018-12-27 DIAGNOSIS — R03.0 ELEVATED BP WITHOUT DIAGNOSIS OF HYPERTENSION: ICD-10-CM

## 2018-12-27 DIAGNOSIS — G35 MS (MULTIPLE SCLEROSIS) (H): ICD-10-CM

## 2018-12-27 DIAGNOSIS — E28.2 PCOS (POLYCYSTIC OVARIAN SYNDROME): ICD-10-CM

## 2018-12-27 PROCEDURE — 82306 VITAMIN D 25 HYDROXY: CPT | Mod: 90 | Performed by: FAMILY MEDICINE

## 2018-12-27 PROCEDURE — 36415 COLL VENOUS BLD VENIPUNCTURE: CPT | Performed by: FAMILY MEDICINE

## 2018-12-27 PROCEDURE — 99000 SPECIMEN HANDLING OFFICE-LAB: CPT | Performed by: FAMILY MEDICINE

## 2018-12-27 PROCEDURE — 99214 OFFICE O/P EST MOD 30 MIN: CPT | Performed by: FAMILY MEDICINE

## 2018-12-27 ASSESSMENT — ANXIETY QUESTIONNAIRES
7. FEELING AFRAID AS IF SOMETHING AWFUL MIGHT HAPPEN: NOT AT ALL
1. FEELING NERVOUS, ANXIOUS, OR ON EDGE: SEVERAL DAYS
2. NOT BEING ABLE TO STOP OR CONTROL WORRYING: SEVERAL DAYS
6. BECOMING EASILY ANNOYED OR IRRITABLE: SEVERAL DAYS
3. WORRYING TOO MUCH ABOUT DIFFERENT THINGS: SEVERAL DAYS
GAD7 TOTAL SCORE: 6
5. BEING SO RESTLESS THAT IT IS HARD TO SIT STILL: SEVERAL DAYS
4. TROUBLE RELAXING: SEVERAL DAYS

## 2018-12-27 ASSESSMENT — PATIENT HEALTH QUESTIONNAIRE - PHQ9: SUM OF ALL RESPONSES TO PHQ QUESTIONS 1-9: 9

## 2018-12-27 ASSESSMENT — PAIN SCALES - GENERAL: PAINLEVEL: SEVERE PAIN (7)

## 2018-12-27 NOTE — TELEPHONE ENCOUNTER
Patient is wondering if she can get an RX for oral steroids? She feels that her optic neuritis is returning and wants to see if that will help symptoms instead of doing IV antibiotics.

## 2018-12-27 NOTE — NURSING NOTE
"Chief Complaint   Patient presents with     vitamin d deficiency       Initial BP (!) 146/92   Pulse 85   Temp 97.3  F (36.3  C) (Tympanic)   Wt 78.9 kg (174 lb)   SpO2 100%   BMI 30.82 kg/m   Estimated body mass index is 30.82 kg/m  as calculated from the following:    Height as of 6/13/18: 1.6 m (5' 3\").    Weight as of this encounter: 78.9 kg (174 lb).  Medication Reconciliation: complete    Kia Lee MA    "

## 2018-12-27 NOTE — TELEPHONE ENCOUNTER
I'm not remembering anything about this.  -- she would need to talk to neurologist or ophthlamologist

## 2018-12-27 NOTE — PROGRESS NOTES
SUBJECTIVE:                                                    Radha Carl is a 28 year old female who presents to clinic today for the following health issues:        Vitamin d check      Duration: follow     Description (location/character/radiation): none    Intensity:  none    Accompanying signs and symptoms: here to have her vitamin d checked. Was due in November.    History (similar episodes/previous evaluation): None    Precipitating or alleviating factors: None    Therapies tried and outcome: None       PCOS      Duration: years    Description (location/character/radiation): using progesterone monthly post peak phase    Intensity:  mild    Accompanying signs and symptoms: no spotting any more    History (similar episodes/previous evaluation): see above    Precipitating or alleviating factors: None    Therapies tried and outcome: NAC, prometrium       Problem list and histories reviewed & adjusted, as indicated.  Additional history: as documented    Patient Active Problem List   Diagnosis     Attention deficit hyperactivity disorder (ADHD)     Hyperlipidemia with target LDL less than 100     PCOS (polycystic ovarian syndrome)     Elevated liver enzymes     MS (multiple sclerosis) (H)     Vitamin B12 deficiency (non anemic)     Adiposity     Skin sensation disturbance     Tubal pregnancy without intrauterine pregnancy     Visual field scotoma     ACP (advance care planning)     Dysmenorrhea     MARIBETH (generalized anxiety disorder)     Hypovitaminosis D     Elevated BP without diagnosis of hypertension     Past Surgical History:   Procedure Laterality Date     GYN SURGERY  5/2010    ectopic pregnancy ruptured -L salpingectomy     wisdom teeth  2014       Social History     Tobacco Use     Smoking status: Never Smoker     Smokeless tobacco: Never Used   Substance Use Topics     Alcohol use: Yes     Alcohol/week: 0.0 oz     Comment: rare     Family History   Problem Relation Age of Onset     Family History  Negative Mother      Family History Negative Father      Family History Negative Sister      Family History Negative Sister      Obesity Sister      Breast Cancer Maternal Grandmother 64     Unknown/Adopted Maternal Grandfather         severe gerd     Lung Cancer Paternal Grandmother         +tobacco     Heart Disease Paternal Grandfather         AMI/CAD or CVA     Family History Negative Brother      Psychotic Disorder Sister         ADHD         Current Outpatient Medications   Medication Sig Dispense Refill     acetaminophen (TYLENOL) 500 MG tablet Take 2 tablets by mouth. Every 6 hours as needed       ACETYLCYSTEINE PO Take 600 mg by mouth 2 times daily       albuterol (PROAIR HFA/PROVENTIL HFA/VENTOLIN HFA) 108 (90 Base) MCG/ACT Inhaler Inhale 2 puffs into the lungs every 4 hours as needed for shortness of breath / dyspnea or wheezing 1 Inhaler 0     cyclobenzaprine (FLEXERIL) 10 MG tablet Take 0.5-1 tablets (5-10 mg) by mouth daily as needed for muscle spasms 90 tablet 0     ibuprofen (ADVIL,MOTRIN) 200 MG tablet Take 2 tablets by mouth. Every 6 hours as needed with food       Meloxicam (MOBIC PO)        order for DME Equipment being ordered: 4 butterfield walker 1 Device 0     order for DME Right ankle ASO 1 Device 0     progesterone (PROMETRIUM) 100 MG capsule Take 1 capsule (100 mg) by mouth daily Starting on peak +3 for 10 days out of month 120 capsule 1     Pyridoxine HCl (VITAMIN  B-6) 500 MG TABS Take 1 tablet by mouth daily       ranitidine (ZANTAC) 150 MG tablet Take 1 tablet by mouth as needed       valACYclovir (VALTREX) 500 MG tablet Take 1 tablet (500 mg) by mouth 2 times daily With active lesion 30 tablet 5     vitamin D (ERGOCALCIFEROL) 24644 UNIT capsule TAKE 1 CAPSULE BY MOUTH ONCE A WEEK 4 capsule 1     Allergies   Allergen Reactions     Cats      Sulfa Drugs Rash     Sulfonamide antibiotics     Labs reviewed in EPIC    ROS:  Constitutional, HEENT, cardiovascular, pulmonary, gi and gu systems are  negative, except as otherwise noted.    OBJECTIVE:                                                    /88   Pulse 85   Temp 97.3  F (36.3  C) (Tympanic)   Wt 78.9 kg (174 lb)   SpO2 100%   BMI 30.82 kg/m     Body mass index is 30.82 kg/m .  GENERAL APPEARANCE: healthy, alert and no distress  RESP: lungs clear to auscultation - no rales, rhonchi or wheezes  CV: regular rates and rhythm, normal S1 S2, no S3 or S4 and no murmur, click or rub  PSYCH: mentation appears normal and affect normal/bright       ASSESSMENT/PLAN:                                                    1. Hypovitaminosis D  She hasn't been taking for a few months  Noticed she felt better when she was taking the high strength  She likely needs high dose year round  - Vitamin D Deficiency    2. Elevated BP without diagnosis of hypertension  Will monitor, she was stressed from missing work, she checks at home and it is normal    3. MS (multiple sclerosis) (H)  Encourage her to get appt in [place] for this summer    4. PCOS (polycystic ovarian syndrome)  Stable, no spotting.    Patient was agreeable to this plan and had no further questions.  See Patient Instructions    Muriel Jones MD  [facility] [place]

## 2018-12-29 ASSESSMENT — ANXIETY QUESTIONNAIRES: GAD7 TOTAL SCORE: 6

## 2018-12-30 LAB — DEPRECATED CALCIDIOL+CALCIFEROL SERPL-MC: 19 UG/L (ref 20–75)

## 2019-04-17 ENCOUNTER — APPOINTMENT (OUTPATIENT)
Dept: OCCUPATIONAL MEDICINE | Facility: OTHER | Age: 29
End: 2019-04-17

## 2019-06-04 NOTE — PROGRESS NOTES
SUBJECTIVE:   CC: Radha Carl is an 28 year old woman who presents for preventive health visit.     Healthy Habits:    Do you get at least three servings of calcium containing foods daily (dairy, green leafy vegetables, etc.)? yes    Amount of exercise or daily activities, outside of work: none    Problems taking medications regularly No    Medication side effects: No    Have you had an eye exam in the past two years? Yes Sept 2018    Do you see a dentist twice per year? yes    Do you have sleep apnea, excessive snoring or daytime drowsiness?yes      Hyperlipidemia Follow-Up      Are you having any of the following symptoms? (Select all that apply)  No complaints of shortness of breath, chest pain or pressure.  No increased sweating or nausea with activity.  No left-sided neck or arm pain.  No complaints of pain in calves when walking 1-2 blocks.    Are you regularly taking any medication or supplement to lower your cholesterol?   No    Are you having muscle aches or other side effects that you think could be caused by your cholesterol lowering medication?  No        Today's PHQ-2 Score:   PHQ-2 ( 1999 Pfizer) 7/23/2015 7/3/2013   Q1: Little interest or pleasure in doing things 0 0   Q2: Feeling down, depressed or hopeless 0 0   PHQ-2 Score 0 0     Abuse: Current or Past(Physical, Sexual or Emotional)- No  Do you feel safe in your environment? Yes    Social History     Tobacco Use     Smoking status: Never Smoker     Smokeless tobacco: Never Used   Substance Use Topics     Alcohol use: Yes     Alcohol/week: 0.0 oz     Comment: rare     If you drink alcohol do you typically have >3 drinks per day or >7 drinks per week? No                     Reviewed orders with patient.  Reviewed health maintenance and updated orders accordingly - Yes  Lab work is in process  Labs reviewed in EPIC  BP Readings from Last 3 Encounters:   06/12/19 140/90   12/27/18 140/88   08/15/18 132/84    Wt Readings from Last 3  Encounters:   06/12/19 78.5 kg (173 lb)   12/27/18 78.9 kg (174 lb)   08/15/18 79.4 kg (175 lb)                  Patient Active Problem List   Diagnosis     Attention deficit hyperactivity disorder (ADHD)     Hyperlipidemia with target LDL less than 100     PCOS (polycystic ovarian syndrome)     Elevated liver enzymes     MS (multiple sclerosis) (H)     Vitamin B12 deficiency (non anemic)     Adiposity     Skin sensation disturbance     Tubal pregnancy without intrauterine pregnancy     Visual field scotoma     ACP (advance care planning)     Dysmenorrhea     MARIBETH (generalized anxiety disorder)     Hypovitaminosis D     Elevated BP without diagnosis of hypertension     Infertility, female, secondary     Elevated glucose     Past Surgical History:   Procedure Laterality Date     GYN SURGERY  5/2010    ectopic pregnancy ruptured -L salpingectomy     HYSTEROSCOPY,DIAGNOSTIC  2016    done here and at [facility]  2014       Social History     Tobacco Use     Smoking status: Never Smoker     Smokeless tobacco: Never Used   Substance Use Topics     Alcohol use: Yes     Alcohol/week: 0.0 oz     Frequency: Monthly or less     Drinks per session: 1 or 2     Comment: rare     Family History   Problem Relation Age of Onset     Family History Negative Mother      Family History Negative Father      Family History Negative Sister      Family History Negative Sister      Obesity Sister      Breast Cancer Maternal Grandmother 64     Unknown/Adopted Maternal Grandfather         severe gerd     Lung Cancer Paternal Grandmother         +tobacco     Heart Disease Paternal Grandfather         AMI/CAD or CVA     Family History Negative Brother      Psychotic Disorder Sister         ADHD         Current Outpatient Medications   Medication Sig Dispense Refill     acetaminophen (TYLENOL) 500 MG tablet Take 2 tablets by mouth. Every 6 hours as needed       ACETYLCYSTEINE PO Take 600 mg by mouth 2 times daily        cyclobenzaprine (FLEXERIL) 10 MG tablet Take 0.5-1 tablets (5-10 mg) by mouth daily as needed for muscle spasms 90 tablet 0     ibuprofen (ADVIL,MOTRIN) 200 MG tablet Take 2 tablets by mouth. Every 6 hours as needed with food       Meloxicam (MOBIC PO)        order for DME Right ankle ASO 1 Device 0     progesterone (PROMETRIUM) 100 MG capsule Take 1 capsule (100 mg) by mouth daily Starting on peak +3 for 10 days out of month 120 capsule 1     Pyridoxine HCl (VITAMIN  B-6) 500 MG TABS Take 1 tablet by mouth daily       ranitidine (ZANTAC) 150 MG tablet Take 1 tablet by mouth as needed       valACYclovir (VALTREX) 500 MG tablet Take 1 tablet (500 mg) by mouth 2 times daily With active lesion 30 tablet 5     vitamin D2 (ERGOCALCIFEROL) 50377 units (1250 mcg) capsule TAKE 1 CAPSULE BY MOUTH ONCE A WEEK 12 capsule 1     Allergies   Allergen Reactions     Cats      Sulfa Drugs Rash     Sulfonamide antibiotics       Mammogram not appropriate for this patient based on age.    Pertinent mammograms are reviewed under the imaging tab.  History of abnormal Pap smear: NO - age 30-65 PAP every 5 years with negative HPV co-testing recommended  PAP / HPV Latest Ref Rng & Units 10/11/2017 4/15/2014   PAP - NIL NIL   HPV 16 DNA NEG:Negative Negative -   HPV 18 DNA NEG:Negative Negative -   OTHER HR HPV NEG:Negative Negative -     Reviewed and updated as needed this visit by clinical staff  Tobacco  Allergies  Meds  Med Hx  Surg Hx  Fam Hx  Soc Hx        Reviewed and updated as needed this visit by Provider        Past Medical History:   Diagnosis Date     ADHD (attention deficit hyperactivity disorder)      Cold sore      Demyelinating changes in brain (H)     saw Dr Gilbert 5/2013 -- ? of MS, but not Dx'd yet.     Elevated glucose 2014    resolved 2016     Elevated liver enzymes 2014    resolved 2015     Fibrocystic breast      History of left salpingo-oophorectomy     still has L ovary, but missing L tube     Hypovitaminosis D       Neuropathy     feet     Obesity      PCOS (polycystic ovarian syndrome)       Past Surgical History:   Procedure Laterality Date     GYN SURGERY  2010    ectopic pregnancy ruptured -L salpingectomy     HYSTEROSCOPY,DIAGNOSTIC  2016    done here and at [facility] teeth  2014     OB History    Para Term  AB Living   1 0 0 0 1 0   SAB TAB Ectopic Multiple Live Births   0 0 1 0 0      # Outcome Date GA Lbr Sage/2nd Weight Sex Delivery Anes PTL Lv   1 Ectopic 2010              Birth Comments: left salpingectomy       ROS:  CONSTITUTIONAL: NEGATIVE for fever, chills, change in weight  INTEGUMENTARU/SKIN: NEGATIVE for worrisome rashes, moles or lesions  EYES: NEGATIVE for vision changes or irritation  ENT: NEGATIVE for ear, mouth and throat problems  RESP: NEGATIVE for significant cough or SOB  BREAST: NEGATIVE for masses, tenderness or discharge  CV: NEGATIVE for chest pain, palpitations or peripheral edema  GI: NEGATIVE for nausea, abdominal pain, heartburn, or change in bowel habits  : NEGATIVE for unusual urinary or vaginal symptoms. Periods are regular.  MUSCULOSKELETAL: NEGATIVE for significant arthralgias or myalgia  NEURO: NEGATIVE for weakness, dizziness or paresthesias  PSYCHIATRIC: NEGATIVE for changes in mood or affect    OBJECTIVE:   /90 (BP Location: Right arm, Patient Position: Chair, Cuff Size: Adult Large)   Pulse 104   Temp 97.9  F (36.6  C) (Tympanic)   Resp 20   Wt 78.5 kg (173 lb)   LMP 2019   SpO2 100%   BMI 30.65 kg/m    EXAM:  GENERAL: healthy, alert and no distress  EYES: Eyes grossly normal to inspection, PERRL and conjunctivae and sclerae normal  HENT: ear canals and TM's normal, nose and mouth without ulcers or lesions  NECK: no adenopathy, no asymmetry, masses, or scars and thyroid normal to palpation  RESP: lungs clear to auscultation - no rales, rhonchi or wheezes  BREAST: normal without masses, tenderness or nipple discharge and no  palpable axillary masses or adenopathy  CV: regular rate and rhythm, normal S1 S2, no S3 or S4, no murmur, click or rub, no peripheral edema and peripheral pulses strong  ABDOMEN: soft, nontender, no hepatosplenomegaly, no masses and bowel sounds normal   (female): normal female external genitalia, normal urethral meatus, vaginal mucosa pink, moist, well rugated, and normal cervix/adnexa/uterus without masses or discharge  MS: no gross musculoskeletal defects noted, no edema  SKIN: no suspicious lesions or rashes  NEURO: Normal strength and tone, mentation intact and speech normal  NEURO: resting tremor  PSYCH: mentation appears normal, affect normal/bright    Diagnostic Test Results:  Labs reviewed in Epic  Results for orders placed or performed in visit on 06/12/19   Hemoglobin A1c   Result Value Ref Range    Hemoglobin A1C 5.9 (H) 0 - 5.6 %       ASSESSMENT/PLAN:   (Z00.00) Routine general medical examination at a health care facility  (primary encounter diagnosis)  Comment:   Plan: Estimated Average Glucose        Follow-up 1 year    (G35) MS (multiple sclerosis) (H)  Comment:   Plan: saw neuro in [place] and she thought she should be on meds but Radha doesn't want to be, she is working hard on diet similar to Boogie Raza MD    (E53.8) Vitamin B12 deficiency (non anemic)  Comment:   Plan: symptoms are better when she is taking b12    (E78.5) Hyperlipidemia with target LDL less than 100  Comment:   Plan: Comprehensive metabolic panel, Lipid Profile         (Chol, Trig, HDL, LDL calc)        Fasting labs today    (E28.2) PCOS (polycystic ovarian syndrome)  Comment: she stopped everything about a year ago, feels the NAC dose was too high for her  Plan: Estradiol, Progesterone        Start charting again, check peak +7 labs and will supplement as needed, follow-up 3 mos and consider femara start    (F41.1) MARIBETH (generalized anxiety disorder)  Comment:   Plan: stable    (F90.9) Attention deficit hyperactivity  "disorder (ADHD), unspecified ADHD type  Comment:   Plan: stable    (R03.0) Elevated BP without diagnosis of hypertension  Comment:   Plan: gets some white coat when she is here -- normal numbers at home    (R73.09) Elevated glucose  Comment:   Plan: Hemoglobin A1c          (E55.9) Hypovitaminosis D  Comment:   Plan: Vitamin D Deficiency        Labs today    COUNSELING:   Reviewed preventive health counseling, as reflected in patient instructions  Special attention given to:        Regular exercise       Healthy diet/nutrition       Vision screening       Hearing screening    Estimated body mass index is 30.65 kg/m  as calculated from the following:    Height as of 6/13/18: 1.6 m (5' 3\").    Weight as of this encounter: 78.5 kg (173 lb).    Weight management plan: Discussed healthy diet and exercise guidelines     reports that she has never smoked. She has never used smokeless tobacco.      Counseling Resources:  ATP IV Guidelines  Pooled Cohorts Equation Calculator  Breast Cancer Risk Calculator  FRAX Risk Assessment  ICSI Preventive Guidelines  Dietary Guidelines for Americans, 2010  USDA's MyPlate  ASA Prophylaxis  Lung CA Screening    Muriel Jones MD  [facility] [place]  "

## 2019-06-12 ENCOUNTER — OFFICE VISIT (OUTPATIENT)
Dept: FAMILY MEDICINE | Facility: OTHER | Age: 29
End: 2019-06-12
Attending: FAMILY MEDICINE
Payer: COMMERCIAL

## 2019-06-12 VITALS
DIASTOLIC BLOOD PRESSURE: 90 MMHG | WEIGHT: 173 LBS | SYSTOLIC BLOOD PRESSURE: 140 MMHG | TEMPERATURE: 97.9 F | OXYGEN SATURATION: 100 % | HEART RATE: 104 BPM | BODY MASS INDEX: 30.65 KG/M2 | RESPIRATION RATE: 20 BRPM

## 2019-06-12 DIAGNOSIS — G35 MS (MULTIPLE SCLEROSIS) (H): ICD-10-CM

## 2019-06-12 DIAGNOSIS — F90.9 ATTENTION DEFICIT HYPERACTIVITY DISORDER (ADHD), UNSPECIFIED ADHD TYPE: ICD-10-CM

## 2019-06-12 DIAGNOSIS — F41.1 GAD (GENERALIZED ANXIETY DISORDER): ICD-10-CM

## 2019-06-12 DIAGNOSIS — E78.5 HYPERLIPIDEMIA WITH TARGET LDL LESS THAN 100: ICD-10-CM

## 2019-06-12 DIAGNOSIS — Z00.00 ROUTINE GENERAL MEDICAL EXAMINATION AT A HEALTH CARE FACILITY: Primary | ICD-10-CM

## 2019-06-12 DIAGNOSIS — R73.09 ELEVATED GLUCOSE: ICD-10-CM

## 2019-06-12 DIAGNOSIS — E28.2 PCOS (POLYCYSTIC OVARIAN SYNDROME): ICD-10-CM

## 2019-06-12 DIAGNOSIS — R03.0 ELEVATED BP WITHOUT DIAGNOSIS OF HYPERTENSION: ICD-10-CM

## 2019-06-12 DIAGNOSIS — E55.9 HYPOVITAMINOSIS D: ICD-10-CM

## 2019-06-12 DIAGNOSIS — E53.8 VITAMIN B12 DEFICIENCY (NON ANEMIC): ICD-10-CM

## 2019-06-12 LAB
ALBUMIN SERPL-MCNC: 4.1 G/DL (ref 3.4–5)
ALP SERPL-CCNC: 71 U/L (ref 40–150)
ALT SERPL W P-5'-P-CCNC: 32 U/L (ref 0–50)
ANION GAP SERPL CALCULATED.3IONS-SCNC: 6 MMOL/L (ref 3–14)
AST SERPL W P-5'-P-CCNC: 13 U/L (ref 0–45)
BILIRUB SERPL-MCNC: 0.4 MG/DL (ref 0.2–1.3)
BUN SERPL-MCNC: 14 MG/DL (ref 7–30)
CALCIUM SERPL-MCNC: 8.7 MG/DL (ref 8.5–10.1)
CHLORIDE SERPL-SCNC: 107 MMOL/L (ref 94–109)
CHOLEST SERPL-MCNC: 199 MG/DL
CO2 SERPL-SCNC: 24 MMOL/L (ref 20–32)
CREAT SERPL-MCNC: 0.65 MG/DL (ref 0.52–1.04)
EST. AVERAGE GLUCOSE BLD GHB EST-MCNC: 123 MG/DL
GFR SERPL CREATININE-BSD FRML MDRD: >90 ML/MIN/{1.73_M2}
GLUCOSE SERPL-MCNC: 114 MG/DL (ref 70–99)
HBA1C MFR BLD: 5.9 % (ref 0–5.6)
HDLC SERPL-MCNC: 41 MG/DL
LDLC SERPL CALC-MCNC: 134 MG/DL
NONHDLC SERPL-MCNC: 158 MG/DL
POTASSIUM SERPL-SCNC: 3.7 MMOL/L (ref 3.4–5.3)
PROT SERPL-MCNC: 8.7 G/DL (ref 6.8–8.8)
SODIUM SERPL-SCNC: 137 MMOL/L (ref 133–144)
TRIGL SERPL-MCNC: 120 MG/DL

## 2019-06-12 PROCEDURE — 82306 VITAMIN D 25 HYDROXY: CPT | Mod: 90 | Performed by: FAMILY MEDICINE

## 2019-06-12 PROCEDURE — 80053 COMPREHEN METABOLIC PANEL: CPT | Performed by: FAMILY MEDICINE

## 2019-06-12 PROCEDURE — 99000 SPECIMEN HANDLING OFFICE-LAB: CPT | Performed by: FAMILY MEDICINE

## 2019-06-12 PROCEDURE — 36415 COLL VENOUS BLD VENIPUNCTURE: CPT | Performed by: FAMILY MEDICINE

## 2019-06-12 PROCEDURE — 83036 HEMOGLOBIN GLYCOSYLATED A1C: CPT | Performed by: FAMILY MEDICINE

## 2019-06-12 PROCEDURE — 99395 PREV VISIT EST AGE 18-39: CPT | Performed by: FAMILY MEDICINE

## 2019-06-12 PROCEDURE — 80061 LIPID PANEL: CPT | Performed by: FAMILY MEDICINE

## 2019-06-12 SDOH — SOCIAL STABILITY: SOCIAL INSECURITY
WITHIN THE LAST YEAR, HAVE YOU BEEN KICKED, HIT, SLAPPED, OR OTHERWISE PHYSICALLY HURT BY YOUR PARTNER OR EX-PARTNER?: NO

## 2019-06-12 SDOH — HEALTH STABILITY: MENTAL HEALTH: HOW MANY STANDARD DRINKS CONTAINING ALCOHOL DO YOU HAVE ON A TYPICAL DAY?: 1 OR 2

## 2019-06-12 SDOH — SOCIAL STABILITY: SOCIAL INSECURITY
WITHIN THE LAST YEAR, HAVE TO BEEN RAPED OR FORCED TO HAVE ANY KIND OF SEXUAL ACTIVITY BY YOUR PARTNER OR EX-PARTNER?: NO

## 2019-06-12 SDOH — HEALTH STABILITY: PHYSICAL HEALTH: ON AVERAGE, HOW MANY MINUTES DO YOU ENGAGE IN EXERCISE AT THIS LEVEL?: 10 MIN

## 2019-06-12 SDOH — HEALTH STABILITY: MENTAL HEALTH: HOW OFTEN DO YOU HAVE A DRINK CONTAINING ALCOHOL?: MONTHLY OR LESS

## 2019-06-12 SDOH — SOCIAL STABILITY: SOCIAL INSECURITY: WITHIN THE LAST YEAR, HAVE YOU BEEN HUMILIATED OR EMOTIONALLY ABUSED IN OTHER WAYS BY YOUR PARTNER OR EX-PARTNER?: NO

## 2019-06-12 SDOH — SOCIAL STABILITY: SOCIAL INSECURITY: WITHIN THE LAST YEAR, HAVE YOU BEEN AFRAID OF YOUR PARTNER OR EX-PARTNER?: NO

## 2019-06-12 SDOH — HEALTH STABILITY: PHYSICAL HEALTH: ON AVERAGE, HOW MANY DAYS PER WEEK DO YOU ENGAGE IN MODERATE TO STRENUOUS EXERCISE (LIKE A BRISK WALK)?: 5 DAYS

## 2019-06-12 ASSESSMENT — PAIN SCALES - GENERAL: PAINLEVEL: SEVERE PAIN (6)

## 2019-06-12 ASSESSMENT — ANXIETY QUESTIONNAIRES
3. WORRYING TOO MUCH ABOUT DIFFERENT THINGS: SEVERAL DAYS
GAD7 TOTAL SCORE: 7
6. BECOMING EASILY ANNOYED OR IRRITABLE: SEVERAL DAYS
5. BEING SO RESTLESS THAT IT IS HARD TO SIT STILL: SEVERAL DAYS
7. FEELING AFRAID AS IF SOMETHING AWFUL MIGHT HAPPEN: NOT AT ALL
2. NOT BEING ABLE TO STOP OR CONTROL WORRYING: SEVERAL DAYS
IF YOU CHECKED OFF ANY PROBLEMS ON THIS QUESTIONNAIRE, HOW DIFFICULT HAVE THESE PROBLEMS MADE IT FOR YOU TO DO YOUR WORK, TAKE CARE OF THINGS AT HOME, OR GET ALONG WITH OTHER PEOPLE: SOMEWHAT DIFFICULT
1. FEELING NERVOUS, ANXIOUS, OR ON EDGE: MORE THAN HALF THE DAYS

## 2019-06-12 ASSESSMENT — PATIENT HEALTH QUESTIONNAIRE - PHQ9
5. POOR APPETITE OR OVEREATING: SEVERAL DAYS
SUM OF ALL RESPONSES TO PHQ QUESTIONS 1-9: 9

## 2019-06-12 NOTE — NURSING NOTE
"Chief Complaint   Patient presents with     Physical       Initial /90 (BP Location: Right arm, Patient Position: Chair, Cuff Size: Adult Large)   Pulse 104   Temp 97.9  F (36.6  C) (Tympanic)   Resp 20   Wt 78.5 kg (173 lb)   LMP 06/05/2019   SpO2 100%   BMI 30.65 kg/m   Estimated body mass index is 30.65 kg/m  as calculated from the following:    Height as of 6/13/18: 1.6 m (5' 3\").    Weight as of this encounter: 78.5 kg (173 lb).  Medication Reconciliation: complete    Judi Gallegos LPN    "

## 2019-06-13 ASSESSMENT — ANXIETY QUESTIONNAIRES: GAD7 TOTAL SCORE: 7

## 2019-06-14 ENCOUNTER — MYC MEDICAL ADVICE (OUTPATIENT)
Dept: FAMILY MEDICINE | Facility: OTHER | Age: 29
End: 2019-06-14

## 2019-06-14 LAB — DEPRECATED CALCIDIOL+CALCIFEROL SERPL-MC: 19 UG/L (ref 20–75)

## 2019-06-17 ENCOUNTER — MYC MEDICAL ADVICE (OUTPATIENT)
Dept: FAMILY MEDICINE | Facility: OTHER | Age: 29
End: 2019-06-17

## 2019-06-17 DIAGNOSIS — G35 MS (MULTIPLE SCLEROSIS) (H): ICD-10-CM

## 2019-06-17 DIAGNOSIS — E55.9 HYPOVITAMINOSIS D: ICD-10-CM

## 2019-06-17 RX ORDER — ERGOCALCIFEROL 1.25 MG/1
CAPSULE, LIQUID FILLED ORAL
Qty: 12 CAPSULE | Refills: 3 | Status: SHIPPED | OUTPATIENT
Start: 2019-06-17 | End: 2020-09-11

## 2019-06-17 NOTE — TELEPHONE ENCOUNTER
Make sure the meals have fat in them -- meat, nuts, dressings, etc  Try taking extra 10,000 international unit(s) mid week with food

## 2019-06-28 DIAGNOSIS — E28.2 PCOS (POLYCYSTIC OVARIAN SYNDROME): ICD-10-CM

## 2019-06-28 LAB
ESTRADIOL SERPL-MCNC: 131 PG/ML
PROGEST SERPL-MCNC: 4.8 NG/ML

## 2019-06-28 PROCEDURE — 82670 ASSAY OF TOTAL ESTRADIOL: CPT | Mod: 90 | Performed by: FAMILY MEDICINE

## 2019-06-28 PROCEDURE — 36415 COLL VENOUS BLD VENIPUNCTURE: CPT | Performed by: FAMILY MEDICINE

## 2019-06-28 PROCEDURE — 99000 SPECIMEN HANDLING OFFICE-LAB: CPT | Performed by: FAMILY MEDICINE

## 2019-06-28 PROCEDURE — 84144 ASSAY OF PROGESTERONE: CPT | Mod: 90 | Performed by: FAMILY MEDICINE

## 2019-07-01 ENCOUNTER — MYC MEDICAL ADVICE (OUTPATIENT)
Dept: FAMILY MEDICINE | Facility: OTHER | Age: 29
End: 2019-07-01

## 2019-07-01 DIAGNOSIS — E28.2 PCOS (POLYCYSTIC OVARIAN SYNDROME): ICD-10-CM

## 2019-10-10 DIAGNOSIS — B00.1 COLD SORE: ICD-10-CM

## 2019-10-11 RX ORDER — VALACYCLOVIR HYDROCHLORIDE 500 MG/1
TABLET, FILM COATED ORAL
Qty: 30 TABLET | Refills: 5 | Status: SHIPPED | OUTPATIENT
Start: 2019-10-11 | End: 2020-09-11

## 2019-11-14 NOTE — PROGRESS NOTES
Clinic Visit  Date of visit: 2019   Chief Complaint:   Chief Complaint   Patient presents with     PCOS       HPI:   Radha Carl is a 29 year old  female who presents to clinic today for follow up  for PCOS .     Menarche: age 8  Menses: frequency: every 28 days and length: 7 days. Patient's last menstrual period was 10/31/2019.   Family Planning Chart: Yes: .  Acne: No.  Hirsutism (facial hair): Yes: .  Male-pattern hair loss: No.  Elevated serum androgen:   Lab Results   Component Value Date    DHEA 206 2016    TESTOSTTOTAL 18 2016    FT 0.39 2016    BARTOLO 0.721 2016   ]  BMI: Body mass index is 31.18 kg/m .   Type 2 DM: No.  Elevated Cholesterol: No.  Mood disorder: Yes: anxiety.    Ultrasound: 2017     Gynecologic History:  Last Pap:   Lab Results   Component Value Date    PAP NIL 10/11/2017      History of abnormal pap: No.    Obstetric History:   OB History    Para Term  AB Living   1 0 0 0 1 0   SAB TAB Ectopic Multiple Live Births   0 0 1 0 0      # Outcome Date GA Lbr Sage/2nd Weight Sex Delivery Anes PTL Lv   1 Ectopic 2010              Birth Comments: left salpingectomy     Obstetric history significant for:  ectopic    Medications:  acetaminophen (TYLENOL) 500 MG tablet, Take 2 tablets by mouth. Every 6 hours as needed  ACETYLCYSTEINE PO, Take 600 mg by mouth 2 times daily  cyclobenzaprine (FLEXERIL) 10 MG tablet, Take 0.5-1 tablets (5-10 mg) by mouth daily as needed for muscle spasms  ibuprofen (ADVIL,MOTRIN) 200 MG tablet, Take 2 tablets by mouth. Every 6 hours as needed with food  Meloxicam (MOBIC PO),   order for DME, Right ankle ASO  progesterone (PROMETRIUM) 100 MG capsule, Take 1 capsule (100 mg) by mouth daily Starting on peak +3 for 10 days out of month  Pyridoxine HCl (VITAMIN  B-6) 500 MG TABS, Take 1 tablet by mouth daily  valACYclovir (VALTREX) 500 MG tablet, TAKE 1 TABLET BY MOUTH TWICE DAILY WITH  ACTIVE  LESION  vitamin D2  (ERGOCALCIFEROL) 69112 units (1250 mcg) capsule, TAKE 1 CAPSULE BY MOUTH ONCE A WEEK  [DISCONTINUED] norethindrone-ethinyl estradiol (MICROGESTIN) 1-20 MG-MCG per tablet, Take 1 tablet by mouth daily.    No current facility-administered medications on file prior to visit.       Allergy:  Allergies   Allergen Reactions     Cats      Sulfa Drugs Rash     Sulfonamide antibiotics       Medical History:  Past Medical History:   Diagnosis Date     ADHD (attention deficit hyperactivity disorder)      Cold sore      Demyelinating changes in brain (H)     saw Dr Gilbert 5/2013 -- ? of MS, but not Dx'd yet.     Elevated glucose 2014    resolved 2016     Elevated liver enzymes 2014    resolved 2015     Fibrocystic breast      History of left salpingo-oophorectomy     still has L ovary, but missing L tube     Hypovitaminosis D      Neuropathy     feet     Obesity      PCOS (polycystic ovarian syndrome)        Surgical History:  Past Surgical History:   Procedure Laterality Date     GYN SURGERY  5/2010    ectopic pregnancy ruptured -L salpingectomy     HYSTEROSCOPY,DIAGNOSTIC  2016    done here and at [facility]  2014       Social History:  Social History    Substance and Sexual Activity      Alcohol use: Yes        Alcohol/week: 0.0 standard drinks        Frequency: Monthly or less        Drinks per session: 1 or 2        Comment: rare    History   Smoking Status     Never Smoker   Smokeless Tobacco     Never Used     History   Drug Use No     History   Sexual Activity     Sexual activity: Yes     Partners: Male     Birth control/ protection: Natural Family Planning     Comment: [employer]     Relationship status is:  / Partnered    8.5 Years.  Name of Spouse / Partner:  Parmjit.    Lives in Stable housing and no concerns with Spouse.   Employment: Employed full time  History of sexual, physical or mental abuse: No.   She denies current fears or concerns for personal safety.      Review of  Systems:    GENERAL: No change in weight, sleep or appetite.  Normal energy.  No fever or chills  EYES: Negative for vision changes or eye problems  ENT: No problems with ears, nose or throat.  No difficulty swallowing.  RESP: No coughing, wheezing or shortness of breath  CV: No chest pains or palpitations  GI: No nausea, vomiting,  heartburn, abdominal pain, diarrhea, constipation or change in bowel habits  : No urinary frequency or dysuria, bladder or kidney problems  MUSCULOSKELETAL: No significant muscle or joint pains  NEUROLOGIC: No headaches, numbness, tingling, weakness, problems with balance or coordination  PSYCHIATRIC: No problems with anxiety, depression or mental health  HEME/IMMUNE/ALLERGY: No history of bleeding or clotting problems or anemia.  No allergies or immune system problems  ENDOCRINE: No history of thyroid disease, diabetes or other endocrine disorders  SKIN: No rashes,worrisome lesions or skin problems      Physical Exam:  Vitals:    19 1100   BP: 132/80   BP Location: Left arm   Patient Position: Chair   Cuff Size: Adult Regular   Pulse: 90   Resp: 20   Temp: 98.3  F (36.8  C)   TempSrc: Tympanic   SpO2: 100%   Weight: 79.8 kg (176 lb)     Body mass index is 31.18 kg/m .    Gen: NAD, Awake and alert, cooperative with exam  CV: regular rate and rhythm  Resp: lungs clear to auscultation bilaterally  Abd: soft, nontender, nondistended, no rebound, no guarding  Extremities: nontender, no edema    Assessment/Plan:  Radha Carl is a 29 year old  who presents for follow up  for PCOS.   (E28.2) PCOS (polycystic ovarian syndrome)  (primary encounter diagnosis)  Comment:   Plan: Multiple Vitamins-Minerals (OPTIVITE P.M.T.)         TABS        Start vitamins  Follow-up 2 mos  Do peak +7 labs    (G35) MS (multiple sclerosis) (H)  Comment:   Plan: discussed possible work restrictions    (R07.1) Costochondral pain  Comment:   Plan: inflammation? MS? Stress?    (F43.9)  Stress  Comment:   Plan: discussed ashwaghanda, maybe try again on a weekend, made her tired before  Meditation, exercise, deep breathing, focus on self-care        Muriel Jones MD

## 2019-11-29 ENCOUNTER — OFFICE VISIT (OUTPATIENT)
Dept: FAMILY MEDICINE | Facility: OTHER | Age: 29
End: 2019-11-29
Attending: FAMILY MEDICINE
Payer: COMMERCIAL

## 2019-11-29 VITALS
DIASTOLIC BLOOD PRESSURE: 80 MMHG | TEMPERATURE: 98.3 F | OXYGEN SATURATION: 100 % | SYSTOLIC BLOOD PRESSURE: 132 MMHG | BODY MASS INDEX: 31.18 KG/M2 | WEIGHT: 176 LBS | HEART RATE: 90 BPM | RESPIRATION RATE: 20 BRPM

## 2019-11-29 DIAGNOSIS — G35 MS (MULTIPLE SCLEROSIS) (H): ICD-10-CM

## 2019-11-29 DIAGNOSIS — E28.2 PCOS (POLYCYSTIC OVARIAN SYNDROME): Primary | ICD-10-CM

## 2019-11-29 DIAGNOSIS — R07.89 COSTOCHONDRAL PAIN: ICD-10-CM

## 2019-11-29 DIAGNOSIS — F43.9 STRESS: ICD-10-CM

## 2019-11-29 PROCEDURE — 99214 OFFICE O/P EST MOD 30 MIN: CPT | Performed by: FAMILY MEDICINE

## 2019-11-29 RX ORDER — MULTIVITAMIN WITH MINERALS
3 TABLET ORAL 2 TIMES DAILY
Qty: 180 TABLET | Refills: 3 | Status: SHIPPED | OUTPATIENT
Start: 2019-11-29 | End: 2020-09-11

## 2019-11-29 ASSESSMENT — ANXIETY QUESTIONNAIRES
IF YOU CHECKED OFF ANY PROBLEMS ON THIS QUESTIONNAIRE, HOW DIFFICULT HAVE THESE PROBLEMS MADE IT FOR YOU TO DO YOUR WORK, TAKE CARE OF THINGS AT HOME, OR GET ALONG WITH OTHER PEOPLE: SOMEWHAT DIFFICULT
2. NOT BEING ABLE TO STOP OR CONTROL WORRYING: NOT AT ALL
GAD7 TOTAL SCORE: 7
6. BECOMING EASILY ANNOYED OR IRRITABLE: MORE THAN HALF THE DAYS
7. FEELING AFRAID AS IF SOMETHING AWFUL MIGHT HAPPEN: NOT AT ALL
1. FEELING NERVOUS, ANXIOUS, OR ON EDGE: NOT AT ALL
3. WORRYING TOO MUCH ABOUT DIFFERENT THINGS: SEVERAL DAYS
5. BEING SO RESTLESS THAT IT IS HARD TO SIT STILL: MORE THAN HALF THE DAYS

## 2019-11-29 ASSESSMENT — PATIENT HEALTH QUESTIONNAIRE - PHQ9
5. POOR APPETITE OR OVEREATING: MORE THAN HALF THE DAYS
SUM OF ALL RESPONSES TO PHQ QUESTIONS 1-9: 10

## 2019-11-29 NOTE — NURSING NOTE
"Chief Complaint   Patient presents with     PCOS       Initial /80 (BP Location: Left arm, Patient Position: Chair, Cuff Size: Adult Regular)   Pulse 90   Temp 98.3  F (36.8  C) (Tympanic)   Resp 20   Wt 79.8 kg (176 lb)   LMP 10/31/2019   SpO2 100%   BMI 31.18 kg/m   Estimated body mass index is 31.18 kg/m  as calculated from the following:    Height as of 6/13/18: 1.6 m (5' 3\").    Weight as of this encounter: 79.8 kg (176 lb).  Medication Reconciliation: complete  Judi Gallegos LPN    "

## 2019-11-29 NOTE — PATIENT INSTRUCTIONS
1.  ashwaghanda (pancho brand)  At bedtime  2.  optivite pmt 3 tabs 2x/day  3.  Peak +7 labs next cycle  4.  Meditation dolores, 20 min daily

## 2019-11-30 ASSESSMENT — ANXIETY QUESTIONNAIRES: GAD7 TOTAL SCORE: 7

## 2020-01-30 NOTE — PROGRESS NOTES
PCOClinic Visit  Date of visit: 2020   Chief Complaint:   Chief Complaint   Patient presents with     PCOS       HPI:   Radha Carl is a 29 year old  female who presents to clinic today for follow up  for PCOS .     Menarche: age 8  Menses: frequency: every 28 days and length: 7 days. Patient's last menstrual period was 2020.   Family Planning Chart: NO  Acne: No.  Hirsutism (facial hair): Yes: .  Male-pattern hair loss: No.  Elevated serum androgen:   Lab Results   Component Value Date    DHEA 206 2016    TESTOSTTOTAL 18 2016    FT 0.39 2016    BARTOLO 0.721 2016   ]  BMI: Body mass index is 31.18 kg/m .   Type 2 DM: No.  Elevated Cholesterol: No.  Mood disorder: Yes: anxiety.    Ultrasound: 2017     Gynecologic History:  Last Pap:   Lab Results   Component Value Date    PAP NIL 10/11/2017      History of abnormal pap: No.    Obstetric History:   OB History    Para Term  AB Living   1 0 0 0 1 0   SAB TAB Ectopic Multiple Live Births   0 0 1 0 0      # Outcome Date GA Lbr Sage/2nd Weight Sex Delivery Anes PTL Lv   1 Ectopic 2010              Birth Comments: left salpingectomy     Obstetric history significant for:  n/a    Medications:  acetaminophen (TYLENOL) 500 MG tablet, Take 2 tablets by mouth. Every 6 hours as needed  ACETYLCYSTEINE PO, Take 600 mg by mouth 2 times daily  cyclobenzaprine (FLEXERIL) 10 MG tablet, Take 0.5-1 tablets (5-10 mg) by mouth daily as needed for muscle spasms  ibuprofen (ADVIL,MOTRIN) 200 MG tablet, Take 2 tablets by mouth. Every 6 hours as needed with food  Meloxicam (MOBIC PO),   order for DME, Right ankle ASO  progesterone (PROMETRIUM) 100 MG capsule, Take 1 capsule (100 mg) by mouth daily Starting on peak +3 for 10 days out of month  Pyridoxine HCl (VITAMIN  B-6) 500 MG TABS, Take 1 tablet by mouth daily  valACYclovir (VALTREX) 500 MG tablet, TAKE 1 TABLET BY MOUTH TWICE DAILY WITH  ACTIVE  LESION  vitamin D2  (ERGOCALCIFEROL) 20352 units (1250 mcg) capsule, TAKE 1 CAPSULE BY MOUTH ONCE A WEEK  Multiple Vitamins-Minerals (OPTIVITE P.M.T.) TABS, Take 3 tablets by mouth 2 times daily (Patient not taking: Reported on 2/14/2020)  [DISCONTINUED] norethindrone-ethinyl estradiol (MICROGESTIN) 1-20 MG-MCG per tablet, Take 1 tablet by mouth daily.    No current facility-administered medications on file prior to visit.       Allergy:  Allergies   Allergen Reactions     Cats      Sulfa Drugs Rash     Sulfonamide antibiotics       Medical History:  Past Medical History:   Diagnosis Date     ADHD (attention deficit hyperactivity disorder)      Cold sore      Demyelinating changes in brain (H)     saw Dr Gilbert 5/2013 -- ? of MS, but not Dx'd yet.     Elevated glucose 2014    resolved 2016     Elevated liver enzymes 2014    resolved 2015     Fibrocystic breast      History of left salpingo-oophorectomy     still has L ovary, but missing L tube     Hypovitaminosis D      Neuropathy     feet     Obesity      PCOS (polycystic ovarian syndrome)        Surgical History:  Past Surgical History:   Procedure Laterality Date     GYN SURGERY  5/2010    ectopic pregnancy ruptured -L salpingectomy     HYSTEROSCOPY,DIAGNOSTIC  2016    done here and at [facility]  2014       Social History:  Social History    Substance and Sexual Activity      Alcohol use: Yes        Alcohol/week: 0.0 standard drinks        Frequency: Monthly or less        Drinks per session: 1 or 2        Comment: rare    History   Smoking Status     Never Smoker   Smokeless Tobacco     Never Used     History   Drug Use No     History   Sexual Activity     Sexual activity: Yes     Partners: Male     Birth control/ protection: Natural Family Planning     Comment: [facility]     Relationship status is:  / Partnered    8.5 Years.  Name of Spouse / Partner:  Parmjit.    Lives in Stable housing and no concerns with Spouse.   Employment: Employed full time  History  of sexual, physical or mental abuse: No.   She denies current fears or concerns for personal safety.    Hypertension Follow-up      Do you check your blood pressure regularly outside of the clinic? No     Are you following a low salt diet? No    Are your blood pressures ever more than 140 on the top number (systolic) OR more   than 90 on the bottom number (diastolic), for example 140/90? Yes      How many servings of fruits and vegetables do you eat daily?  0-1    On average, how many sweetened beverages do you drink each day (Examples: soda, juice, sweet tea, etc.  Do NOT count diet or artificially sweetened beverages)?   Not asked    How many days per week do you exercise enough to make your heart beat faster? 3 or less    How many minutes a day do you exercise enough to make your heart beat faster? 9 or less    How many days per week do you miss taking your medication? n/a    Abnormal Mood Symptoms      Duration: years    Description:  Depression: no  Anxiety: YES  Panic attacks: no      Accompanying signs and symptoms: see PHQ-9 and MARIBETH scores    History (similar episodes/previous evaluation): None    Precipitating or alleviating factors: None    Therapies tried and outcome: none         Review of Systems:    GENERAL: No change in weight, sleep or appetite.  Normal energy.  No fever or chills  EYES: Negative for vision changes or eye problems  ENT: No problems with ears, nose or throat.  No difficulty swallowing.  RESP: No coughing, wheezing or shortness of breath  CV: No chest pains or palpitations  GI: No nausea, vomiting,  heartburn, abdominal pain, diarrhea, constipation or change in bowel habits  : No urinary frequency or dysuria, bladder or kidney problems  MUSCULOSKELETAL: No significant muscle or joint pains  NEUROLOGIC: No headaches, numbness, tingling, weakness, problems with balance or coordination  Neurologic: POSITIVE for:, gait disturbance, numbness or tingling, Hx  headaches-muscle-tension  PSYCHIATRIC: No problems with anxiety, depression or mental health  HEME/IMMUNE/ALLERGY: No history of bleeding or clotting problems or anemia.  No allergies or immune system problems  ENDOCRINE: No history of thyroid disease, diabetes or other endocrine disorders  SKIN: No rashes,worrisome lesions or skin problems      Physical Exam:  Vitals:    20 1418   BP: (!) 140/100   BP Location: Right arm   Patient Position: Chair   Cuff Size: Adult Large   Pulse: 102   Resp: 20   Temp: 98.4  F (36.9  C)   TempSrc: Tympanic   SpO2: 100%   Weight: 79.8 kg (176 lb)     Body mass index is 31.18 kg/m .    Gen: NAD, Awake and alert, cooperative with exam  CV: regular rate and rhythm  Resp: lungs clear to auscultation bilaterally  Extremities: nontender, no edema  Psych:  Normal mentation, mood/affect    Assessment:  Radha Carl is a 29 year old  who presents for follow up  for PCOS .     (I10) Benign essential hypertension  (primary encounter diagnosis)  Comment:   Plan: lisinopril (ZESTRIL) 2.5 MG tablet, EKG 12-lead        complete w/read - (Clinic Performed)        Follow-up 4 wks    (F90.0) Attention deficit hyperactivity disorder (ADHD), predominantly inattentive type  Comment:   Plan: lisdexamfetamine (VYVANSE) 10 MG capsule        Start and follow-up 4 wks  Also consider other medication for MARIBETH    (E28.2) PCOS (polycystic ovarian syndrome)  Comment:   Plan: not looking to achieve pregnancy right now  Continue vitamins to maximize hormone balance      Muriel Jones MD

## 2020-02-14 ENCOUNTER — OFFICE VISIT (OUTPATIENT)
Dept: FAMILY MEDICINE | Facility: OTHER | Age: 30
End: 2020-02-14
Attending: FAMILY MEDICINE
Payer: COMMERCIAL

## 2020-02-14 VITALS
TEMPERATURE: 98.4 F | DIASTOLIC BLOOD PRESSURE: 100 MMHG | BODY MASS INDEX: 31.18 KG/M2 | SYSTOLIC BLOOD PRESSURE: 140 MMHG | OXYGEN SATURATION: 100 % | HEART RATE: 102 BPM | RESPIRATION RATE: 20 BRPM | WEIGHT: 176 LBS

## 2020-02-14 DIAGNOSIS — E28.2 PCOS (POLYCYSTIC OVARIAN SYNDROME): ICD-10-CM

## 2020-02-14 DIAGNOSIS — I10 BENIGN ESSENTIAL HYPERTENSION: Primary | ICD-10-CM

## 2020-02-14 DIAGNOSIS — F90.0 ATTENTION DEFICIT HYPERACTIVITY DISORDER (ADHD), PREDOMINANTLY INATTENTIVE TYPE: ICD-10-CM

## 2020-02-14 PROCEDURE — 93000 ELECTROCARDIOGRAM COMPLETE: CPT | Performed by: INTERNAL MEDICINE

## 2020-02-14 PROCEDURE — 99214 OFFICE O/P EST MOD 30 MIN: CPT | Mod: 25 | Performed by: FAMILY MEDICINE

## 2020-02-14 RX ORDER — LISDEXAMFETAMINE DIMESYLATE 10 MG/1
10 CAPSULE ORAL EVERY MORNING
Qty: 30 CAPSULE | Refills: 0 | Status: SHIPPED | OUTPATIENT
Start: 2020-02-14 | End: 2020-03-18

## 2020-02-14 RX ORDER — LISINOPRIL 2.5 MG/1
2.5 TABLET ORAL DAILY
Qty: 30 TABLET | Refills: 1 | Status: SHIPPED | OUTPATIENT
Start: 2020-02-14 | End: 2020-09-11

## 2020-02-14 ASSESSMENT — PAIN SCALES - GENERAL: PAINLEVEL: SEVERE PAIN (6)

## 2020-02-14 NOTE — NURSING NOTE
"Chief Complaint   Patient presents with     PCOS       Initial BP (!) 140/100 (BP Location: Right arm, Patient Position: Chair, Cuff Size: Adult Large)   Pulse 102   Temp 98.4  F (36.9  C) (Tympanic)   Resp 20   Wt 79.8 kg (176 lb)   LMP 01/29/2020   SpO2 100%   BMI 31.18 kg/m   Estimated body mass index is 31.18 kg/m  as calculated from the following:    Height as of 6/13/18: 1.6 m (5' 3\").    Weight as of this encounter: 79.8 kg (176 lb).  Medication Reconciliation: complete  Judi Gallegos LPN    "

## 2020-02-17 ENCOUNTER — TELEPHONE (OUTPATIENT)
Dept: FAMILY MEDICINE | Facility: OTHER | Age: 30
End: 2020-02-17

## 2020-02-17 DIAGNOSIS — F90.0 ATTENTION DEFICIT HYPERACTIVITY DISORDER (ADHD), PREDOMINANTLY INATTENTIVE TYPE: ICD-10-CM

## 2020-02-17 RX ORDER — LISDEXAMFETAMINE DIMESYLATE 20 MG/1
20 CAPSULE ORAL EVERY MORNING
Qty: 30 CAPSULE | Refills: 0 | Status: CANCELLED | OUTPATIENT
Start: 2020-02-17

## 2020-02-17 NOTE — TELEPHONE ENCOUNTER
Pt called, reports that she was recently restarted on vyvanse. In the past pt took 20mg but 10mg dose was ordered. Pt wants 20mg. Pended. Please advise. Thank you!

## 2020-02-28 NOTE — PROGRESS NOTES
Subjective     Radha Carl is a 29 year old female who presents to clinic today for the following health issues:    HPI   Hypertension Follow-up      Do you check your blood pressure regularly outside of the clinic? Yes     Are you following a low salt diet? Yes    Are your blood pressures ever more than 140 on the top number (systolic) OR more   than 90 on the bottom number (diastolic), for example 140/90? No      How many servings of fruits and vegetables do you eat daily?  4 or more    On average, how many sweetened beverages do you drink each day (Examples: soda, juice, sweet tea, etc.  Do NOT count diet or artificially sweetened beverages)?   0    How many days per week do you exercise enough to make your heart beat faster? 7    How many minutes a day do you exercise enough to make your heart beat faster? 30 - 60  How many days per week do you miss taking your medication? 7    What makes it hard for you to take your medications?  Hard to take them because she has to eat with them    Medication Followup of Vyvanse    Taking Medication as prescribed: NO    Side Effects:  None    Medication Helping Symptoms:  NO     Abnormal Mood Symptoms      Duration: months    Description:  Depression: YES  Anxiety: YES  Panic attacks: no     Accompanying signs and symptoms: see PHQ-9 and MARIBETH scores    History (similar episodes/previous evaluation): stress at home with projects, , stress at work, with her health and headaches missing work for them    Precipitating or alleviating factors: gets angry easily    Therapies tried and outcome: none      Patient Active Problem List   Diagnosis     Attention deficit hyperactivity disorder (ADHD), predominantly inattentive type     Hyperlipidemia with target LDL less than 100     PCOS (polycystic ovarian syndrome)     Elevated liver enzymes     MS (multiple sclerosis) (H)     Vitamin B12 deficiency (non anemic)     Adiposity     Skin sensation disturbance     Tubal  pregnancy without intrauterine pregnancy     Visual field scotoma     ACP (advance care planning)     Dysmenorrhea     MARIBETH (generalized anxiety disorder)     Hypovitaminosis D     Infertility, female, secondary     Elevated glucose     Costochondral pain     Stress     Benign essential hypertension     Prediabetes     Chronic tension-type headache, not intractable     Past Surgical History:   Procedure Laterality Date     GYN SURGERY  5/2010    ectopic pregnancy ruptured -L salpingectomy     HYSTEROSCOPY,DIAGNOSTIC  2016    [facility] here and at [facility]  2014       Social History     Tobacco Use     Smoking status: Never Smoker     Smokeless tobacco: Never Used   Substance Use Topics     Alcohol use: Yes     Alcohol/week: 0.0 standard drinks     Frequency: Monthly or less     Drinks per session: 1 or 2     Comment: rare     Family History   Problem Relation Age of Onset     Family History Negative Mother      Family History Negative Father      Family History Negative Sister      Family History Negative Sister      Obesity Sister      Breast Cancer Maternal Grandmother 64     Unknown/Adopted Maternal Grandfather         severe gerd     Lung Cancer Paternal Grandmother         +tobacco     Heart Disease Paternal Grandfather         AMI/CAD or CVA     Family History Negative Brother      Psychotic Disorder Sister         ADHD         Current Outpatient Medications   Medication Sig Dispense Refill     acetaminophen (TYLENOL) 500 MG tablet Take 2 tablets by mouth. Every 6 hours as needed       ACETYLCYSTEINE PO Take 600 mg by mouth 2 times daily       cyclobenzaprine (FLEXERIL) 10 MG tablet Take 0.5-1 tablets (5-10 mg) by mouth daily as needed for muscle spasms 90 tablet 0     ibuprofen (ADVIL,MOTRIN) 200 MG tablet Take 2 tablets by mouth. Every 6 hours as needed with food       lisdexamfetamine (VYVANSE) 10 MG capsule Take 1 capsule (10 mg) by mouth every morning 30 capsule 0     lisinopril  (ZESTRIL) 2.5 MG tablet Take 1 tablet (2.5 mg) by mouth daily 30 tablet 1     Meloxicam (MOBIC PO)        Multiple Vitamins-Minerals (OPTIVITE P.M.T.) TABS Take 3 tablets by mouth 2 times daily 180 tablet 3     order for DME Right ankle ASO 1 Device 0     progesterone (PROMETRIUM) 100 MG capsule Take 1 capsule (100 mg) by mouth daily Starting on peak +3 for 10 days out of month 30 capsule 3     Pyridoxine HCl (VITAMIN  B-6) 500 MG TABS Take 1 tablet by mouth daily       valACYclovir (VALTREX) 500 MG tablet TAKE 1 TABLET BY MOUTH TWICE DAILY WITH  ACTIVE  LESION 30 tablet 5     vitamin D2 (ERGOCALCIFEROL) 84109 units (1250 mcg) capsule TAKE 1 CAPSULE BY MOUTH ONCE A WEEK 12 capsule 3     Allergies   Allergen Reactions     Cats      Sulfa Drugs Rash     Sulfonamide antibiotics     BP Readings from Last 3 Encounters:   03/11/20 (!) 134/90   02/14/20 (!) 140/100   11/29/19 132/80    Wt Readings from Last 3 Encounters:   03/11/20 79.4 kg (175 lb)   02/14/20 79.8 kg (176 lb)   11/29/19 79.8 kg (176 lb)        Reviewed and updated as needed this visit by Provider         Review of Systems   ROS COMP: Constitutional, HEENT, cardiovascular, pulmonary, gi and gu systems are negative, except as otherwise noted.      Objective    BP (!) 134/90 (BP Location: Left arm, Patient Position: Chair, Cuff Size: Adult Large)   Pulse 102   Temp 97.2  F (36.2  C) (Tympanic)   Resp 20   Wt 79.4 kg (175 lb)   SpO2 100%   BMI 31.00 kg/m    Body mass index is 31 kg/m .  Physical Exam   GENERAL: healthy, alert and no distress  NECK: no adenopathy, no asymmetry, masses, or scars and thyroid normal to palpation  RESP: lungs clear to auscultation - no rales, rhonchi or wheezes  CV: regular rate and rhythm, normal S1 S2, no S3 or S4, no murmur, click or rub, no peripheral edema and peripheral pulses strong  MS: no gross musculoskeletal defects noted, no edema  PSYCH: mentation appears normal, affect normal/bright    Diagnostic Test  Results:  Labs reviewed in Epic  No results found for any visits on 03/11/20.        Assessment & Plan     (G35) MS (multiple sclerosis) (H)  (primary encounter diagnosis)  Comment:   Plan: PHYSICAL THERAPY REFERRAL        Encourage some therapy for her neck, try dry needling    (G44.229) Chronic tension-type headache, not intractable  Comment:   Plan: PHYSICAL THERAPY REFERRAL        She declines medication, reports headache a few times per year    (F41.1) MARIBETH (generalized anxiety disorder)  Comment:   Plan: would like to start medication, she declines  Strongly encourage EAP or counselor    (F90.0) Attention deficit hyperactivity disorder (ADHD), predominantly inattentive type  Comment:   Plan: vyvance is helping    (F43.9) Stress  Comment:   Plan: PHYSICAL THERAPY REFERRAL          (I10) Benign essential hypertension  Comment:   Plan: has not been taking lisinopril  Encourage self-cares     Patient was agreeable to this plan and had no further questions.  Patient Instructions    Psychologists/ Counselors  Hayden CARL    [facility]   [phone]  [facility]   [phone]  [facility] [phone]  [facility] (kids) [phone]  [facility](teens) [phone]  [facility]  [phone]  [facility]  [phone]  [facility]  [phone]  [facility]  [phone]  [facility]  [phone]  [facility] counseling [phone] (Opening new location)  [facility]   [phone] (Opening new location)  [facility]    [phone]   [facility]   [phone]   (Jacqueline Aldana)   [facility]  [phone]  [facility] [phone]  [facility]  [phone]  [facility]  [phone]     [facility] [phone]      [facility] counseling [phone]  [facility]   [phone]  [facility] (TURNER Rebolledo)                                                    [phone]  Aleksandra Faith  [phone]  [facility] [phone]  [facility]      [phone]  [facility]       [phone]  [employer] [facility]  [phone]  [facility]      [phone]     [facility]   [phone]  [facility]      [phone]  [facility] [address] TE[facility]ILA Garnica [phone]  [facility]      [phone]  [facility]  [phone]    [facility]                      [phone]    *Facilities in bold italics indicate medication management  Services are offered.    Crisis support  Mobile crisis line- [phone]  [facility]: Free online resources including therapy for Mental Health and substance use problems: Http://thriverange.org    Crisis Text Line  Text HOME to 539-815 - The Crisis Text Line serves anyone, in any type of crisis, providing access to free, 24/7 support and information via the medium people already use and trust  http://www.crisistextline.org   Here's how it works:  Text HOME to 915-673 from anywhere in the [place], anytime, about any type of crisis.  A live, trained Crisis Counselor receives the text and responds quickly.  The volunteer Crisis Counselor will help you move from a 'hot moment to a cool moment'                                No follow-ups on file.    Muriel Jones MD  [facility]

## 2020-03-04 PROBLEM — R73.03 PREDIABETES: Status: ACTIVE | Noted: 2020-03-04

## 2020-03-11 ENCOUNTER — OFFICE VISIT (OUTPATIENT)
Dept: FAMILY MEDICINE | Facility: OTHER | Age: 30
End: 2020-03-11
Attending: FAMILY MEDICINE
Payer: COMMERCIAL

## 2020-03-11 VITALS
BODY MASS INDEX: 31 KG/M2 | WEIGHT: 175 LBS | DIASTOLIC BLOOD PRESSURE: 90 MMHG | HEART RATE: 102 BPM | SYSTOLIC BLOOD PRESSURE: 134 MMHG | TEMPERATURE: 97.2 F | OXYGEN SATURATION: 100 % | RESPIRATION RATE: 20 BRPM

## 2020-03-11 DIAGNOSIS — G35 MS (MULTIPLE SCLEROSIS) (H): Primary | ICD-10-CM

## 2020-03-11 DIAGNOSIS — F41.1 GAD (GENERALIZED ANXIETY DISORDER): ICD-10-CM

## 2020-03-11 DIAGNOSIS — G44.229 CHRONIC TENSION-TYPE HEADACHE, NOT INTRACTABLE: ICD-10-CM

## 2020-03-11 DIAGNOSIS — F43.9 STRESS: ICD-10-CM

## 2020-03-11 DIAGNOSIS — I10 BENIGN ESSENTIAL HYPERTENSION: ICD-10-CM

## 2020-03-11 DIAGNOSIS — F90.0 ATTENTION DEFICIT HYPERACTIVITY DISORDER (ADHD), PREDOMINANTLY INATTENTIVE TYPE: ICD-10-CM

## 2020-03-11 PROCEDURE — 99214 OFFICE O/P EST MOD 30 MIN: CPT | Performed by: FAMILY MEDICINE

## 2020-03-11 ASSESSMENT — ANXIETY QUESTIONNAIRES
2. NOT BEING ABLE TO STOP OR CONTROL WORRYING: MORE THAN HALF THE DAYS
GAD7 TOTAL SCORE: 13
6. BECOMING EASILY ANNOYED OR IRRITABLE: MORE THAN HALF THE DAYS
5. BEING SO RESTLESS THAT IT IS HARD TO SIT STILL: MORE THAN HALF THE DAYS
1. FEELING NERVOUS, ANXIOUS, OR ON EDGE: MORE THAN HALF THE DAYS
IF YOU CHECKED OFF ANY PROBLEMS ON THIS QUESTIONNAIRE, HOW DIFFICULT HAVE THESE PROBLEMS MADE IT FOR YOU TO DO YOUR WORK, TAKE CARE OF THINGS AT HOME, OR GET ALONG WITH OTHER PEOPLE: VERY DIFFICULT
7. FEELING AFRAID AS IF SOMETHING AWFUL MIGHT HAPPEN: NOT AT ALL
3. WORRYING TOO MUCH ABOUT DIFFERENT THINGS: MORE THAN HALF THE DAYS

## 2020-03-11 ASSESSMENT — PATIENT HEALTH QUESTIONNAIRE - PHQ9
5. POOR APPETITE OR OVEREATING: NEARLY EVERY DAY
SUM OF ALL RESPONSES TO PHQ QUESTIONS 1-9: 11

## 2020-03-11 ASSESSMENT — PAIN SCALES - GENERAL: PAINLEVEL: NO PAIN (0)

## 2020-03-11 NOTE — PATIENT INSTRUCTIONS
Psychologists/ Counselors  Hayden Holder  [place]   [phone]  [facility]   [phone]  [facility] [phone]  [facility] (kids) [phone]  [facility](teens) [phone]  [facility]  [phone]  [facility]  [phone]  [facility]  [phone]  [facility]  [phone]  [facility]  [phone]  [facility] counseling [phone] (Opening new location)  [facility]   [phone] (Opening new location)  [facility]    [phone]   [facility]   [phone]   (Jacqueline Aldana)   [facility]  [phone]  [facility] [phone]  [facility]  [phone]  [place] [facility]  [phone]     [facility] [phone]      [facility] counseling [phone]  [facility]   [phone]  [facility] (Tarun Salcido LMFT)                                                   [phone]  Ale[facility]andra Faith  [phone]  [facility] [phone]  [facility]      [phone]  [facility]       [phone]  [employer] [facility]  [phone]  [facility]      [phone]     [facility]   [phone]  [facility]      [phone]  [facility] [address] TEQUILA Garnica [phone]  [facility]      [phone]  [facility]  [phone]    [facility]                      [phone]    *Facilities in bold italics indicate medication management  Services are offered.    Crisis support  Mobile crisis line- [phone]  [facility]: Free online resources including therapy for Mental Health and substance use problems: Http://thriverange.org    Crisis Text Line  Text HOME to 283-164 - The Crisis Text Line serves anyone, in any type of crisis, providing access to free, 24/7 support and information  via the medium people already use and trust  http://www.crisistextline.org   Here's how it works:  Text HOME to 823-123 from anywhere in the [place], anytime, about any type of crisis.  A live, trained Crisis Counselor receives the text and responds quickly.  The volunteer Crisis Counselor will help you move from a 'hot moment to a cool moment'

## 2020-03-12 ASSESSMENT — ANXIETY QUESTIONNAIRES: GAD7 TOTAL SCORE: 13

## 2020-03-18 ENCOUNTER — OFFICE VISIT (OUTPATIENT)
Dept: FAMILY MEDICINE | Facility: OTHER | Age: 30
End: 2020-03-18
Attending: FAMILY MEDICINE
Payer: COMMERCIAL

## 2020-03-18 VITALS
BODY MASS INDEX: 31 KG/M2 | HEART RATE: 94 BPM | WEIGHT: 175 LBS | DIASTOLIC BLOOD PRESSURE: 84 MMHG | RESPIRATION RATE: 20 BRPM | OXYGEN SATURATION: 100 % | SYSTOLIC BLOOD PRESSURE: 152 MMHG | TEMPERATURE: 98.7 F

## 2020-03-18 DIAGNOSIS — H69.93 DYSFUNCTION OF BOTH EUSTACHIAN TUBES: Primary | ICD-10-CM

## 2020-03-18 DIAGNOSIS — F90.0 ATTENTION DEFICIT HYPERACTIVITY DISORDER (ADHD), PREDOMINANTLY INATTENTIVE TYPE: ICD-10-CM

## 2020-03-18 PROCEDURE — 99213 OFFICE O/P EST LOW 20 MIN: CPT | Performed by: FAMILY MEDICINE

## 2020-03-18 RX ORDER — FLUTICASONE PROPIONATE 50 MCG
1 SPRAY, SUSPENSION (ML) NASAL DAILY
Qty: 16 G | Refills: 1 | Status: SHIPPED | OUTPATIENT
Start: 2020-03-18 | End: 2020-05-21

## 2020-03-18 RX ORDER — LISDEXAMFETAMINE DIMESYLATE 10 MG/1
10 CAPSULE ORAL EVERY MORNING
Qty: 30 CAPSULE | Refills: 0 | Status: SHIPPED | OUTPATIENT
Start: 2020-03-18 | End: 2020-04-16

## 2020-03-18 ASSESSMENT — PAIN SCALES - GENERAL: PAINLEVEL: EXTREME PAIN (8)

## 2020-03-18 NOTE — TELEPHONE ENCOUNTER
Joaquin      Last Written Prescription Date:  02/14/20  Last Fill Quantity: 30,   # refills: 0  Last Office Visit: 03/18/20  Future Office visit:       Routing refill request to provider for review/approval because:  Drug not on the [facility], P or [facility] refill protocol or controlled substance

## 2020-03-18 NOTE — NURSING NOTE
"Chief Complaint   Patient presents with     Otalgia       Initial BP (!) 152/84 (BP Location: Left arm, Patient Position: Sitting, Cuff Size: Adult Regular)   Pulse 94   Temp 98.7  F (37.1  C) (Tympanic)   Resp 20   Wt 79.4 kg (175 lb)   SpO2 100%   BMI 31.00 kg/m   Estimated body mass index is 31 kg/m  as calculated from the following:    Height as of 6/13/18: 1.6 m (5' 3\").    Weight as of this encounter: 79.4 kg (175 lb).  Medication Reconciliation: complete  Delvin Akins LPN  "

## 2020-03-18 NOTE — PROGRESS NOTES
Subjective     Radha Carl is a 29 year old female who presents to clinic today for the following health issues:    HPI   RESPIRATORY SYMPTOMS      Duration: 1 day    Description  Sharp ear pain-left    Severity: mild    Accompanying signs and symptoms: None    History (predisposing factors):  none    Precipitating or alleviating factors: None    Therapies tried and outcome:  none      Patient Active Problem List   Diagnosis     Attention deficit hyperactivity disorder (ADHD), predominantly inattentive type     Hyperlipidemia with target LDL less than 100     PCOS (polycystic ovarian syndrome)     Elevated liver enzymes     MS (multiple sclerosis) (H)     Vitamin B12 deficiency (non anemic)     Adiposity     Skin sensation disturbance     Tubal pregnancy without intrauterine pregnancy     Visual field scotoma     ACP (advance care planning)     Dysmenorrhea     MARIBETH (generalized anxiety disorder)     Hypovitaminosis D     Infertility, female, secondary     Elevated glucose     Costochondral pain     Stress     Benign essential hypertension     Prediabetes     Chronic tension-type headache, not intractable     Past Surgical History:   Procedure Laterality Date     GYN SURGERY  5/2010    ectopic pregnancy ruptured -L salpingectomy     HYSTEROSCOPY,DIAGNOSTIC  2016    done here and at [facility]  2014       Social History     Tobacco Use     Smoking status: Never Smoker     Smokeless tobacco: Never Used   Substance Use Topics     Alcohol use: Yes     Alcohol/week: 0.0 standard drinks     Frequency: Monthly or less     Drinks per session: 1 or 2     Comment: rare     Family History   Problem Relation Age of Onset     Family History Negative Mother      Family History Negative Father      Family History Negative Sister      Family History Negative Sister      Obesity Sister      Breast Cancer Maternal Grandmother 64     Unknown/Adopted Maternal Grandfather         severe gerd     Lung Cancer  Paternal Grandmother         +tobacco     Heart Disease Paternal Grandfather         AMI/CAD or CVA     Family History Negative Brother      Psychotic Disorder Sister         ADHD         Current Outpatient Medications   Medication Sig Dispense Refill     ACETYLCYSTEINE PO Take 600 mg by mouth 2 times daily       cyclobenzaprine (FLEXERIL) 10 MG tablet Take 0.5-1 tablets (5-10 mg) by mouth daily as needed for muscle spasms 90 tablet 0     fluticasone (FLONASE) 50 MCG/ACT nasal spray Spray 1 spray into both nostrils daily 16 g 1     lisdexamfetamine (VYVANSE) 10 MG capsule Take 1 capsule (10 mg) by mouth every morning 30 capsule 0     lisinopril (ZESTRIL) 2.5 MG tablet Take 1 tablet (2.5 mg) by mouth daily 30 tablet 1     Meloxicam (MOBIC PO)        Multiple Vitamins-Minerals (OPTIVITE P.M.T.) TABS Take 3 tablets by mouth 2 times daily 180 tablet 3     order for DME Right ankle ASO 1 Device 0     progesterone (PROMETRIUM) 100 MG capsule Take 1 capsule (100 mg) by mouth daily Starting on peak +3 for 10 days out of month 30 capsule 3     Pyridoxine HCl (VITAMIN  B-6) 500 MG TABS Take 1 tablet by mouth daily       valACYclovir (VALTREX) 500 MG tablet TAKE 1 TABLET BY MOUTH TWICE DAILY WITH  ACTIVE  LESION 30 tablet 5     vitamin D2 (ERGOCALCIFEROL) 83679 units (1250 mcg) capsule TAKE 1 CAPSULE BY MOUTH ONCE A WEEK 12 capsule 3     acetaminophen (TYLENOL) 500 MG tablet Take 2 tablets by mouth. Every 6 hours as needed       ibuprofen (ADVIL,MOTRIN) 200 MG tablet Take 2 tablets by mouth. Every 6 hours as needed with food       Allergies   Allergen Reactions     Cats      Sulfa Drugs Rash     Sulfonamide antibiotics     BP Readings from Last 3 Encounters:   03/18/20 (!) 152/84   03/11/20 (!) 134/90   02/14/20 (!) 140/100    Wt Readings from Last 3 Encounters:   03/18/20 79.4 kg (175 lb)   03/11/20 79.4 kg (175 lb)   02/14/20 79.8 kg (176 lb)         Reviewed and updated as needed this visit by Provider         Review of  Systems   ROS COMP: Constitutional, HEENT, cardiovascular, pulmonary, gi and gu systems are negative, except as otherwise noted.      Objective    BP (!) 152/84 (BP Location: Left arm, Patient Position: Sitting, Cuff Size: Adult Regular)   Pulse 94   Temp 98.7  F (37.1  C) (Tympanic)   Resp 20   Wt 79.4 kg (175 lb)   SpO2 100%   BMI 31.00 kg/m    Body mass index is 31 kg/m .  Physical Exam   GENERAL: healthy, alert and no distress  EYES: Eyes grossly normal to inspection, PERRL and conjunctivae and sclerae normal  HENT: ear canals and TM's normal with mild amount of fluid bilaterally, nose and mouth without ulcers or lesions  NECK: no adenopathy, no asymmetry, masses, or scars and thyroid normal to palpation  RESP: lungs clear to auscultation - no rales, rhonchi or wheezes  CV: regular rate and rhythm, normal S1 S2, no S3 or S4, no murmur, click or rub, no peripheral edema and peripheral pulses strong  PSYCH: mentation appears normal, affect normal/bright    Diagnostic Test Results:  Labs reviewed in Epic  No results found for any visits on 03/18/20.        Assessment & Plan     (H69.83) Dysfunction of both eustachian tubes  (primary encounter diagnosis)  Comment:   Plan: fluticasone (FLONASE) 50 MCG/ACT nasal spray        Ears look good, some mild amount of fluid bilaterally       Patient was agreeable to this plan and had no further questions.  There are no Patient Instructions on file for this visit.    No follow-ups on file.    Muriel Jones MD  [facility]

## 2020-03-24 ENCOUNTER — OFFICE VISIT (OUTPATIENT)
Dept: OTOLARYNGOLOGY | Facility: OTHER | Age: 30
End: 2020-03-24
Attending: NURSE PRACTITIONER
Payer: COMMERCIAL

## 2020-03-24 VITALS
HEART RATE: 95 BPM | SYSTOLIC BLOOD PRESSURE: 148 MMHG | BODY MASS INDEX: 31.01 KG/M2 | WEIGHT: 175 LBS | TEMPERATURE: 98.7 F | DIASTOLIC BLOOD PRESSURE: 98 MMHG | RESPIRATION RATE: 16 BRPM | HEIGHT: 63 IN | OXYGEN SATURATION: 100 %

## 2020-03-24 DIAGNOSIS — M26.609 TEMPOROMANDIBULAR JOINT DISORDER: ICD-10-CM

## 2020-03-24 DIAGNOSIS — H92.02 OTALGIA, LEFT: Primary | ICD-10-CM

## 2020-03-24 PROCEDURE — 99213 OFFICE O/P EST LOW 20 MIN: CPT | Mod: 25 | Performed by: NURSE PRACTITIONER

## 2020-03-24 PROCEDURE — 92504 EAR MICROSCOPY EXAMINATION: CPT | Performed by: NURSE PRACTITIONER

## 2020-03-24 ASSESSMENT — PAIN SCALES - GENERAL: PAINLEVEL: EXTREME PAIN (8)

## 2020-03-24 ASSESSMENT — MIFFLIN-ST. JEOR: SCORE: 1477.92

## 2020-03-24 NOTE — PROGRESS NOTES
Otolaryngology Note         Chief Complaint:     Patient presents with:  Ear Problem: sharp pain in ear           History of Present Illness:     Radha Carl is a 29 year old female seen today for left sided ear pain that started 6 days ago.  The pain is intermittent and sharp.  She denies change in hearing but notes at times it feels like it needs to pop.  She was seen by Dr Jones last week and started on flonase, she has not noted any improvements with the flonase.  She took ibuprofen and tylenol x 1 without improvement so hasn't been taking it anymore.      No history of recurrent OM  Never smoker   Patient has no history of otological surgeries or procedures  No family hx of congential hearing loss, COM  No current concerns with otalgia, otorrhea.    No history of noise exposure  No vertigo, facial numbness or tingling.    She denies clenching or grinding that she is aware of.  She does note a history of TMJ in the past.           Medications:     Current Outpatient Rx   Medication Sig Dispense Refill     acetaminophen (TYLENOL) 500 MG tablet Take 2 tablets by mouth. Every 6 hours as needed       ACETYLCYSTEINE PO Take 600 mg by mouth 2 times daily       cyclobenzaprine (FLEXERIL) 10 MG tablet Take 0.5-1 tablets (5-10 mg) by mouth daily as needed for muscle spasms 90 tablet 0     fluticasone (FLONASE) 50 MCG/ACT nasal spray Spray 1 spray into both nostrils daily 16 g 1     ibuprofen (ADVIL,MOTRIN) 200 MG tablet Take 2 tablets by mouth. Every 6 hours as needed with food       lisdexamfetamine (VYVANSE) 10 MG capsule Take 1 capsule (10 mg) by mouth every morning 30 capsule 0     lisinopril (ZESTRIL) 2.5 MG tablet Take 1 tablet (2.5 mg) by mouth daily 30 tablet 1     Meloxicam (MOBIC PO)        Multiple Vitamins-Minerals (OPTIVITE P.M.T.) TABS Take 3 tablets by mouth 2 times daily 180 tablet 3     order for DME Right ankle ASO 1 Device 0     progesterone (PROMETRIUM) 100 MG capsule Take 1 capsule (100 mg)  "by mouth daily Starting on peak +3 for 10 days out of month 30 capsule 3     Pyridoxine HCl (VITAMIN  B-6) 500 MG TABS Take 1 tablet by mouth daily       valACYclovir (VALTREX) 500 MG tablet TAKE 1 TABLET BY MOUTH TWICE DAILY WITH  ACTIVE  LESION 30 tablet 5     vitamin D2 (ERGOCALCIFEROL) 10515 units (1250 mcg) capsule TAKE 1 CAPSULE BY MOUTH ONCE A WEEK 12 capsule 3            Allergies:     Allergies: Cats and Sulfa drugs          Past Medical History:     Past Medical History:   Diagnosis Date     ADHD (attention deficit hyperactivity disorder)      Cold sore      Demyelinating changes in brain (H)     saw Dr Gilbert 5/2013 -- ? of MS, but not Dx'd yet.     Elevated glucose 2014    resolved 2016     Elevated liver enzymes 2014    resolved 2015     Fibrocystic breast      History of left salpingo-oophorectomy     still has L ovary, but missing L tube     Hypovitaminosis D      Neuropathy     feet     Obesity      PCOS (polycystic ovarian syndrome)             Past Surgical History:     Past Surgical History:   Procedure Laterality Date     GYN SURGERY  5/2010    ectopic pregnancy ruptured -L salpingectomy     HYSTEROSCOPY,DIAGNOSTIC  2016    done here and at [facility]     wisdom teeth  2014       ENT family history reviewed         Social History:     Social History     Tobacco Use     Smoking status: Never Smoker     Smokeless tobacco: Never Used   Substance Use Topics     Alcohol use: Yes     Alcohol/week: 0.0 standard drinks     Frequency: Monthly or less     Drinks per session: 1 or 2     Comment: rare     Drug use: No            Review of Systems:     ROS: See HPI         Physical Exam:     BP (!) 148/98 (BP Location: Left arm)   Pulse 95   Temp 98.7  F (37.1  C) (Tympanic)   Resp 16   Ht 1.6 m (5' 3\")   Wt 79.4 kg (175 lb)   SpO2 100%   BMI 31.00 kg/m       General - The patient is well nourished and well developed, and appears to have good nutritional status.  Alert and oriented to person and " place, answers questions and cooperates with examination appropriately.   Head and Face - Normocephalic and atraumatic, with no gross asymmetry noted.  The facial nerve is intact, with strong symmetric movements.  Voice and Breathing - The patient was breathing comfortably without the use of accessory muscles. There was no wheezing, stridor. The patients voice was clear and strong, and had appropriate pitch and quality.  Ears - The ears were examined under binocular microscopy and with otoscope.  External ear normal. Canals are patent. Right tympanic membrane is intact without effusion, retraction or mass. Left tympanic membrane is intact without effusion, retraction or mass.  Good movement bilaterally with valsalva.   Eyes - Extraocular movements intact,sSclera were not icteric or injected, conjunctiva were pink and moist.  Mouth - Examination of the oral cavity showed pink, healthy oral mucosa. Dentition in good condition. No lesions or ulcerations noted. The tongue was mobile and midline.  Mild TMJ and masseter muscle tenderness bilaterally, left > right.    Throat - The walls of the oropharynx were smooth, pink, moist, symmetric, and had no lesions or ulcerations.  The tonsillar pillars and soft palate were symmetric. The uvula was midline on elevation.    Neck - Normal midline excursion of the laryngotracheal complex during swallowing.  Full range of motion on passive movement.  Palpation of the occipital, submental, submandibular, internal jugular chain, and supraclavicular nodes did not demonstrate any abnormal lymph nodes or masses.  Palpation of the thyroid was soft and smooth, with no nodules or goiter appreciated.  The trachea was mobile and midline.  Nose - External contour is symmetric, no gross deflection or scars.  Nasal mucosa is pink and moist with no abnormal mucus.  The septum and turbinates were evaluated: grossly normal.  No polyps, masses, or purulence noted on examination.         Assessment  and Plan:       ICD-10-CM    1. Otalgia, left  H92.02    2. Temporomandibular joint disorder  M26.609      Advised Radha that both ears look perfect.  No signs of fluid, retraction or infection.  Symptoms appear to be associated with TMJ causing ear pain.      TMJ disease can usually be managed conservatively.  Recommendations include resting the muscles and joints by eating soft foods, not chewing gum, avoiding clenching or tensing the jaw, and relaxing muscles with moist heat for 1/2 hour at least, twice daily.  Physical therapy can be helpful as well as non-steroidal anti-inflammatory drugs.  Oral splints or mouth guards are often necessary.  If these steps are not helpful an oral surgeon may need to be contacted.    [facility]  4:32 PM  March 24, 2020

## 2020-03-24 NOTE — NURSING NOTE
"Chief Complaint   Patient presents with     Ear Problem     sharp pain in ear       Initial BP (!) 148/98 (BP Location: Left arm)   Pulse 95   Temp 98.7  F (37.1  C) (Tympanic)   Resp 16   Ht 1.6 m (5' 3\")   Wt 79.4 kg (175 lb)   SpO2 100%   BMI 31.00 kg/m   Estimated body mass index is 31 kg/m  as calculated from the following:    Height as of this encounter: 1.6 m (5' 3\").    Weight as of this encounter: 79.4 kg (175 lb).  Medication Reconciliation: complete  Emily Quiroz LPN  "

## 2020-03-24 NOTE — PATIENT INSTRUCTIONS
TMJ disease can usually be managed conservatively.  Recommendations include resting the muscles and joints by eating soft foods, not chewing gum, avoiding clenching or tensing the jaw, and relaxing muscles with moist heat for 1/2 hour at least, twice daily.  Physical therapy can be helpful as well as non-steroidal anti-inflammatory drugs.  Oral splints or mouth guards are often necessary.  If these steps are not helpful an oral surgeon may need to be contacted.      Patient Education     TMJ Syndrome  The temporomandibular joint (TMJ) is the joint that connects your lower jaw to your head. You can feel it in front of your ears when you open and close your mouth. TMJ disorders involve chronic or recurrent pain in the joint. When treated, symptoms of TMJ disorders usually go away within a few months.  Causes  There is no widely agreed-on cause of TMJ disorders. They have been linked to injury, arthritis, chronic fatigue syndrome, and fibromyalgia. A definite connection has not been shown, though.  Symptoms    Pain in the face, jaw, or neck    Pain with jaw movement or chewing    Locking or catching sensation of the jaw    Clicking, popping, or grinding sounds with movement of the TMJ    Headache    Ear pain  Home care  Modest, nonsurgical treatments are a good first step toward relieving symptoms. Try the approaches described below.    Rest the jaw by avoiding crunchy or hard-to-chew foods. Don t eat hard or sticky candies. Soft foods and liquids are easier on the jaw.    Protect your jaw while yawning. If you need to yawn, put your fist under your chin to prevent your mouth from opening up too wide.    To help relieve pain, try applying hot or cold packs to the painful area. Try both hot and cold to find out which works best for you. To make a cold pack, put ice cubes in a plastic bag that seals at the top. Wrap the bag in a clean, thin towel or cloth. Never put ice or an ice pack directly on the skin. If you use hot  packs (small towels soaked in hot water), be careful not to burn yourself.    You may take acetaminophen or ibuprofen for pain, unless you were given a different pain medicine. (Note: If you have chronic liver or kidney disease or have ever had a stomach ulcer or gastrointestinal bleeding, talk with your healthcare provider before using these medicines. Also talk to your provider if you are taking medicine to prevent blood clots.) Don t give aspirin to a child younger than age 19 unless directed by the child s provider. Taking aspirin can put a child at risk for Reye syndrome. This is a rare but very serious disorder that most often affects the brain and the liver.  Reducing stress  If stress seems to be contributing to your symptoms, try to identify the sources of stress in your life. These aren t always obvious. Common stressors include:    Everyday hassles. These include things such as traffic jams, missed appointments, or car trouble.    Major life changes. These can be good, such as a new baby or job promotion. And they can be bad, such as losing a job or losing a loved one.    Overload. The feeling that you have too many responsibilities and can't take care of everything at once.    Helplessness. Feeling like your problems are more than you can solve.  When possible, do something about your sources of stress. See if you can avoid hassles, limit the amount of change in your life at one time, and take breaks when you feel overloaded.  Unfortunately, many stressful situations cannot be avoided. So learning how to manage stress better is very important. Getting regular exercise, eating nutritious, balanced meals, and getting adequate rest all help to make everyday stress more manageable. Certain techniques are also helpful: relaxation and breathing exercises, visualization, biofeedback, meditation, or simply taking some time out to clear your mind. For more information, talk with your healthcare  provider.  Follow-up care  Follow up with your healthcare provider, or as advised. Further testing and additional treatment may be required. If changes to your lifestyle do not improve your symptoms, talk with your healthcare provider about other available therapies. These include bite guards for help with teeth grinding, stress management techniques, and more. If stress is an important factor and does not respond to the above simple measures, talk with your healthcare provider about a referral for stress management.  If X-rays were done, they will be reviewed by a specialist. You will be notified of the results, especially if they affect treatment.  Call 911  Call 911 if any of these occur:    Trouble breathing or swallowing, wheezing    Confusion    Extreme drowsiness or trouble awakening    Fainting or loss of consciousness    Rapid heart rate  When to seek medical advice  Call your healthcare provider right away if any of these occur:    Swollen or red face    Pain gets worse    Neck, mouth, tooth, or throat pain gets worse    Fever of 100.4 F (38 C) or higher, or as directed by your healthcare provider  Date Last Reviewed: 10/1/2017    2383-2113 [employer]. [address] [place] [address], [place] [address]. All rights reserved. This information is not intended as a substitute for professional medical care. Always follow your healthcare professional's instructions.

## 2020-03-24 NOTE — LETTER
3/24/2020         RE: Radha Carl  [address]  [place] [address]        Dear Colleague,    Thank you for referring your patient, Radha Carl, to the [facility]. Please see a copy of my visit note below.    Otolaryngology Note         Chief Complaint:     Patient presents with:  Ear Problem: sharp pain in ear           History of Present Illness:     Radha Carl is a 29 year old female seen today for left sided ear pain that started 6 days ago.  The pain is intermittent and sharp.  She denies change in hearing but notes at times it feels like it needs to pop.  She was seen by Dr Jones last week and started on flonase, she has not noted any improvements with the flonase.  She took ibuprofen and tylenol x 1 without improvement so hasn't been taking it anymore.      No history of recurrent OM  Never smoker   Patient has no history of otological surgeries or procedures  No family hx of congential hearing loss, COM  No current concerns with otalgia, otorrhea.    No history of noise exposure  No vertigo, facial numbness or tingling.    She denies clenching or grinding that she is aware of.  She does note a history of TMJ in the past.           Medications:     Current Outpatient Rx   Medication Sig Dispense Refill     acetaminophen (TYLENOL) 500 MG tablet Take 2 tablets by mouth. Every 6 hours as needed       ACETYLCYSTEINE PO Take 600 mg by mouth 2 times daily       cyclobenzaprine (FLEXERIL) 10 MG tablet Take 0.5-1 tablets (5-10 mg) by mouth daily as needed for muscle spasms 90 tablet 0     fluticasone (FLONASE) 50 MCG/ACT nasal spray Spray 1 spray into both nostrils daily 16 g 1     ibuprofen (ADVIL,MOTRIN) 200 MG tablet Take 2 tablets by mouth. Every 6 hours as needed with food       lisdexamfetamine (VYVANSE) 10 MG capsule Take 1 capsule (10 mg) by mouth every morning 30 capsule 0     lisinopril (ZESTRIL) 2.5 MG tablet Take 1 tablet (2.5 mg) by mouth daily 30  tablet 1     Meloxicam (MOBIC PO)        Multiple Vitamins-Minerals (OPTIVITE P.M.T.) TABS Take 3 tablets by mouth 2 times daily 180 tablet 3     order for DME Right ankle ASO 1 Device 0     progesterone (PROMETRIUM) 100 MG capsule Take 1 capsule (100 mg) by mouth daily Starting on peak +3 for 10 days out of month 30 capsule 3     Pyridoxine HCl (VITAMIN  B-6) 500 MG TABS Take 1 tablet by mouth daily       valACYclovir (VALTREX) 500 MG tablet TAKE 1 TABLET BY MOUTH TWICE DAILY WITH  ACTIVE  LESION 30 tablet 5     vitamin D2 (ERGOCALCIFEROL) 13004 units (1250 mcg) capsule TAKE 1 CAPSULE BY MOUTH ONCE A WEEK 12 capsule 3            Allergies:     Allergies: Cats and Sulfa drugs          Past Medical History:     Past Medical History:   Diagnosis Date     ADHD (attention deficit hyperactivity disorder)      Cold sore      Demyelinating changes in brain (H)     saw Dr Gilbert 5/2013 -- ? of MS, but not Dx'd yet.     Elevated glucose 2014    resolved 2016     Elevated liver enzymes 2014    resolved 2015     Fibrocystic breast      History of left salpingo-oophorectomy     still has L ovary, but missing L tube     Hypovitaminosis D      Neuropathy     feet     Obesity      PCOS (polycystic ovarian syndrome)             Past Surgical History:     Past Surgical History:   Procedure Laterality Date     GYN SURGERY  5/2010    ectopic pregnancy ruptured -L salpingectomy     HYSTEROSCOPY,DIAGNOSTIC  2016    done here and at [facility]     wisdom teeth  2014       ENT family history reviewed         Social History:     Social History     Tobacco Use     Smoking status: Never Smoker     Smokeless tobacco: Never Used   Substance Use Topics     Alcohol use: Yes     Alcohol/week: 0.0 standard drinks     Frequency: Monthly or less     Drinks per session: 1 or 2     Comment: rare     Drug use: No            Review of Systems:     ROS: See HPI         Physical Exam:     BP (!) 148/98 (BP Location: Left arm)   Pulse 95   Temp 98.7  " F (37.1  C) (Tympanic)   Resp 16   Ht 1.6 m (5' 3\")   Wt 79.4 kg (175 lb)   SpO2 100%   BMI 31.00 kg/m       General - The patient is well nourished and well developed, and appears to have good nutritional status.  Alert and oriented to person and place, answers questions and cooperates with examination appropriately.   Head and Face - Normocephalic and atraumatic, with no gross asymmetry noted.  The facial nerve is intact, with strong symmetric movements.  Voice and Breathing - The patient was breathing comfortably without the use of accessory muscles. There was no wheezing, stridor. The patients voice was clear and strong, and had appropriate pitch and quality.  Ears - The ears were examined under binocular microscopy and with otoscope.  External ear normal. Canals are patent. Right tympanic membrane is intact without effusion, retraction or mass. Left tympanic membrane is intact without effusion, retraction or mass.  Good movement bilaterally with valsalva.   Eyes - Extraocular movements intact,sSclera were not icteric or injected, conjunctiva were pink and moist.  Mouth - Examination of the oral cavity showed pink, healthy oral mucosa. Dentition in good condition. No lesions or ulcerations noted. The tongue was mobile and midline.  Mild TMJ and masseter muscle tenderness bilaterally, left > right.    Throat - The walls of the oropharynx were smooth, pink, moist, symmetric, and had no lesions or ulcerations.  The tonsillar pillars and soft palate were symmetric. The uvula was midline on elevation.    Neck - Normal midline excursion of the laryngotracheal complex during swallowing.  Full range of motion on passive movement.  Palpation of the occipital, submental, submandibular, internal jugular chain, and supraclavicular nodes did not demonstrate any abnormal lymph nodes or masses.  Palpation of the thyroid was soft and smooth, with no nodules or goiter appreciated.  The trachea was mobile and midline.  Nose " - External contour is symmetric, no gross deflection or scars.  Nasal mucosa is pink and moist with no abnormal mucus.  The septum and turbinates were evaluated: grossly normal.  No polyps, masses, or purulence noted on examination.         Assessment and Plan:       ICD-10-CM    1. Otalgia, left  H92.02    2. Temporomandibular joint disorder  M26.609      Advised Radha that both ears look perfect.  No signs of fluid, retraction or infection.  Symptoms appear to be associated with TMJ causing ear pain.      TMJ disease can usually be managed conservatively.  Recommendations include resting the muscles and joints by eating soft foods, not chewing gum, avoiding clenching or tensing the jaw, and relaxing muscles with moist heat for 1/2 hour at least, twice daily.  Physical therapy can be helpful as well as non-steroidal anti-inflammatory drugs.  Oral splints or mouth guards are often necessary.  If these steps are not helpful an oral surgeon may need to be contacted.    Danielle AUSTIN  [facility]  4:32 PM  March 24, 2020        Again, thank you for allowing me to participate in the care of your patient.        Sincerely,        Danielle Magaña NP

## 2020-03-30 NOTE — TELEPHONE ENCOUNTER
Valtrex     Last Written Prescription Date: 02/28/2017  Last Fill Quantity: 6, # refills: 0  Last Office Visit with [facility] or [facility] prescribing provider: 3/14/2017        Creatinine   Date Value Ref Range Status   01/25/2017 0.82 0.52 - 1.04 mg/dL Final       
Yes

## 2020-04-16 ENCOUNTER — E-VISIT (OUTPATIENT)
Dept: FAMILY MEDICINE | Facility: OTHER | Age: 30
End: 2020-04-16
Payer: COMMERCIAL

## 2020-04-16 ENCOUNTER — TELEPHONE (OUTPATIENT)
Dept: FAMILY MEDICINE | Facility: OTHER | Age: 30
End: 2020-04-16

## 2020-04-16 DIAGNOSIS — F90.0 ATTENTION DEFICIT HYPERACTIVITY DISORDER (ADHD), PREDOMINANTLY INATTENTIVE TYPE: ICD-10-CM

## 2020-04-16 DIAGNOSIS — F41.1 GAD (GENERALIZED ANXIETY DISORDER): ICD-10-CM

## 2020-04-16 DIAGNOSIS — F90.0 ATTENTION DEFICIT HYPERACTIVITY DISORDER (ADHD), PREDOMINANTLY INATTENTIVE TYPE: Primary | ICD-10-CM

## 2020-04-16 DIAGNOSIS — G35 MS (MULTIPLE SCLEROSIS) (H): ICD-10-CM

## 2020-04-16 PROCEDURE — 99422 OL DIG E/M SVC 11-20 MIN: CPT | Performed by: FAMILY MEDICINE

## 2020-04-16 RX ORDER — FLUOXETINE 10 MG/1
10 CAPSULE ORAL DAILY
Qty: 60 CAPSULE | Refills: 1 | Status: SHIPPED | OUTPATIENT
Start: 2020-04-16 | End: 2020-09-11 | Stop reason: SINTOL

## 2020-04-16 RX ORDER — LISDEXAMFETAMINE DIMESYLATE 10 MG/1
10 CAPSULE ORAL EVERY MORNING
Qty: 30 CAPSULE | Refills: 0 | Status: SHIPPED | OUTPATIENT
Start: 2020-04-17 | End: 2023-07-05

## 2020-04-16 RX ORDER — LISDEXAMFETAMINE DIMESYLATE 20 MG/1
20 CAPSULE ORAL EVERY MORNING
Qty: 30 CAPSULE | Refills: 0 | Status: CANCELLED | OUTPATIENT
Start: 2020-04-16

## 2020-04-16 NOTE — TELEPHONE ENCOUNTER
Pt called, would like her vyvanse increased to 20mg daily. Pt states that she was on this dose previously. Pt notes that 10mg dose seems to be wearing off in the afternoon. Pended 20mg vyvanse. Please advise. Thank you!

## 2020-04-16 NOTE — TELEPHONE ENCOUNTER
Provider E-Visit time total (minutes): 18  ELECTRONIC VISIT DOCUMENTATION:    SUBJECTIVE:  Note above accurately captures the substance of my conversation with the patient.    ASSESSMENT/ PLAN:  1. Attention deficit hyperactivity disorder (ADHD), predominantly inattentive type  Will keep her on 10mg for now as there are other co-founding factors    2. MS (multiple sclerosis) (H)  Will start prozac 20 based on studies showing slowing progression or stabilization    3. MARIBETH (generalized anxiety disorder)  Start prozac 20    Muriel Jones MD  4/16/2020  11:55 AM

## 2020-04-29 ENCOUNTER — MYC MEDICAL ADVICE (OUTPATIENT)
Dept: FAMILY MEDICINE | Facility: OTHER | Age: 30
End: 2020-04-29

## 2020-05-20 ENCOUNTER — MYC MEDICAL ADVICE (OUTPATIENT)
Dept: FAMILY MEDICINE | Facility: OTHER | Age: 30
End: 2020-05-20

## 2020-05-21 ENCOUNTER — VIRTUAL VISIT (OUTPATIENT)
Dept: FAMILY MEDICINE | Facility: OTHER | Age: 30
End: 2020-05-21
Attending: FAMILY MEDICINE
Payer: COMMERCIAL

## 2020-05-21 DIAGNOSIS — F43.9 STRESS: ICD-10-CM

## 2020-05-21 DIAGNOSIS — G35 MS (MULTIPLE SCLEROSIS) (H): Primary | ICD-10-CM

## 2020-05-21 PROCEDURE — 99213 OFFICE O/P EST LOW 20 MIN: CPT | Mod: GT | Performed by: FAMILY MEDICINE

## 2020-05-27 ENCOUNTER — MYC MEDICAL ADVICE (OUTPATIENT)
Dept: FAMILY MEDICINE | Facility: OTHER | Age: 30
End: 2020-05-27

## 2020-06-15 DIAGNOSIS — E55.9 HYPOVITAMINOSIS D: ICD-10-CM

## 2020-06-15 PROCEDURE — 36415 COLL VENOUS BLD VENIPUNCTURE: CPT | Performed by: FAMILY MEDICINE

## 2020-06-15 PROCEDURE — 82306 VITAMIN D 25 HYDROXY: CPT | Performed by: FAMILY MEDICINE

## 2020-06-17 LAB — DEPRECATED CALCIDIOL+CALCIFEROL SERPL-MC: 26 UG/L (ref 20–75)

## 2020-07-08 ENCOUNTER — VIRTUAL VISIT (OUTPATIENT)
Dept: FAMILY MEDICINE | Facility: OTHER | Age: 30
End: 2020-07-08
Attending: FAMILY MEDICINE
Payer: COMMERCIAL

## 2020-07-08 DIAGNOSIS — M54.2 CERVICALGIA: Primary | ICD-10-CM

## 2020-07-08 DIAGNOSIS — R20.2 PARESTHESIAS: ICD-10-CM

## 2020-07-08 PROCEDURE — 99213 OFFICE O/P EST LOW 20 MIN: CPT | Mod: 95 | Performed by: FAMILY MEDICINE

## 2020-07-08 RX ORDER — BACLOFEN 10 MG/1
5-10 TABLET ORAL 2 TIMES DAILY PRN
Qty: 30 TABLET | Refills: 1 | Status: SHIPPED | OUTPATIENT
Start: 2020-07-08 | End: 2021-11-12

## 2020-07-08 ASSESSMENT — ANXIETY QUESTIONNAIRES
4. TROUBLE RELAXING: NEARLY EVERY DAY
1. FEELING NERVOUS, ANXIOUS, OR ON EDGE: SEVERAL DAYS
5. BEING SO RESTLESS THAT IT IS HARD TO SIT STILL: MORE THAN HALF THE DAYS
6. BECOMING EASILY ANNOYED OR IRRITABLE: MORE THAN HALF THE DAYS
2. NOT BEING ABLE TO STOP OR CONTROL WORRYING: SEVERAL DAYS
GAD7 TOTAL SCORE: 10
7. FEELING AFRAID AS IF SOMETHING AWFUL MIGHT HAPPEN: NOT AT ALL
IF YOU CHECKED OFF ANY PROBLEMS ON THIS QUESTIONNAIRE, HOW DIFFICULT HAVE THESE PROBLEMS MADE IT FOR YOU TO DO YOUR WORK, TAKE CARE OF THINGS AT HOME, OR GET ALONG WITH OTHER PEOPLE: SOMEWHAT DIFFICULT
3. WORRYING TOO MUCH ABOUT DIFFERENT THINGS: SEVERAL DAYS

## 2020-07-08 ASSESSMENT — PATIENT HEALTH QUESTIONNAIRE - PHQ9: SUM OF ALL RESPONSES TO PHQ QUESTIONS 1-9: 10
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
## 2020-07-09 DIAGNOSIS — R20.2 PARESTHESIAS: ICD-10-CM

## 2020-07-09 PROCEDURE — 84207 ASSAY OF VITAMIN B-6: CPT | Mod: 90 | Performed by: FAMILY MEDICINE

## 2020-07-09 PROCEDURE — 36415 COLL VENOUS BLD VENIPUNCTURE: CPT | Performed by: FAMILY MEDICINE

## 2020-07-09 PROCEDURE — 99000 SPECIMEN HANDLING OFFICE-LAB: CPT | Performed by: FAMILY MEDICINE

## 2020-07-09 PROCEDURE — 82607 VITAMIN B-12: CPT | Performed by: FAMILY MEDICINE

## 2020-07-09 ASSESSMENT — ANXIETY QUESTIONNAIRES: GAD7 TOTAL SCORE: 10

## 2020-07-10 LAB — VIT B12 SERPL-MCNC: 611 PG/ML (ref 193–986)

## 2020-07-14 LAB — VIT B6 SERPL-MCNC: 53.8 NMOL/L (ref 20–125)

## 2020-07-23 ENCOUNTER — HOSPITAL ENCOUNTER (OUTPATIENT)
Dept: PHYSICAL THERAPY | Facility: HOSPITAL | Age: 30
Setting detail: THERAPIES SERIES
End: 2020-07-23
Attending: FAMILY MEDICINE
Payer: COMMERCIAL

## 2020-07-23 DIAGNOSIS — G35 MS (MULTIPLE SCLEROSIS) (H): ICD-10-CM

## 2020-07-23 DIAGNOSIS — F43.9 STRESS: ICD-10-CM

## 2020-07-23 DIAGNOSIS — G44.229 CHRONIC TENSION-TYPE HEADACHE, NOT INTRACTABLE: ICD-10-CM

## 2020-07-23 PROCEDURE — 97161 PT EVAL LOW COMPLEX 20 MIN: CPT | Mod: GP | Performed by: PHYSICAL THERAPIST

## 2020-07-23 PROCEDURE — 97140 MANUAL THERAPY 1/> REGIONS: CPT | Mod: GP | Performed by: PHYSICAL THERAPIST

## 2020-07-23 PROCEDURE — 97110 THERAPEUTIC EXERCISES: CPT | Mod: GP | Performed by: PHYSICAL THERAPIST

## 2020-07-24 NOTE — PROGRESS NOTES
07/23/20 1300   General Information   Type of Visit Initial OP Ortho PT Evaluation   Start of Care Date 07/23/20   Referring Physician Dr Jones   Patient/Family Goals Statement less pain   Orders Evaluate and Treat   Orders Comment trial dry needling   Date of Order 07/23/20   Certification Required? No   Medical Diagnosis Neck pain, HA   Surgical/Medical history reviewed Yes   Precautions/Limitations no known precautions/limitations   General Information Comments PMH: MS, 2010 tubal, 2013 oral   Body Part(s)   Body Part(s) Cervical Spine   Presentation and Etiology   Pertinent history of current problem (include personal factors and/or comorbidities that impact the POC) Patient presents with R neck pain after a fly was in her hair and she felt she gave herself a whiplash type injury. She reports it is better now, but she still has pain and tightness. She feels it is muscular and we will address that pain and then look at joint mobility   Impairments A. Pain;E. Decreased flexibility;F. Decreased strength and endurance;N. Headaches   Functional Limitations perform activities of daily living;perform required work activities   Symptom Location R neck pain   How/Where did it occur At home   Onset date of current episode/exacerbation 07/08/20   Chronicity New   Pain rating (0-10 point scale) Best (/10);Worst (/10)   Best (/10) 3   Worst (/10) 8   Pain quality A. Sharp;C. Aching;G. Cramping   Frequency of pain/symptoms A. Constant   Pain/symptoms are: Worse during the day   Pain/symptoms exacerbated by A. Sitting;E. Rest;L. Work tasks   Pain/symptoms eased by C. Rest;E. Changing positions;F. Certain positions;G. Heat;H. Cold;I. OTC medication(s)   Progression of symptoms since onset: Improved   Current Level of Function   Patient role/employment history A. Employed   Em[place] [employer] [place]   Fall Risk Screen   Fall screen completed by PT   Have you fallen 2 or more times in the past year? No   Have you  fallen and had an injury in the past year? No   Is patient a fall risk? No   Cervical Spine   Observation no acute distress   Posture Rounded shoulders, forward head   Cervical Flexion ROM WFL   Cervical Extension ROM WFL   Cervical Right Side Bending ROM min gray   Cervical Left Side Bending ROM min gray   Cervical Right Rotation ROM min gray    Cervical Left Rotation ROM minlim   Thoracic Flexion ROM WFL   Thoracic Extension ROM min gray   Shoulder Shrug (C2-C4) Strength 5/5   Shoulder Abd (C5) Strength 4/5   Shoulder Add (C7) Strength 5/5   Shoulder ER (C5, C6) Strength 4/5   Shoulder IR (C5, C6) Strength 5/5   Upper Trapezius Flexibility hypo   Levator Scapula Flexibility hypo   Scalene Flexibility hypo   Pectoralis Minor Flexibility hypo++   Vertebral Artery Test neg   Alar Ligament Test neg   Transverse Ligament Test neg   Spurling Test neg   Neck Flexor Endurance Test (normal 39 sec) weak 8 sec   Segmental Mobility-Cervical hypo at C4-C7, T1-T8   Palpation Tightness In R UT, leavtor, SCM   Planned Therapy Interventions   Planned Therapy Interventions joint mobilization;manual therapy;neuromuscular re-education;ROM;strengthening;stretching   Planned Modality Interventions   Planned Modality Interventions Comments as needed   Clinical Impression   Criteria for Skilled Therapeutic Interventions Met yes, treatment indicated   PT Diagnosis R neck pain/strain   Influenced by the following impairments pain and tightness   Functional limitations due to impairments difficuly performing home and work tasks   Clinical Presentation Stable/Uncomplicated   Clinical Presentation Rationale due to lack of additional comorbiities that may influence anticipated PT progress   Clinical Decision Making (Complexity) Low complexity   Therapy Frequency 2 times/Week   Predicted Duration of Therapy Intervention (days/wks) up to $ weeks   Risk & Benefits of therapy have been explained Yes   Patient, Family & other staff in agreement with  plan of care Yes   Clinical Impression Comments Presentation is consistent with R neck pain that is expected to improve with skilled PT intervention   Education Assessment   Preferred Learning Style Demonstration   Barriers to Learning No barriers   ORTHO GOALS   PT Ortho Eval Goals 1;2;3   Ortho Goal 1   Goal Identifier STG 1   Goal Description patient will be compliant with HEP in order to make progress at home   Target Date 08/07/20   Ortho Goal 2   Goal Identifier LTG 1   Goal Description Patient will report overall 50% or more improvement in R neck pain in order to improve function at home and at work   Target Date 08/21/20   Ortho Goal 3   Goal Identifier LTG 2   Goal Description Patient will demonstrate full, painfree range of motion in order to perform home and work activities safely   Target Date 08/21/20   Total Evaluation Time   PT Eval, Low Complexity Minutes (17682) 02

## 2020-07-28 ENCOUNTER — HOSPITAL ENCOUNTER (OUTPATIENT)
Dept: PHYSICAL THERAPY | Facility: HOSPITAL | Age: 30
Setting detail: THERAPIES SERIES
End: 2020-07-28
Attending: FAMILY MEDICINE
Payer: COMMERCIAL

## 2020-07-28 PROCEDURE — 97140 MANUAL THERAPY 1/> REGIONS: CPT | Mod: GP | Performed by: PHYSICAL THERAPIST

## 2020-07-30 ENCOUNTER — HOSPITAL ENCOUNTER (OUTPATIENT)
Dept: PHYSICAL THERAPY | Facility: HOSPITAL | Age: 30
Setting detail: THERAPIES SERIES
End: 2020-07-30
Attending: FAMILY MEDICINE
Payer: COMMERCIAL

## 2020-07-30 PROCEDURE — 97140 MANUAL THERAPY 1/> REGIONS: CPT | Mod: GP | Performed by: PHYSICAL THERAPIST

## 2020-08-11 ENCOUNTER — HOSPITAL ENCOUNTER (OUTPATIENT)
Dept: PHYSICAL THERAPY | Facility: HOSPITAL | Age: 30
Setting detail: THERAPIES SERIES
End: 2020-08-11
Attending: FAMILY MEDICINE
Payer: COMMERCIAL

## 2020-08-11 PROCEDURE — 97140 MANUAL THERAPY 1/> REGIONS: CPT | Mod: GP | Performed by: PHYSICAL THERAPIST

## 2020-08-13 ENCOUNTER — HOSPITAL ENCOUNTER (OUTPATIENT)
Dept: PHYSICAL THERAPY | Facility: HOSPITAL | Age: 30
Setting detail: THERAPIES SERIES
End: 2020-08-13
Attending: FAMILY MEDICINE
Payer: COMMERCIAL

## 2020-08-13 PROCEDURE — 97110 THERAPEUTIC EXERCISES: CPT | Mod: GP | Performed by: PHYSICAL THERAPIST

## 2020-08-13 PROCEDURE — 97140 MANUAL THERAPY 1/> REGIONS: CPT | Mod: GP | Performed by: PHYSICAL THERAPIST

## 2020-08-18 NOTE — PROGRESS NOTES
SUBJECTIVE:   CC: Radha Carl is an 29 year old woman who presents for preventive health visit.     Healthy Habits:    Do you get at least three servings of calcium containing foods daily (dairy, green leafy vegetables, etc.)? yes    Amount of exercise or daily activities, outside of work: 2-3 hour(s) per day    Problems taking medications regularly No    Medication side effects: No    Have you had an eye exam in the past two years? yes    Do you see a dentist twice per year? yes    Do you have sleep apnea, excessive snoring or daytime drowsiness?yes      Hyperlipidemia Follow-Up      Are you regularly taking any medication or supplement to lower your cholesterol?   No    Are you having muscle aches or other side effects that you think could be caused by your cholesterol lowering medication?  No    Hypertension Follow-up      Do you check your blood pressure regularly outside of the clinic? No     Are you following a low salt diet? Yes    Are your blood pressures ever more than 140 on the top number (systolic) OR more   than 90 on the bottom number (diastolic), for example 140/90? Yes    Depression and Anxiety Follow-Up    How are you doing with your depression since your last visit? Improved     How are you doing with your anxiety since your last visit?  No change    Are you having other symptoms that might be associated with depression or anxiety? Yes:  see phq, gee    Have you had a significant life event? No     Do you have any concerns with your use of alcohol or other drugs? No    Social History     Tobacco Use     Smoking status: Never Smoker     Smokeless tobacco: Never Used   Substance Use Topics     Alcohol use: Yes     Alcohol/week: 0.0 standard drinks     Frequency: Monthly or less     Drinks per session: 1 or 2     Comment: rare     Drug use: No     PHQ 3/11/2020 7/8/2020 9/11/2020   PHQ-9 Total Score 11 10 9   Q9: Thoughts of better off dead/self-harm past 2 weeks Not at all Not at all Not at  all     MARIBETH-7 SCORE 3/11/2020 7/8/2020 9/11/2020   Total Score 13 10 10     Last PHQ-9 9/11/2020   1.  Little interest or pleasure in doing things 1   2.  Feeling down, depressed, or hopeless 0   3.  Trouble falling or staying asleep, or sleeping too much 2   4.  Feeling tired or having little energy 2   5.  Poor appetite or overeating 1   6.  Feeling bad about yourself 0   7.  Trouble concentrating 3   8.  Moving slowly or restless 0   Q9: Thoughts of better off dead/self-harm past 2 weeks 0   PHQ-9 Total Score 9   Difficulty at work, home, or with people Somewhat difficult     MARIBETH-7  9/11/2020   1. Feeling nervous, anxious, or on edge 1   2. Not being able to stop or control worrying 1   3. Worrying too much about different things 2   4. Trouble relaxing 2   5. Being so restless that it is hard to sit still 2   6. Becoming easily annoyed or irritable 2   7. Feeling afraid, as if something awful might happen 0   MARIBETH-7 Total Score 10   If you checked any problems, how difficult have they made it for you to do your work, take care of things at home, or get along with other people? Somewhat difficult       Suicide Assessment Five-step Evaluation and Treatment (SAFE-T)      Today's PHQ-2 Score:   PHQ-2 ( 1999 [employer]) 7/23/2015 7/3/2013   Q1: Little interest or pleasure in doing things 0 0   Q2: Feeling down, depressed or hopeless 0 0   PHQ-2 Score 0 0       Abuse: Current or Past(Physical, Sexual or Emotional)- No  Do you feel safe in your environment? Yes        Social History     Tobacco Use     Smoking status: Never Smoker     Smokeless tobacco: Never Used   Substance Use Topics     Alcohol use: Yes     Alcohol/week: 0.0 standard drinks     Frequency: Monthly or less     Drinks per session: 1 or 2     Comment: rare     If you drink alcohol do you typically have >3 drinks per day or >7 drinks per week? No                     Reviewed orders with patient.  Reviewed health maintenance and updated orders accordingly -  Yes  Lab work is in process  Labs reviewed in EPIC  BP Readings from Last 3 Encounters:   09/11/20 134/86   03/24/20 (!) 148/98   03/18/20 (!) 152/84    Wt Readings from Last 3 Encounters:   09/11/20 81.8 kg (180 lb 6.4 oz)   03/24/20 79.4 kg (175 lb)   03/18/20 79.4 kg (175 lb)                  Patient Active Problem List   Diagnosis     Attention deficit hyperactivity disorder (ADHD), predominantly inattentive type     Hyperlipidemia with target LDL less than 100     PCOS (polycystic ovarian syndrome)     Elevated liver enzymes     MS (multiple sclerosis) (H)     Vitamin B12 deficiency (non anemic)     Adiposity     Skin sensation disturbance     Tubal pregnancy without intrauterine pregnancy     Visual field scotoma     ACP (advance care planning)     Dysmenorrhea     MARIBETH (generalized anxiety disorder)     Hypovitaminosis D     Infertility, female, secondary     Elevated glucose     Costochondral pain     Stress     Benign essential hypertension     Prediabetes     Chronic tension-type headache, not intractable     Past Surgical History:   Procedure Laterality Date     GYN SURGERY  5/2010    ectopic pregnancy ruptured -L salpingectomy     HYSTEROSCOPY,DIAGNOSTIC  2016    [facility]  2014       Social History     Tobacco Use     Smoking status: Never Smoker     Smokeless tobacco: Never Used   Substance Use Topics     Alcohol use: Yes     Alcohol/week: 0.0 standard drinks     Frequency: Monthly or less     Drinks per session: 1 or 2     Comment: rare     Family History   Problem Relation Age of Onset     Family History Negative Mother      Family History Negative Father      Family History Negative Sister      Family History Negative Sister      Obesity Sister      Hypertension Sister      Diabetes Sister         pre     Breast Cancer Maternal Grandmother 64     Unknown/Adopted Maternal Grandfather         severe gerd     Lung Cancer Paternal Grandmother         +tobacco      Heart Disease Paternal Grandfather         AMI/CAD or CVA     Family History Negative Brother      Psychotic Disorder Sister         ADHD         Current Outpatient Medications   Medication Sig Dispense Refill     acetaminophen (TYLENOL) 500 MG tablet Take 2 tablets by mouth. Every 6 hours as needed       baclofen (LIORESAL) 10 MG tablet Take 0.5-1 tablets (5-10 mg) by mouth 2 times daily as needed for muscle spasms 30 tablet 1     cyclobenzaprine (FLEXERIL) 10 MG tablet Take 0.5-1 tablets (5-10 mg) by mouth daily as needed for muscle spasms 90 tablet 0     ibuprofen (ADVIL,MOTRIN) 200 MG tablet Take 2 tablets by mouth. Every 6 hours as needed with food       lisdexamfetamine (VYVANSE) 10 MG capsule Take 1 capsule (10 mg) by mouth every morning 30 capsule 0     lisinopril (ZESTRIL) 2.5 MG tablet Take 1 tablet (2.5 mg) by mouth daily 30 tablet 1     Meloxicam (MOBIC PO)        Multiple Vitamins-Minerals (OPTIVITE P.M.T.) TABS Take 3 tablets by mouth 2 times daily 180 tablet 3     progesterone (PROMETRIUM) 100 MG capsule Take 1 capsule (100 mg) by mouth daily Starting on peak +3 for 10 days out of month 30 capsule 3     valACYclovir (VALTREX) 500 MG tablet TAKE 1 TABLET BY MOUTH TWICE DAILY WITH  ACTIVE  LESION 30 tablet 5     FLUoxetine (PROZAC) 10 MG capsule Take 1 capsule (10 mg) by mouth daily For one week and then increase to 2 capsules daily (Patient not taking: Reported on 5/21/2020) 60 capsule 1     order for DME Right ankle ASO 1 Device 0     Vitamin D3 (CHOLECALCIFEROL) 125 MCG (5000 UT) tablet Take 30,000 mcg by mouth once a week 300 units weekly       Allergies   Allergen Reactions     Cats      Sulfa Drugs Rash     Sulfonamide antibiotics       Mammogram not appropriate for this patient based on age.    Pertinent mammograms are reviewed under the imaging tab.  History of abnormal Pap smear: NO - age 30-65 PAP every 5 years with negative HPV co-testing recommended  PAP / HPV Latest Ref Rng & Units  10/11/2017 4/15/2014   PAP - NIL NIL   HPV 16 DNA NEG:Negative Negative -   HPV 18 DNA NEG:Negative Negative -   OTHER HR HPV NEG:Negative Negative -     Reviewed and updated as needed this visit by clinical staff  Tobacco  Allergies  Meds  Med Hx  Surg Hx  Fam Hx  Soc Hx        Reviewed and updated as needed this visit by Provider        Past Medical History:   Diagnosis Date     ADHD (attention deficit hyperactivity disorder)      Cold sore      Demyelinating changes in brain (H)     saw Dr Gilbert 2013 -- ? of MS, but not Dx'd yet.     Elevated glucose     resolved      Elevated liver enzymes     resolved      Fibrocystic breast      MARIBETH (generalized anxiety disorder)      History of left salpingo-oophorectomy     still has L ovary, but missing L tube     Hypovitaminosis D      MS (multiple sclerosis) (H)      Neuropathy     feet     Obesity      PCOS (polycystic ovarian syndrome)       Past Surgical History:   Procedure Laterality Date     GYN SURGERY  2010    ectopic pregnancy ruptured -L salpingectomy     HYSTEROSCOPY,DIAGNOSTIC  2016    [facility]       OB History    Para Term  AB Living   1 0 0 0 1 0   SAB TAB Ectopic Multiple Live Births   0 0 1 0 0      # Outcome Date GA Lbr Sage/2nd Weight Sex Delivery Anes PTL Lv   1 Ectopic 2010              Birth Comments: left salpingectomy       ROS:  CONSTITUTIONAL: NEGATIVE for fever, chills, change in weight  INTEGUMENTARU/SKIN: NEGATIVE for worrisome rashes, moles or lesions  EYES: NEGATIVE for vision changes or irritation  ENT: NEGATIVE for ear, mouth and throat problems  RESP: NEGATIVE for significant cough or SOB  BREAST: NEGATIVE for masses, tenderness or discharge  CV: NEGATIVE for chest pain, palpitations or peripheral edema  GI: NEGATIVE for nausea, abdominal pain, heartburn, or change in bowel habits  : NEGATIVE for unusual urinary or vaginal symptoms. Periods are  "regular.  MUSCULOSKELETAL: NEGATIVE for significant arthralgias or myalgia  NEURO: NEGATIVE for weakness, dizziness or paresthesias  PSYCHIATRIC: NEGATIVE for changes in mood or affect    OBJECTIVE:   /86 (BP Location: Left arm, Patient Position: Chair, Cuff Size: Adult Large)   Pulse 112   Temp 97.6  F (36.4  C) (Tympanic)   Resp 20   Ht 1.638 m (5' 4.5\")   Wt 81.8 kg (180 lb 6.4 oz)   LMP 08/20/2020   SpO2 100%   BMI 30.49 kg/m    EXAM:  GENERAL: healthy, alert and no distress  EYES: Eyes grossly normal to inspection, PERRL and conjunctivae and sclerae normal  HENT: ear canals and TM's normal, nose and mouth without ulcers or lesions  NECK: no adenopathy, no asymmetry, masses, or scars and thyroid normal to palpation  RESP: lungs clear to auscultation - no rales, rhonchi or wheezes  BREAST: normal without masses, tenderness or nipple discharge and no palpable axillary masses or adenopathy  CV: regular rate and rhythm, normal S1 S2, no S3 or S4, no murmur, click or rub, no peripheral edema and peripheral pulses strong  ABDOMEN: soft, nontender, no hepatosplenomegaly, no masses and bowel sounds normal   (female): normal female external genitalia, normal urethral meatus, vaginal mucosa pink, moist, well rugated, and normal cervix/adnexa/uterus without masses or discharge  MS: no gross musculoskeletal defects noted, no edema  SKIN: no suspicious lesions or rashes  NEURO: Normal strength and tone, mentation intact and speech normal  PSYCH: mentation appears normal, affect normal/bright    Diagnostic Test Results:  Labs reviewed in Epic  Results for orders placed or performed in visit on 09/11/20   Lipid Profile (Chol, Trig, HDL, LDL calc)     Status: Abnormal   Result Value Ref Range    Cholesterol 192 <200 mg/dL    Triglycerides 96 <150 mg/dL    HDL Cholesterol 39 (L) >49 mg/dL    LDL Cholesterol Calculated 134 (H) <100 mg/dL    Non HDL Cholesterol 153 (H) <130 mg/dL   Comprehensive metabolic panel     " Status: Abnormal   Result Value Ref Range    Sodium 136 133 - 144 mmol/L    Potassium 3.9 3.4 - 5.3 mmol/L    Chloride 105 94 - 109 mmol/L    Carbon Dioxide 25 20 - 32 mmol/L    Anion Gap 6 3 - 14 mmol/L    Glucose 132 (H) 70 - 99 mg/dL    Urea Nitrogen 9 7 - 30 mg/dL    Creatinine 0.56 0.52 - 1.04 mg/dL    GFR Estimate >90 >60 mL/min/[1.73_m2]    GFR Estimate If Black >90 >60 mL/min/[1.73_m2]    Calcium 8.8 8.5 - 10.1 mg/dL    Bilirubin Total 0.3 0.2 - 1.3 mg/dL    Albumin 4.1 3.4 - 5.0 g/dL    Protein Total 8.7 6.8 - 8.8 g/dL    Alkaline Phosphatase 80 40 - 150 U/L     (H) 0 - 50 U/L    AST 52 (H) 0 - 45 U/L   Hemoglobin A1c     Status: Abnormal   Result Value Ref Range    Hemoglobin A1C 6.4 (H) 0 - 5.6 %   Estimated Average Glucose     Status: None   Result Value Ref Range    Estimated Average Glucose 137 mg/dL       ASSESSMENT/PLAN:   (Z00.00) Routine general medical examination at a health care facility  (primary encounter diagnosis)  Comment:   Plan: follow-up 1 year    (E78.5) Hyperlipidemia with target LDL less than 100  Comment:   Plan: Comprehensive metabolic panel, Lipid Profile         (Chol, Trig, HDL, LDL calc)          (E28.2) PCOS (polycystic ovarian syndrome)  Comment:   Plan: progesterone (PROMETRIUM) 100 MG capsule,         Progesterone, Estradiol, Multiple         Vitamins-Minerals (OPTIVITE P.M.T.) TABS  Needs peak +7 labs next 2 mos          (E53.8) Vitamin B12 deficiency (non anemic)  Comment:   Plan: labs    (E55.9) Hypovitaminosis D  Comment:   Plan: Vitamin D Deficiency          (F41.1) MARIBETH (generalized anxiety disorder)  Comment:   Plan: didn't like prozac    (R73.09) Elevated glucose  Comment:   Plan: Hemoglobin A1c        Pre-diabetes, work on weight loss, cut carbs and increase exercise as able    (I10) Benign essential hypertension  Comment:   Plan: lisinopril (ZESTRIL) 2.5 MG tablet        Has only been taking last 2 wks, follow-up 4 wks    (B00.1) Cold sore  Comment:   Plan:  "valACYclovir (VALTREX) 500 MG tablet        refilled    (F90.0) Attention deficit hyperactivity disorder (ADHD), predominantly inattentive type  Comment: feels better with 10mg than 20mg and would like to go up to 10mg BID  Plan: stop vyvanse due to elevated LFTs    (G35) MS (multiple sclerosis) (H)  Comment: has several bad days  Plan: she is willing to see neurology [facility] -- wants to research and will let me know  Talked about ADA about lack of work accomodations, has felt judged by neurologists in the past as to why she isn't on medications  Spent over 20 min in discussion about supplements, stress mgmt, and being open to possible medications to help with symptoms    Also discussed her fertility concerns, use of femara, foster care system and options there    60 min spent with patient and her  greater than 50% education and counseling and coordination of care with regards to above    COUNSELING:   Reviewed preventive health counseling, as reflected in patient instructions  Special attention given to:        Regular exercise       Healthy diet/nutrition       Vision screening       Hearing screening       Advance Care Planning    Estimated body mass index is 30.49 kg/m  as calculated from the following:    Height as of this encounter: 1.638 m (5' 4.5\").    Weight as of this encounter: 81.8 kg (180 lb 6.4 oz).    Weight management plan: Discussed healthy diet and exercise guidelines     reports that she has never smoked. She has never used smokeless tobacco.      Counseling Resources:  ATP IV Guidelines  Pooled Cohorts Equation Calculator  Breast Cancer Risk Calculator  FRAX Risk Assessment  ICSI Preventive Guidelines  Dietary Guidelines for Americans, 2010  USDA's MyPlate  ASA Prophylaxis  Lung CA Screening    Muriel Jones MD  [facility] - [place]  "

## 2020-08-18 NOTE — PATIENT INSTRUCTIONS
1.  Pure encapsulations -- look for vitamin d      Preventive Health Recommendations  Female Ages 26 - 39  Yearly exam:   See your health care provider every year in order to    Review health changes.     Discuss preventive care.      Review your medicines if you your doctor has prescribed any.    Until age 30: Get a Pap test every three years (more often if you have had an abnormal result).    After age 30: Talk to your doctor about whether you should have a Pap test every 3 years or have a Pap test with HPV screening every 5 years.   You do not need a Pap test if your uterus was removed (hysterectomy) and you have not had cancer.  You should be tested each year for STDs (sexually transmitted diseases), if you're at risk.   Talk to your provider about how often to have your cholesterol checked.  If you are at risk for diabetes, you should have a diabetes test (fasting glucose).  Shots: Get a flu shot each year. Get a tetanus shot every 10 years.   Nutrition:     Eat at least 5 servings of fruits and vegetables each day.    Eat whole-grain bread, whole-wheat pasta and brown rice instead of white grains and rice.    Get adequate Calcium and Vitamin D.     Lifestyle    Exercise at least 150 minutes a week (30 minutes a day, 5 days of the week). This will help you control your weight and prevent disease.    Limit alcohol to one drink per day.    No smoking.     Wear sunscreen to prevent skin cancer.    See your dentist every six months for an exam and cleaning.

## 2020-08-25 ENCOUNTER — HOSPITAL ENCOUNTER (OUTPATIENT)
Dept: PHYSICAL THERAPY | Facility: HOSPITAL | Age: 30
Setting detail: THERAPIES SERIES
End: 2020-08-25
Attending: FAMILY MEDICINE
Payer: COMMERCIAL

## 2020-08-25 PROCEDURE — 97140 MANUAL THERAPY 1/> REGIONS: CPT | Mod: GP | Performed by: PHYSICAL THERAPIST

## 2020-08-27 ENCOUNTER — HOSPITAL ENCOUNTER (OUTPATIENT)
Dept: PHYSICAL THERAPY | Facility: HOSPITAL | Age: 30
Setting detail: THERAPIES SERIES
End: 2020-08-27
Attending: FAMILY MEDICINE
Payer: COMMERCIAL

## 2020-08-27 PROCEDURE — 97140 MANUAL THERAPY 1/> REGIONS: CPT | Mod: GP | Performed by: PHYSICAL THERAPIST

## 2020-08-28 NOTE — PROGRESS NOTES
Outpatient Physical Therapy Discharge Note     Patient: Radha DELAROSA [place]  : 1990    Beginning/End Dates of Reporting Period:  2020 to 2020    Referring Provider: Dr Jones    Therapy Diagnosis: Cervical muscle strain     Client Self Report: Patient reports that her neck is overall 90% improved. She has full range of motion and only feels mild tightness/restriction in her R neck with end range SB and ROT motions.  She feels comfortable with her HEP and plans to continue to work on those to keep her symptoms at bay and help the muscle tension fully resolve. She will return to PT if she has any issues    Objective Measurements:  Objective Measure: CROM and muscle tension  Details: CROM is full without restriction although mild pain at EROM L SB and R ROT along SCM and R UT.  Muscle tension is much improved from IE with only very slight restriction appreciated - she states this resolves with the stretching and home program      Goals:  Goal Identifier STG 1   Goal Description patient will be compliant with HEP in order to make progress at home   Target Date 20   Date Met  20   Progress:     Goal Identifier LTG 1   Goal Description Patient will report overall 50% or more improvement in R neck pain in order to improve function at home and at work   Target Date 20   Date Met  20   Progress:     Goal Identifier LTG 2   Goal Description Patient will demonstrate full, painfree range of motion in order to perform home and work activities safely   Target Date 20   Date Met  20   Progress:       Progress Toward Goals:   Progress this reporting period: Patient has met goals      Plan:  Discharge from therapy.    Discharge:    Reason for Discharge: Patient has met all goals.    Equipment Issued: None    Discharge Plan: Patient to continue home program.

## 2020-09-11 ENCOUNTER — MYC MEDICAL ADVICE (OUTPATIENT)
Dept: FAMILY MEDICINE | Facility: OTHER | Age: 30
End: 2020-09-11

## 2020-09-11 ENCOUNTER — OFFICE VISIT (OUTPATIENT)
Dept: FAMILY MEDICINE | Facility: OTHER | Age: 30
End: 2020-09-11
Attending: FAMILY MEDICINE
Payer: COMMERCIAL

## 2020-09-11 VITALS
BODY MASS INDEX: 30.06 KG/M2 | OXYGEN SATURATION: 100 % | RESPIRATION RATE: 20 BRPM | TEMPERATURE: 97.6 F | DIASTOLIC BLOOD PRESSURE: 86 MMHG | HEART RATE: 112 BPM | SYSTOLIC BLOOD PRESSURE: 134 MMHG | HEIGHT: 65 IN | WEIGHT: 180.4 LBS

## 2020-09-11 DIAGNOSIS — F41.1 GAD (GENERALIZED ANXIETY DISORDER): ICD-10-CM

## 2020-09-11 DIAGNOSIS — R73.09 ELEVATED GLUCOSE: ICD-10-CM

## 2020-09-11 DIAGNOSIS — I10 BENIGN ESSENTIAL HYPERTENSION: ICD-10-CM

## 2020-09-11 DIAGNOSIS — G35 MS (MULTIPLE SCLEROSIS) (H): ICD-10-CM

## 2020-09-11 DIAGNOSIS — E78.5 HYPERLIPIDEMIA WITH TARGET LDL LESS THAN 100: ICD-10-CM

## 2020-09-11 DIAGNOSIS — Z00.00 ROUTINE GENERAL MEDICAL EXAMINATION AT A HEALTH CARE FACILITY: Primary | ICD-10-CM

## 2020-09-11 DIAGNOSIS — B00.1 COLD SORE: ICD-10-CM

## 2020-09-11 DIAGNOSIS — E55.9 HYPOVITAMINOSIS D: ICD-10-CM

## 2020-09-11 DIAGNOSIS — E28.2 PCOS (POLYCYSTIC OVARIAN SYNDROME): ICD-10-CM

## 2020-09-11 DIAGNOSIS — E53.8 VITAMIN B12 DEFICIENCY (NON ANEMIC): ICD-10-CM

## 2020-09-11 DIAGNOSIS — F90.0 ATTENTION DEFICIT HYPERACTIVITY DISORDER (ADHD), PREDOMINANTLY INATTENTIVE TYPE: ICD-10-CM

## 2020-09-11 LAB
ALBUMIN SERPL-MCNC: 4.1 G/DL (ref 3.4–5)
ALP SERPL-CCNC: 80 U/L (ref 40–150)
ALT SERPL W P-5'-P-CCNC: 101 U/L (ref 0–50)
ANION GAP SERPL CALCULATED.3IONS-SCNC: 6 MMOL/L (ref 3–14)
AST SERPL W P-5'-P-CCNC: 52 U/L (ref 0–45)
BILIRUB SERPL-MCNC: 0.3 MG/DL (ref 0.2–1.3)
BUN SERPL-MCNC: 9 MG/DL (ref 7–30)
CALCIUM SERPL-MCNC: 8.8 MG/DL (ref 8.5–10.1)
CHLORIDE SERPL-SCNC: 105 MMOL/L (ref 94–109)
CHOLEST SERPL-MCNC: 192 MG/DL
CO2 SERPL-SCNC: 25 MMOL/L (ref 20–32)
CREAT SERPL-MCNC: 0.56 MG/DL (ref 0.52–1.04)
EST. AVERAGE GLUCOSE BLD GHB EST-MCNC: 137 MG/DL
ESTRADIOL SERPL-MCNC: 74 PG/ML
GFR SERPL CREATININE-BSD FRML MDRD: >90 ML/MIN/{1.73_M2}
GLUCOSE SERPL-MCNC: 132 MG/DL (ref 70–99)
HBA1C MFR BLD: 6.4 % (ref 0–5.6)
HDLC SERPL-MCNC: 39 MG/DL
LDLC SERPL CALC-MCNC: 134 MG/DL
NONHDLC SERPL-MCNC: 153 MG/DL
POTASSIUM SERPL-SCNC: 3.9 MMOL/L (ref 3.4–5.3)
PROGEST SERPL-MCNC: 6.3 NG/ML
PROT SERPL-MCNC: 8.7 G/DL (ref 6.8–8.8)
SODIUM SERPL-SCNC: 136 MMOL/L (ref 133–144)
TRIGL SERPL-MCNC: 96 MG/DL

## 2020-09-11 PROCEDURE — 82306 VITAMIN D 25 HYDROXY: CPT | Performed by: FAMILY MEDICINE

## 2020-09-11 PROCEDURE — 80061 LIPID PANEL: CPT | Performed by: FAMILY MEDICINE

## 2020-09-11 PROCEDURE — 80053 COMPREHEN METABOLIC PANEL: CPT | Performed by: FAMILY MEDICINE

## 2020-09-11 PROCEDURE — 36415 COLL VENOUS BLD VENIPUNCTURE: CPT | Performed by: FAMILY MEDICINE

## 2020-09-11 PROCEDURE — 84144 ASSAY OF PROGESTERONE: CPT | Performed by: FAMILY MEDICINE

## 2020-09-11 PROCEDURE — 82670 ASSAY OF TOTAL ESTRADIOL: CPT | Performed by: FAMILY MEDICINE

## 2020-09-11 PROCEDURE — 83036 HEMOGLOBIN GLYCOSYLATED A1C: CPT | Performed by: FAMILY MEDICINE

## 2020-09-11 PROCEDURE — 99395 PREV VISIT EST AGE 18-39: CPT | Performed by: FAMILY MEDICINE

## 2020-09-11 RX ORDER — MULTIVITAMIN WITH MINERALS
3 TABLET ORAL 2 TIMES DAILY
Qty: 180 TABLET | Refills: 3 | Status: SHIPPED | OUTPATIENT
Start: 2020-09-11 | End: 2023-07-05

## 2020-09-11 RX ORDER — VALACYCLOVIR HYDROCHLORIDE 500 MG/1
TABLET, FILM COATED ORAL
Qty: 30 TABLET | Refills: 5 | Status: SHIPPED | OUTPATIENT
Start: 2020-09-11 | End: 2021-11-12

## 2020-09-11 RX ORDER — LISINOPRIL 2.5 MG/1
2.5 TABLET ORAL DAILY
Qty: 90 TABLET | Refills: 3 | Status: SHIPPED | OUTPATIENT
Start: 2020-09-11 | End: 2021-06-07

## 2020-09-11 RX ORDER — LISDEXAMFETAMINE DIMESYLATE 10 MG/1
10 CAPSULE ORAL 2 TIMES DAILY
Qty: 60 CAPSULE | Refills: 0 | Status: CANCELLED | OUTPATIENT
Start: 2020-09-11

## 2020-09-11 ASSESSMENT — PAIN SCALES - GENERAL: PAINLEVEL: MODERATE PAIN (4)

## 2020-09-11 ASSESSMENT — ANXIETY QUESTIONNAIRES
7. FEELING AFRAID AS IF SOMETHING AWFUL MIGHT HAPPEN: NOT AT ALL
3. WORRYING TOO MUCH ABOUT DIFFERENT THINGS: MORE THAN HALF THE DAYS
6. BECOMING EASILY ANNOYED OR IRRITABLE: MORE THAN HALF THE DAYS
1. FEELING NERVOUS, ANXIOUS, OR ON EDGE: SEVERAL DAYS
IF YOU CHECKED OFF ANY PROBLEMS ON THIS QUESTIONNAIRE, HOW DIFFICULT HAVE THESE PROBLEMS MADE IT FOR YOU TO DO YOUR WORK, TAKE CARE OF THINGS AT HOME, OR GET ALONG WITH OTHER PEOPLE: SOMEWHAT DIFFICULT
GAD7 TOTAL SCORE: 10
5. BEING SO RESTLESS THAT IT IS HARD TO SIT STILL: MORE THAN HALF THE DAYS
2. NOT BEING ABLE TO STOP OR CONTROL WORRYING: SEVERAL DAYS

## 2020-09-11 ASSESSMENT — PATIENT HEALTH QUESTIONNAIRE - PHQ9
5. POOR APPETITE OR OVEREATING: MORE THAN HALF THE DAYS
SUM OF ALL RESPONSES TO PHQ QUESTIONS 1-9: 9

## 2020-09-11 ASSESSMENT — MIFFLIN-ST. JEOR: SCORE: 1526.23

## 2020-09-11 NOTE — NURSING NOTE
"Chief Complaint   Patient presents with     Physical       Initial /86 (BP Location: Left arm, Patient Position: Chair, Cuff Size: Adult Large)   Pulse 112   Temp 97.6  F (36.4  C) (Tympanic)   Resp 20   Ht 1.638 m (5' 4.5\")   Wt 81.8 kg (180 lb 6.4 oz)   LMP 08/20/2020   SpO2 100%   BMI 30.49 kg/m   Estimated body mass index is 30.49 kg/m  as calculated from the following:    Height as of this encounter: 1.638 m (5' 4.5\").    Weight as of this encounter: 81.8 kg (180 lb 6.4 oz).  Medication Reconciliation: complete  Judi Gallegos LPN    "

## 2020-09-12 ASSESSMENT — ANXIETY QUESTIONNAIRES: GAD7 TOTAL SCORE: 10

## 2020-09-14 LAB — DEPRECATED CALCIDIOL+CALCIFEROL SERPL-MC: 27 UG/L (ref 20–75)

## 2020-10-07 ENCOUNTER — TELEPHONE (OUTPATIENT)
Dept: FAMILY MEDICINE | Facility: OTHER | Age: 30
End: 2020-10-07

## 2020-10-07 DIAGNOSIS — M25.551 HIP PAIN, RIGHT: Primary | ICD-10-CM

## 2020-10-07 NOTE — TELEPHONE ENCOUNTER
Pt called, requesting referral for [facility]. Has been seen here in the past for R hip issues and reports that this is a recurrent issue for her. States that she has discussed these issues with PCP in the past. Referral pended. Please advise. Thank you!

## 2020-11-11 ENCOUNTER — MYC MEDICAL ADVICE (OUTPATIENT)
Dept: FAMILY MEDICINE | Facility: OTHER | Age: 30
End: 2020-11-11

## 2020-11-11 DIAGNOSIS — R74.8 ELEVATED LIVER ENZYMES: ICD-10-CM

## 2020-11-11 DIAGNOSIS — E55.9 HYPOVITAMINOSIS D: ICD-10-CM

## 2020-11-11 DIAGNOSIS — G35 MS (MULTIPLE SCLEROSIS) (H): Primary | ICD-10-CM

## 2020-11-13 DIAGNOSIS — R74.8 ELEVATED LIVER ENZYMES: ICD-10-CM

## 2020-11-13 DIAGNOSIS — E28.2 PCOS (POLYCYSTIC OVARIAN SYNDROME): ICD-10-CM

## 2020-11-13 DIAGNOSIS — E55.9 HYPOVITAMINOSIS D: ICD-10-CM

## 2020-11-13 LAB
ALBUMIN SERPL-MCNC: 4 G/DL (ref 3.4–5)
ALP SERPL-CCNC: 71 U/L (ref 40–150)
ALT SERPL W P-5'-P-CCNC: 88 U/L (ref 0–50)
ANION GAP SERPL CALCULATED.3IONS-SCNC: 5 MMOL/L (ref 3–14)
AST SERPL W P-5'-P-CCNC: 43 U/L (ref 0–45)
BILIRUB SERPL-MCNC: 0.2 MG/DL (ref 0.2–1.3)
BUN SERPL-MCNC: 14 MG/DL (ref 7–30)
CALCIUM SERPL-MCNC: 9 MG/DL (ref 8.5–10.1)
CHLORIDE SERPL-SCNC: 107 MMOL/L (ref 94–109)
CO2 SERPL-SCNC: 25 MMOL/L (ref 20–32)
CREAT SERPL-MCNC: 0.6 MG/DL (ref 0.52–1.04)
GFR SERPL CREATININE-BSD FRML MDRD: >90 ML/MIN/{1.73_M2}
GLUCOSE SERPL-MCNC: 119 MG/DL (ref 70–99)
POTASSIUM SERPL-SCNC: 4.1 MMOL/L (ref 3.4–5.3)
PROGEST SERPL-MCNC: 9.5 NG/ML
PROT SERPL-MCNC: 8.4 G/DL (ref 6.8–8.8)
SODIUM SERPL-SCNC: 137 MMOL/L (ref 133–144)

## 2020-11-13 PROCEDURE — 82306 VITAMIN D 25 HYDROXY: CPT | Performed by: FAMILY MEDICINE

## 2020-11-13 PROCEDURE — 36415 COLL VENOUS BLD VENIPUNCTURE: CPT | Performed by: FAMILY MEDICINE

## 2020-11-13 PROCEDURE — 82670 ASSAY OF TOTAL ESTRADIOL: CPT | Performed by: FAMILY MEDICINE

## 2020-11-13 PROCEDURE — 84144 ASSAY OF PROGESTERONE: CPT | Performed by: FAMILY MEDICINE

## 2020-11-13 PROCEDURE — 80053 COMPREHEN METABOLIC PANEL: CPT | Performed by: FAMILY MEDICINE

## 2020-11-14 LAB — ESTRADIOL SERPL-MCNC: 33 PG/ML

## 2020-11-16 LAB — DEPRECATED CALCIDIOL+CALCIFEROL SERPL-MC: 22 UG/L (ref 20–75)

## 2020-11-20 ENCOUNTER — TELEPHONE (OUTPATIENT)
Dept: FAMILY MEDICINE | Facility: OTHER | Age: 30
End: 2020-11-20

## 2020-11-20 ENCOUNTER — E-VISIT (OUTPATIENT)
Dept: FAMILY MEDICINE | Facility: OTHER | Age: 30
End: 2020-11-20
Payer: COMMERCIAL

## 2020-11-20 ENCOUNTER — MYC MEDICAL ADVICE (OUTPATIENT)
Dept: FAMILY MEDICINE | Facility: OTHER | Age: 30
End: 2020-11-20

## 2020-11-20 DIAGNOSIS — E28.2 PCOS (POLYCYSTIC OVARIAN SYNDROME): Primary | ICD-10-CM

## 2020-11-20 DIAGNOSIS — R79.89 LOW SERUM PROGESTERONE: ICD-10-CM

## 2020-11-20 DIAGNOSIS — R79.89 LOW VITAMIN D LEVEL: Primary | ICD-10-CM

## 2020-11-20 DIAGNOSIS — G44.229 CHRONIC TENSION-TYPE HEADACHE, NOT INTRACTABLE: ICD-10-CM

## 2020-11-20 PROCEDURE — 99422 OL DIG E/M SVC 11-20 MIN: CPT | Performed by: FAMILY MEDICINE

## 2020-11-21 NOTE — TELEPHONE ENCOUNTER
Provider E-Visit time total (minutes): 11  ELECTRONIC VISIT DOCUMENTATION:    SUBJECTIVE:  Note above accurately captures the substance of my conversation with the patient.    ASSESSMENT/ PLAN:  1. PCOS (polycystic ovarian syndrome)      2. Chronic tension-type headache, not intractable  improved    3. Low serum progesterone  Recheck in a few months, asymptomatic    Muriel Jones MD  11/21/2020  2:18 PM

## 2020-12-07 ENCOUNTER — TELEPHONE (OUTPATIENT)
Dept: FAMILY MEDICINE | Facility: OTHER | Age: 30
End: 2020-12-07

## 2020-12-07 DIAGNOSIS — F41.1 GAD (GENERALIZED ANXIETY DISORDER): Primary | ICD-10-CM

## 2020-12-07 RX ORDER — ALPRAZOLAM 0.5 MG
.5-1 TABLET ORAL 2 TIMES DAILY PRN
Qty: 8 TABLET | Refills: 0 | Status: SHIPPED | OUTPATIENT
Start: 2020-12-07 | End: 2020-12-17

## 2020-12-07 NOTE — TELEPHONE ENCOUNTER
Pt called, is going on a trip and is requesting PRN medication for anxiety related to flying. Pt requesting something low dose, #2.  Please advise. Thank you!

## 2020-12-16 ENCOUNTER — VIRTUAL VISIT (OUTPATIENT)
Dept: FAMILY MEDICINE | Facility: OTHER | Age: 30
End: 2020-12-16
Attending: FAMILY MEDICINE
Payer: COMMERCIAL

## 2020-12-16 VITALS — TEMPERATURE: 97.5 F

## 2020-12-16 DIAGNOSIS — H81.10 BENIGN PAROXYSMAL POSITIONAL VERTIGO, UNSPECIFIED LATERALITY: Primary | ICD-10-CM

## 2020-12-16 PROCEDURE — 99213 OFFICE O/P EST LOW 20 MIN: CPT | Mod: TEL | Performed by: FAMILY MEDICINE

## 2020-12-16 ASSESSMENT — PAIN SCALES - GENERAL: PAINLEVEL: NO PAIN (0)

## 2020-12-16 NOTE — NURSING NOTE
"Chief Complaint   Patient presents with     Dizziness       Initial Temp 97.5  F (36.4  C) (Oral)   LMP 12/06/2020  Estimated body mass index is 30.49 kg/m  as calculated from the following:    Height as of 9/11/20: 1.638 m (5' 4.5\").    Weight as of 9/11/20: 81.8 kg (180 lb 6.4 oz).  Medication Reconciliation: complete  Judi Gallegos LPN    "

## 2020-12-16 NOTE — PROGRESS NOTES
"Radha Carl is a 30 year old female who is being evaluated via a billable telephone visit.      The patient has been notified of following:     \"This telephone visit will be conducted via a call between you and your physician/provider. We have found that certain health care needs can be provided without the need for a physical exam.  This service lets us provide the care you need with a short phone conversation.  If a prescription is necessary we can send it directly to your pharmacy.  If lab work is needed we can place an order for that and you can then stop by our lab to have the test done at a later time.    Telephone visits are billed at different rates depending on your insurance coverage. During this emergency period, for some insurers they may be billed the same as an in-person visit.  Please reach out to your insurance provider with any questions.    If during the course of the call the physician/provider feels a telephone visit is not appropriate, you will not be charged for this service.\"    Patient has given verbal consent for Telephone visit?  Yes    What phone number would you like to be contacted at? [phone]      How would you like to obtain your AVS? Thais    Subjective     Radha Carl is a 30 year old female who presents via phone visit today for the following health issues:    HPI     Dizziness  Onset/Duration: started yesterday   Description:   Do you feel faint: no  Does it feel like the surroundings (bed, room) are moving: YES  Unsteady/off balance: YES  Have you passed out or fallen: YES- fall this am   Intensity: moderate  Progression of Symptoms: intermittent   Accompanying Signs & Symptoms:  Heart palpitations or chest pain: no  Nausea, vomiting: no  Weakness or lack of coordination in arms or legs: no  Vision or speech changes: YES- vision   Numbness or tingling: YES  Ringing in ears (Tinnitus): no  Hearing Loss: no  History:   Head trauma/concussion history: " no  Previous similar symptoms: YES  Recent bleeding history: no  Any new medications (BP?): no  Precipitating factors:   Worse with activity: YES- with movement   Worse with head movement: YES  Alleviating factors:   Does staying in a fixed position give relief: YES when sitting is fine, looking at things is unable to focus   Therapies tried and outcome: meclizine otc- no relief.     Recent flight back from [place]  Symptoms started this morning    Review of Systems   Constitutional, HEENT, cardiovascular, pulmonary, gi and gu systems are negative, except as otherwise noted.       Objective   Vitals - Patient Reported  Pain Score: No Pain (0)      Vitals:  No vitals were obtained today due to virtual visit.    healthy, alert and no distress  PSYCH: Alert and oriented times 3; coherent speech, normal   rate and volume, able to articulate logical thoughts, able   to abstract reason, no tangential thoughts, no hallucinations   or delusions  Her affect is normal and pleasant  RESP: No cough, no audible wheezing, able to talk in full sentences  Remainder of exam unable to be completed due to telephone visits    No results found for any visits on 12/16/20.        Assessment/Plan:    Assessment & Plan     Benign paroxysmal positional vertigo, unspecified laterality  Etiology flight? Ears feel plugged, follow-up if not improving  Could be MS as she gets flares like this on occasion  - PHYSICAL THERAPY REFERRAL; Future      Patient was agreeable to this plan and had no further questions.  There are no Patient Instructions on file for this visit.    No follow-ups on file.    Muriel Jones MD  [facility] - [place]    Phone call duration:  11 minutes

## 2020-12-16 NOTE — LETTER
December 16, 2020      Radha Clarosicle  [address]  [place] [address]        To Whom It May Concern:    Radha Carl was seen in our clinic. She may return to work with the following: no restrictions  on or about 12/18/2020.      Sincerely,        Muriel Jones MD

## 2020-12-17 ENCOUNTER — OFFICE VISIT (OUTPATIENT)
Dept: OTOLARYNGOLOGY | Facility: OTHER | Age: 30
End: 2020-12-17
Attending: PHYSICIAN ASSISTANT
Payer: COMMERCIAL

## 2020-12-17 ENCOUNTER — TELEPHONE (OUTPATIENT)
Dept: OTOLARYNGOLOGY | Facility: OTHER | Age: 30
End: 2020-12-17

## 2020-12-17 ENCOUNTER — MYC MEDICAL ADVICE (OUTPATIENT)
Dept: FAMILY MEDICINE | Facility: OTHER | Age: 30
End: 2020-12-17

## 2020-12-17 VITALS
DIASTOLIC BLOOD PRESSURE: 90 MMHG | SYSTOLIC BLOOD PRESSURE: 150 MMHG | TEMPERATURE: 97.7 F | BODY MASS INDEX: 31.89 KG/M2 | HEART RATE: 96 BPM | WEIGHT: 180 LBS | OXYGEN SATURATION: 98 % | HEIGHT: 63 IN

## 2020-12-17 DIAGNOSIS — R42 VERTIGO: Primary | ICD-10-CM

## 2020-12-17 DIAGNOSIS — G35 MS (MULTIPLE SCLEROSIS) (H): ICD-10-CM

## 2020-12-17 PROCEDURE — 99214 OFFICE O/P EST MOD 30 MIN: CPT | Performed by: PHYSICIAN ASSISTANT

## 2020-12-17 RX ORDER — ONDANSETRON 4 MG/1
4 TABLET, ORALLY DISINTEGRATING ORAL EVERY 8 HOURS PRN
Qty: 20 TABLET | Refills: 0 | Status: SHIPPED | OUTPATIENT
Start: 2020-12-17 | End: 2021-06-07

## 2020-12-17 RX ORDER — PREDNISONE 10 MG/1
TABLET ORAL
Qty: 25 TABLET | Refills: 0 | Status: SHIPPED | OUTPATIENT
Start: 2020-12-17 | End: 2020-12-30

## 2020-12-17 RX ORDER — DIAZEPAM 2 MG
TABLET ORAL
Qty: 20 TABLET | Refills: 1 | Status: SHIPPED | OUTPATIENT
Start: 2020-12-17 | End: 2021-05-25

## 2020-12-17 ASSESSMENT — MIFFLIN-ST. JEOR: SCORE: 1495.6

## 2020-12-17 ASSESSMENT — PAIN SCALES - GENERAL: PAINLEVEL: NO PAIN (0)

## 2020-12-17 NOTE — Clinical Note
Saw Radha today- No rodriguez BPPV. Flare of MS. Sent in prednisone, Valium. Hopefully she can see neuro soon. Thanks. Margaret

## 2020-12-17 NOTE — LETTER
December 17, 2020      Radha Clarosicle  [address] [place] [address]        To Whom It May Concern:    Radha Carl  was seen on 12/17/20. Please excuse her from work due to acute illness related from her Multiple sclerosis.       Sincerely,        Margaret Shelton PA-C

## 2020-12-17 NOTE — PROGRESS NOTES
Chief Complaint   Patient presents with     Vertigo     Pt is here for vertigo.  This started on Tuesday.     Patient presents for BPPV. Patient went to [place], and returned on Sunday, Monday. She woke up Tuesday AM to use bathroom when she first began to have dizziness. Further worsened over the last 24 hours.   This AM, she may have felt some improvement but once she was up moving and walking around, she felt symptoms return.  She does have double vision present today.   If she closes one eye she is able to focus and improves her visit.   R>L.   Dizziness/ vertigo is always there, it does not fatigue.   She has no nausea, emesis.   She has been able to eat and drink well.   Taken meclizine and Claritin D without improvement.   She was able to sleep ok. Reports with motion it does worsen. Increase in symptoms with walking/ moving. Denies turning to right/ left triggering symptoms.   Reports hx of vertigo this past September but today, it worse than before.   She feels like her walking is affected with her MS- Hx of optic neuritis.   She was referred to PT, and was not able to see until next week.       +Vertigo.   +MS diagnosis follows with neurology and patient uses home remedies at this time. She has not started medications at this time. New Neurology consult was placed by Dr. Jones. MRI reviewed from 2018.   No hearing concerns.   Denies COM or otologic surgeries.   Denies otalgia, otorrhea.  Denies worrisome tinnitus.  Denies fluctuating hearing loss or tinnitus.   Denies facial paraesthesia.   Denies dysphagia.     Audiogram from 2015 reviewed.   Type A tympanograms. Normal thresholds.   Past Medical History:   Diagnosis Date     ADHD (attention deficit hyperactivity disorder)      Cold sore      Demyelinating changes in brain (H)     saw Dr Gilbert 5/2013 -- ? of MS, but not Dx'd yet.     Elevated glucose 2014    resolved 2016     Elevated liver enzymes 2014    resolved 2015     Fibrocystic breast      MARIBETH  "(generalized anxiety disorder)      History of left salpingo-oophorectomy     still has L ovary, but missing L tube     Hypovitaminosis D      MS (multiple sclerosis) (H) 2012     Neuropathy     feet     Obesity      PCOS (polycystic ovarian syndrome)         Allergies   Allergen Reactions     Cats      Sulfa Drugs Rash     Sulfonamide antibiotics     Current Outpatient Medications   Medication     acetaminophen (TYLENOL) 500 MG tablet     baclofen (LIORESAL) 10 MG tablet     cyclobenzaprine (FLEXERIL) 10 MG tablet     ibuprofen (ADVIL,MOTRIN) 200 MG tablet     lisinopril (ZESTRIL) 2.5 MG tablet     Meloxicam (MOBIC PO)     Multiple Vitamins-Minerals (OPTIVITE P.M.T.) TABS     order for DME     progesterone (PROMETRIUM) 100 MG capsule     valACYclovir (VALTREX) 500 MG tablet     vitamin D3 (CHOLECALCIFEROL) 1.25 MG (80685 UT) capsule     lisdexamfetamine (VYVANSE) 10 MG capsule     No current facility-administered medications for this visit.       ROS: 10 point ROS neg other than the symptoms noted above in the HPI.  BP (!) 150/90 (BP Location: Right arm, Cuff Size: Adult Large)   Pulse 96   Temp 97.7  F (36.5  C) (Tympanic)   Ht 1.6 m (5' 3\")   Wt 81.6 kg (180 lb)   LMP 12/06/2020   SpO2 98%   BMI 31.89 kg/m      General - The patient is well nourished and well developed, and appears to have good nutritional status.  Alert and oriented to person and place, answers questions and cooperates with examination appropriately.   Head and Face - Normocephalic and atraumatic, with no gross asymmetry noted.  The facial nerve is intact, with strong symmetric movements.  Voice and Breathing - The patient was breathing comfortably without the use of accessory muscles. There was no wheezing, stridor, or stertor.  The patients voice was clear and strong, and had appropriate pitch and quality.  Ears -The external auditory canals are patent, the tympanic membranes are intact without effusion, retraction or mass.  Bony " landmarks are intact.  Eyes - Extraocular movements intact. Sclera were not icteric or injected, conjunctiva were pink and moist. She does have spontaneous nystagmus, up beating.   Mouth - Examination of the oral cavity showed pink, healthy oral mucosa. No lesions or ulcerations noted.  The tongue was mobile and midline, and the dentition were in good condition.    Throat - The walls of the oropharynx were smooth, pink, moist, symmetric, and had no lesions or ulcerations.  The tonsillar pillars and soft palate were symmetric.  The uvula was midline on elevation.    Neck - Normal midline excursion of the laryngotracheal complex during swallowing.  Full range of motion on passive movement.  Palpation of the occipital, submental, submandibular, internal jugular chain, and supraclavicular nodes did not demonstrate any abnormal lymph nodes or masses.  Palpation of the thyroid was soft and smooth, with no nodules or goiter appreciated.  The trachea was mobile and midline.  Nose - External contour is symmetric, no gross deflection or scars.  Nasal mucosa is pink and moist with no abnormal mucus.    NEURO:  equal  strength, + Romberg,  finger to nose normal, CN intact, ambulates with assistance. She is not able to walk alone, due to drifting/ instability. Brittny Hallpike- Negative. She has bilateral up beating nystagmus, bilaterally. No fatigue.          ASSESSMENT:    ICD-10-CM    1. Vertigo  R42 predniSONE (DELTASONE) 10 MG tablet     ondansetron (ZOFRAN-ODT) 4 MG ODT tab     diazepam (VALIUM) 2 MG tablet     MR Brain w/o & w Contrast     CANCELED: MR Brain w Contrast   2. MS (multiple sclerosis) (H)  G35 predniSONE (DELTASONE) 10 MG tablet     ondansetron (ZOFRAN-ODT) 4 MG ODT tab     diazepam (VALIUM) 2 MG tablet     MR Brain w/o & w Contrast     CANCELED: MR Brain w Contrast     Discussed with vicky, I do not appreciated a BPPV occurrence at this time. I am concerned for flare of her MS.   Reviewed vestibular  sources of vertigo, and her symptoms are more pronounced with MS flare.   Care reviewed with Dr. Conde whom agreed with treatment.     Prednisone taper, take as directed.   Risks and complications of prednisone reviewed. She has tolerated in past without concern.   Valium as needed for severe vertigo  Zofran PRN Nausea.     Brain MRI, no recent imaging since 2018.   Follow up w/ neurology. She has a referral pending, she will contact to arrange appointment. If she is not able to be seen in a timely manner. Consider referral to [place].     She agrees with this plan.   Her , Buddy was present with her today and was able to drive her home.   Return if symptoms progress.       Risks of oral steroid use were discussed and include psychiatric/mood changes, insomnia, stomach ulcers and potential GI bleeding, blood sugar elevation/worsening diabetes, hip/bone necrosis called avascular necrosis, or bone demineralization.          Margaret Shelton PA-C  ENT  [facility], [place]  [phone]

## 2020-12-17 NOTE — LETTER
12/17/2020         RE: Radha Carl  [address] [place] [address]        Dear Colleague,    Thank you for referring your patient, Radha Carl, to the [facility] - [place]. Please see a copy of my visit note below.    Chief Complaint   Patient presents with     Vertigo     Pt is here for vertigo.  This started on Tuesday.     Patient presents for BPPV. Patient went to [place], and returned on Sunday, Monday. She woke up Tuesday AM to use bathroom when she first began to have dizziness. Further worsened over the last 24 hours.   This AM, she may have felt some improvement but once she was up moving and walking around, she felt symptoms return.  She does have double vision present today.   If she closes one eye she is able to focus and improves her visit.   R>L.   Dizziness/ vertigo is always there, it does not fatigue.   She has no nausea, emesis.   She has been able to eat and drink well.   Taken meclizine and Claritin D without improvement.   She was able to sleep ok. Reports with motion it does worsen. Increase in symptoms with walking/ moving. Denies turning to right/ left triggering symptoms.   Reports hx of vertigo this past September but today, it worse than before.   She feels like her walking is affected with her MS- Hx of optic neuritis.   She was referred to PT, and was not able to see until next week.       +Vertigo.   +MS diagnosis follows with neurology and patient uses home remedies at this time. She has not started medications at this time. New Neurology consult was placed by Dr. Jones. MRI reviewed from 2018.   No hearing concerns.   Denies COM or otologic surgeries.   Denies otalgia, otorrhea.  Denies worrisome tinnitus.  Denies fluctuating hearing loss or tinnitus.   Denies facial paraesthesia.   Denies dysphagia.     Audiogram from 2015 reviewed.   Type A tympanograms. Normal thresholds.   Past Medical History:   Diagnosis Date     ADHD (attention deficit  "hyperactivity disorder)      Cold sore      Demyelinating changes in brain (H)     saw Dr Gilbert 5/2013 -- ? of MS, but not Dx'd yet.     Elevated glucose 2014    resolved 2016     Elevated liver enzymes 2014    resolved 2015     Fibrocystic breast      MARIBETH (generalized anxiety disorder)      History of left salpingo-oophorectomy     still has L ovary, but missing L tube     Hypovitaminosis D      MS (multiple sclerosis) (H) 2012     Neuropathy     feet     Obesity      PCOS (polycystic ovarian syndrome)         Allergies   Allergen Reactions     Cats      Sulfa Drugs Rash     Sulfonamide antibiotics     Current Outpatient Medications   Medication     acetaminophen (TYLENOL) 500 MG tablet     baclofen (LIORESAL) 10 MG tablet     cyclobenzaprine (FLEXERIL) 10 MG tablet     ibuprofen (ADVIL,MOTRIN) 200 MG tablet     lisinopril (ZESTRIL) 2.5 MG tablet     Meloxicam (MOBIC PO)     Multiple Vitamins-Minerals (OPTIVITE P.M.T.) TABS     order for DME     progesterone (PROMETRIUM) 100 MG capsule     valACYclovir (VALTREX) 500 MG tablet     vitamin D3 (CHOLECALCIFEROL) 1.25 MG (47108 UT) capsule     lisdexamfetamine (VYVANSE) 10 MG capsule     No current facility-administered medications for this visit.       ROS: 10 point ROS neg other than the symptoms noted above in the HPI.  BP (!) 150/90 (BP Location: Right arm, Cuff Size: Adult Large)   Pulse 96   Temp 97.7  F (36.5  C) (Tympanic)   Ht 1.6 m (5' 3\")   Wt 81.6 kg (180 lb)   LMP 12/06/2020   SpO2 98%   BMI 31.89 kg/m      General - The patient is well nourished and well developed, and appears to have good nutritional status.  Alert and oriented to person and place, answers questions and cooperates with examination appropriately.   Head and Face - Normocephalic and atraumatic, with no gross asymmetry noted.  The facial nerve is intact, with strong symmetric movements.  Voice and Breathing - The patient was breathing comfortably without the use of accessory muscles. " There was no wheezing, stridor, or stertor.  The patients voice was clear and strong, and had appropriate pitch and quality.  Ears -The external auditory canals are patent, the tympanic membranes are intact without effusion, retraction or mass.  Bony landmarks are intact.  Eyes - Extraocular movements intact. Sclera were not icteric or injected, conjunctiva were pink and moist. She does have spontaneous nystagmus, up beating.   Mouth - Examination of the oral cavity showed pink, healthy oral mucosa. No lesions or ulcerations noted.  The tongue was mobile and midline, and the dentition were in good condition.    Throat - The walls of the oropharynx were smooth, pink, moist, symmetric, and had no lesions or ulcerations.  The tonsillar pillars and soft palate were symmetric.  The uvula was midline on elevation.    Neck - Normal midline excursion of the laryngotracheal complex during swallowing.  Full range of motion on passive movement.  Palpation of the occipital, submental, submandibular, internal jugular chain, and supraclavicular nodes did not demonstrate any abnormal lymph nodes or masses.  Palpation of the thyroid was soft and smooth, with no nodules or goiter appreciated.  The trachea was mobile and midline.  Nose - External contour is symmetric, no gross deflection or scars.  Nasal mucosa is pink and moist with no abnormal mucus.    NEURO:  equal  strength, + Romberg,  finger to nose normal, CN intact, ambulates with assistance. She is not able to walk alone, due to drifting/ instability. Brittny Hallpike- Negative. She has bilateral up beating nystagmus, bilaterally. No fatigue.          ASSESSMENT:    ICD-10-CM    1. Vertigo  R42 predniSONE (DELTASONE) 10 MG tablet     ondansetron (ZOFRAN-ODT) 4 MG ODT tab     diazepam (VALIUM) 2 MG tablet     MR Brain w/o & w Contrast     CANCELED: MR Brain w Contrast   2. MS (multiple sclerosis) (H)  G35 predniSONE (DELTASONE) 10 MG tablet     ondansetron (ZOFRAN-ODT) 4 MG  ODT tab     diazepam (VALIUM) 2 MG tablet     MR Brain w/o & w Contrast     CANCELED: MR Brain w Contrast     Discussed with vicky, I do not appreciated a BPPV occurrence at this time. I am concerned for flare of her MS.   Reviewed vestibular sources of vertigo, and her symptoms are more pronounced with MS flare.   Care reviewed with Dr. Conde whom agreed with treatment.     Prednisone taper, take as directed.   Risks and complications of prednisone reviewed. She has tolerated in past without concern.   Valium as needed for severe vertigo  Zofran PRN Nausea.     Brain MRI, no recent imaging since 2018.   Follow up w/ neurology. She has a referral pending, she will contact to arrange appointment. If she is not able to be seen in a timely manner. Consider referral to [place].     She agrees with this plan.   Her , Buddy was present with her today and was able to drive her home.   Return if symptoms progress.       Risks of oral steroid use were discussed and include psychiatric/mood changes, insomnia, stomach ulcers and potential GI bleeding, blood sugar elevation/worsening diabetes, hip/bone necrosis called avascular necrosis, or bone demineralization.          Margaret Shelton PA-C  ENT  [facility], [place]  [phone]          Again, thank you for allowing me to participate in the care of your patient.        Sincerely,        Margaret Shelton PA-C

## 2020-12-17 NOTE — LETTER
December 17, 2020      Radha Clarosicle  [address] [place] [address]        To Whom It May Concern:      Radha Carl  was seen on 12/17/20. Please excuse her from work for an additional 7 days as needed due to her vertigo related to her multiple sclerosis.         Sincerely,        Margaret Shelton PA-C

## 2020-12-17 NOTE — LETTER
December 17, 2020      Radha Clarosicle  [address] [place] [address]        To Whom It May Concern:    Radha Cral  was seen on 12/17/20. Please excuse her from work for an additional 7 days due to her vertigo related to her multiple sclerosis.         Sincerely,        Margaret Shelton PA-C

## 2020-12-17 NOTE — PATIENT INSTRUCTIONS
Start prednisone taper.   Start Valium 1-2 tablets every 6 hours as needed for vertigo  Zofran for nausea.   Follow up with Neurology. If you are not able to be seen, consider referral to [facility]  Brain MRI to be completed.       Thank you for allowing Margaret Shelton PA-C and our ENT team to participate in your care.  If your medications are too expensive, please give the nurse a call.  We can possibly change this medication.  If you have a scheduling or an appointment question please contact our Health Unit Coordinator at their direct line [phone].   ALL nursing questions or concerns can be directed to your ENT nurse at: [phone] [place]

## 2020-12-18 DIAGNOSIS — R82.90 ABNORMAL URINE FINDINGS: Primary | ICD-10-CM

## 2020-12-18 DIAGNOSIS — R30.0 DYSURIA: ICD-10-CM

## 2020-12-18 LAB

## 2020-12-18 PROCEDURE — 81001 URINALYSIS AUTO W/SCOPE: CPT | Performed by: FAMILY MEDICINE

## 2020-12-18 PROCEDURE — 87086 URINE CULTURE/COLONY COUNT: CPT | Performed by: FAMILY MEDICINE

## 2020-12-18 PROCEDURE — 87088 URINE BACTERIA CULTURE: CPT | Performed by: FAMILY MEDICINE

## 2020-12-18 PROCEDURE — 87186 SC STD MICRODIL/AGAR DIL: CPT | Performed by: FAMILY MEDICINE

## 2020-12-20 ENCOUNTER — HEALTH MAINTENANCE LETTER (OUTPATIENT)
Age: 30
End: 2020-12-20

## 2020-12-22 ENCOUNTER — MYC MEDICAL ADVICE (OUTPATIENT)
Dept: OTOLARYNGOLOGY | Facility: OTHER | Age: 30
End: 2020-12-22

## 2020-12-22 NOTE — TELEPHONE ENCOUNTER
Work note  provided.   She needs to see Neurology. Consider e-consult at the [facility] if she can not get in. If symptoms worsen contact Primary may need alternative medication/ treatment. TR

## 2020-12-22 NOTE — LETTER
December 22, 2020      Radha J Mericle  [address]  [address]   [facility] [address]        To Whom It May Concern:    Radha Carl  was seen on 12/17/20.  Please excuse her until 12/31/20 due to acute episode of vertigo related to her MS.         Sincerely,        Margaret Shelton PA-C

## 2020-12-23 LAB
BACTERIA SPEC CULT: ABNORMAL
Lab: ABNORMAL
SPECIMEN SOURCE: ABNORMAL

## 2020-12-30 ENCOUNTER — HOSPITAL ENCOUNTER (OUTPATIENT)
Dept: MRI IMAGING | Facility: HOSPITAL | Age: 30
End: 2020-12-30
Attending: PHYSICIAN ASSISTANT
Payer: COMMERCIAL

## 2020-12-30 ENCOUNTER — VIRTUAL VISIT (OUTPATIENT)
Dept: FAMILY MEDICINE | Facility: OTHER | Age: 30
End: 2020-12-30
Attending: FAMILY MEDICINE
Payer: COMMERCIAL

## 2020-12-30 ENCOUNTER — MYC MEDICAL ADVICE (OUTPATIENT)
Dept: OTOLARYNGOLOGY | Facility: OTHER | Age: 30
End: 2020-12-30

## 2020-12-30 DIAGNOSIS — R42 VERTIGO: ICD-10-CM

## 2020-12-30 DIAGNOSIS — G35 MS (MULTIPLE SCLEROSIS) (H): ICD-10-CM

## 2020-12-30 DIAGNOSIS — H46.9 OPTIC NEURITIS DUE TO MULTIPLE SCLEROSIS (H): ICD-10-CM

## 2020-12-30 DIAGNOSIS — R42 VERTIGO: Primary | ICD-10-CM

## 2020-12-30 DIAGNOSIS — G35 OPTIC NEURITIS DUE TO MULTIPLE SCLEROSIS (H): ICD-10-CM

## 2020-12-30 DIAGNOSIS — G35 MS (MULTIPLE SCLEROSIS) (H): Primary | ICD-10-CM

## 2020-12-30 PROCEDURE — 255N000002 HC RX 255 OP 636: Performed by: RADIOLOGY

## 2020-12-30 PROCEDURE — A9585 GADOBUTROL INJECTION: HCPCS | Performed by: RADIOLOGY

## 2020-12-30 PROCEDURE — 99213 OFFICE O/P EST LOW 20 MIN: CPT | Mod: TEL | Performed by: FAMILY MEDICINE

## 2020-12-30 PROCEDURE — 70553 MRI BRAIN STEM W/O & W/DYE: CPT

## 2020-12-30 RX ORDER — GADOBUTROL 604.72 MG/ML
7.5 INJECTION INTRAVENOUS ONCE
Status: COMPLETED | OUTPATIENT
Start: 2020-12-30 | End: 2020-12-30

## 2020-12-30 RX ADMIN — GADOBUTROL 7.5 ML: 604.72 INJECTION INTRAVENOUS at 17:36

## 2020-12-30 ASSESSMENT — ANXIETY QUESTIONNAIRES
4. TROUBLE RELAXING: NOT AT ALL
GAD7 TOTAL SCORE: 0
3. WORRYING TOO MUCH ABOUT DIFFERENT THINGS: NOT AT ALL
5. BEING SO RESTLESS THAT IT IS HARD TO SIT STILL: NOT AT ALL
1. FEELING NERVOUS, ANXIOUS, OR ON EDGE: NOT AT ALL
6. BECOMING EASILY ANNOYED OR IRRITABLE: NOT AT ALL
7. FEELING AFRAID AS IF SOMETHING AWFUL MIGHT HAPPEN: NOT AT ALL
2. NOT BEING ABLE TO STOP OR CONTROL WORRYING: NOT AT ALL

## 2020-12-30 ASSESSMENT — PAIN SCALES - GENERAL: PAINLEVEL: NO PAIN (0)

## 2020-12-30 ASSESSMENT — PATIENT HEALTH QUESTIONNAIRE - PHQ9: SUM OF ALL RESPONSES TO PHQ QUESTIONS 1-9: 0

## 2020-12-30 NOTE — NURSING NOTE
"Chief Complaint   Patient presents with     Eye Problem       Initial LMP 12/06/2020  Estimated body mass index is 31.89 kg/m  as calculated from the following:    Height as of 12/17/20: 1.6 m (5' 3\").    Weight as of 12/17/20: 81.6 kg (180 lb).  Medication Reconciliation: complete  Noman Michele LPN  "

## 2020-12-30 NOTE — PROGRESS NOTES
"Radha Carl is a 30 year old female who is being evaluated via a billable telephone visit.      The patient has been notified of following:     \"This telephone visit will be conducted via a call between you and your physician/provider. We have found that certain health care needs can be provided without the need for a physical exam.  This service lets us provide the care you need with a short phone conversation.  If a prescription is necessary we can send it directly to your pharmacy.  If lab work is needed we can place an order for that and you can then stop by our lab to have the test done at a later time.    Telephone visits are billed at different rates depending on your insurance coverage. During this emergency period, for some insurers they may be billed the same as an in-person visit.  Please reach out to your insurance provider with any questions.    If during the course of the call the physician/provider feels a telephone visit is not appropriate, you will not be charged for this service.\"    Patient has given verbal consent for Telephone visit?  Yes    What phone number would you like to be contacted at? [phone]    How would you like to obtain your AVS? Thais    Subjective     Radha Carl is a 30 year old female who presents via phone visit today for the following health issues:    HPI     Eye(s) Problem  Onset/Duration: 1 month follow up, saw ENT  Description:   Location: left eye  Pain: YES, not today  Redness: no  Accompanying Signs & Symptoms:  Discharge/mattering: no  Swelling: no  Visual changes: YES  Fever: no  Nasal Congestion: no  Bothered by bright lights: no  History:  Trauma: no  Foreign body exposure: no  Wearing contacts: no  Precipitating or alleviating factors: None  Therapies tried and outcome: Saw ENT    Believes she is having optic neuritis and left vision issues and has trouble driving with it, she veers to the left when driving with both eyes open     Review of Systems "   Constitutional, HEENT, cardiovascular, pulmonary, gi and gu systems are negative, except as otherwise noted.       Objective          Vitals:  No vitals were obtained today due to virtual visit.    healthy, alert and no distress  PSYCH: Alert and oriented times 3; coherent speech, normal   rate and volume, able to articulate logical thoughts, able   to abstract reason, no tangential thoughts, no hallucinations   or delusions  Her affect is normal and pleasant  RESP: No cough, no audible wheezing, able to talk in full sentences  Remainder of exam unable to be completed due to telephone visits    No results found for any visits on 12/30/20.        Assessment/Plan:    Assessment & Plan     MS (multiple sclerosis) (H)  Having MRI today at 5pm  - NEUROLOGY ADULT REFERRAL  - EYE ADULT REFERRAL; Future    Optic neuritis due to multiple sclerosis (H)  Needs dilated eye exam  - NEUROLOGY ADULT REFERRAL  - EYE ADULT REFERRAL; Future      Patient was agreeable to this plan and had no further questions.  There are no Patient Instructions on file for this visit.    No follow-ups on file.    Muriel Jones MD  [facility] [place]    Phone call duration:  12 minutes

## 2020-12-31 ENCOUNTER — MYC MEDICAL ADVICE (OUTPATIENT)
Dept: FAMILY MEDICINE | Facility: OTHER | Age: 30
End: 2020-12-31

## 2020-12-31 ASSESSMENT — ANXIETY QUESTIONNAIRES: GAD7 TOTAL SCORE: 0

## 2020-12-31 NOTE — TELEPHONE ENCOUNTER
Spoke with patient. Note completed. Sent to patient via [employer] and faxed to leave management.

## 2020-12-31 NOTE — LETTER
December 31, 2020      Radha Carl  [address]  [address]   [facility] [address]        To Whom It May Concern:    Radha Carl was seen in our clinic. She may return to work on or about 1/4/21 with the following: reduced hours or half days depending on patient's health condition. Hours patient is able to work will be per patient discretion.      Sincerely,      Muriel Jones MD

## 2020-12-31 NOTE — LETTER
December 31, 2020      Radha Carl  [address]  [address]   [facility] [address]        To Whom It May Concern:    Radha Carl was seen in our clinic. She may return to work on or about 1/4/21 and may work reduced hours or half days depending on health condition.      Sincerely,      Muriel Jones MD

## 2021-01-04 ENCOUNTER — TELEPHONE (OUTPATIENT)
Dept: FAMILY MEDICINE | Facility: OTHER | Age: 31
End: 2021-01-04

## 2021-01-07 ENCOUNTER — TRANSFERRED RECORDS (OUTPATIENT)
Dept: HEALTH INFORMATION MANAGEMENT | Facility: CLINIC | Age: 31
End: 2021-01-07

## 2021-01-11 ENCOUNTER — OFFICE VISIT (OUTPATIENT)
Dept: FAMILY MEDICINE | Facility: OTHER | Age: 31
End: 2021-01-11
Attending: FAMILY MEDICINE
Payer: COMMERCIAL

## 2021-01-11 VITALS
HEIGHT: 63 IN | TEMPERATURE: 98.3 F | RESPIRATION RATE: 18 BRPM | BODY MASS INDEX: 32.78 KG/M2 | SYSTOLIC BLOOD PRESSURE: 144 MMHG | DIASTOLIC BLOOD PRESSURE: 106 MMHG | WEIGHT: 185 LBS | HEART RATE: 106 BPM | OXYGEN SATURATION: 100 %

## 2021-01-11 DIAGNOSIS — E53.8 VITAMIN B12 DEFICIENCY (NON ANEMIC): ICD-10-CM

## 2021-01-11 DIAGNOSIS — E28.2 PCOS (POLYCYSTIC OVARIAN SYNDROME): ICD-10-CM

## 2021-01-11 DIAGNOSIS — G35 MS (MULTIPLE SCLEROSIS) (H): Primary | ICD-10-CM

## 2021-01-11 PROCEDURE — 99214 OFFICE O/P EST MOD 30 MIN: CPT | Performed by: FAMILY MEDICINE

## 2021-01-11 ASSESSMENT — PAIN SCALES - GENERAL: PAINLEVEL: MODERATE PAIN (5)

## 2021-01-11 ASSESSMENT — MIFFLIN-ST. JEOR: SCORE: 1518.28

## 2021-01-11 NOTE — PROGRESS NOTES
"  Assessment & Plan     MS (multiple sclerosis) (H)  Seeing neurologist at the [facility] in February and [facility] in march  Ok to try and return to work next Tuesday, will see how she does this week  Notes from Dr rojas report no optic neuritis but had some discoloration likely from before    PCOS (polycystic ovarian syndrome)  Late menses last month by 2 weeks with all the stress before she went to [place]    Vitamin B12 deficiency (non anemic)  Has had labs      Review of external notes as documented above     30 minutes spent on the date of the encounter doing chart review, history and exam, documentation and further activities as noted above         Patient was agreeable to this plan and had no further questions.  Patient Instructions   The tapping solution      No follow-ups on file.    Muriel Jones MD  [facility] - ZORAIDA Garcia is a 30 year old who presents to clinic today for the following health issues  accompanied by her spouse:    HPI       Concern - Vertigo/MS  Onset: December 15th  Description: Dizzy, off balance  Intensity: mild  Progression of Symptoms:  improving  Accompanying Signs & Symptoms: n/a  Previous history of similar problem: pt has had vertigo oin the past  Precipitating factors:        Worsened by: quick head movements  Alleviating factors:        Improved by: sitting   Therapies tried and outcome: Prednisone, meclizine, valium, clairitin - slightly effective    Review of Systems   Constitutional, HEENT, cardiovascular, pulmonary, gi and gu systems are negative, except as otherwise noted.      Objective    BP (!) 144/106   Pulse 106   Temp 98.3  F (36.8  C) (Tympanic)   Resp 18   Ht 1.6 m (5' 3\")   Wt 83.9 kg (185 lb)   SpO2 100%   BMI 32.77 kg/m    Body mass index is 32.77 kg/m .  Physical Exam   GENERAL: healthy, alert and no distress  EYES: Eyes grossly normal to inspection, PERRL and conjunctivae and sclerae normal  NECK: no adenopathy, no " asymmetry, masses, or scars and thyroid normal to palpation  RESP: lungs clear to auscultation - no rales, rhonchi or wheezes  CV: regular rate and rhythm, normal S1 S2, no S3 or S4, no murmur, click or rub, no peripheral edema and peripheral pulses strong  MS: no gross musculoskeletal defects noted, no edema  PSYCH: mentation appears normal, affect normal/bright    No results found for any visits on 01/11/21.

## 2021-01-11 NOTE — NURSING NOTE
"Chief Complaint   Patient presents with     Dizziness     RECHECK     Neurologic Problem       Initial BP (!) 144/106   Pulse 106   Temp 98.3  F (36.8  C) (Tympanic)   Resp 18   Ht 1.6 m (5' 3\")   Wt 83.9 kg (185 lb)   SpO2 100%   BMI 32.77 kg/m   Estimated body mass index is 32.77 kg/m  as calculated from the following:    Height as of this encounter: 1.6 m (5' 3\").    Weight as of this encounter: 83.9 kg (185 lb).  Medication Reconciliation: nicole Hudson  "

## 2021-01-11 NOTE — TELEPHONE ENCOUNTER
RECORDS RECEIVED FROM: Internal   Date of Appt: 21   NOTES (FOR ALL VISITS) STATUS DETAILS   OFFICE NOTE from referring provider Internal Dr Tanna Jones @ [facility]:  20   OFFICE NOTE from other specialist N/A    DISCHARGE SUMMARY from hospital N/A    DISCHARGE REPORT from the ER N/A    OPERATIVE REPORT N/A    MEDICATION LIST Internal    IMAGING  (FOR ALL VISITS)     EMG N/A    EEG N/A    LUMBAR PUNCTURE N/A    ROSANGELA SCAN N/A    DEXA SCAN *NEUROSURGERY* N/A    ULTRASOUND (CAROTID BILAT) *VASCULAR* N/A    MRI (HEAD, NECK, SPINE) Internal [facility]:  MRI Brain 20  MRI Brain 18  MRI Lumbar Spine 10/31/17  MRI Cervical Spine 10/31/17   XRAY (SPINE) *NEUROSURGERY* N/A    CT (HEAD, NECK, SPINE) N/A

## 2021-01-11 NOTE — LETTER
January 11, 2021      Radha Carl  [address]   [facility] [address]        To Whom It May Concern:    Radha Carl was seen in our clinic. She may return to work with the following: no restrictions on or about 1/19/20.      Sincerely,        Muriel Jones MD

## 2021-02-04 ENCOUNTER — VIRTUAL VISIT (OUTPATIENT)
Dept: NEUROLOGY | Facility: CLINIC | Age: 31
End: 2021-02-04
Attending: FAMILY MEDICINE
Payer: COMMERCIAL

## 2021-02-04 ENCOUNTER — PRE VISIT (OUTPATIENT)
Dept: NEUROLOGY | Facility: CLINIC | Age: 31
End: 2021-02-04

## 2021-02-04 DIAGNOSIS — G35 MS (MULTIPLE SCLEROSIS) (H): Primary | ICD-10-CM

## 2021-02-04 PROCEDURE — 99205 OFFICE O/P NEW HI 60 MIN: CPT | Mod: 95 | Performed by: PSYCHIATRY & NEUROLOGY

## 2021-02-04 NOTE — Clinical Note
"2/4/2021       RE: Radha Carl  [address]  [address]  [facility] [address]     Dear Colleague,    Thank you for referring your patient, Radha Carl, to the [facility] [place] [facility]. Please see a copy of my visit note below.    Radha is a 30 year old who is being evaluated via a billable video visit.      How would you like to obtain your AVS? [employer]  If the video visit is dropped, the invitation should be resent by: Other e-mail: [employer]  Will anyone else be joining your video visit? No  {If patient encounters technical issues they should call [phone] :[id number]}    Video Start Time: {video visit start/end time for provider to select:152948}  Video-Visit Details    Type of service:  Video Visit    Video End Time:{video visit start/end time for provider to select:152948}    Originating Location (pt. Location): {video visit patient location:850139::\"Home\"}    Distant Location (provider location):  [facility]     Platform used for Video Visit: {Virtual Visit Platforms:372020::\"CloudEngine\"}        Again, thank you for allowing me to participate in the care of your patient.      Sincerely,    Santo Gracia MD    "

## 2021-02-04 NOTE — LETTER
"2/4/2021       RE: Radha Carl  [address]   [address] [place] [id number]     Dear Dr. Jones,    Thank you for referring your patient, Radha Carl, to the Multiple Sclerosis Clinic at [facility]. Please see a copy of my visit note below.    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 Jones for an opinion regarding management of presumed diagnosis of multiple sclerosis.     At the request of the patient, the appointment was conducted by video as she lives several hours away from our clinic and to limit unnecessary exposure in light of the COVID-19 pandemic.    The patient's neurologic history dates back to May 2013, at which time she was admitted to hospital for ascending paresthesias. MRI imaging was suggestive of demyelinating disease of the type seen in multiple sclerosis, per report. A few months later, she was seen in follow-up by an outside neurologist, Dr. Juaquin Gilbert. At that visit she also endorsed blurred vision of the right eye and Lhermitte phenomenon. She was diagnosed with a clinically isolated demyelinating syndrome, but did not start disease modifying therapy.    Around 2018 she was seen by another neurologist, Dr. Rima Varma, after she developed weakness of the legs and had MRI imaging showing actively enhancing lesions. Per the patient's report, disease modifying therapy was recommended but she was not comfortable with that recommendation and did not return.    Most recently, in mid-December 2020 she woke up one morning with new onset vertigo shortly after returning from a trip to [place]. As discussed further below, MRI scan of the brain demonstrated new active inflammatory " demyelination. The patient was treated with an oral prednisone taper with subsequent improvement in her symptoms, but missed three weeks of work overall.    She is here today for an opinion regarding management of her condition.    Past Medical History:  1. ADHD  2. Hyperlipidemia  3. Ectopic pregnancy, status post unilateral salpingectomy  4. Hypertension  5. Polycystic ovarian syndrome  6. Prediabetes    Medications:  1. Vitamin D 50,000 units by mouth weekly    Family History: There is no family history of multiple sclerosis or other autoimmune disease in first degree relatives of which she is aware, although a distant ancestor (great-great grandmother?) may have had MS, in retrospect.    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 She does not smoke cigarettes.    Investigations: I reviewed images from an MRI scans of the brain dated 12/30/2020. These images demonstrate several periventricular, juxtacortical and infratentorial foci of T2 hyperintensity in the brain parenchyma, including a contrast enhancing lesion in the left middle cerebellar peduncle adjacent to the 4th ventricle. In this clinical context, the MRI appearance is consistent with, and satisfies the International Panel diagnostic criteria for, a diagnosis of relapsing-remitting multiple sclerosis (RRMS).    Assessment/plan:    1. Multiple sclerosis  Dissemination of demyelinating disease in space and time is strongly suggested by history and confirmed by current and past MRI findings, and thus the diagnosis of relapsing-remitting multiple sclerosis is secure.    I discussed the demonstrated benefits of disease modifying therapies (DMT) for RRMS. These include average reduction in the frequency of clinical relapses, reduction in the accumulation of new MRI lesions, and reduction in the likelihood of accumulating new abnormalities on neurologic examination over a 1-3 year period. In light of a recent relapse that  caused her to miss three weeks of work, these benefits alone would appear to represent a positive good.    Moreover, although randomized controlled trials regarding long term outcomes with DMT versus placebo (e.g., at 20 years) are not and never will be available, accumulating evidence from retrospective studies suggests that these agents, on average, likely impact long term disability outcomes in people with MS. These include finding of a mortality benefit at 21 years in patients randomized to drug in the original interferon beta-1b trial, as well as multiple recent retrospective cohort studies suggesting a lower incidence of negative outcomes (for example, reaching pre-defined disability thresholds and conversion to secondary progressive MS) in patients who are treated with DMT.    The patient inquires about the alternative strategy of a watchful-waiting approach. I explained to her that for the reasons noted above, the majority of MS experts do not regard this as advisable in most circumstances, particularly in cases like hers in which there is clear-cut clinical and radiologic evidence of ongoing active inflammatory demyelination. It should be acknowledged that in a minority of patients (~10-20%), neurologic examination 20 years after onset of symptoms will be relatively benign, and it is apparent that some patients with MS will presumably do well regardless of whether or not they are treated. However, we lack any valid prospective metric to determine who will fall into that category in advance, and it should be noted that even a benign examination at 20 years is not a guarantee of long term disease stability.    IIn particular, it should be noted that although measures such as maintaining a healthy diet, regular physical activity, and good sleep habits are advisable in people with MS, neither these, nor 'natural' supplements, nor any other strategy other than using an FDA approved DMT have been shown to have  "any meaningful effect on the natural history of RRMS in clinical trials. In essence, a decision to defer DMT is to elect to take one's chances with the natural history of MS, which is not favorable in a substantial majority of patients.     Accordingly, it would be my strong recommendation for her to start a DMT in the near future.    We went on to discuss treatment options. I explained that most clinics have typically followed an \"escalation\" strategy in which patients begin with treatments that are known to be very safe, although not associated with the highest likelihood of disease stability. In recent years, a number of MS experts have urged that patients begin treatment with \"highly active\" parenteral therapies such as natalizumab or CD20 monoclonal antibodies directly. As of yet, there is no definite evidence demonstrating that the latter strategy is associated with more favorable long term outcomes, although this is a reasonable choice. The counter-argument is that these agents are associated with a low but non-zero risk of serious side effects such as opportunistic infections of the brain, and that it is not clear that those who are stable on older, more benign therapies derive any additional benefit from more aggressive options.    Among the first line therapies, we primarily discussed an injectable therapy, glatiramer acetate, and an oral therapy, dimethyl fumarate. Glatiramer acetate is the safest of all MS drugs and is generally well tolerated apart from injectable mode of delivery. Dimethyl fumarate may be a slightly more efficacious therapy, but is associated with rare occurrence of serious side effects including lymphopenia and opportunistic infection with progressive multifocal leukoencephalopathy (PML) in a handful of cases. Other nuisance side effects of this treatment include flushing and gastrointestinal distress, with the latter usually being self-limited, but occasionally severe and limiting to " tolerance of the treatment.     Currently, I asked the patient to have laboratory studies done for risk stratification regarding DMT, including hepatitis B, HIV, and GARRETT virus serologies and Quantiferon-Gold screening for tuberculosis. I will arrange for her to receive information in the mail regarding disease modifying therapies for MS, and then see her back in one month for further discussion.    I spent a total of 71 minutes in patient care activities related to this encounter on the day of the visit, including time spent in reviewing the chart and outside medical records, face-to-face visit with the patient, and coordination of care.    Again, thank you for allowing me to participate in the care of your patient.      Sincerely,    Santo Gracia MD   of Neurology  [facility]  Cc:  Patient

## 2021-02-04 NOTE — PATIENT INSTRUCTIONS
1. We will send you information on several disease modifying therapies for MS, including:  -Glatiramer acetate (Copaxone)  -Dimethyl fumarate (Tecfidera)  -Teriflunomide (Aubagio)  -Natalizumab (Tysabri)  -Ocrelizmab (Ocrevus)    2. Please have blood tests completed at your local [facility] clinic before the end of the month    3. Return in one month

## 2021-02-05 DIAGNOSIS — G35 MS (MULTIPLE SCLEROSIS) (H): ICD-10-CM

## 2021-02-05 PROCEDURE — 99N1133 PR STATISTIC JC VIR AB INDEX INHIB: Mod: 90 | Performed by: PSYCHIATRY & NEUROLOGY

## 2021-02-05 PROCEDURE — 86706 HEP B SURFACE ANTIBODY: CPT | Performed by: PSYCHIATRY & NEUROLOGY

## 2021-02-05 PROCEDURE — 86481 TB AG RESPONSE T-CELL SUSP: CPT | Performed by: PSYCHIATRY & NEUROLOGY

## 2021-02-05 PROCEDURE — 87340 HEPATITIS B SURFACE AG IA: CPT | Performed by: PSYCHIATRY & NEUROLOGY

## 2021-02-05 PROCEDURE — 86704 HEP B CORE ANTIBODY TOTAL: CPT | Performed by: PSYCHIATRY & NEUROLOGY

## 2021-02-05 PROCEDURE — 87389 HIV-1 AG W/HIV-1&-2 AB AG IA: CPT | Performed by: PSYCHIATRY & NEUROLOGY

## 2021-02-05 PROCEDURE — 36415 COLL VENOUS BLD VENIPUNCTURE: CPT | Performed by: PSYCHIATRY & NEUROLOGY

## 2021-02-07 LAB
GAMMA INTERFERON BACKGROUND BLD IA-ACNC: 0.05 IU/ML
M TB IFN-G CD4+ BCKGRND COR BLD-ACNC: 9.95 IU/ML
M TB TUBERC IFN-G BLD QL: NEGATIVE
MITOGEN IGNF BCKGRD COR BLD-ACNC: 0 IU/ML
MITOGEN IGNF BCKGRD COR BLD-ACNC: 0 IU/ML

## 2021-02-08 LAB
HBV CORE AB SERPL QL IA: NONREACTIVE
HBV SURFACE AB SERPL IA-ACNC: 569.99 M[IU]/ML
HBV SURFACE AG SERPL QL IA: NONREACTIVE
HIV 1+2 AB+HIV1 P24 AG SERPL QL IA: NONREACTIVE

## 2021-02-15 LAB — LAB SCANNED RESULT: ABNORMAL

## 2021-02-21 NOTE — PROGRESS NOTES
"Radha is a 30 year old who is being evaluated via a billable video visit.      How would you like to obtain your AVS? MyChart  If the video visit is dropped, the invitation should be resent by: Other e-mail: [email or web address]  Will anyone else be joining your video visit? 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 Jones for an opinion regarding management of presumed diagnosis of multiple sclerosis.     At the request of the patient, the appointment was conducted by video as she lives several hours away from our clinic and to limit unnecessary exposure in light of the COVID-19 pandemic.    The patient's neurologic history dates back to May 2013, at which time she was admitted to hospital for ascending paresthesias. MRI imaging was suggestive of demyelinating disease of the type seen in multiple sclerosis, per report. A few months later, she was seen in follow-up by an outside neurologist, Dr. Juaquin Gilbert. At that visit she also endorsed blurred vision of the right eye and Lhermitte phenomenon. She was diagnosed with a clinically isolated demyelinating syndrome, but did not start disease modifying therapy.    Around 2018 she was seen by another neurologist, Dr. Rima Varma, after she developed weakness of the legs and had MRI imaging showing actively enhancing lesions. Per the patient's report, disease modifying therapy was recommended but she was not comfortable with that recommendation and did not return.    Most recently, in mid-December 2020 she woke up one morning with new onset vertigo shortly after returning from a trip to [place]. As discussed further below, MRI scan of the brain demonstrated new active inflammatory demyelination. The patient was " treated with an oral prednisone taper with subsequent improvement in her symptoms, but missed three weeks of work overall.    She is here today for an opinion regarding management of her condition.    Past Medical History:  1. ADHD  2. Hyperlipidemia  3. Ectopic pregnancy, status post unilateral salpingectomy  4. Hypertension  5. Polycystic ovarian syndrome  6. Prediabetes    Medications:  1. Vitamin D 50,000 units by mouth weekly    Family History: There is no family history of multiple sclerosis or other autoimmune disease in first degree relatives of which she is aware, although a distant ancestor (great-great grandmother?) may have had MS, in retrospect.    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 She does not smoke cigarettes.    Investigations: I reviewed images from an MRI scans of the brain dated 12/30/2020. These images demonstrate several periventricular, juxtacortical and infratentorial foci of T2 hyperintensity in the brain parenchyma, including a contrast enhancing lesion in the left middle cerebellar peduncle adjacent to the 4th ventricle. In this clinical context, the MRI appearance is consistent with, and satisfies the International Panel diagnostic criteria for, a diagnosis of relapsing-remitting multiple sclerosis (RRMS).    Assessment/plan:    1. Multiple sclerosis  Dissemination of demyelinating disease in space and time is strongly suggested by history and confirmed by current and past MRI findings, and thus the diagnosis of relapsing-remitting multiple sclerosis is secure.    I discussed the demonstrated benefits of disease modifying therapies (DMT) for RRMS. These include average reduction in the frequency of clinical relapses, reduction in the accumulation of new MRI lesions, and reduction in the likelihood of accumulating new abnormalities on neurologic examination over a 1-3 year period. In light of a recent relapse that caused her to miss three weeks of  work, these benefits alone would appear to represent a positive good.    Moreover, although randomized controlled trials regarding long term outcomes with DMT versus placebo (e.g., at 20 years) are not and never will be available, accumulating evidence from retrospective studies suggests that these agents, on average, likely impact long term disability outcomes in people with MS. These include finding of a mortality benefit at 21 years in patients randomized to drug in the original interferon beta-1b trial, as well as multiple recent retrospective cohort studies suggesting a lower incidence of negative outcomes (for example, reaching pre-defined disability thresholds and conversion to secondary progressive MS) in patients who are treated with DMT.    The patient inquires about the alternative strategy of a watchful-waiting approach. I explained to her that for the reasons noted above, the majority of MS experts do not regard this as advisable in most circumstances, particularly in cases like hers in which there is clear-cut clinical and radiologic evidence of ongoing active inflammatory demyelination. It should be acknowledged that in a minority of patients (~10-20%), neurologic examination 20 years after onset of symptoms will be relatively benign, and it is apparent that some patients with MS will presumably do well regardless of whether or not they are treated. However, we lack any valid prospective metric to determine who will fall into that category in advance, and it should be noted that even a benign examination at 20 years is not a guarantee of long term disease stability.    IIn particular, it should be noted that although measures such as maintaining a healthy diet, regular physical activity, and good sleep habits are advisable in people with MS, neither these, nor 'natural' supplements, nor any other strategy other than using an FDA approved DMT have been shown to have any meaningful effect on the natural  "history of RRMS in clinical trials. In essence, a decision to defer DMT is to elect to take one's chances with the natural history of MS, which is not favorable in a substantial majority of patients.     Accordingly, it would be my strong recommendation for her to start a DMT in the near future.    We went on to discuss treatment options. I explained that most clinics have typically followed an \"escalation\" strategy in which patients begin with treatments that are known to be very safe, although not associated with the highest likelihood of disease stability. In recent years, a number of MS experts have urged that patients begin treatment with \"highly active\" parenteral therapies such as natalizumab or CD20 monoclonal antibodies directly. As of yet, there is no definite evidence demonstrating that the latter strategy is associated with more favorable long term outcomes, although this is a reasonable choice. The counter-argument is that these agents are associated with a low but non-zero risk of serious side effects such as opportunistic infections of the brain, and that it is not clear that those who are stable on older, more benign therapies derive any additional benefit from more aggressive options.    Among the first line therapies, we primarily discussed an injectable therapy, glatiramer acetate, and an oral therapy, dimethyl fumarate. Glatiramer acetate is the safest of all MS drugs and is generally well tolerated apart from injectable mode of delivery. Dimethyl fumarate may be a slightly more efficacious therapy, but is associated with rare occurrence of serious side effects including lymphopenia and opportunistic infection with progressive multifocal leukoencephalopathy (PML) in a handful of cases. Other nuisance side effects of this treatment include flushing and gastrointestinal distress, with the latter usually being self-limited, but occasionally severe and limiting to tolerance of the treatment. "     Currently, I asked the patient to have laboratory studies done for risk stratification regarding DMT, including hepatitis B, HIV, and GARRETT virus serologies and Quantiferon-Gold screening for tuberculosis. I will arrange for her to receive information in the mail regarding disease modifying therapies for MS, and then see her back in one month for further discussion.    I spent a total of 71 minutes in patient care activities related to this encounter on the day of the visit, including time spent in reviewing the chart and outside medical records, face-to-face visit with the patient, and coordination of care.    Video-Visit Details    Type of service:  Video Visit    Video Start Time: 2:04 pm    Video End Time: 3:02 pm    Originating Location (pt. Location):Home    Distant Location (provider location):  [facility]     Platform used for Video Visit: Sentiment

## 2021-03-11 ENCOUNTER — VIRTUAL VISIT (OUTPATIENT)
Dept: NEUROLOGY | Facility: CLINIC | Age: 31
End: 2021-03-11
Attending: PSYCHIATRY & NEUROLOGY
Payer: COMMERCIAL

## 2021-03-11 DIAGNOSIS — G35 MS (MULTIPLE SCLEROSIS) (H): Primary | ICD-10-CM

## 2021-03-11 PROCEDURE — 99213 OFFICE O/P EST LOW 20 MIN: CPT | Mod: 95 | Performed by: PSYCHIATRY & NEUROLOGY

## 2021-03-11 ASSESSMENT — PATIENT HEALTH QUESTIONNAIRE - PHQ9: SUM OF ALL RESPONSES TO PHQ QUESTIONS 1-9: 0

## 2021-03-11 NOTE — PROGRESS NOTES
Radha is a 30 year old who is being evaluated via a billable video visit.      How would you like to obtain your AVS? MyChart  If the video visit is dropped, the invitation should be resent by: Other e-mail: [email or web address]  Will anyone else be joining your video visit? No     Provider notes    Referral source: Established patient.    Chief complaint: Multiple sclerosis    History of the Present Illness: Ms. Radha Carl is a 30 year old woman who is evaluated in the Multiple Sclerosis Clinic today for further discussion of disease modifying therapy.    At the request of the patient, the appointment was conducted by video to limit unnecessary exposure in light of the COVID-19 pandemic.    This is a patient whom I evaluated on one previous occasion last month. At that time, she related a history of symptoms of demyelinating disease dating back to May 2013, at which time she was admitted to hospital for ascending paresthesias. MRI imaging was suggestive of demyelinating disease of the type seen in multiple sclerosis. 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 A formal MS diagnosis was confirmed in 2018 after she developed increased weakness of the legs and MRI imaging showed multiple new lesions, but she continued to decline disease modifying treatment. Further radiologic progression was noted in December of last year after she developed vertiginous symptoms, and she was referred to this clinic for an opinion on management.    I told the patient that given unequivocal evidence of ongoing active inflammatory demyelination, I would strongly recommend disease modifying therapy. We subsequently obtained blood tests for risk stratification regarding the various treatments, and she returns today to review those results and discuss long term management.    She denies any new episodic changes in vision, balance, strength or  sensation suggestive of new MS relapse since her last evaluation.    Investigations: I reviewed the following results with the patient:    1) HIV antibodies: negative  2) Quantiferon screen for tuberculosis: negative  3) Hepatitis B serologies-Positive surface antibody, negative core antibody and surface antigen (indicates successful vaccination)  4) GARRETT virus serology: Positive (index 3.30)    Assessment/plan:    1. Multiple sclerosis  I discussed disease modifying therapies with the patient. Given her positive GARRETT virus serology, natalizumab would not be appropriate for her now or in the future.    She further indicates that she feels that compliance with an injectable therapy would be difficult for her.    We primarily discussed dimethyl fumarate and ocrelizumab; she would prefer not to start with a highly active parenteral therapy, which is reasonable in a treatment naive person.    We discussed common side effects of dimethyl fumarate, including flushing and gastrointestinal distress. The latter usually improves with continued use, although a small minority of patients are unable to tolerate this drug.    Periodic blood counts and liver function tests are required, and will need to be performed about 2 and 4 months after starting the drug and then periodically thereafter.    More serious risks of this medication include occasional occurrence of lymphopenia, which has rarely been associated with cases of progressive multifocal leukoencephalopathy (PML), a potentially fatal viral infection of the brain.    After discussion, the patient is comfortable with plan to start dimethyl fumarate. We will send the forms needed to start the medication to her home via mail.    I will see her back in 6 months with an MRI scan of the brain to be performed prior to that visit to make sure that the active inflammatory component of her illness is stabilizing.     I spent a total of 25 minutes on patient care activities related to  this encounter on the date of service, including time spent in reviewing the chart and in discussion with the patient.    Video-Visit Details    Type of service:  Video Visit    Video Start Time: 4:37  pm    Video End Time: 4:59 pm    Originating Location (pt. Location): Home    Distant Location (provider location):  [facility]     Platform used for Video Visit: MangoPlate

## 2021-03-11 NOTE — LETTER
3/11/2021      RE: Radha Carl  [address]  [address]  [facility] [address]     Referral source: Established patient    Chief complaint: Multiple sclerosis    History of the Present Illness: Ms. Radha Carl is a 30 year old woman who is evaluated in the Multiple Sclerosis Clinic today for further discussion of disease modifying therapy.    At the request of the patient, the appointment was conducted by video to limit unnecessary exposure in light of the COVID-19 pandemic.    This is a patient whom I evaluated on one previous occasion last month. At that time, she related a history of symptoms of demyelinating disease dating back to May 2013, at which time she was admitted to hospital for ascending paresthesias. MRI imaging was suggestive of demyelinating disease of the type seen in multiple sclerosis. 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 A formal MS diagnosis was confirmed in 2018 after she developed increased weakness of the legs and MRI imaging showed multiple new lesions, but she continued to decline disease modifying treatment. Further radiologic progression was noted in December of last year after she developed vertiginous symptoms, and she was referred to this clinic for an opinion on management.    I told the patient that given unequivocal evidence of ongoing active inflammatory demyelination, I would strongly recommend disease modifying therapy. We subsequently obtained blood tests for risk stratification regarding the various treatments, and she returns today to review those results and discuss long term management.    She denies any new episodic changes in vision, balance, strength or sensation suggestive of new MS relapse since her last evaluation.    Investigations: I reviewed the following results with the patient:    1) HIV antibodies: negative  2) Quantiferon screen for  tuberculosis: negative  3) Hepatitis B serologies-Positive surface antibody, negative core antibody and surface antigen (indicates successful vaccination)  4) GARRETT virus serology: Positive (index 3.30)    Assessment/plan:    1. Multiple sclerosis  I discussed disease modifying therapies with the patient. Given her positive GARRETT virus serology, natalizumab would not be appropriate for her now or in the future.    She further indicates that she feels that compliance with an injectable therapy would be difficult for her.    We primarily discussed dimethyl fumarate and ocrelizumab; she would prefer not to start with a highly active parenteral therapy, which is reasonable in a treatment naive person.    We discussed common side effects of dimethyl fumarate, including flushing and gastrointestinal distress. The latter usually improves with continued use, although a small minority of patients are unable to tolerate this drug.    Periodic blood counts and liver function tests are required, and will need to be performed about 2 and 4 months after starting the drug and then periodically thereafter.    More serious risks of this medication include occasional occurrence of lymphopenia, which has rarely been associated with cases of progressive multifocal leukoencephalopathy (PML), a potentially fatal viral infection of the brain.    After discussion, the patient is comfortable with plan to start dimethyl fumarate. We will send the forms needed to start the medication to her home via mail.    I will see her back in 6 months with an MRI scan of the brain to be performed prior to that visit to make sure that the active inflammatory component of her illness is stabilizing.     I spent a total of 25 minutes on patient care activities related to this encounter on the date of service, including time spent in reviewing the chart and in discussion with the patient.    Santo Gracia MD   of Neurology  [facility]    Cc:  Muriel Jones MD (PCP)  Patient

## 2021-03-12 ENCOUNTER — TELEPHONE (OUTPATIENT)
Dept: NEUROLOGY | Facility: CLINIC | Age: 31
End: 2021-03-12

## 2021-03-12 NOTE — TELEPHONE ENCOUNTER
Prior Authorization Specialty Medication Request    Medication/Dose: Tecfidera 120 mg and 240 mg capsules  ICD code (if different than what is on RX):  G35  Previously Tried and Failed:  none    Important Lab Values:   Rationale: Initiation of disease modifying therapy for demyelinating disease, please approve     Insurance Name: [employer]  Insurance ID: [id number]  Insurance Phone Number: [phone]    Pharmacy Information (if different than what is on RX)  Name:    Phone:

## 2021-03-12 NOTE — TELEPHONE ENCOUNTER
Patient had a virtual visit with Dr. Gracia and it was decided she would begin Tecfidera.  Start form signed by Dr. Gracia and faxed to patient for signature with directions to fax back to clinic when signed.      Taylor Jordan RN

## 2021-03-12 NOTE — PATIENT INSTRUCTIONS
1. We will send a form for you to sign in order to initiate treatment with Tecfidera    2. You will need to have blood tests approximately 2, 4 and 6 months after starting Tecfidera, and can do the first two of these tests locally    3. In 6 months I would like to see you back in person in our clinic with an MRI scan of the brain prior to the visit--the MRI can also be done at [facility]

## 2021-03-12 NOTE — TELEPHONE ENCOUNTER
PA Initiation    Medication: Tecfidera Start Pack 120MG & 240MG (BRAND - PENDING)  Insurance Company: [employer] - Phone [phone] Fax [phone]  Pharmacy Filling the Rx: [facility] - [place], [place] - [address]  Filling Pharmacy Phone: [phone]  Filling Pharmacy Fax: [phone]  Start Date: 3/12/2021            Thank you,    Karina Leone [facility]  Specialty Pharmacy Clinic [address] [address]  Ph: [phone] Fax: [phone]  [email or web address]

## 2021-03-15 NOTE — TELEPHONE ENCOUNTER
Prior Authorization Approval    Authorization Effective Date: 3/11/2021  Authorization Expiration Date: 3/11/2026  Medication: Tecfidera Start Pack 120MG & 240MG (BRAND - APPROVED)  Approved Dose/Quantity: 30 days  Reference #: [id number]   Insurance Company: [employer] - Phone [phone] Fax [phone]  Expected CoPay:       CoPay Card Available: Yes    Foundation Assistance Needed:    Which Pharmacy is filling the prescription (Not needed for infusion/clinic administered): [facility] - [place], [place] - [address] [place] [address]  Pharmacy Notified:    Patient Notified:        The  PA was approved for Tecfidera, but her insurance is charging a co-insurance or penalty for using Brand (Tecfidera) vs. Generic (Dimethyl Fumarate. The cost for one month of Tecfidera is $7,481.04 while the cost for one month of Dimethyl Fumarate is $35. Tecfidera does have a copay card available to help with the cost. I'll have [employer] completed their investigation to verify how much they cover per month/per year.     Start form faxed to [employer].

## 2021-04-14 ENCOUNTER — TELEPHONE (OUTPATIENT)
Dept: NEUROLOGY | Facility: CLINIC | Age: 31
End: 2021-04-14

## 2021-04-14 NOTE — TELEPHONE ENCOUNTER
Patient sent a MyChart regarding persistent side effects after starting dimethyl fumarate.  Per Dr. Gracia, patient could switch to Vumerity, as GI side effects may be lessened.  Notified patient of change via MyChart.  Start form placed in Dr. Gracia's folder for signature.  PA generated in separate encounter.    Taylor Jordan RN

## 2021-04-14 NOTE — TELEPHONE ENCOUNTER
Prior Authorization Retail Medication Request    Medication/Dose: Vumerity 231 mg capsules  ICD code (if different than what is on RX): Multiple Sclerosis, G35   Previously Tried and Failed:  Dimethyl Fumarate  Rationale:  Continuation of disease modifying therapy for demyelinating disease, please approve    Insurance Name:  [employer]  Insurance ID:  [id number]      Pharmacy Information (if different than what is on RX)  Name:   Phone:

## 2021-04-15 ENCOUNTER — MEDICAL CORRESPONDENCE (OUTPATIENT)
Dept: HEALTH INFORMATION MANAGEMENT | Facility: CLINIC | Age: 31
End: 2021-04-15

## 2021-04-16 NOTE — TELEPHONE ENCOUNTER
PA Initiation    Medication: Vumerity  Insurance Company: [employer] - Phone [phone] Fax [phone]  Pharmacy Filling the Rx: [facility] - [place], [place] - [facility] [address]  Filling Pharmacy Phone:    Filling Pharmacy Fax:    Start Date: 4/16/2021

## 2021-04-18 ENCOUNTER — HEALTH MAINTENANCE LETTER (OUTPATIENT)
Age: 31
End: 2021-04-18

## 2021-04-21 NOTE — TELEPHONE ENCOUNTER
Prior Authorization Approval    Authorization Effective Date: 4/16/2021  Authorization Expiration Date: 4/16/2022  Medication: Vumerity 231MG Capsules (APPROVED)  Approved Dose/Quantity: 30 days  Reference #:     Insurance Company: [employer] - Phone [phone] Fax [phone]  Expected CoPay:       CoPay Card Available: Yes    Foundation Assistance Needed:    Which Pharmacy is filling the prescription (Not needed for infusion/clinic administered): [facility] - [place], [place] - [address]  Pharmacy Notified: Yes  Patient Notified: Yes

## 2021-04-21 NOTE — TELEPHONE ENCOUNTER
Prior Authorization Approval    Authorization Effective Date: 4/16/2021  Authorization Expiration Date: 4/16/2022  Medication: Vumerity 231 MG Capsules (APPROVED)  Approved Dose/Quantity: 30 days  Reference #:     Insurance Company: [employer] - Phone [phone] Fax [phone]  Expected CoPay:       CoPay Card Available:      Foundation Assistance Needed:    Which Pharmacy is filling the prescription (Not needed for infusion/clinic administered): [facility] - [place], [place] - [address]    Start form faxed to hub form review and scanned into media tab.

## 2021-04-23 ENCOUNTER — TELEPHONE (OUTPATIENT)
Dept: NEUROLOGY | Facility: CLINIC | Age: 31
End: 2021-04-23

## 2021-04-23 NOTE — TELEPHONE ENCOUNTER
Prior Authorization Specialty Medication Request    Medication/Dose: glatiramer acetate 40 mg/ml  ICD code (if different than what is on RX):  Multiple Sclerosis, G35  Previously Tried and Failed:  Dimethyl fumarate    Important Lab Values:   Rationale: continuation of disease modifying therapy for demyelinating disease, please approve     Insurance Name: [employer]  Insurance ID: [id number]  Insurance Phone Number: [phone]    Pharmacy Information (if different than what is on RX)  Name:    Phone:

## 2021-04-23 NOTE — TELEPHONE ENCOUNTER
Patient would like to switch to glatiramer acetate.  Per Dr. Gracia, ok for patient to switch to GA.  Faxed start form over to patient who will be faxing back to our clinic once signed.  PA generated in separate encounter.    aTylor Jordan RN

## 2021-04-26 NOTE — TELEPHONE ENCOUNTER
PA Initiation    Medication: Glatiramer acetate 40MG/ML syringes (PENDING)  Insurance Company: [employer] - Phone [phone] Fax [phone]  Pharmacy Filling the Rx: [facility] - [place], [place] - [address]  Filling Pharmacy Phone: [phone]  Filling Pharmacy Fax: [phone]  Start Date: 4/26/2021        Thank you,    Karina Leone [facility]  Specialty Pharmacy Clinic [address] [place] [address]  Ph: [phone] Fax: [phone]  [email or web address]

## 2021-05-07 NOTE — TELEPHONE ENCOUNTER
Prior Authorization Approval    Authorization Effective Date: 4/26/2021  Authorization Expiration Date: 4/26/2026  Medication: Glatiramer acetate 40MG/ML syringes (APPROVED)  Approved Dose/Quantity: 28 DAYS  Reference #: [id number]   Insurance Company: [employer] - Phone [phone] Fax [phone]  Expected CoPay: $35     CoPay Card Available:      Foundation Assistance Needed:    Which Pharmacy is filling the prescription (Not needed for infusion/clinic administered): [facility] - [place], [place] - [address]  Pharmacy Notified:    Patient Notified:

## 2021-05-10 NOTE — TELEPHONE ENCOUNTER
Start form faxed to [facility] and scanned into Media tab.     Thank you,    Karina Leone CPh-T  Specialty Pharmacy Clinic [facility] [address] [place] [address]  Ph: [phone] Fax: [phone]  [email or web address]

## 2021-05-15 ENCOUNTER — IMMUNIZATION (OUTPATIENT)
Dept: FAMILY MEDICINE | Facility: OTHER | Age: 31
End: 2021-05-15
Attending: FAMILY MEDICINE
Payer: COMMERCIAL

## 2021-05-15 PROCEDURE — 91300 PR COVID VAC PFIZER DIL RECON 30 MCG/0.3 ML IM: CPT

## 2021-05-15 PROCEDURE — 0001A PR COVID VAC PFIZER DIL RECON 30 MCG/0.3 ML IM: CPT

## 2021-05-25 ENCOUNTER — OFFICE VISIT (OUTPATIENT)
Dept: OTOLARYNGOLOGY | Facility: OTHER | Age: 31
End: 2021-05-25
Attending: NURSE PRACTITIONER
Payer: COMMERCIAL

## 2021-05-25 VITALS
OXYGEN SATURATION: 99 % | HEART RATE: 99 BPM | WEIGHT: 182 LBS | TEMPERATURE: 97.8 F | HEIGHT: 63 IN | BODY MASS INDEX: 32.25 KG/M2 | DIASTOLIC BLOOD PRESSURE: 84 MMHG | SYSTOLIC BLOOD PRESSURE: 148 MMHG

## 2021-05-25 DIAGNOSIS — T16.1XXA FOREIGN BODY OF RIGHT EAR, INITIAL ENCOUNTER: Primary | ICD-10-CM

## 2021-05-25 PROCEDURE — 69200 CLEAR OUTER EAR CANAL: CPT | Performed by: NURSE PRACTITIONER

## 2021-05-25 PROCEDURE — 99213 OFFICE O/P EST LOW 20 MIN: CPT | Mod: 25 | Performed by: NURSE PRACTITIONER

## 2021-05-25 ASSESSMENT — MIFFLIN-ST. JEOR: SCORE: 1504.68

## 2021-05-25 ASSESSMENT — PAIN SCALES - GENERAL: PAINLEVEL: MODERATE PAIN (5)

## 2021-05-25 NOTE — PATIENT INSTRUCTIONS
Follow up as needed with increased symptoms or new concerns      Thank you for allowing Danielle AUSTIN and our ENT team to participate in your care.  If your medications are too expensive, please call my nurse at the number listed below.  We can possibly change this medication.    If you have a scheduling or an appointment question please contact our Health Unit Coordinator at their direct line [phone].   ALL nursing questions or concerns can be directed to my Nurse Nieves [phone].

## 2021-05-25 NOTE — PROGRESS NOTES
Otolaryngology Note         Chief Complaint:     Patient presents with:  Ear Problem: Ear Pain, Plugged           History of Present Illness:     Radha Carl is a 30 year old female seen today for right ear pressure and plugged sensation for the past several days.   She reports she got out of the shower and noted the symptoms.      She denies concerns for decreased hearing  No history of recurrent OM  Never smoker   Patient has no history of otological surgeries or procedures  She feels pressure and crackling sensation sensation.      No recent colds or seasonal allergies.   No vertigo, facial numbness or tingling.    She has a history of vertigo, follows with Neurology, she is in process of starting new medication for MS    Audiogram completed 5/14/15 showed normal thresholds, type A tympanogram bilaterally, excellent word discrimination.  She denies changes in hearing.          Medications:     Current Outpatient Rx   Medication Sig Dispense Refill     acetaminophen (TYLENOL) 500 MG tablet Take 2 tablets by mouth. Every 6 hours as needed       baclofen (LIORESAL) 10 MG tablet Take 0.5-1 tablets (5-10 mg) by mouth 2 times daily as needed for muscle spasms 30 tablet 1     cyclobenzaprine (FLEXERIL) 10 MG tablet Take 0.5-1 tablets (5-10 mg) by mouth daily as needed for muscle spasms 90 tablet 0     ibuprofen (ADVIL,MOTRIN) 200 MG tablet Take 2 tablets by mouth. Every 6 hours as needed with food       lisdexamfetamine (VYVANSE) 10 MG capsule Take 1 capsule (10 mg) by mouth every morning 30 capsule 0     lisinopril (ZESTRIL) 2.5 MG tablet Take 1 tablet (2.5 mg) by mouth daily 90 tablet 3     Meloxicam (MOBIC PO)        Multiple Vitamins-Minerals (OPTIVITE P.M.T.) TABS Take 3 tablets by mouth 2 times daily 180 tablet 3     ondansetron (ZOFRAN-ODT) 4 MG ODT tab Take 1 tablet (4 mg) by mouth every 8 hours as needed for nausea 20 tablet 0     order for DME Right ankle ASO 1 Device 0     progesterone (PROMETRIUM) 100  "MG capsule Take 1 capsule (100 mg) by mouth daily Starting on peak +3 for 10 days out of month 30 capsule 3     valACYclovir (VALTREX) 500 MG tablet TAKE 1 TABLET BY MOUTH TWICE DAILY WITH  ACTIVE  LESION 30 tablet 5     vitamin D3 (CHOLECALCIFEROL) 1.25 MG (27718 UT) capsule Take 1 capsule (50,000 Units) by mouth every 7 days 4 capsule 12            Allergies:     Allergies: Cats and Sulfa drugs          Past Medical History:     Past Medical History:   Diagnosis Date     ADHD (attention deficit hyperactivity disorder)      Cold sore      Demyelinating changes in brain (H)     saw Dr Gilbert 5/2013 -- ? of MS, but not Dx'd yet.     Elevated glucose 2014    resolved 2016     Elevated liver enzymes 2014    resolved 2015     Fibrocystic breast      MARIBETH (generalized anxiety disorder)      History of left salpingo-oophorectomy     still has L ovary, but missing L tube     Hypovitaminosis D      MS (multiple sclerosis) (H) 2012     Neuropathy     feet     Obesity      PCOS (polycystic ovarian syndrome)             Past Surgical History:     Past Surgical History:   Procedure Laterality Date     GYN SURGERY  5/2010    ectopic pregnancy ruptured -L salpingectomy     HYSTEROSCOPY,DIAGNOSTIC  2016    done here and at [facility]     wisdom teeth  2014       ENT family history reviewed         Social History:     Social History     Tobacco Use     Smoking status: Never Smoker     Smokeless tobacco: Never Used   Substance Use Topics     Alcohol use: Yes     Alcohol/week: 0.0 standard drinks     Frequency: Monthly or less     Drinks per session: 1 or 2     Comment: rare     Drug use: No            Review of Systems:     ROS: See HPI         Physical Exam:     BP (!) 148/84 (Cuff Size: Adult Regular)   Pulse 99   Temp 97.8  F (36.6  C) (Tympanic)   Ht 1.6 m (5' 3\")   Wt 82.6 kg (182 lb)   SpO2 99%   BMI 32.24 kg/m      General - The patient is well nourished and well developed, and appears to have good nutritional " status.  Alert and oriented to person and place, answers questions and cooperates with examination appropriately.   Head and Face - Normocephalic and atraumatic, with no gross asymmetry noted.  The facial nerve is intact, with strong symmetric movements.  Voice and Breathing - The patient was breathing comfortably without the use of accessory muscles. There was no wheezing, stridor. The patients voice was clear and strong, and had appropriate pitch and quality.  Ears - External ear normal. The ears were examined under binocular microscopy and with otoscope.  The right canal has a thick black hair in the canal, this was removed and she had instant improvement in symptoms.  The left canals is patent. Right tympanic membrane is intact without effusion, retraction or mass. Left tympanic membrane is intact without effusion, retraction or mass.  Good movement bilaterally with valsalva.    Eyes - Extraocular movements intact, sclera were not icteric or injected, conjunctiva were pink and moist.  Mouth - Examination of the oral cavity showed pink, healthy oral mucosa. Dentition in good condition. No lesions or ulcerations noted. The tongue was mobile and midline.   Throat - The walls of the oropharynx were smooth, pink, moist, symmetric, and had no lesions or ulcerations.  The tonsillar pillars and soft palate were symmetric. The uvula was midline on elevation.    Neck - Normal midline excursion of the laryngotracheal complex during swallowing.  Full range of motion on passive movement.  Palpation of the occipital, submental, submandibular, internal jugular chain, and supraclavicular nodes did not demonstrate any abnormal lymph nodes or masses.  Palpation of the thyroid was soft and smooth, with no nodules or goiter appreciated.  The trachea was mobile and midline.  Nose - External contour is symmetric, no gross deflection or scars.  Nasal mucosa is pink and moist with no abnormal mucus.  The septum and turbinates were  evaluated with nasal speculum,  no polyps, masses, or purulence noted on examination.         Assessment and Plan:       ICD-10-CM    1. Foreign body of right ear, initial encounter  T16.1XXA     hair removed from right ear with resolution of symptoms     Follow up as needed  Reassured Radha today that both ears look healthy, no effusion.      Danielle Magaña NP-C  [facility]

## 2021-05-25 NOTE — NURSING NOTE
"Chief Complaint   Patient presents with     Ear Problem     Ear Pain, Plugged       Initial BP (!) 148/84 (Cuff Size: Adult Regular)   Pulse 99   Temp 97.8  F (36.6  C) (Tympanic)   Ht 1.6 m (5' 3\")   Wt 82.6 kg (182 lb)   SpO2 99%   BMI 32.24 kg/m   Estimated body mass index is 32.24 kg/m  as calculated from the following:    Height as of this encounter: 1.6 m (5' 3\").    Weight as of this encounter: 82.6 kg (182 lb).  Medication Reconciliation: complete  Jacqueline Montero LPN    "

## 2021-05-25 NOTE — LETTER
5/25/2021         RE: Radha Carl  [address]  [address]  [place] [address]        Dear Colleague,    Thank you for referring your patient, Radha Carl, to the [facility]. Please see a copy of my visit note below.    Otolaryngology Note         Chief Complaint:     Patient presents with:  Ear Problem: Ear Pain, Plugged           History of Present Illness:     Radha Carl is a 30 year old female seen today for right ear pressure and plugged sensation for the past several days.   She reports she got out of the shower and noted the symptoms.      She denies concerns for decreased hearing  No history of recurrent OM  Never smoker   Patient has no history of otological surgeries or procedures  She feels pressure and crackling sensation sensation.      No recent colds or seasonal allergies.   No vertigo, facial numbness or tingling.    She has a history of vertigo, follows with Neurology, she is in process of starting new medication for MS    Audiogram completed 5/14/15 showed normal thresholds, type A tympanogram bilaterally, excellent word discrimination.  She denies changes in hearing.          Medications:     Current Outpatient Rx   Medication Sig Dispense Refill     acetaminophen (TYLENOL) 500 MG tablet Take 2 tablets by mouth. Every 6 hours as needed       baclofen (LIORESAL) 10 MG tablet Take 0.5-1 tablets (5-10 mg) by mouth 2 times daily as needed for muscle spasms 30 tablet 1     cyclobenzaprine (FLEXERIL) 10 MG tablet Take 0.5-1 tablets (5-10 mg) by mouth daily as needed for muscle spasms 90 tablet 0     ibuprofen (ADVIL,MOTRIN) 200 MG tablet Take 2 tablets by mouth. Every 6 hours as needed with food       lisdexamfetamine (VYVANSE) 10 MG capsule Take 1 capsule (10 mg) by mouth every morning 30 capsule 0     lisinopril (ZESTRIL) 2.5 MG tablet Take 1 tablet (2.5 mg) by mouth daily 90 tablet 3     Meloxicam (MOBIC PO)        Multiple Vitamins-Minerals  (OPTIVITE P.M.T.) TABS Take 3 tablets by mouth 2 times daily 180 tablet 3     ondansetron (ZOFRAN-ODT) 4 MG ODT tab Take 1 tablet (4 mg) by mouth every 8 hours as needed for nausea 20 tablet 0     order for DME Right ankle ASO 1 Device 0     progesterone (PROMETRIUM) 100 MG capsule Take 1 capsule (100 mg) by mouth daily Starting on peak +3 for 10 days out of month 30 capsule 3     valACYclovir (VALTREX) 500 MG tablet TAKE 1 TABLET BY MOUTH TWICE DAILY WITH  ACTIVE  LESION 30 tablet 5     vitamin D3 (CHOLECALCIFEROL) 1.25 MG (93598 UT) capsule Take 1 capsule (50,000 Units) by mouth every 7 days 4 capsule 12            Allergies:     Allergies: Cats and Sulfa drugs          Past Medical History:     Past Medical History:   Diagnosis Date     ADHD (attention deficit hyperactivity disorder)      Cold sore      Demyelinating changes in brain (H)     saw Dr Gilbert 5/2013 -- ? of MS, but not Dx'd yet.     Elevated glucose 2014    resolved 2016     Elevated liver enzymes 2014    resolved 2015     Fibrocystic breast      MARIBETH (generalized anxiety disorder)      History of left salpingo-oophorectomy     still has L ovary, but missing L tube     Hypovitaminosis D      MS (multiple sclerosis) (H) 2012     Neuropathy     feet     Obesity      PCOS (polycystic ovarian syndrome)             Past Surgical History:     Past Surgical History:   Procedure Laterality Date     GYN SURGERY  5/2010    ectopic pregnancy ruptured -L salpingectomy     HYSTEROSCOPY,DIAGNOSTIC  2016    done here and at [facility]     wisdom teeth  2014       ENT family history reviewed         Social History:     Social History     Tobacco Use     Smoking status: Never Smoker     Smokeless tobacco: Never Used   Substance Use Topics     Alcohol use: Yes     Alcohol/week: 0.0 standard drinks     Frequency: Monthly or less     Drinks per session: 1 or 2     Comment: rare     Drug use: No            Review of Systems:     ROS: See HPI         Physical Exam:  "    BP (!) 148/84 (Cuff Size: Adult Regular)   Pulse 99   Temp 97.8  F (36.6  C) (Tympanic)   Ht 1.6 m (5' 3\")   Wt 82.6 kg (182 lb)   SpO2 99%   BMI 32.24 kg/m      General - The patient is well nourished and well developed, and appears to have good nutritional status.  Alert and oriented to person and place, answers questions and cooperates with examination appropriately.   Head and Face - Normocephalic and atraumatic, with no gross asymmetry noted.  The facial nerve is intact, with strong symmetric movements.  Voice and Breathing - The patient was breathing comfortably without the use of accessory muscles. There was no wheezing, stridor. The patients voice was clear and strong, and had appropriate pitch and quality.  Ears - External ear normal. The ears were examined under binocular microscopy and with otoscope.  The right canal has a thick black hair in the canal, this was removed and she had instant improvement in symptoms.  The left canals is patent. Right tympanic membrane is intact without effusion, retraction or mass. Left tympanic membrane is intact without effusion, retraction or mass.  Good movement bilaterally with valsalva.    Eyes - Extraocular movements intact, sclera were not icteric or injected, conjunctiva were pink and moist.  Mouth - Examination of the oral cavity showed pink, healthy oral mucosa. Dentition in good condition. No lesions or ulcerations noted. The tongue was mobile and midline.   Throat - The walls of the oropharynx were smooth, pink, moist, symmetric, and had no lesions or ulcerations.  The tonsillar pillars and soft palate were symmetric. The uvula was midline on elevation.    Neck - Normal midline excursion of the laryngotracheal complex during swallowing.  Full range of motion on passive movement.  Palpation of the occipital, submental, submandibular, internal jugular chain, and supraclavicular nodes did not demonstrate any abnormal lymph nodes or masses.  Palpation of " the thyroid was soft and smooth, with no nodules or goiter appreciated.  The trachea was mobile and midline.  Nose - External contour is symmetric, no gross deflection or scars.  Nasal mucosa is pink and moist with no abnormal mucus.  The septum and turbinates were evaluated with nasal speculum,  no polyps, masses, or purulence noted on examination.         Assessment and Plan:       ICD-10-CM    1. Foreign body of right ear, initial encounter  T16.1XXA     hair removed from right ear with resolution of symptoms     Follow up as needed  Reassured Radha today that both ears look healthy, no effusion.      Danielle AUSTIN  [facility]          Again, thank you for allowing me to participate in the care of your patient.        Sincerely,        Danielle Magaña NP

## 2021-05-27 NOTE — PROGRESS NOTES
Clinic Visit  Date of visit: 2021   Chief Complaint:   Chief Complaint   Patient presents with     PCOS       HPI:   Radha Carl is a 30 year old  female who presents to clinic today for follow up  for PCOS .     Menarche: age 8  Menses: frequency: every 28 days and length: 7 days. Patient's last menstrual period was 05/10/2021.   Family Planning Chart: Yes: .  Acne: No.  Hirsutism (facial hair): Yes: .  Male-pattern hair loss: No.  Elevated serum androgen:   Lab Results   Component Value Date    DHEA 206 2016    TESTOSTTOTAL 18 2016    FT 0.39 2016    BARTOLO 0.721 2016   ]  BMI: Body mass index is 32.24 kg/m .   Type 2 DM: No.  Elevated Cholesterol: No.  Mood disorder: Yes: anxiety .    Ultrasound: 2017     Gynecologic History:  Last Pap:   Lab Results   Component Value Date    PAP NIL 10/11/2017      History of abnormal pap: No.    Obstetric History:   OB History    Para Term  AB Living   1 0 0 0 1 0   SAB TAB Ectopic Multiple Live Births   0 0 1 0 0      # Outcome Date GA Lbr Sage/2nd Weight Sex Delivery Anes PTL Lv   1 Ectopic 2010              Birth Comments: left salpingectomy     Obstetric history significant for:  NA    Medications:  baclofen (LIORESAL) 10 MG tablet, Take 0.5-1 tablets (5-10 mg) by mouth 2 times daily as needed for muscle spasms  cyclobenzaprine (FLEXERIL) 10 MG tablet, Take 0.5-1 tablets (5-10 mg) by mouth daily as needed for muscle spasms  ibuprofen (ADVIL,MOTRIN) 200 MG tablet, Take 2 tablets by mouth. Every 6 hours as needed with food  Meloxicam (MOBIC PO),   Multiple Vitamins-Minerals (OPTIVITE P.M.T.) TABS, Take 3 tablets by mouth 2 times daily  order for DME, Right ankle ASO  progesterone (PROMETRIUM) 100 MG capsule, Take 1 capsule (100 mg) by mouth daily Starting on peak +3 for 10 days out of month  valACYclovir (VALTREX) 500 MG tablet, TAKE 1 TABLET BY MOUTH TWICE DAILY WITH  ACTIVE  LESION  vitamin D3 (CHOLECALCIFEROL)  1.25 MG (27327 UT) capsule, Take 1 capsule (50,000 Units) by mouth every 7 days  acetaminophen (TYLENOL) 500 MG tablet, Take 2 tablets by mouth. Every 6 hours as needed  lisdexamfetamine (VYVANSE) 10 MG capsule, Take 1 capsule (10 mg) by mouth every morning (Patient not taking: Reported on 6/7/2021)  [DISCONTINUED] norethindrone-ethinyl estradiol (MICROGESTIN) 1-20 MG-MCG per tablet, Take 1 tablet by mouth daily.    No current facility-administered medications on file prior to visit.       Allergy:  Allergies   Allergen Reactions     Cats      Sulfa Drugs Rash     Sulfonamide antibiotics       Medical History:  Past Medical History:   Diagnosis Date     ADHD (attention deficit hyperactivity disorder)      Cold sore      Demyelinating changes in brain (H)     saw Dr Gilbert 5/2013 -- ? of MS, but not Dx'd yet.     Elevated glucose 2014    resolved 2016     Elevated liver enzymes 2014    resolved 2015     Fibrocystic breast      MARIBETH (generalized anxiety disorder)      History of left salpingo-oophorectomy     still has L ovary, but missing L tube     Hypovitaminosis D      MS (multiple sclerosis) (H) 2012     Neuropathy     feet     Obesity      PCOS (polycystic ovarian syndrome)        Surgical History:  Past Surgical History:   Procedure Laterality Date     GYN SURGERY  5/2010    ectopic pregnancy ruptured -L salpingectomy     HYSTEROSCOPY,DIAGNOSTIC  2016    done here and at [facility]  2014       Social History:  Social History    Substance and Sexual Activity      Alcohol use: Yes        Alcohol/week: 0.0 standard drinks        Frequency: Monthly or less        Drinks per session: 1 or 2        Comment: rare    History   Smoking Status     Never Smoker   Smokeless Tobacco     Never Used     History   Drug Use No     History   Sexual Activity     Sexual activity: Yes     Partners: Male     Birth control/ protection: Natural Family Planning     Comment: [facility]     Relationship status is:  /  Partnered    10 Years.  Name of Spouse / Partner:  Parmjit.    Lives in Stable housing and no concerns with Spouse.   Employment: Employed full time  History of sexual, physical or mental abuse: No.   She denies current fears or concerns for personal safety.      Review of Systems:    GENERAL: No change in weight, sleep or appetite.  Normal energy.  No fever or chills  EYES: Negative for vision changes or eye problems  ENT: No problems with ears, nose or throat.  No difficulty swallowing.  RESP: No coughing, wheezing or shortness of breath  CV: No chest pains or palpitations  GI: No nausea, vomiting,  heartburn, abdominal pain, diarrhea, constipation or change in bowel habits  : No urinary frequency or dysuria, bladder or kidney problems  MUSCULOSKELETAL: No significant muscle or joint pains  NEUROLOGIC: No headaches, numbness, tingling, weakness, problems with balance or coordination  PSYCHIATRIC: No problems with anxiety, depression or mental health  HEME/IMMUNE/ALLERGY: No history of bleeding or clotting problems or anemia.  No allergies or immune system problems  ENDOCRINE: No history of thyroid disease, diabetes or other endocrine disorders  SKIN: No rashes,worrisome lesions or skin problems      Physical Exam:  Vitals:    21 1208   BP: (!) 150/90   BP Location: Left arm   Patient Position: Chair   Cuff Size: Adult Large   Pulse: 104   Resp: 18   Temp: 99.4  F (37.4  C)   TempSrc: Tympanic   SpO2: 100%   Weight: 82.6 kg (182 lb)     Body mass index is 32.24 kg/m .    Gen: NAD, Awake and alert, cooperative with exam  Extremities: nontender, no edema  Psych:  Normal mentation, mood    Assessment/Plan:  Radha Carl is a 30 year old  who presents for follow up  for PCOS .   (G35) MS (multiple sclerosis) (H)  (primary encounter diagnosis)  Comment:   Plan: would like her to see naturopath to do eval with accupuncture with her   Toxin exposure?    (E28.2) PCOS (polycystic ovarian  syndrome)  Comment:   Plan: increase optivite PMT as able    (I10) Benign essential hypertension  Comment:   Plan: monitor, not feeling well since covid vaccine, follow-up pending        Follow up: 1-2 mos       Muriel Jones MD

## 2021-06-05 ENCOUNTER — IMMUNIZATION (OUTPATIENT)
Dept: FAMILY MEDICINE | Facility: OTHER | Age: 31
End: 2021-06-05
Attending: FAMILY MEDICINE
Payer: COMMERCIAL

## 2021-06-05 PROCEDURE — 91300 PR COVID VAC PFIZER DIL RECON 30 MCG/0.3 ML IM: CPT

## 2021-06-05 PROCEDURE — 0002A PR COVID VAC PFIZER DIL RECON 30 MCG/0.3 ML IM: CPT

## 2021-06-07 ENCOUNTER — OFFICE VISIT (OUTPATIENT)
Dept: FAMILY MEDICINE | Facility: OTHER | Age: 31
End: 2021-06-07
Attending: FAMILY MEDICINE
Payer: COMMERCIAL

## 2021-06-07 VITALS
SYSTOLIC BLOOD PRESSURE: 150 MMHG | HEART RATE: 104 BPM | BODY MASS INDEX: 32.24 KG/M2 | TEMPERATURE: 99.4 F | WEIGHT: 182 LBS | OXYGEN SATURATION: 100 % | DIASTOLIC BLOOD PRESSURE: 90 MMHG | RESPIRATION RATE: 18 BRPM

## 2021-06-07 DIAGNOSIS — I10 BENIGN ESSENTIAL HYPERTENSION: ICD-10-CM

## 2021-06-07 DIAGNOSIS — E28.2 PCOS (POLYCYSTIC OVARIAN SYNDROME): ICD-10-CM

## 2021-06-07 DIAGNOSIS — G35 MS (MULTIPLE SCLEROSIS) (H): Primary | ICD-10-CM

## 2021-06-07 PROCEDURE — 99214 OFFICE O/P EST MOD 30 MIN: CPT | Performed by: FAMILY MEDICINE

## 2021-06-07 ASSESSMENT — PAIN SCALES - GENERAL: PAINLEVEL: EXTREME PAIN (8)

## 2021-06-07 NOTE — NURSING NOTE
"Chief Complaint   Patient presents with     PCOS       Initial BP (!) 150/90 (BP Location: Left arm, Patient Position: Chair, Cuff Size: Adult Large)   Pulse 104   Temp 99.4  F (37.4  C) (Tympanic)   Resp 18   Wt 82.6 kg (182 lb)   LMP 05/10/2021   SpO2 100%   BMI 32.24 kg/m   Estimated body mass index is 32.24 kg/m  as calculated from the following:    Height as of 5/25/21: 1.6 m (5' 3\").    Weight as of this encounter: 82.6 kg (182 lb).  Medication Reconciliation: complete  Judi Gallegos LPN    "

## 2021-07-02 ENCOUNTER — OFFICE VISIT (OUTPATIENT)
Dept: FAMILY MEDICINE | Facility: OTHER | Age: 31
End: 2021-07-02
Attending: NURSE PRACTITIONER
Payer: COMMERCIAL

## 2021-07-02 VITALS
WEIGHT: 185 LBS | HEIGHT: 63 IN | RESPIRATION RATE: 18 BRPM | DIASTOLIC BLOOD PRESSURE: 86 MMHG | TEMPERATURE: 98.6 F | SYSTOLIC BLOOD PRESSURE: 138 MMHG | HEART RATE: 105 BPM | OXYGEN SATURATION: 100 % | BODY MASS INDEX: 32.78 KG/M2

## 2021-07-02 DIAGNOSIS — R06.2 WHEEZING: Primary | ICD-10-CM

## 2021-07-02 PROCEDURE — 99213 OFFICE O/P EST LOW 20 MIN: CPT | Performed by: NURSE PRACTITIONER

## 2021-07-02 RX ORDER — ALBUTEROL SULFATE 90 UG/1
2 AEROSOL, METERED RESPIRATORY (INHALATION) EVERY 6 HOURS
Qty: 8.5 G | Refills: 1 | Status: SHIPPED | OUTPATIENT
Start: 2021-07-02 | End: 2022-02-18

## 2021-07-02 RX ORDER — PREDNISONE 20 MG/1
20 TABLET ORAL 2 TIMES DAILY
Qty: 10 TABLET | Refills: 0 | Status: SHIPPED | OUTPATIENT
Start: 2021-07-02 | End: 2021-07-07

## 2021-07-02 ASSESSMENT — MIFFLIN-ST. JEOR: SCORE: 1518.28

## 2021-07-02 ASSESSMENT — ANXIETY QUESTIONNAIRES
7. FEELING AFRAID AS IF SOMETHING AWFUL MIGHT HAPPEN: NOT AT ALL
1. FEELING NERVOUS, ANXIOUS, OR ON EDGE: NOT AT ALL
5. BEING SO RESTLESS THAT IT IS HARD TO SIT STILL: NOT AT ALL
2. NOT BEING ABLE TO STOP OR CONTROL WORRYING: NOT AT ALL
4. TROUBLE RELAXING: NOT AT ALL
GAD7 TOTAL SCORE: 0
6. BECOMING EASILY ANNOYED OR IRRITABLE: NOT AT ALL
3. WORRYING TOO MUCH ABOUT DIFFERENT THINGS: NOT AT ALL

## 2021-07-02 ASSESSMENT — PAIN SCALES - GENERAL: PAINLEVEL: NO PAIN (0)

## 2021-07-02 ASSESSMENT — PATIENT HEALTH QUESTIONNAIRE - PHQ9: SUM OF ALL RESPONSES TO PHQ QUESTIONS 1-9: 0

## 2021-07-02 NOTE — NURSING NOTE
"Chief Complaint   Patient presents with     Throat Problem       Initial /86 (BP Location: Left arm, Patient Position: Sitting, Cuff Size: Adult Regular)   Pulse 105   Temp 98.6  F (37  C) (Tympanic)   Resp 18   Ht 1.6 m (5' 3\")   Wt 83.9 kg (185 lb)   SpO2 100%   BMI 32.77 kg/m   Estimated body mass index is 32.77 kg/m  as calculated from the following:    Height as of this encounter: 1.6 m (5' 3\").    Weight as of this encounter: 83.9 kg (185 lb).  Medication Reconciliation: complete  Huma Cedillo LPN  "

## 2021-07-02 NOTE — PATIENT INSTRUCTIONS
Patient Education     Bronchospasm (Adult)    Bronchospasm occurs when the airways (bronchial tubes) go into spasm and contract. This makes it hard to breathe and causes wheezing (a high-pitched whistling sound). Bronchospasm can also cause frequent coughing without wheezing.  Bronchospasm is due to irritation, inflammation, or allergic reaction of the airways. People with asthma get bronchospasm. However, not everyone with bronchospasm has asthma.  Being exposed to harmful fumes, a recent case of bronchitis, exercise, or a flare-up of chronic obstructive pulmonary disease (COPD) may cause the airways to spasm. An episode of bronchospasm may last 7 to 14 days. Medicine may be prescribed to relax the airways and prevent wheezing. Antibiotics will be prescribed only if your healthcare provider thinks there is a bacterial infection. Antibiotics do not help a viral infection.  Home care    Drink lots of water or other fluids (at least 10 glasses a day) during an attack. This will loosen lung secretions and make it easier to breathe. If you have heart or kidney disease, check with your doctor before you drink extra fluids.    Take prescribed medicine exactly at the times advised. If you take an inhaled medicine to help with breathing, don't use it more than once every 4 hours, unless told to do so. If prescribed an antibiotic or prednisone, take all of the medicine, even if you are feeling better after a few days.    Don't smoke. Also avoid being exposed to secondhand smoke.    If you were given an inhaler, use it exactly as directed. If you need to use it more often than prescribed, your condition may be getting worse. Contact your healthcare provider.  Follow-up care  Follow up with your healthcare provider, or as advised.  If you are age 65 or older, have a chronic lung disease or condition that affects your immune system, or you smoke, ask your healthcare provider about getting a pneumococcal vaccine, as well as a  yearly flu shot (influenza vaccine).  When to seek medical advice  Call your healthcare provider right away if any of these occur:    You need to use your inhalers more often than usual    Fever of 100.4 F (38 C) or higher, or as directed by your healthcare provider    Cough that brings up lots of dark-colored sputum (mucus)    You don't get better within 24 hours  Call 911  Call 911 if any of these occur:    Coughing up bloody sputum (mucus)    Chest pain with each breath    Increased wheezing or shortness of breath  [employer] last reviewed this educational content on 6/1/2018 2000-2021 [employer]. All rights reserved. This information is not intended as a substitute for professional medical care. Always follow your healthcare professional's instructions.

## 2021-07-02 NOTE — PROGRESS NOTES
"    Assessment & Plan     Wheezing  DDX: allergy, reactive airway  Audible and expiratory wheezing. Continue with daily antihistamin (increase to 2 times day for the next 5 days).  Start prednisone and use inhaler as needed.  To the ER if any worsening symptoms such as SOB, difficulty breathing, increased wheezing, unable to swallow, drooling or any concerns.  Discussed side effects of medication such as but not limited to, insomnia, stomach irritation, anxiety   - predniSONE (DELTASONE) 20 MG tablet; Take 1 tablet (20 mg) by mouth 2 times daily for 5 days  - albuterol (PROAIR HFA/PROVENTIL HFA/VENTOLIN HFA) 108 (90 Base) MCG/ACT inhaler; Inhale 2 puffs into the lungs every 6 hours         BMI:   Estimated body mass index is 32.77 kg/m  as calculated from the following:    Height as of this encounter: 1.6 m (5' 3\").    Weight as of this encounter: 83.9 kg (185 lb).       See Patient Instructions    No follow-ups on file.    CHINA Moreno [facility] - ZORAIDA Garcia is a 30 year old who presents for the following health issues     HPI     Concern - throat issues  Onset: 3 days ago  Description: Choking on phlegm  Intensity: moderate  Progression of Symptoms:  worsening and intermittent  Accompanying Signs & Symptoms: Tickle in throat  Coughing from tickle  Previous history of similar problem: No  Precipitating factors:        Worsened by: NA  Alleviating factors:        Improved by: NA  Therapies tried and outcome: Allergy medication Claritin   No benadryl - due to side effect of drowsiness  She has thick mucus stuck in throat causing her to cough - clear to tan to green and clear again   strider noted           Review of Systems   CONSTITUTIONAL: NEGATIVE for fever, chills, change in weight  INTEGUMENTARY/SKIN: NEGATIVE for worrisome rashes, moles or lesions  EYES: NEGATIVE for vision changes or irritation  ENT/MOUTH: sore throat and thick mucus in throat " "  RESP:experatory wheezing   CV: NEGATIVE for chest pain, palpitations or peripheral edema  GI: NEGATIVE for nausea, abdominal pain, heartburn, or change in bowel habits  : denies dysuria  MUSCULOSKELETAL: NEGATIVE for significant arthralgias or myalgia  NEURO: NEGATIVE for weakness, dizziness or paresthesias      Objective    /86 (BP Location: Left arm, Patient Position: Sitting, Cuff Size: Adult Regular)   Pulse 105   Temp 98.6  F (37  C) (Tympanic)   Resp 18   Ht 1.6 m (5' 3\")   Wt 83.9 kg (185 lb)   SpO2 100%   BMI 32.77 kg/m    Body mass index is 32.77 kg/m .  Physical Exam   GENERAL: alert and no distress  EYES: Eyes grossly normal to inspection, PERRL and conjunctivae and sclerae normal  HENT: ear canals and TM's normal, nose and mouth without ulcers or lesions  NECK: no adenopathy, no asymmetry, masses, or scars and thyroid normal to palpation  RESP: expiratory wheezes R upper posterior, R mid posterior and L upper posterior  CV: regular rate and rhythm, normal S1 S2, no S3 or S4, no murmur, click or rub, no peripheral edema and peripheral pulses strong  ABDOMEN: soft, nontender, no hepatosplenomegaly, no masses and bowel sounds normal  SKIN: no suspicious lesions or rashes  NEURO: Normal strength and tone, mentation intact and speech normal  PSYCH: mentation appears normal, affect normal/bright    Orders Only on 02/05/2021   Component Date Value Ref Range Status     MTB Quantiferon Result 02/05/2021 Negative  NEG^Negative Final    Comment: No interferon gamma response to M.tuberculosis antigens was detected.   Infection with M.tuberculosis is unlikely, however a single negative result   does not exclude infection. In patients at high risk for infection, a second   test should be considered       TB1 Ag minus Nil 02/05/2021 0.00  IU/mL Final     TB2 Ag minus Nil 02/05/2021 0.00  IU/mL Final     Mitogen minus Nil 02/05/2021 9.95  IU/mL Final     NIL Result 02/05/2021 0.05  IU/mL Final     HIV " Antigen Antibody Combo 02/05/2021 Nonreactive  NR^Nonreactive     Final    HIV-1 p24 Ag & HIV-1/HIV-2 Ab Not Detected     Hepatitis B Core Prema 02/05/2021 Nonreactive  NR^Nonreactive Final     Hepatitis B Surface Antibody 02/05/2021 569.99* <8.00 m[IU]/mL Final    Comment: Reactive, Patient is considered to be immune to infection with hepatitis B   when the value is greater than or equal to 12.0 m[IU]/mL.       Hep B Surface Agn 02/05/2021 Nonreactive  NR^Nonreactive Final     Lab Scanned Result 02/05/2021 [facility] VIR AB INDEX REFLEX-Scanned*  Final

## 2021-07-03 ASSESSMENT — ANXIETY QUESTIONNAIRES: GAD7 TOTAL SCORE: 0

## 2021-08-07 ENCOUNTER — HEALTH MAINTENANCE LETTER (OUTPATIENT)
Age: 31
End: 2021-08-07

## 2021-09-08 ENCOUNTER — HOSPITAL ENCOUNTER (OUTPATIENT)
Dept: MRI IMAGING | Facility: HOSPITAL | Age: 31
Discharge: HOME | End: 2021-09-08
Attending: PSYCHIATRY & NEUROLOGY | Admitting: PSYCHIATRY & NEUROLOGY
Payer: COMMERCIAL

## 2021-09-08 DIAGNOSIS — G35 MS (MULTIPLE SCLEROSIS) (H): ICD-10-CM

## 2021-09-08 PROCEDURE — 70553 MRI BRAIN STEM W/O & W/DYE: CPT

## 2021-09-08 PROCEDURE — A9585 GADOBUTROL INJECTION: HCPCS | Performed by: RADIOLOGY

## 2021-09-08 PROCEDURE — 255N000002 HC RX 255 OP 636: Performed by: RADIOLOGY

## 2021-09-08 RX ORDER — GADOBUTROL 604.72 MG/ML
7.5 INJECTION INTRAVENOUS ONCE
Status: COMPLETED | OUTPATIENT
Start: 2021-09-08 | End: 2021-09-08

## 2021-09-08 RX ADMIN — GADOBUTROL 7.5 ML: 604.72 INJECTION INTRAVENOUS at 12:04

## 2021-09-16 ENCOUNTER — VIRTUAL VISIT (OUTPATIENT)
Dept: NEUROLOGY | Facility: CLINIC | Age: 31
End: 2021-09-16
Attending: PSYCHIATRY & NEUROLOGY
Payer: COMMERCIAL

## 2021-09-16 DIAGNOSIS — G35 MS (MULTIPLE SCLEROSIS) (H): Primary | ICD-10-CM

## 2021-09-16 PROCEDURE — 99214 OFFICE O/P EST MOD 30 MIN: CPT | Mod: 95 | Performed by: PSYCHIATRY & NEUROLOGY

## 2021-09-16 NOTE — PROGRESS NOTES
"Radha is a 31 year old who is being evaluated via a billable video visit.      How would you like to obtain your AVS? MyChart  If the video visit is dropped, the invitation should be resent by: Other e-mail: [email or web address]  Will anyone else be joining your video visit? No     Referral source: Established patient     Chief complaint: Multiple sclerosis     History of the Present Illness: Ms. Radha Carl is a 89year old woman who is evaluated in the Multiple Sclerosis Clinic today for follow up regarding her diagnosis of multiple sclerosis.     At the request of the patient, the appointment was conducted by video to limit unnecessary exposure in light of the COVID-19 pandemic.     The patient's history is as per my previous notes. Her history of symptoms of demyelinating disease dates back to May 2013, at which time she was admitted to hospital for ascending paresthesias. MRI imaging was suggestive of demyelinating disease of the type seen in multiple sclerosis. 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 A formal MS diagnosis was confirmed in 2018 after she developed increased weakness of the legs and MRI imaging showed multiple new lesions, but she continued to decline disease modifying treatment. Further radiologic progression was noted in December of last year after she developed vertiginous symptoms, and she was referred to this clinic for an opinion on management. We initially started her on disease modifying therapy with dimethyl fumarate, but she discontinued that medication after a few weeks due to gastrointestinal side effects.    Next, she started glatiramer acetate but has only taken three injections due to concern about side effects including muscle stiffness, dizziness, headache and seeing \"dots\" in her field of vision after taking the injections.     I reviewed images from an MRI scan of the brain " "performed on 9/8/2021, showing a new enhancing focus in the right parieto-occipital white matter, suggestive of ongoing active inflammatory demyelination.    Assessment/plan:    1. Multiple sclerosis  Her last several MRI scans have continued to show highly active inflammatory demyelination, and I continue to strongly recommend disease modifying therapy. We cannot make any conclusions about the efficacy of glatiramer acetate for her given minimal exposure to the medication. I discussed with her that the symptoms she describes are not at all typical side effects of glatiramer acetate, which is a very benign therapy with minimal systemic effects. However, if she does not feel able to tolerate this, we should consider other options.    She mentions consideration of pregnancy in the relatively near future, and in this context the range of options is somewhat limited. Teriflunomide and sphingosine analogues (such as fingolimod) should be avoided as these are either known to be or strongly suspected to be human teratogens. Interferon beta is relatively safe in early pregnancy but is among the most difficult to tolerate of the disease modifying therapies, and I am not optimistic that she would do well with a different injectable treatment.    Ocrelizumab is a highly active disease modifying treatment that would be a good choice for her in the long term, but should not be started if she is going to attempt pregnancy within the next six months. I typically recommend waiting at least six months from the last ocrelizumab treatment before attempting to conceive. A considerable advantage of this treatment is that the efficacy tends to wane fairly slowly over a number of months past the standard six month treatment interval and can thus provide a \"window\" of relative safety in which conception will hopefully occur.    We discussed known side effects of this therapy, which include reduced efficacy of vaccinations administered " while on the treatment, as well as theoretical risks of opportunistic infections and possibly an increased risk of certain cancers. Fortunately, such serious side effects have been rare in the 4.5 years that this medication has been on the market.    She is going to consider her options. Overall, I would recommend that she either restart glatiramer acetate or (if not trying to conceive in the next six months) switch to ocrelizumab.    I would like to see her back in 6 months with an MRI scan of the brain performed prior to that appointment to assess the radiologic stability of her condition.    I spent a total of 35 minutes on patient care activities related to this encounter on the date of service, including time spent in reviewing the chart and in obtaining history from and in counseling the patient.    Video-Visit Details    Type of service:  Video Visit    Video Start Time: 11:58 am    Video End Time: 12:28 pm    Originating Location (pt. Location): Home    Distant Location (provider location):  [facility]     Platform used for Video Visit: Nicolasa

## 2021-09-16 NOTE — LETTER
"9/16/2021      RE: Radha Carl  [address]  [address]  [facility] [address]     Referral source: Established patient     Chief complaint: Multiple sclerosis     History of the Present Illness: Ms. Radha Carl is a 89year old woman who is evaluated in the Multiple Sclerosis Clinic today for follow up regarding her diagnosis of multiple sclerosis.     At the request of the patient, the appointment was conducted by video to limit unnecessary exposure in light of the COVID-19 pandemic.     The patient's history is as per my previous notes. Her history of symptoms of demyelinating disease dates back to May 2013, at which time she was admitted to hospital for ascending paresthesias. MRI imaging was suggestive of demyelinating disease of the type seen in multiple sclerosis. 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 A formal MS diagnosis was confirmed in 2018 after she developed increased weakness of the legs and MRI imaging showed multiple new lesions, but she continued to decline disease modifying treatment. Further radiologic progression was noted in December of last year after she developed vertiginous symptoms, and she was referred to this clinic for an opinion on management. We initially started her on disease modifying therapy with dimethyl fumarate, but she discontinued that medication after a few weeks due to gastrointestinal side effects.    Next, she started glatiramer acetate but has only taken three injections due to concern about side effects including muscle stiffness, dizziness, headache and seeing \"dots\" in her field of vision after taking the injections.     I reviewed images from an MRI scan of the brain performed on 9/8/2021, showing a new enhancing focus in the right parieto-occipital white matter, suggestive of ongoing active inflammatory demyelination.    Assessment/plan:    1. " "Multiple sclerosis  Her last several MRI scans have continued to show highly active inflammatory demyelination, and I continue to strongly recommend disease modifying therapy. We cannot make any conclusions about the efficacy of glatiramer acetate for her given minimal exposure to the medication. I discussed with her that the symptoms she describes are not at all typical side effects of glatiramer acetate, which is a very benign therapy with minimal systemic effects. However, if she does not feel able to tolerate this, we should consider other options.    She mentions consideration of pregnancy in the relatively near future, and in this context the range of options is somewhat limited. Teriflunomide and sphingosine analogues (such as fingolimod) should be avoided as these are either known to be or strongly suspected to be human teratogens. Interferon beta is relatively safe in early pregnancy but is among the most difficult to tolerate of the disease modifying therapies, and I am not optimistic that she would do well with a different injectable treatment.    Ocrelizumab is a highly active disease modifying treatment that would be a good choice for her in the long term, but should not be started if she is going to attempt pregnancy within the next six months. I typically recommend waiting at least six months from the last ocrelizumab treatment before attempting to conceive. A considerable advantage of this treatment is that the efficacy tends to wane fairly slowly over a number of months past the standard six month treatment interval and can thus provide a \"window\" of relative safety in which conception will hopefully occur.    We discussed known side effects of this therapy, which include reduced efficacy of vaccinations administered while on the treatment, as well as theoretical risks of opportunistic infections and possibly an increased risk of certain cancers. Fortunately, such serious side effects have been rare " in the 4.5 years that this medication has been on the market.    She is going to consider her options. Overall, I would recommend that she either restart glatiramer acetate or (if not trying to conceive in the next six months) switch to ocrelizumab.    I would like to see her back in 6 months with an MRI scan of the brain performed prior to that appointment to assess the radiologic stability of her condition.    I spent a total of 35 minutes on patient care activities related to this encounter on the date of service, including time spent in reviewing the chart and in obtaining history from and in counseling the patient.    Santo Gracia MD   of Neurology  [facility]    Cc:  Muriel Jones MD (PCP)  Patient

## 2021-09-16 NOTE — PATIENT INSTRUCTIONS
1. We discussed disease modifying therapies including glatiramer acetate and Ocrevus: I think that either are reasonable choices at this time, but if you are planning to try to conceive within the next six months I would not start Ocrevus    2. Repeat MRI scan of the brain in 6 months (can be done in [place])    3. Return to clinic in 6 months

## 2021-10-03 ENCOUNTER — HEALTH MAINTENANCE LETTER (OUTPATIENT)
Age: 31
End: 2021-10-03

## 2021-10-21 ENCOUNTER — ALLIED HEALTH/NURSE VISIT (OUTPATIENT)
Dept: FAMILY MEDICINE | Facility: OTHER | Age: 31
End: 2021-10-21
Attending: FAMILY MEDICINE
Payer: COMMERCIAL

## 2021-10-21 DIAGNOSIS — Z23 NEED FOR PROPHYLACTIC VACCINATION AND INOCULATION AGAINST INFLUENZA: Primary | ICD-10-CM

## 2021-10-21 PROCEDURE — 90471 IMMUNIZATION ADMIN: CPT

## 2021-10-21 PROCEDURE — 90686 IIV4 VACC NO PRSV 0.5 ML IM: CPT

## 2021-11-11 NOTE — PROGRESS NOTES
SUBJECTIVE:   CC: Radha Carl is an 31 year old woman who presents for preventive health visit.       Patient has been advised of split billing requirements and indicates understanding: Yes  Healthy Habits:     Getting at least 3 servings of Calcium per day:  Yes    Bi-annual eye exam:  Yes    Dental care twice a year:  Yes    Sleep apnea or symptoms of sleep apnea:  Daytime drowsiness    Diet:  Regular (no restrictions)    Frequency of exercise:  2-3 days/week    Duration of exercise:  15-30 minutes    Taking medications regularly:  Yes    Medication side effects:  None    PHQ-2 Total Score: 0    Additional concerns today:  No    Fatigue and weight gain      Duration: years    Description (location/character/radiation): MS, PCOS, stressful work    Intensity:  moderate    Accompanying signs and symptoms: in tears somedays after work    History (similar episodes/previous evaluation): covid pandemic, has tried immune regulating medications    Precipitating or alleviating factors: rest    Therapies tried and outcome: above     Pre-Diabetes Follow-up      How often are you checking your blood sugar? Not at all    What concerns do you have today about your pre-diabetes? None     Do you have any of these symptoms? (Select all that apply)  Weight gain      BP Readings from Last 2 Encounters:   11/12/21 (!) 138/100   07/02/21 138/86     Hemoglobin A1C (%)   Date Value   09/11/2020 6.4 (H)   06/12/2019 5.9 (H)     LDL Cholesterol Calculated (mg/dL)   Date Value   09/11/2020 134 (H)   06/12/2019 134 (H)                 Today's PHQ-2 Score:   PHQ-2 ( 1999 Pf[employer]) 11/12/2021   Q1: Little interest or pleasure in doing things 0   Q2: Feeling down, depressed or hopeless 0   PHQ-2 Score 0   Q1: Little interest or pleasure in doing things Not at all   Q2: Feeling down, depressed or hopeless Not at all   PHQ-2 Score 0       Abuse: Current or Past (Physical, Sexual or Emotional) - No  Do you feel safe in your environment?  Yes    Have you ever done Advance Care Planning? (For example, a Health Directive, POLST, or a discussion with a medical provider or your loved ones about your wishes): No, advance care planning information given to patient to review.  Patient declined advance care planning discussion at this time.    Social History     Tobacco Use     Smoking status: Never Smoker     Smokeless tobacco: Never Used   Substance Use Topics     Alcohol use: Yes     Alcohol/week: 0.0 standard drinks     Comment: rare     If you drink alcohol do you typically have >3 drinks per day or >7 drinks per week? No    Alcohol Use 11/12/2021   Prescreen: >3 drinks/day or >7 drinks/week? No   Prescreen: >3 drinks/day or >7 drinks/week? -       Reviewed orders with patient.  Reviewed health maintenance and updated orders accordingly - Yes  Lab work is in process  Labs reviewed in EPIC  BP Readings from Last 3 Encounters:   11/12/21 (!) 138/100   07/02/21 138/86   06/07/21 (!) 150/90    Wt Readings from Last 3 Encounters:   11/12/21 83 kg (183 lb)   07/02/21 83.9 kg (185 lb)   06/07/21 82.6 kg (182 lb)                  Patient Active Problem List   Diagnosis     Attention deficit hyperactivity disorder (ADHD), predominantly inattentive type     Hyperlipidemia with target LDL less than 100     PCOS (polycystic ovarian syndrome)     Elevated liver enzymes     MS (multiple sclerosis) (H)     Vitamin B12 deficiency (non anemic)     Adiposity     Skin sensation disturbance     Tubal pregnancy without intrauterine pregnancy     Visual field scotoma     ACP (advance care planning)     Dysmenorrhea     MARIBETH (generalized anxiety disorder)     Hypovitaminosis D     Infertility, female, secondary     Elevated glucose     Costochondral pain     Stress     Benign essential hypertension     Prediabetes     Chronic tension-type headache, not intractable     Cold sore     Cervicalgia     Weight gain     Past Surgical History:   Procedure Laterality Date     GYN  SURGERY  5/2010    ectopic pregnancy ruptured -L salpingectomy     HYSTEROSCOPY,DIAGNOSTIC  2016    [facility] here [facility]     wisdom teeth  2014       Social History     Tobacco Use     Smoking status: Never Smoker     Smokeless tobacco: Never Used   Substance Use Topics     Alcohol use: Yes     Alcohol/week: 0.0 standard drinks     Comment: rare     Family History   Problem Relation Age of Onset     Family History Negative Mother      Unknown/Adopted Father      Family History Negative Sister      Family History Negative Sister      Obesity Sister         iron def     Hypertension Sister      Diabetes Sister         pre     Migraines Sister      Psychotic Disorder Sister         ADHD     Family History Negative Brother      Breast Cancer Maternal Grandmother 64     Unknown/Adopted Maternal Grandfather         severe gerd     Lung Cancer Paternal Grandmother         +tobacco     Heart Disease Paternal Grandfather         AMI/CAD or CVA         Current Outpatient Medications   Medication Sig Dispense Refill     acetaminophen (TYLENOL) 500 MG tablet Take 2 tablets by mouth. Every 6 hours as needed       albuterol (PROAIR HFA/PROVENTIL HFA/VENTOLIN HFA) 108 (90 Base) MCG/ACT inhaler Inhale 2 puffs into the lungs every 6 hours 8.5 g 1     baclofen (LIORESAL) 10 MG tablet Take 0.5-1 tablets (5-10 mg) by mouth 2 times daily as needed for muscle spasms 30 tablet 1     cyclobenzaprine (FLEXERIL) 10 MG tablet Take 0.5-1 tablets (5-10 mg) by mouth daily as needed for muscle spasms 30 tablet 1     ibuprofen (ADVIL,MOTRIN) 200 MG tablet Take 2 tablets by mouth. Every 6 hours as needed with food       lisdexamfetamine (VYVANSE) 10 MG capsule Take 1 capsule (10 mg) by mouth every morning 30 capsule 0     Meloxicam (MOBIC PO)        Multiple Vitamins-Minerals (OPTIVITE P.M.T.) TABS Take 3 tablets by mouth 2 times daily 180 tablet 3     order for DME Right ankle ASO 1 Device 0     progesterone (PROMETRIUM) 100 MG capsule  Take 1 capsule (100 mg) by mouth daily Starting on peak +3 for 10 days out of month 30 capsule 3     valACYclovir (VALTREX) 500 MG tablet TAKE 1 TABLET BY MOUTH TWICE DAILY WITH  ACTIVE  LESION 30 tablet 5     vitamin D3 (CHOLECALCIFEROL) 1.25 MG (67316 UT) capsule Take 1 capsule (50,000 Units) by mouth every 7 days 4 capsule 12     Allergies   Allergen Reactions     Cats      Sulfa Drugs Rash     Sulfonamide antibiotics     Recent Labs   Lab Test 11/13/20  0840 09/11/20  0816 06/12/19  0803 06/13/18  1623 09/05/17  0806 01/25/17  1700 07/08/16  0732 02/26/16  1148   A1C  --  6.4* 5.9* 6.0* 5.8 5.8   < > 6.1*   LDL  --  134* 134*  --  119*  --    < >  --    HDL  --  39* 41*  --  38*  --    < >  --    TRIG  --  96 120  --  111  --    < >  --    ALT 88* 101* 32  --  20 30   < > 30   CR 0.60 0.56 0.65  --  0.62 0.82   < > 0.59   GFRESTIMATED >90 >90 >90  --  >90 82   < > >90  Non  GFR Calc     GFRESTBLACK >90 >90 >90  --  >90 >90  African American GFR Calc     < > >90   GFR Calc     POTASSIUM 4.1 3.9 3.7  --  3.8 3.6   < > 3.9   TSH  --   --   --   --   --  0.85  --  0.65    < > = values in this interval not displayed.        Breast Cancer Screening:  Any new diagnosis of family breast, ovarian, or bowel cancer? No    FHS-7: No flowsheet data found.  Patient under 40 years of age: Routine Mammogram Screening not recommended.   Pertinent mammograms are reviewed under the imaging tab.    History of abnormal Pap smear: NO - age 30-65 PAP every 5 years with negative HPV co-testing recommended  PAP / HPV Latest Ref Rng & Units 10/11/2017 4/15/2014   PAP (Historical) - NIL NIL   HPV16 NEG:Negative Negative -   HPV18 NEG:Negative Negative -   HRHPV NEG:Negative Negative -     Reviewed and updated as needed this visit by clinical staff  Tobacco  Allergies  Meds             Reviewed and updated as needed this visit by Provider               Past Medical History:   Diagnosis Date     ADHD  (attention deficit hyperactivity disorder)      Cold sore      Demyelinating changes in brain (H)     saw Dr Gilbert 2013 -- ? of MS, but not Dx'd yet.     Elevated glucose     resolved      Elevated liver enzymes     resolved      Fibrocystic breast      MARIBETH (generalized anxiety disorder)      History of left salpingo-oophorectomy     still has L ovary, but missing L tube     Hypovitaminosis D      MS (multiple sclerosis) (H)      Neuropathy     feet     Obesity      PCOS (polycystic ovarian syndrome)       Past Surgical History:   Procedure Laterality Date     GYN SURGERY  2010    ectopic pregnancy ruptured -L salpingectomy     HYSTEROSCOPY,DIAGNOSTIC  2016    done here and at [facility]     wisdom teeth       OB History    Para Term  AB Living   1 0 0 0 1 0   SAB IAB Ectopic Multiple Live Births   0 0 1 0 0      # Outcome Date GA Lbr Sage/2nd Weight Sex Delivery Anes PTL Lv   1 Ectopic 2010              Birth Comments: left salpingectomy       Review of Systems  CONSTITUTIONAL: NEGATIVE for fever, chills, change in weight  INTEGUMENTARU/SKIN: NEGATIVE for worrisome rashes, moles or lesions  EYES: NEGATIVE for vision changes or irritation  ENT: NEGATIVE for ear, mouth and throat problems  RESP: NEGATIVE for significant cough or SOB  BREAST: NEGATIVE for masses, tenderness or discharge  CV: NEGATIVE for chest pain, palpitations or peripheral edema  GI: NEGATIVE for nausea, abdominal pain, heartburn, or change in bowel habits  : NEGATIVE for unusual urinary or vaginal symptoms. Periods are regular.  MUSCULOSKELETAL:POSITIVE  for muscle weakness waxes and wanes and paresthesias  NEURO: POSITIVE for dizziness/lightheadedness, HX headaches-migraine, involuntary movements, gait disturbance, paresthesias legs and weakness legs  PSYCHIATRIC: NEGATIVE for changes in mood or affect     OBJECTIVE:   BP (!) 138/100   Pulse 108   Temp 98.6  F (37  C)   Resp 18   Ht 1.6 m (5'  "3\")   Wt 83 kg (183 lb)   LMP 10/26/2021 (Exact Date)   SpO2 99%   BMI 32.42 kg/m    Physical Exam  GENERAL: healthy, alert and no distress  EYES: Eyes grossly normal to inspection, PERRL and conjunctivae and sclerae normal  HENT: ear canals and TM's normal, nose and mouth without ulcers or lesions  NECK: no adenopathy, no asymmetry, masses, or scars and thyroid normal to palpation  RESP: lungs clear to auscultation - no rales, rhonchi or wheezes  BREAST: normal without masses, tenderness or nipple discharge and no palpable axillary masses or adenopathy  CV: regular rate and rhythm, normal S1 S2, no S3 or S4, no murmur, click or rub, no peripheral edema and peripheral pulses strong  ABDOMEN: soft, nontender, no hepatosplenomegaly, no masses and bowel sounds normal   (female): normal female external genitalia, normal urethral meatus, vaginal mucosa pink, moist, well rugated, and normal cervix/adnexa/uterus without masses or discharge  MS: no gross musculoskeletal defects noted, no edema  SKIN: no suspicious lesions or rashes  NEURO: Normal strength and tone, mentation intact and speech normal  PSYCH: mentation appears normal, affect normal/bright    Diagnostic Test Results:  Labs reviewed in Epic  No results found for any visits on 11/12/21.    ASSESSMENT/PLAN:   (Z00.00) Routine general medical examination at a health care facility  (primary encounter diagnosis)  Comment:   Plan: follow-up 1 year    (E55.9) Hypovitaminosis D  Comment:   Plan: Vitamin D Deficiency        Check level next week    (G35) MS (multiple sclerosis) (H)  Comment:   Plan: cyclobenzaprine (FLEXERIL) 10 MG tablet        Refilled  Has tried some immune modulating medications but didn't tolerate them  Working with her neurologist    (E78.5) Hyperlipidemia with target LDL less than 100  Comment:   Plan: Comprehensive metabolic panel, Lipid Profile         (Chol, Trig, HDL, LDL calc)        Will have her do all fasting labs next " "week    (E53.8) Vitamin B12 deficiency (non anemic)  Comment:   Plan: Vitamin B12          (R73.09) Elevated glucose  Comment:   Plan: Hemoglobin A1c        Labs next week    (E28.2) PCOS (polycystic ovarian syndrome)  Comment:   Plan: progesterone (PROMETRIUM) 100 MG capsule,         Prolactin        continue progesterone monthly, should check peak +7 labs at some point    (M54.2) Cervicalgia  Comment:   Plan: baclofen (LIORESAL) 10 MG tablet        Hasn't tried this yet but encouraged her to try on a weekend when home    (B00.1) Cold sore  Comment:   Plan: valACYclovir (VALTREX) 500 MG tablet        refilled    (R63.5) Weight gain  Comment:   Plan: TSH with free T4 reflex, T3, Free, T3 total,         Iron and iron binding capacity, Ferritin        Discussed evaluation with naturopath, consider code red, plant paradox and lectin elimination or use in pressure cooker  Increase water, stress management    (R53.82) Chronic fatigue  Comment:   Plan: second to above, insulin resistance?  Going to start some magnesium maleate or glycinate    (D23.72) Dermatofibroma of left lower extremity  Comment:   Plan: Single Lesion        Continues to change and grow, gets knicked by shaving -- see procedure note    (K59.02) Constipation due to outlet dysfunction  Comment:   Plan: Physical Therapy Referral        Discussed regular bowel program, she already has a squatty potty which has helped      COUNSELING:  Reviewed preventive health counseling, as reflected in patient instructions  Special attention given to:        Regular exercise       Healthy diet/nutrition       Vision screening       Hearing screening       HIV screeninx in teen years, 1x in adult years, and at intervals if high risk       Advance Care Planning    Estimated body mass index is 32.42 kg/m  as calculated from the following:    Height as of this encounter: 1.6 m (5' 3\").    Weight as of this encounter: 83 kg (183 lb).    Weight management plan: Discussed " healthy diet and exercise guidelines Specific weight management program called code red, plant adina discussed    She reports that she has never smoked. She has never used smokeless tobacco.      Counseling Resources:  ATP IV Guidelines  Pooled Cohorts Equation Calculator  Breast Cancer Risk Calculator  BRCA-Related Cancer Risk Assessment: FHS-7 Tool  FRAX Risk Assessment  ICSI Preventive Guidelines  Dietary Guidelines for Americans, 2010  [employer]'s MyPlate  ASA Prophylaxis  Lung CA Screening    Muriel Jones MD  [facility]

## 2021-11-11 NOTE — PATIENT INSTRUCTIONS
[facility] --   [place]ian[place]?    The Plant Paradox    Preventive Health Recommendations  Female Ages 26 - 39  Yearly exam:   See your health care provider every year in order to    Review health changes.     Discuss preventive care.      Review your medicines if you your doctor has prescribed any.    Until age 30: Get a Pap test every three years (more often if you have had an abnormal result).    After age 30: Talk to your doctor about whether you should have a Pap test every 3 years or have a Pap test with HPV screening every 5 years.   You do not need a Pap test if your uterus was removed (hysterectomy) and you have not had cancer.  You should be tested each year for STDs (sexually transmitted diseases), if you're at risk.   Talk to your provider about how often to have your cholesterol checked.  If you are at risk for diabetes, you should have a diabetes test (fasting glucose).  Shots: Get a flu shot each year. Get a tetanus shot every 10 years.   Nutrition:     Eat at least 5 servings of fruits and vegetables each day.    Eat whole-grain bread, whole-wheat pasta and brown rice instead of white grains and rice.    Get adequate Calcium and Vitamin D.     Lifestyle    Exercise at least 150 minutes a week (30 minutes a day, 5 days of the week). This will help you control your weight and prevent disease.    Limit alcohol to one drink per day.    No smoking.     Wear sunscreen to prevent skin cancer.    See your dentist every six months for an exam and cleaning.    Procedure: Punch biopsy  Diagnosis: dermatofibroma vs atypical nevi  Location: left lower extremity  The patient was informed of the nature of the procedure and the inherent risks of unattractive scarring, bleeding, and infection, and that further treatment may be required. She consents to the procedure.  The area surrounding the lesion was prepped with alcohol and anesthetized with 1% xylocaine with epinephrine. A 5 mm punch biopsy was obtained. The  wound was closed using 4-0 ethilon with 1 mattress suture..  The wound was dressed with Bacitracin ointment and a bandage. Wound care was explained and a wound care instruction sheet given. Follow up with any wound problems, poor healing, or signs of infection.

## 2021-11-12 ENCOUNTER — OFFICE VISIT (OUTPATIENT)
Dept: FAMILY MEDICINE | Facility: OTHER | Age: 31
End: 2021-11-12
Attending: FAMILY MEDICINE
Payer: COMMERCIAL

## 2021-11-12 ENCOUNTER — APPOINTMENT (OUTPATIENT)
Dept: LAB | Facility: OTHER | Age: 31
End: 2021-11-12
Attending: FAMILY MEDICINE
Payer: COMMERCIAL

## 2021-11-12 VITALS
TEMPERATURE: 98.6 F | SYSTOLIC BLOOD PRESSURE: 138 MMHG | HEART RATE: 108 BPM | OXYGEN SATURATION: 99 % | BODY MASS INDEX: 32.43 KG/M2 | HEIGHT: 63 IN | WEIGHT: 183 LBS | DIASTOLIC BLOOD PRESSURE: 100 MMHG | RESPIRATION RATE: 18 BRPM

## 2021-11-12 DIAGNOSIS — K59.02 CONSTIPATION DUE TO OUTLET DYSFUNCTION: ICD-10-CM

## 2021-11-12 DIAGNOSIS — D23.72 DERMATOFIBROMA OF LEFT LOWER EXTREMITY: ICD-10-CM

## 2021-11-12 DIAGNOSIS — M54.2 CERVICALGIA: ICD-10-CM

## 2021-11-12 DIAGNOSIS — R63.5 WEIGHT GAIN: ICD-10-CM

## 2021-11-12 DIAGNOSIS — E78.5 HYPERLIPIDEMIA WITH TARGET LDL LESS THAN 100: ICD-10-CM

## 2021-11-12 DIAGNOSIS — Z00.00 ROUTINE GENERAL MEDICAL EXAMINATION AT A HEALTH CARE FACILITY: Primary | ICD-10-CM

## 2021-11-12 DIAGNOSIS — E53.8 VITAMIN B12 DEFICIENCY (NON ANEMIC): ICD-10-CM

## 2021-11-12 DIAGNOSIS — E28.2 PCOS (POLYCYSTIC OVARIAN SYNDROME): ICD-10-CM

## 2021-11-12 DIAGNOSIS — G35 MS (MULTIPLE SCLEROSIS) (H): ICD-10-CM

## 2021-11-12 DIAGNOSIS — R53.82 CHRONIC FATIGUE: ICD-10-CM

## 2021-11-12 DIAGNOSIS — R73.09 ELEVATED GLUCOSE: ICD-10-CM

## 2021-11-12 DIAGNOSIS — E55.9 HYPOVITAMINOSIS D: ICD-10-CM

## 2021-11-12 DIAGNOSIS — B00.1 COLD SORE: ICD-10-CM

## 2021-11-12 PROCEDURE — 88305 TISSUE EXAM BY PATHOLOGIST: CPT | Mod: 26 | Performed by: PATHOLOGY

## 2021-11-12 PROCEDURE — 11104 PUNCH BX SKIN SINGLE LESION: CPT

## 2021-11-12 PROCEDURE — 88305 TISSUE EXAM BY PATHOLOGIST: CPT | Mod: TC | Performed by: FAMILY MEDICINE

## 2021-11-12 PROCEDURE — 99212 OFFICE O/P EST SF 10 MIN: CPT | Mod: 25 | Performed by: FAMILY MEDICINE

## 2021-11-12 PROCEDURE — 11104 PUNCH BX SKIN SINGLE LESION: CPT | Performed by: FAMILY MEDICINE

## 2021-11-12 PROCEDURE — 99395 PREV VISIT EST AGE 18-39: CPT | Performed by: FAMILY MEDICINE

## 2021-11-12 PROCEDURE — G0463 HOSPITAL OUTPT CLINIC VISIT: HCPCS | Mod: 25

## 2021-11-12 RX ORDER — PROGESTERONE 100 MG/1
100 CAPSULE ORAL DAILY
Qty: 30 CAPSULE | Refills: 3 | Status: SHIPPED | OUTPATIENT
Start: 2021-11-12 | End: 2024-01-12

## 2021-11-12 RX ORDER — CYCLOBENZAPRINE HCL 10 MG
5-10 TABLET ORAL DAILY PRN
Qty: 30 TABLET | Refills: 1 | Status: SHIPPED | OUTPATIENT
Start: 2021-11-12

## 2021-11-12 RX ORDER — VALACYCLOVIR HYDROCHLORIDE 500 MG/1
TABLET, FILM COATED ORAL
Qty: 30 TABLET | Refills: 5 | Status: SHIPPED | OUTPATIENT
Start: 2021-11-12 | End: 2023-07-06

## 2021-11-12 RX ORDER — BACLOFEN 10 MG/1
5-10 TABLET ORAL 2 TIMES DAILY PRN
Qty: 30 TABLET | Refills: 1 | Status: SHIPPED | OUTPATIENT
Start: 2021-11-12

## 2021-11-12 SDOH — HEALTH STABILITY: PHYSICAL HEALTH: ON AVERAGE, HOW MANY MINUTES DO YOU ENGAGE IN EXERCISE AT THIS LEVEL?: 10 MIN

## 2021-11-12 SDOH — HEALTH STABILITY: PHYSICAL HEALTH: ON AVERAGE, HOW MANY DAYS PER WEEK DO YOU ENGAGE IN MODERATE TO STRENUOUS EXERCISE (LIKE A BRISK WALK)?: 4 DAYS

## 2021-11-12 ASSESSMENT — LIFESTYLE VARIABLES
HOW MANY STANDARD DRINKS CONTAINING ALCOHOL DO YOU HAVE ON A TYPICAL DAY: 1 OR 2
HOW OFTEN DO YOU HAVE SIX OR MORE DRINKS ON ONE OCCASION: NEVER

## 2021-11-12 ASSESSMENT — PAIN SCALES - GENERAL: PAINLEVEL: NO PAIN (0)

## 2021-11-12 ASSESSMENT — ANXIETY QUESTIONNAIRES
GAD7 TOTAL SCORE: 7
1. FEELING NERVOUS, ANXIOUS, OR ON EDGE: SEVERAL DAYS
5. BEING SO RESTLESS THAT IT IS HARD TO SIT STILL: SEVERAL DAYS
2. NOT BEING ABLE TO STOP OR CONTROL WORRYING: SEVERAL DAYS
4. TROUBLE RELAXING: MORE THAN HALF THE DAYS
3. WORRYING TOO MUCH ABOUT DIFFERENT THINGS: SEVERAL DAYS
7. FEELING AFRAID AS IF SOMETHING AWFUL MIGHT HAPPEN: NOT AT ALL
IF YOU CHECKED OFF ANY PROBLEMS ON THIS QUESTIONNAIRE, HOW DIFFICULT HAVE THESE PROBLEMS MADE IT FOR YOU TO DO YOUR WORK, TAKE CARE OF THINGS AT HOME, OR GET ALONG WITH OTHER PEOPLE: SOMEWHAT DIFFICULT
6. BECOMING EASILY ANNOYED OR IRRITABLE: SEVERAL DAYS

## 2021-11-12 ASSESSMENT — MIFFLIN-ST. JEOR: SCORE: 1504.21

## 2021-11-12 NOTE — NURSING NOTE
"Chief Complaint   Patient presents with     Physical       Initial BP (!) 138/100   Pulse 108   Temp 98.6  F (37  C)   Resp 18   Ht 1.6 m (5' 3\")   Wt 83 kg (183 lb)   LMP 10/26/2021 (Exact Date)   SpO2 99%   BMI 32.42 kg/m   Estimated body mass index is 32.42 kg/m  as calculated from the following:    Height as of this encounter: 1.6 m (5' 3\").    Weight as of this encounter: 83 kg (183 lb).  Medication Reconciliation: nicole Hudson  "

## 2021-11-13 ASSESSMENT — ANXIETY QUESTIONNAIRES: GAD7 TOTAL SCORE: 7

## 2021-11-13 ASSESSMENT — PATIENT HEALTH QUESTIONNAIRE - PHQ9: SUM OF ALL RESPONSES TO PHQ QUESTIONS 1-9: 7

## 2021-11-15 ENCOUNTER — LAB (OUTPATIENT)
Dept: LAB | Facility: OTHER | Age: 31
End: 2021-11-15
Payer: COMMERCIAL

## 2021-11-15 DIAGNOSIS — R73.09 ELEVATED GLUCOSE: ICD-10-CM

## 2021-11-15 DIAGNOSIS — R63.5 WEIGHT GAIN: ICD-10-CM

## 2021-11-15 DIAGNOSIS — E61.1 IRON DEFICIENCY: ICD-10-CM

## 2021-11-15 DIAGNOSIS — E28.2 PCOS (POLYCYSTIC OVARIAN SYNDROME): ICD-10-CM

## 2021-11-15 DIAGNOSIS — E53.8 VITAMIN B12 DEFICIENCY (NON ANEMIC): Primary | ICD-10-CM

## 2021-11-15 DIAGNOSIS — E53.8 VITAMIN B12 DEFICIENCY (NON ANEMIC): ICD-10-CM

## 2021-11-15 DIAGNOSIS — E55.9 HYPOVITAMINOSIS D: ICD-10-CM

## 2021-11-15 DIAGNOSIS — E78.5 HYPERLIPIDEMIA WITH TARGET LDL LESS THAN 100: ICD-10-CM

## 2021-11-15 LAB
ALBUMIN SERPL-MCNC: 3.7 G/DL (ref 3.4–5)
ALP SERPL-CCNC: 76 U/L (ref 40–150)
ALT SERPL W P-5'-P-CCNC: 63 U/L (ref 0–50)
ANION GAP SERPL CALCULATED.3IONS-SCNC: 6 MMOL/L (ref 3–14)
AST SERPL W P-5'-P-CCNC: 27 U/L (ref 0–45)
BILIRUB SERPL-MCNC: 0.2 MG/DL (ref 0.2–1.3)
BUN SERPL-MCNC: 10 MG/DL (ref 7–30)
CALCIUM SERPL-MCNC: 8.8 MG/DL (ref 8.5–10.1)
CHLORIDE BLD-SCNC: 105 MMOL/L (ref 94–109)
CHOLEST SERPL-MCNC: 194 MG/DL
CO2 SERPL-SCNC: 25 MMOL/L (ref 20–32)
CREAT SERPL-MCNC: 0.6 MG/DL (ref 0.52–1.04)
EST. AVERAGE GLUCOSE BLD GHB EST-MCNC: 140 MG/DL
FASTING STATUS PATIENT QL REPORTED: YES
FERRITIN SERPL-MCNC: 4 NG/ML (ref 12–150)
GFR SERPL CREATININE-BSD FRML MDRD: >90 ML/MIN/1.73M2
GLUCOSE BLD-MCNC: 140 MG/DL (ref 70–99)
HBA1C MFR BLD: 6.5 % (ref 0–5.6)
HDLC SERPL-MCNC: 41 MG/DL
IRON SATN MFR SERPL: 5 % (ref 15–46)
IRON SERPL-MCNC: 23 UG/DL (ref 35–180)
LDLC SERPL CALC-MCNC: 131 MG/DL
NONHDLC SERPL-MCNC: 153 MG/DL
POTASSIUM BLD-SCNC: 3.8 MMOL/L (ref 3.4–5.3)
PROLACTIN SERPL-MCNC: 16 UG/L (ref 3–27)
PROT SERPL-MCNC: 8.4 G/DL (ref 6.8–8.8)
SODIUM SERPL-SCNC: 136 MMOL/L (ref 133–144)
T3 SERPL-MCNC: 137 NG/DL (ref 60–181)
T3FREE SERPL-MCNC: 3.2 PG/ML (ref 2.3–4.2)
TIBC SERPL-MCNC: 441 UG/DL (ref 240–430)
TRIGL SERPL-MCNC: 111 MG/DL
TSH SERPL DL<=0.005 MIU/L-ACNC: 0.89 MU/L (ref 0.4–4)
VIT B12 SERPL-MCNC: 741 PG/ML (ref 193–986)

## 2021-11-15 PROCEDURE — 82607 VITAMIN B-12: CPT

## 2021-11-15 PROCEDURE — 82728 ASSAY OF FERRITIN: CPT

## 2021-11-15 PROCEDURE — 83550 IRON BINDING TEST: CPT

## 2021-11-15 PROCEDURE — 80061 LIPID PANEL: CPT

## 2021-11-15 PROCEDURE — 83036 HEMOGLOBIN GLYCOSYLATED A1C: CPT

## 2021-11-15 PROCEDURE — 84146 ASSAY OF PROLACTIN: CPT

## 2021-11-15 PROCEDURE — 84443 ASSAY THYROID STIM HORMONE: CPT

## 2021-11-15 PROCEDURE — 36415 COLL VENOUS BLD VENIPUNCTURE: CPT

## 2021-11-15 PROCEDURE — 84481 FREE ASSAY (FT-3): CPT

## 2021-11-15 PROCEDURE — 84480 ASSAY TRIIODOTHYRONINE (T3): CPT

## 2021-11-15 PROCEDURE — 82306 VITAMIN D 25 HYDROXY: CPT

## 2021-11-15 PROCEDURE — 80053 COMPREHEN METABOLIC PANEL: CPT

## 2021-11-16 LAB — DEPRECATED CALCIDIOL+CALCIFEROL SERPL-MC: 52 UG/L (ref 20–75)

## 2021-11-17 LAB
PATH REPORT.COMMENTS IMP SPEC: NORMAL
PATH REPORT.COMMENTS IMP SPEC: NORMAL
PATH REPORT.FINAL DX SPEC: NORMAL
PATH REPORT.GROSS SPEC: NORMAL
PATH REPORT.MICROSCOPIC SPEC OTHER STN: NORMAL
PATH REPORT.RELEVANT HX SPEC: NORMAL
PHOTO IMAGE: NORMAL

## 2021-11-27 ENCOUNTER — HEALTH MAINTENANCE LETTER (OUTPATIENT)
Age: 31
End: 2021-11-27

## 2021-11-29 ENCOUNTER — TELEPHONE (OUTPATIENT)
Dept: FAMILY MEDICINE | Facility: OTHER | Age: 31
End: 2021-11-29
Payer: COMMERCIAL

## 2021-11-29 DIAGNOSIS — G35 MS (MULTIPLE SCLEROSIS) (H): ICD-10-CM

## 2021-11-29 DIAGNOSIS — M25.551 HIP PAIN, RIGHT: Primary | ICD-10-CM

## 2021-11-29 DIAGNOSIS — M51.369 DDD (DEGENERATIVE DISC DISEASE), LUMBAR: ICD-10-CM

## 2021-11-29 NOTE — TELEPHONE ENCOUNTER
Patient requesting Chiropractic referral for Range Spine. Patient states she will need to be seen up to three times monthly. Referral pended with associated dx.

## 2022-01-12 ENCOUNTER — LAB (OUTPATIENT)
Dept: LAB | Facility: OTHER | Age: 32
End: 2022-01-12
Payer: COMMERCIAL

## 2022-01-12 DIAGNOSIS — E53.8 VITAMIN B12 DEFICIENCY (NON ANEMIC): ICD-10-CM

## 2022-01-12 DIAGNOSIS — E61.1 IRON DEFICIENCY: ICD-10-CM

## 2022-01-12 LAB
IRON SATN MFR SERPL: 10 % (ref 15–46)
IRON SERPL-MCNC: 35 UG/DL (ref 35–180)
TIBC SERPL-MCNC: 367 UG/DL (ref 240–430)

## 2022-01-12 PROCEDURE — 83550 IRON BINDING TEST: CPT

## 2022-01-12 PROCEDURE — 36415 COLL VENOUS BLD VENIPUNCTURE: CPT

## 2022-01-14 ENCOUNTER — APPOINTMENT (OUTPATIENT)
Dept: ULTRASOUND IMAGING | Facility: HOSPITAL | Age: 32
End: 2022-01-14
Attending: EMERGENCY MEDICINE
Payer: COMMERCIAL

## 2022-01-14 ENCOUNTER — HOSPITAL ENCOUNTER (EMERGENCY)
Facility: HOSPITAL | Age: 32
Discharge: HOME | End: 2022-01-14
Attending: EMERGENCY MEDICINE | Admitting: EMERGENCY MEDICINE
Payer: COMMERCIAL

## 2022-01-14 ENCOUNTER — MYC MEDICAL ADVICE (OUTPATIENT)
Dept: FAMILY MEDICINE | Facility: OTHER | Age: 32
End: 2022-01-14
Payer: COMMERCIAL

## 2022-01-14 ENCOUNTER — TELEPHONE (OUTPATIENT)
Dept: FAMILY MEDICINE | Facility: OTHER | Age: 32
End: 2022-01-14

## 2022-01-14 VITALS
OXYGEN SATURATION: 98 % | DIASTOLIC BLOOD PRESSURE: 105 MMHG | RESPIRATION RATE: 18 BRPM | HEART RATE: 97 BPM | TEMPERATURE: 97 F | SYSTOLIC BLOOD PRESSURE: 159 MMHG

## 2022-01-14 DIAGNOSIS — R79.89 LOW SERUM PROGESTERONE: Primary | ICD-10-CM

## 2022-01-14 DIAGNOSIS — R10.31 ABDOMINAL PAIN, RIGHT LOWER QUADRANT: ICD-10-CM

## 2022-01-14 DIAGNOSIS — Z32.01 PREGNANCY TEST POSITIVE: ICD-10-CM

## 2022-01-14 DIAGNOSIS — O03.9 MISCARRIAGE: Primary | ICD-10-CM

## 2022-01-14 LAB
ABO/RH(D): NORMAL
ALBUMIN UR-MCNC: NEGATIVE MG/DL
ANION GAP SERPL CALCULATED.3IONS-SCNC: 7 MMOL/L (ref 3–14)
ANTIBODY SCREEN: NEGATIVE
APPEARANCE UR: CLEAR
APTT PPP: 34 SECONDS (ref 22–38)
B-HCG SERPL-ACNC: 1993 IU/L (ref 0–5)
BASOPHILS # BLD AUTO: 0.1 10E3/UL (ref 0–0.2)
BASOPHILS NFR BLD AUTO: 1 %
BILIRUB UR QL STRIP: NEGATIVE
BUN SERPL-MCNC: 14 MG/DL (ref 7–30)
CALCIUM SERPL-MCNC: 8.7 MG/DL (ref 8.5–10.1)
CHLORIDE BLD-SCNC: 104 MMOL/L (ref 94–109)
CO2 SERPL-SCNC: 23 MMOL/L (ref 20–32)
COLOR UR AUTO: ABNORMAL
CREAT SERPL-MCNC: 0.52 MG/DL (ref 0.52–1.04)
EOSINOPHIL # BLD AUTO: 0.4 10E3/UL (ref 0–0.7)
EOSINOPHIL NFR BLD AUTO: 3 %
ERYTHROCYTE [DISTWIDTH] IN BLOOD BY AUTOMATED COUNT: 22.7 % (ref 10–15)
GFR SERPL CREATININE-BSD FRML MDRD: >90 ML/MIN/1.73M2
GLUCOSE BLD-MCNC: 185 MG/DL (ref 70–99)
GLUCOSE UR STRIP-MCNC: NEGATIVE MG/DL
HCG SERPL QL: POSITIVE
HCT VFR BLD AUTO: 36.6 % (ref 35–47)
HGB BLD-MCNC: 11.3 G/DL (ref 11.7–15.7)
HGB UR QL STRIP: NEGATIVE
IMM GRANULOCYTES # BLD: 0.1 10E3/UL
IMM GRANULOCYTES NFR BLD: 0 %
INR PPP: 0.98 (ref 0.85–1.15)
KETONES UR STRIP-MCNC: NEGATIVE MG/DL
LACTATE SERPL-SCNC: 1.7 MMOL/L (ref 0.7–2)
LEUKOCYTE ESTERASE UR QL STRIP: NEGATIVE
LYMPHOCYTES # BLD AUTO: 2.9 10E3/UL (ref 0.8–5.3)
LYMPHOCYTES NFR BLD AUTO: 21 %
MCH RBC QN AUTO: 23.3 PG (ref 26.5–33)
MCHC RBC AUTO-ENTMCNC: 30.9 G/DL (ref 31.5–36.5)
MCV RBC AUTO: 76 FL (ref 78–100)
MONOCYTES # BLD AUTO: 1 10E3/UL (ref 0–1.3)
MONOCYTES NFR BLD AUTO: 8 %
MUCOUS THREADS #/AREA URNS LPF: PRESENT /LPF
NEUTROPHILS # BLD AUTO: 9 10E3/UL (ref 1.6–8.3)
NEUTROPHILS NFR BLD AUTO: 67 %
NITRATE UR QL: POSITIVE
NRBC # BLD AUTO: 0 10E3/UL
NRBC BLD AUTO-RTO: 0 /100
PH UR STRIP: 5.5 [PH] (ref 4.7–8)
PLATELET # BLD AUTO: 505 10E3/UL (ref 150–450)
POTASSIUM BLD-SCNC: 3.8 MMOL/L (ref 3.4–5.3)
RBC # BLD AUTO: 4.84 10E6/UL (ref 3.8–5.2)
RBC URINE: 0 /HPF
SARS-COV-2 RNA RESP QL NAA+PROBE: NEGATIVE
SODIUM SERPL-SCNC: 134 MMOL/L (ref 133–144)
SP GR UR STRIP: 1.02 (ref 1–1.03)
SPECIMEN EXPIRATION DATE: NORMAL
SQUAMOUS EPITHELIAL: 1 /HPF
UROBILINOGEN UR STRIP-MCNC: NORMAL MG/DL
WBC # BLD AUTO: 13.4 10E3/UL (ref 4–11)
WBC URINE: 4 /HPF

## 2022-01-14 PROCEDURE — 99285 EMERGENCY DEPT VISIT HI MDM: CPT | Performed by: EMERGENCY MEDICINE

## 2022-01-14 PROCEDURE — 80048 BASIC METABOLIC PNL TOTAL CA: CPT | Performed by: EMERGENCY MEDICINE

## 2022-01-14 PROCEDURE — 250N000011 HC RX IP 250 OP 636: Performed by: EMERGENCY MEDICINE

## 2022-01-14 PROCEDURE — 96374 THER/PROPH/DIAG INJ IV PUSH: CPT

## 2022-01-14 PROCEDURE — 87086 URINE CULTURE/COLONY COUNT: CPT | Performed by: EMERGENCY MEDICINE

## 2022-01-14 PROCEDURE — 85730 THROMBOPLASTIN TIME PARTIAL: CPT | Performed by: EMERGENCY MEDICINE

## 2022-01-14 PROCEDURE — 86901 BLOOD TYPING SEROLOGIC RH(D): CPT | Performed by: EMERGENCY MEDICINE

## 2022-01-14 PROCEDURE — U0005 INFEC AGEN DETEC AMPLI PROBE: HCPCS | Performed by: EMERGENCY MEDICINE

## 2022-01-14 PROCEDURE — 83605 ASSAY OF LACTIC ACID: CPT | Performed by: EMERGENCY MEDICINE

## 2022-01-14 PROCEDURE — C9803 HOPD COVID-19 SPEC COLLECT: HCPCS

## 2022-01-14 PROCEDURE — 36415 COLL VENOUS BLD VENIPUNCTURE: CPT | Performed by: EMERGENCY MEDICINE

## 2022-01-14 PROCEDURE — 85025 COMPLETE CBC W/AUTO DIFF WBC: CPT | Performed by: EMERGENCY MEDICINE

## 2022-01-14 PROCEDURE — 76830 TRANSVAGINAL US NON-OB: CPT

## 2022-01-14 PROCEDURE — 84702 CHORIONIC GONADOTROPIN TEST: CPT | Performed by: STUDENT IN AN ORGANIZED HEALTH CARE EDUCATION/TRAINING PROGRAM

## 2022-01-14 PROCEDURE — 99285 EMERGENCY DEPT VISIT HI MDM: CPT | Mod: 25

## 2022-01-14 PROCEDURE — 84144 ASSAY OF PROGESTERONE: CPT | Performed by: FAMILY MEDICINE

## 2022-01-14 PROCEDURE — 81001 URINALYSIS AUTO W/SCOPE: CPT | Performed by: EMERGENCY MEDICINE

## 2022-01-14 PROCEDURE — 85610 PROTHROMBIN TIME: CPT | Performed by: EMERGENCY MEDICINE

## 2022-01-14 PROCEDURE — 84703 CHORIONIC GONADOTROPIN ASSAY: CPT | Performed by: EMERGENCY MEDICINE

## 2022-01-14 RX ADMIN — HYDROMORPHONE HYDROCHLORIDE 1 MG: 1 INJECTION, SOLUTION INTRAMUSCULAR; INTRAVENOUS; SUBCUTANEOUS at 06:40

## 2022-01-14 ASSESSMENT — ENCOUNTER SYMPTOMS
FEVER: 0
COUGH: 0
CHILLS: 0
SHORTNESS OF BREATH: 0

## 2022-01-14 NOTE — TELEPHONE ENCOUNTER
Patient called in regards to MyChart messages. During conversation with patient PCP responded to patient and all questions were answered.

## 2022-01-14 NOTE — ED PROVIDER NOTES
History     Chief Complaint   Patient presents with     Groin Pain     c/o lower right groin pain that started at 0400.     HPI  Radha Carl is a 31 year old female who has a pmshx of L fallopian tube removal d/t ectopic, h/o ovarian cysts, htn, pre-dm, PCOS, here w/ sudden onset R pelvic pain. 10/10 initially, now 7/10. states she has a high pain tolerance. Nauseated but no vomiting and no diarrhea. No fever, chills, decreased appetite or malaise. Pain is intermittent, never goes away. Radiates to R back.    Allergies:  Allergies   Allergen Reactions     Cats      Sulfa Drugs Rash     Sulfonamide antibiotics       Problem List:    Patient Active Problem List    Diagnosis Date Noted     Cervicalgia 11/12/2021     Priority: Medium     Weight gain 11/12/2021     Priority: Medium     Cold sore 09/11/2020     Priority: Medium     Chronic tension-type headache, not intractable 03/11/2020     Priority: Medium     Prediabetes 03/04/2020     Priority: Medium     Benign essential hypertension 02/14/2020     Priority: Medium     Costochondral pain 11/29/2019     Priority: Medium     Stress 11/29/2019     Priority: Medium     Elevated glucose 06/12/2019     Priority: Medium     Hypovitaminosis D 12/27/2018     Priority: Medium     MARIBETH (generalized anxiety disorder) 10/11/2017     Priority: Medium     Dysmenorrhea 08/18/2016     Priority: Medium     Infertility, female, secondary 07/19/2016     Priority: Medium     ACP (advance care planning) 07/07/2016     Priority: Medium     Advance Care Planning 7/7/2016: ACP Review of Chart / Resources Provided:  Reviewed chart for advance care plan.  Radha Carl has no plan or code status on file. Discussed available resources and provided with information. Confirmed code status reflects current choices pending further ACP discussions.  Confirmed/documented legally designated decision makers.  Added by Torie Camacho           MS (multiple sclerosis) (H) 06/25/2015      Priority: Medium     Vitamin B12 deficiency (non anemic) 06/25/2015     Priority: Medium     Elevated liver enzymes 03/19/2015     Priority: Medium     PCOS (polycystic ovarian syndrome) 11/06/2014     Priority: Medium     Hyperlipidemia with target LDL less than 100 08/08/2014     Priority: Medium     Diagnosis updated by automated process. Provider to review and confirm.       Attention deficit hyperactivity disorder (ADHD), predominantly inattentive type 02/28/2014     Priority: Medium     Problem list name updated by automated process. Provider to review       Adiposity 05/24/2013     Priority: Medium     Skin sensation disturbance 05/24/2013     Priority: Medium     Tubal pregnancy without intrauterine pregnancy 05/24/2013     Priority: Medium     Visual field scotoma 05/24/2013     Priority: Medium        Past Medical History:    Past Medical History:   Diagnosis Date     ADHD (attention deficit hyperactivity disorder)      Cold sore      Demyelinating changes in brain (H)      Elevated glucose 2014     Elevated liver enzymes 2014     Fibrocystic breast      MARIBETH (generalized anxiety disorder)      History of left salpingo-oophorectomy      Hypovitaminosis D      MS (multiple sclerosis) (H) 2012     Neuropathy      Obesity      PCOS (polycystic ovarian syndrome)        Past Surgical History:    Past Surgical History:   Procedure Laterality Date     GYN SURGERY  5/2010    ectopic pregnancy ruptured -L salpingectomy     HYSTEROSCOPY,DIAGNOSTIC  2016    done here and at [facility]  2014       Family History:    Family History   Problem Relation Age of Onset     Family History Negative Mother      Unknown/Adopted Father      Family History Negative Sister      Family History Negative Sister      Obesity Sister         iron def     Hypertension Sister      Diabetes Sister         pre     Migraines Sister      Psychotic Disorder Sister         ADHD     Family History Negative Brother      Breast  Cancer Maternal Grandmother 64     Unknown/Adopted Maternal Grandfather         severe gerd     Lung Cancer Paternal Grandmother         +tobacco     Heart Disease Paternal Grandfather         AMI/CAD or CVA       Social History:  Marital Status:   [2]  Social History     Tobacco Use     Smoking status: Never Smoker     Smokeless tobacco: Never Used   Vaping Use     Vaping Use: Never used   Substance Use Topics     Alcohol use: Yes     Alcohol/week: 0.0 standard drinks     Comment: rare     Drug use: No        Medications:    acetaminophen (TYLENOL) 500 MG tablet  albuterol (PROAIR HFA/PROVENTIL HFA/VENTOLIN HFA) 108 (90 Base) MCG/ACT inhaler  baclofen (LIORESAL) 10 MG tablet  cyclobenzaprine (FLEXERIL) 10 MG tablet  ibuprofen (ADVIL,MOTRIN) 200 MG tablet  lisdexamfetamine (VYVANSE) 10 MG capsule  Meloxicam (MOBIC PO)  Multiple Vitamins-Minerals (OPTIVITE P.M.T.) TABS  order for DME  progesterone (PROMETRIUM) 100 MG capsule  valACYclovir (VALTREX) 500 MG tablet  vitamin D3 (CHOLECALCIFEROL) 1.25 MG (24347 UT) capsule          Review of Systems   Constitutional: Negative for chills and fever.   Respiratory: Negative for cough and shortness of breath.    All other systems reviewed and are negative.      Physical Exam   BP: (!) 189/117  Pulse: 89  Temp: 97  F (36.1  C)  Resp: 18  SpO2: 98 %      Physical Exam  Constitutional:       General: She is not in acute distress.     Appearance: She is not diaphoretic.   HENT:      Head: Normocephalic and atraumatic.      Right Ear: External ear normal.      Left Ear: External ear normal.      Nose: No congestion or rhinorrhea.      Mouth/Throat:      Pharynx: Oropharynx is clear. No oropharyngeal exudate.   Eyes:      General: No scleral icterus.     Pupils: Pupils are equal, round, and reactive to light.   Cardiovascular:      Rate and Rhythm: Normal rate and regular rhythm.      Heart sounds: Normal heart sounds.   Pulmonary:      Effort: No respiratory distress.       Breath sounds: Normal breath sounds.   Abdominal:      General: Bowel sounds are normal.      Palpations: Abdomen is soft.      Tenderness: There is no abdominal tenderness. There is no right CVA tenderness or left CVA tenderness.      Comments: Minimal rlq ttp   Musculoskeletal:         General: No tenderness.      Cervical back: Normal range of motion and neck supple.      Right lower leg: No edema.      Left lower leg: No edema.   Skin:     General: Skin is warm.      Capillary Refill: Capillary refill takes less than 2 seconds.      Findings: No rash.   Neurological:      Mental Status: Mental status is at baseline.      Cranial Nerves: No cranial nerve deficit.   Psychiatric:         Mood and Affect: Mood normal.         Behavior: Behavior normal.         ED Course              ED Course as of 01/17/22 0706   Fri Jan 14, 2022   0643 Sign out received. Probably ruptured ovarian cyst. Considered transfer to [facility] for US, but by the time patient reaches there, US should be available here. If negative US, discuss CT with patient.   0748 US Pelvis Cmplt w Transvag & Doppler LmtPel Duplex Limited  US reviewed by me. Evidence of arterial blood flow present bilaterally.   0820 Postive pregnancy. Obtaining quant. Will discuss with OB after quant returns.   0837 Dilaudid brought pain from 6/10 down to 2/10.   0851 Plan for repeat HCG on Monday plus US as needed. Suspect this is a cyst. Spoke with Dr. Earl of OB who agrees with plan. Patient in agreement with plan.    0853 Findings were discussed with the patient. Additional verbal instructions were discussed with the patient as well. Instructed to follow up with PCP/OB within 3 days. Also discussed specific warning signs and instructed to return to the ED if there are any concerns. Patient voiced understanding of instructions, questions were answered and the patient was discharged home in stable condition.       Procedures              Critical Care time:  none                No results found for this or any previous visit (from the past 24 hour(s)).    Medications   HYDROmorphone (DILAUDID) injection 1 mg (1 mg Intravenous Given 1/14/22 0640)       Assessments & Plan (with Medical Decision Making)     I have reviewed the nursing notes.    I have reviewed the findings, diagnosis, plan and need for follow up with the patient.   30 yo f here w/ RLQ pain, suspect ovarian > appendix, will start w/ TVUS to r/o ectopic, torsion, ruptured cyst. No discharge so unlikely TOA. If workup unremarkable and pain persistent, will discuss ct a/p w/ pt.    Patient was signed thanks provider, ultrasound showed possible right ruptured cyst, hCG was positive, case discussed with OB, patient will get repeat ultrasound in a few days to look for fetal heart rate, strict return precautions.    Discharge Medication List as of 1/14/2022  8:57 AM          Final diagnoses:   Pregnancy test positive   Abdominal pain, right lower quadrant       1/14/2022   [facility]     Geoff Jones MD  01/14/22 0623       Geoff Jones MD  01/17/22 0706

## 2022-01-14 NOTE — ED NOTES
Did not tolerate IV Dilaudid. States it did not make her sick but she got very lightheaded feeling and did not like how that felt.

## 2022-01-14 NOTE — ED NOTES
C/o right side groin pain rated 8/10 that started at 0400 this morning. Denies any other symptoms. Denies fever. States pain comes and goes and is a sharp pain in lower right groin that feels like it goes through to back but isn't actually back pain. Denies taking any pain medications. Hx of PCOS. States last period was 12/25/2021. Ambulated to room independently with steady gait. Call light placed within reach.

## 2022-01-14 NOTE — DISCHARGE INSTRUCTIONS
- Please take Tylenol for your symptoms  - Please return to the Emergency Room if you do not improve, feel worse, or have any new or concerning symptoms.   - Please follow up with your OB on Monday for repeat testing

## 2022-01-14 NOTE — ED NOTES
ED Course as of 01/14/22 0853   Fri Jan 14, 2022   0643 Sign out received. Probably ruptured ovarian cyst. Considered transfer to [facility] for US, but by the time patient reaches there, US should be available here. If negative US, discuss CT with patient.   0748 US Pelvis Cmplt w Transvag & Doppler LmtPel Duplex Limited  US reviewed by me. Evidence of arterial blood flow present bilaterally.   0820 Postive pregnancy. Obtaining quant. Will discuss with OB after quant returns.   0837 Dilaudid brought pain from 6/10 down to 2/10.   0851 Plan for repeat HCG on Monday plus US as needed. Suspect this is a cyst. Spoke with Dr. Earl of OB who agrees with plan. Patient in agreement with plan.    0853 Findings were discussed with the patient. Additional verbal instructions were discussed with the patient as well. Instructed to follow up with PCP/OB within 3 days. Also discussed specific warning signs and instructed to return to the ED if there are any concerns. Patient voiced understanding of instructions, questions were answered and the patient was discharged home in stable condition.       HÉCTOR MITTAL MD on 1/14/2022 at 8:53 AM       Héctor Mittal MD  01/14/22 0853

## 2022-01-14 NOTE — Clinical Note
Parmjit Carl accompanied Radha Carl to the emergency department on 1/14/2022. They may return to work on 01/15/2022.      If you have any questions or concerns, please don't hesitate to call.      Héctor Espinal MD

## 2022-01-15 LAB — PROGEST SERPL-MCNC: 2.7 NG/ML

## 2022-01-16 LAB — BACTERIA UR CULT: ABNORMAL

## 2022-01-17 ENCOUNTER — LAB (OUTPATIENT)
Dept: LAB | Facility: OTHER | Age: 32
End: 2022-01-17
Attending: OBSTETRICS & GYNECOLOGY
Payer: COMMERCIAL

## 2022-01-17 ENCOUNTER — PRENATAL OFFICE VISIT (OUTPATIENT)
Dept: OBGYN | Facility: OTHER | Age: 32
End: 2022-01-17
Attending: OBSTETRICS & GYNECOLOGY
Payer: COMMERCIAL

## 2022-01-17 VITALS
DIASTOLIC BLOOD PRESSURE: 84 MMHG | BODY MASS INDEX: 33.66 KG/M2 | SYSTOLIC BLOOD PRESSURE: 134 MMHG | HEIGHT: 63 IN | HEART RATE: 64 BPM | WEIGHT: 190 LBS

## 2022-01-17 DIAGNOSIS — D25.1 INTRAMURAL LEIOMYOMA OF UTERUS: ICD-10-CM

## 2022-01-17 DIAGNOSIS — O20.0 THREATENED ABORTION: Primary | ICD-10-CM

## 2022-01-17 DIAGNOSIS — O03.9 MISCARRIAGE: ICD-10-CM

## 2022-01-17 PROBLEM — R07.89 COSTOCHONDRAL PAIN: Status: RESOLVED | Noted: 2019-11-29 | Resolved: 2022-01-17

## 2022-01-17 PROBLEM — R73.09 ELEVATED GLUCOSE: Status: RESOLVED | Noted: 2019-06-12 | Resolved: 2022-01-17

## 2022-01-17 LAB — B-HCG SERPL-ACNC: 553 IU/L (ref 0–5)

## 2022-01-17 PROCEDURE — 84702 CHORIONIC GONADOTROPIN TEST: CPT

## 2022-01-17 PROCEDURE — 99214 OFFICE O/P EST MOD 30 MIN: CPT | Performed by: OBSTETRICS & GYNECOLOGY

## 2022-01-17 PROCEDURE — 36415 COLL VENOUS BLD VENIPUNCTURE: CPT

## 2022-01-17 ASSESSMENT — PAIN SCALES - GENERAL: PAINLEVEL: MODERATE PAIN (5)

## 2022-01-17 ASSESSMENT — MIFFLIN-ST. JEOR: SCORE: 1535.96

## 2022-01-17 NOTE — PROGRESS NOTES
CHIEF COMPLAINT / REASON FOR VISIT  Patient presents for threatened miscarriage.    ELISEO Carl is a 31 year old  with Patient's last menstrual period was 2021. and her Estimated Date of Delivery: Oct 1, 2022 determined by LMP. Gestational age is 3 2/7 wk. The patient presents for evaluation of threatened miscarriage. She presented to the emergency room on 2022 with right lower pelvic pain and discomfort that radiated to her back.  She had nausea but no emesis. She was found to be pregnant at that time.  She had an ultrasound which showed a suspected right ovarian hemorrhagic cyst and no evidence of ovarian torsion with no intrauterine gestational sac. Her hCG was 1,993.  Her pain improved and she was discharged home.  She reports vaginal spotting and light bleeding starting on 01/15/2022 which has transition to light menstrual bleeding today. Her pelvic pain and discomfort is now menstrual type cramping without sharp exacerbation.  She continues with nausea but denies emesis.  No change in bowel habits, constipation, diarrhea, pain with defecation.  No difficulty urinating, dysuria or flank pain.  No fever or chills.    Past Obstetric history: Ectopic 2010.  Relationship with FOB: .  STD History: No STD history.  Last Pap smear history: Normal.  Nausea/vomiting: As above.  Vaginal bleeding: As above.  Abdominal or pelvic pain, cramping, tenderness: As above.  Difficulty urinating: Denies.  Breast tenderness: Denies.  Fatigue: Denies.  Headache or vision changes: Denies.  Depression symptoms: Denies.  Other concerns: None.    ALLERGIES     Allergies   Allergen Reactions     Cats      Sulfa Drugs Rash     Sulfonamide antibiotics     MEDICATIONS    Current Outpatient Medications:      acetaminophen (TYLENOL) 500 MG tablet, Take 2 tablets by mouth. Every 6 hours as needed, Disp: , Rfl:      albuterol (PROAIR HFA/PROVENTIL HFA/VENTOLIN HFA) 108 (90 Base) MCG/ACT inhaler,  Inhale 2 puffs into the lungs every 6 hours, Disp: 8.5 g, Rfl: 1     baclofen (LIORESAL) 10 MG tablet, Take 0.5-1 tablets (5-10 mg) by mouth 2 times daily as needed for muscle spasms, Disp: 30 tablet, Rfl: 1     cyclobenzaprine (FLEXERIL) 10 MG tablet, Take 0.5-1 tablets (5-10 mg) by mouth daily as needed for muscle spasms, Disp: 30 tablet, Rfl: 1     ibuprofen (ADVIL,MOTRIN) 200 MG tablet, Take 2 tablets by mouth. Every 6 hours as needed with food, Disp: , Rfl:      lisdexamfetamine (VYVANSE) 10 MG capsule, Take 1 capsule (10 mg) by mouth every morning, Disp: 30 capsule, Rfl: 0     Meloxicam (MOBIC PO), , Disp: , Rfl:      Multiple Vitamins-Minerals (OPTIVITE P.M.T.) TABS, Take 3 tablets by mouth 2 times daily, Disp: 180 tablet, Rfl: 3     order for DME, Right ankle ASO, Disp: 1 Device, Rfl: 0     progesterone (PROMETRIUM) 100 MG capsule, Take 1 capsule (100 mg) by mouth daily Starting on peak +3 for 10 days out of month, Disp: 30 capsule, Rfl: 3     valACYclovir (VALTREX) 500 MG tablet, TAKE 1 TABLET BY MOUTH TWICE DAILY WITH  ACTIVE  LESION, Disp: 30 tablet, Rfl: 5     vitamin D3 (CHOLECALCIFEROL) 1.25 MG (79425 UT) capsule, Take 1 capsule (50,000 Units) by mouth every 7 days, Disp: 4 capsule, Rfl: 12    REVIEW OF SYSTEMS  As per HPI, otherwise negative.    Past Medical History:   Diagnosis Date     ADHD (attention deficit hyperactivity disorder)      Attention deficit hyperactivity disorder (ADHD), predominantly inattentive type 02/28/2014     Problem list name updated by automated process. Provider to review     Benign essential hypertension 02/14/2020     Cervicalgia 11/12/2021     Chronic tension-type headache, not intractable 03/11/2020     Cold sore      Costochondral pain 11/29/2019     Demyelinating changes in brain (H)     saw Dr Gilbert 5/2013 -- ? of MS, but not Dx'd yet.     Dysmenorrhea 08/18/2016     Elevated glucose 2014    resolved 2016     Elevated liver enzymes 2014    resolved 2015      "Fibrocystic breast      MARIBETH (generalized anxiety disorder)      Hyperlipidemia with target LDL less than 100 2014     Diagnosis updated by automated process. Provider to review and confirm.     Hypovitaminosis D      Infertility, female, secondary 2016     Intramural leiomyoma of uterus 2022     MS (multiple sclerosis) (H)      Neuropathy     feet     Obesity      PCOS (polycystic ovarian syndrome)      Tubal pregnancy without intrauterine pregnancy 2013     Visual field scotoma 2013     Vitamin B12 deficiency (non anemic) 2015     Past Surgical History:   Procedure Laterality Date     HYSTEROSCOPY,DIAGNOSTIC  2016    done here and at [facility]     LAPAROSCOPIC SALPINGECTOMY Left 2010    ruptured ectopic pregnancy     wisdom teeth       OB History    Para Term  AB Living   2 0 0 0 1 0   SAB IAB Ectopic Multiple Live Births   0 0 1 0 0      # Outcome Date GA Lbr Sage/2nd Weight Sex Delivery Anes PTL Lv   2 Current            1 Ectopic 05/01/10     ECTOPIC   FD      Birth Comments: tubal     Social History     Tobacco Use     Smoking status: Never Smoker     Smokeless tobacco: Never Used   Substance Use Topics     Alcohol use: Yes     Alcohol/week: 0.0 standard drinks     Comment: rare     History   Sexual Activity     Sexual activity: Yes     Partners: Male     Birth control/ protection: Natural Family Planning     Comment: FABM     OBJECTIVE  /84   Pulse 64   Ht 1.6 m (5' 3\")   Wt 86.2 kg (190 lb)   LMP 2021   BMI 33.66 kg/m      General:  Well-developed, well-nourished female in no apparent distress.  Mental:  Alert and oriented times three.  Lungs:  Clear to auscultation bilaterally with good inspiratory effort.  No wheezing rhonchi or rales noted. Breathing nonlabored.  Heart:  Regular rate and rhythm without murmur. No JVD.  No peripheral vascular disease.  Abdomen: Soft, non tender, nondistended, positive bowel sounds. No " organomegaly. No suprapubic tenderness. No rebound, no guarding. Fundal height not palpated.  Fetal heart tones not auscultated.  Pelvic exam: Deferred.  Extremities:  No clubbing cyanosis or edema. Non tender bilaterally.    DIAGNOSTICS  2022 HC  2022 Hgb 11.3  2022 Type and Rh: A Positive  2022 Antibody screen: Negative  2022 HC    2022 OB ultrasound: Uterus: 7.9 x 5.2 x 6.2 cm. Endometrium: 9.7 mm in thickness. Right Ovary: 4.7 x 4.2 x 4.2 cm, Normal blood flow. Left Ovary:  3.8 x 2.2 x 2.8 cm, Normal blood flow. UTERUS: 5.1 x 4.2 x 5.8 cm intramural uterine fibroid located posteriorly in the mid to upper uterine segment, larger when compared to the previous MRI at which time it measured approximately 2.5 to 3 cm mean diameter. Arcuate versus septate uterus is again seen. ADNEXA: 4.2 x 4.2 x 4.7 cm heterogeneously echogenic, primarily hypoechoic lesion of the right ovary is avascular, likely representing a hemorrhagic cyst. MISC: Small volume of free fluid.  IMPRESSION:   4 cm mean diameter hypovascular/relatively avascular hypoechoic lesion of the right ovary is felt to likely represent a hemorrhagic cyst. Differential diagnosis includes endometrioma, cystic neoplasm. Recommend ultrasound reevaluation in 10 weeks.  No evidence of ovarian torsion.  5 cm mean diameter intramural uterine fibroid located posterior lesion in the mid to upper uterine segment, larger when compared to an MRI examination from 2017.    ASSESSMENT / PLAN  1 Suspect complete miscarriage at 3 2/7 wk  2 Fibroid uterus    - I discussed miscarriage with the patient.  I discussed my concern for a suspected complete miscarriage.  Her hCG fell from 1,993 down to 553.  She now has menses bleeding and menses type cramping.  - I reviewed that miscarriage occurs in about 1 in 5 pregnancies.  - I reassured the patient that there is nothing that she did to cause a miscarriage and likewise there is no  specific prevention that the patient could have avoided or performed to prevent a miscarriage from occurring.  - I discussed that there are many etiologies for miscarriage and the most common is chromosomal abnormalities.  - I reviewed that risk of a miscarriage in her next pregnancy is not elevated by the current event. If the patient has a third miscarriage then I would recommend a more extensive work up.  - I also discussed possible consideration for progesterone and/or baby aspirin for her next pregnancy and the fact that there is conflicting data regarding this approach.   - The patient's ultrasound shows normal endometrium without gestational sac or embryo.  There is a right ovarian hemorrhagic cyst and no evidence of ovarian torsion.  The patient has a 5.1 x 5.8 cm intramural fibroid.  - The labs show normal hemoglobin 11.3 and hCG 1,993 which is now down to 553.  Blood type is A positive.  - The patient's  examination shows a benign abdominal exam.  Pelvic exam deferred.  - The patient is currently bleeding.  - The blood type is A, positive. Rhogam is not indicated.  - I discussed management options with the patient today including expectant management awaiting spontaneous miscarriage, medical management, and suction dilation curettage.  I reviewed the expected outcome, risk, benefits, and indication of each option with the patient.  - I discussed the expected bleeding and cramping associated with spontaneous miscarriage and expectant management.  - I discussed the indications for suction dilation and curettage in relation to miscarriage.  If the patient is bleeding for longer than 1 week or her bleeding becomes heavy or she develops concerning symptoms that require her to go to the emergency room then I recommend a suction dilation and curettage.  - The patient desires to proceed with expectant management.  - I recommend that we follow hCG weekly until it reaches 0.  An hCG is ordered for 01/24/2022.  - I  recommend that the patient go to the emergency room if she develops heavy bleeding, saturating a maxi pad more frequently than every hour for more than 2 hours, repeatedly passing large clots, or has lightheadedness and dizziness in the setting of heavy bleeding.  - I recommend the patient use Ibuprofen for the cramping, up to 800mg every 8 hours.  - I recommend the patient wait until after her next normal menses before attempting pregnancy again.  - The patient expressed appropriate sadness at her loss.    - Problem list reviewed and updated.  - Follow up in 1 weeks.    - 45 minute visit with greater than 50% spent in counseling.    Ray Earl MD  Obstetrics and Gynecology

## 2022-01-19 ENCOUNTER — LAB (OUTPATIENT)
Dept: OCCUPATIONAL MEDICINE | Facility: OTHER | Age: 32
End: 2022-01-19

## 2022-01-19 ENCOUNTER — LAB REQUISITION (OUTPATIENT)
Dept: LAB | Facility: HOSPITAL | Age: 32
End: 2022-01-19
Payer: COMMERCIAL

## 2022-01-19 LAB
FLUAV RNA SPEC QL NAA+PROBE: NEGATIVE
FLUBV RNA RESP QL NAA+PROBE: NEGATIVE
RSV RNA SPEC NAA+PROBE: NEGATIVE
SARS-COV-2 RNA RESP QL NAA+PROBE: NEGATIVE

## 2022-01-19 PROCEDURE — 87637 SARSCOV2&INF A&B&RSV AMP PRB: CPT | Performed by: FAMILY MEDICINE

## 2022-01-24 ENCOUNTER — LAB (OUTPATIENT)
Dept: LAB | Facility: OTHER | Age: 32
End: 2022-01-24
Payer: COMMERCIAL

## 2022-01-24 DIAGNOSIS — O20.0 THREATENED ABORTION: ICD-10-CM

## 2022-01-24 LAB — B-HCG SERPL-ACNC: 22 IU/L (ref 0–5)

## 2022-01-24 PROCEDURE — 84702 CHORIONIC GONADOTROPIN TEST: CPT

## 2022-01-24 PROCEDURE — 36415 COLL VENOUS BLD VENIPUNCTURE: CPT

## 2022-01-25 ENCOUNTER — HOSPITAL ENCOUNTER (OUTPATIENT)
Dept: PHYSICAL THERAPY | Facility: HOSPITAL | Age: 32
Setting detail: THERAPIES SERIES
End: 2022-01-25
Attending: FAMILY MEDICINE
Payer: COMMERCIAL

## 2022-01-25 PROCEDURE — 97161 PT EVAL LOW COMPLEX 20 MIN: CPT | Mod: GP

## 2022-01-25 PROCEDURE — 97530 THERAPEUTIC ACTIVITIES: CPT | Mod: GP

## 2022-01-25 NOTE — PROGRESS NOTES
01/25/22 1000   General Information   Type of Visit Initial OP Ortho PT Evaluation   Start of Care Date 01/25/22   Referring Physician Dr Jones   Orders Evaluate and Treat   Orders Comment constipation   Date of Order 11/12/21   Certification Required? No   Medical Diagnosis Constipation, pelvic floor dysfunction   Surgical/Medical history reviewed Yes   Precautions/Limitations no known precautions/limitations   Body Part(s)   Body Part(s) Pelvic Floor Dysfunction   Presentation and Etiology   Pertinent history of current problem (include personal factors and/or comorbidities that impact the POC) This 32 y/o female referred to PT d/t PFM dysfunction, constipation x approx 6 years ago insidious onset.  Pt was started on metformin for PCOS in 2014 and she reports this destroyed her bowels. Reports she has difficulty when dehydrated.  Occas pain with sex/tampons- reports having difficulty at times- also has fibroid.  Sees chiropracter for back/hip issues which is intermittent. Painful bowel movements, constipation 50% of the time.  Has MS which causes weakness, difficulty with gait.  Uses stool softener/supp fibers occasionally but not consistently. Has to take iron daily with vit c.    Impairments P. Bowel or bladder problems   Functional Limitations perform required work activities;perform desired leisure / sports activities   Current Level of Function   Current Community Support Family/friend caregiver   Patient role/employment history A. Employed   Employment Comments desk job in clinic   [facility]   Current equipment-Gait/Locomotion None   Specific Questions   Specific Questions Pelvic Floor Dysfunction;Women's Health   Pelvic Floor Dysfunction Questions   Regular exercise No   Fluid intake-glasses/day (one glass/cup = 8oz 6-12   Caffeinated beverages-glasses/day 1   Alcoholic beverages - glasses/day 0   Recent diet change? No   How long can you delay the need to urinate?  unsure   How  "many times do you wake to urinate at night?   2-3   How often do you urinate during the day?   4-10   Can you stop the flow of urine when on the toilet?  Yes   Is the volume of urine passed usually    (depends on fluids)   Do you have the sensation that you need to go to the toilet?  Yes   Do you have \"triggers\" that make you feel you can't wait to go to the toilet?  No   Number of bladder infections last year?  00   Frequency of bowel movements:  every 3-7 days - varies   Consistency of stool?  Other  (ranges from type 3-4-5)   Do you ignore the urge to defecate?  No   Women's Health Questions   Number of pregnancies  0  (miscarriages early)   Pelvic Floor Dysfunction Objective Findings   Pain-pelvic dysfunction Pelvic pain  (occas vaginal pain with sex/tampon)   Observation pt ambul with BLE tone, uneven gait stride.   Posture fwd head, neck. 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 Takes stool softener occas, has tried supp fibers)   Pelvic Floor Dysfunction Comments Will cont to assess/treat. No LLD this date. RLE signif tightness t/o hip, knee.    Pelvic   (deferred d/t period)   Planned Therapy Interventions   Planned Therapy Interventions manual therapy;stretching;strengthening;ROM   Planned Modality Interventions   Planned Modality Interventions Biofeedback  (as indicated)   Clinical Impression   Criteria for Skilled Therapeutic Interventions Met yes, treatment indicated   PT Diagnosis Constipation, PFM dysfunction   Influenced by the following impairments pain, tightness, dysfunction, weakness, constipation   Functional limitations due to impairments travel, work, sleeping, adls   Clinical Presentation Evolving/Changing   Clinical Presentation Rationale clinical judgement   Clinical " Decision Making (Complexity) Low complexity   Therapy Frequency 1 time/week   Predicted Duration of Therapy Intervention (days/wks) 10 weeks   Risk & Benefits of therapy have been explained Yes   Patient, Family & other staff in agreement with plan of care Yes   Pelvic Health Informed Consent Statement Discussed with patient/guardian reason for referral regarding pelvic health needs and external/internal pelvic floor muscle examination.  Opportunity provided to ask questions and verbal consent for assessment and intervention was given.   Clinical Impression Comments Further assessment of PFM needed. Per subjective and clinical judgement, pt appears to have dsyfunctional PFM and tightness causing pain and contributing to  constipation.    ORTHO GOALS   PT Ortho Eval Goals 1;2;3   Ortho Goal 1   Goal Identifier STG 1   Goal Description Indep HEP compliance   Target Date 03/08/22   Ortho Goal 2   Goal Identifier STG 2   Goal Description Pt will report regular frequency of BM of 4 days per week    Target Date 03/22/22   Ortho Goal 3   Goal Identifier LTG   Goal Description ADLs and work will not be limited by pelvic floor dysfunction   Target Date 04/19/22   Total Evaluation Time   PT Leny Low Complexity Minutes (12514) 65

## 2022-02-01 ENCOUNTER — HOSPITAL ENCOUNTER (OUTPATIENT)
Dept: PHYSICAL THERAPY | Facility: HOSPITAL | Age: 32
Setting detail: THERAPIES SERIES
End: 2022-02-01
Attending: FAMILY MEDICINE
Payer: COMMERCIAL

## 2022-02-01 PROCEDURE — 97140 MANUAL THERAPY 1/> REGIONS: CPT | Mod: GP

## 2022-02-01 PROCEDURE — 97530 THERAPEUTIC ACTIVITIES: CPT | Mod: GP

## 2022-02-01 PROCEDURE — 97110 THERAPEUTIC EXERCISES: CPT | Mod: GP

## 2022-02-08 ENCOUNTER — HOSPITAL ENCOUNTER (OUTPATIENT)
Dept: PHYSICAL THERAPY | Facility: HOSPITAL | Age: 32
Setting detail: THERAPIES SERIES
End: 2022-02-08
Attending: FAMILY MEDICINE
Payer: COMMERCIAL

## 2022-02-08 PROCEDURE — 97110 THERAPEUTIC EXERCISES: CPT | Mod: GP

## 2022-02-08 PROCEDURE — 97530 THERAPEUTIC ACTIVITIES: CPT | Mod: GP

## 2022-02-10 NOTE — PROGRESS NOTES
Clinic Visit  Date of visit: 2/10/2022   Chief Complaint:   Chief Complaint   Patient presents with     PCOS       HPI:   Radha Carl is a 31 year old  female who presents to clinic today for follow up  for PCOS .     Menarche: age 8  Menses: frequency: every 28 days. Patient's last menstrual period was 2022.   Family Planning Chart: Yes: .  Acne: No.  Hirsutism (facial hair): No.  Male-pattern hair loss: No.  Elevated serum androgen:   Lab Results   Component Value Date    DHEA 206 2016    TESTOSTTOTAL 18 2016    FT 0.39 2016    BARTOLO 0.721 2016   ]  BMI: Body mass index is 32.77 kg/m .   Type 2 DM: No.  Elevated Cholesterol: No.  Mood disorder: Yes: Anxiety.    Ultrasound: 2022    Gynecologic History:  Last Pap:   Lab Results   Component Value Date    PAP NIL 10/11/2017      History of abnormal pap: No.    Obstetric History:   OB History    Para Term  AB Living   2 0 0 0 1 0   SAB IAB Ectopic Multiple Live Births   0 0 1 0 0      # Outcome Date GA Lbr Sage/2nd Weight Sex Delivery Anes PTL Lv   2             1 Ectopic 05/01/10     ECTOPIC   FD      Birth Comments: tubal     Obstetric history significant for:  NA    Medications:  acetaminophen (TYLENOL) 500 MG tablet, Take 2 tablets by mouth. Every 6 hours as needed  albuterol (PROAIR HFA/PROVENTIL HFA/VENTOLIN HFA) 108 (90 Base) MCG/ACT inhaler, Inhale 2 puffs into the lungs every 6 hours  baclofen (LIORESAL) 10 MG tablet, Take 0.5-1 tablets (5-10 mg) by mouth 2 times daily as needed for muscle spasms  cyclobenzaprine (FLEXERIL) 10 MG tablet, Take 0.5-1 tablets (5-10 mg) by mouth daily as needed for muscle spasms  ibuprofen (ADVIL,MOTRIN) 200 MG tablet, Take 2 tablets by mouth. Every 6 hours as needed with food  lisdexamfetamine (VYVANSE) 10 MG capsule, Take 1 capsule (10 mg) by mouth every morning  Meloxicam (MOBIC PO),   Multiple Vitamins-Minerals (OPTIVITE P.M.T.) TABS, Take 3 tablets by mouth  2 times daily  order for DME, Right ankle ASO  progesterone (PROMETRIUM) 100 MG capsule, Take 1 capsule (100 mg) by mouth daily Starting on peak +3 for 10 days out of month  valACYclovir (VALTREX) 500 MG tablet, TAKE 1 TABLET BY MOUTH TWICE DAILY WITH  ACTIVE  LESION  vitamin D3 (CHOLECALCIFEROL) 1.25 MG (62828 UT) capsule, Take 1 capsule (50,000 Units) by mouth every 7 days  [DISCONTINUED] norethindrone-ethinyl estradiol (MICROGESTIN) 1-20 MG-MCG per tablet, Take 1 tablet by mouth daily.    No current facility-administered medications on file prior to visit.      Allergy:  Allergies   Allergen Reactions     Cats      Sulfa Drugs Rash     Sulfonamide antibiotics       Medical History:  Past Medical History:   Diagnosis Date     ADHD (attention deficit hyperactivity disorder)      Attention deficit hyperactivity disorder (ADHD), predominantly inattentive type 02/28/2014     Problem list name updated by automated process. Provider to review     Benign essential hypertension 02/14/2020     Cervicalgia 11/12/2021     Chronic tension-type headache, not intractable 03/11/2020     Cold sore      Costochondral pain 11/29/2019     Dysmenorrhea 08/18/2016     Elevated glucose 2014    resolved 2016     Elevated liver enzymes 2014    resolved 2015     Fibrocystic breast      MARIBETH (generalized anxiety disorder)      Hyperlipidemia with target LDL less than 100 08/08/2014     Diagnosis updated by automated process. Provider to review and confirm.     Hypovitaminosis D      Infertility, female, secondary 07/19/2016     Intramural leiomyoma of uterus 01/14/2022     MS (multiple sclerosis) (H) 2012     Neuropathy     feet     Obesity      PCOS (polycystic ovarian syndrome)      Tubal pregnancy without intrauterine pregnancy 05/24/2013     Visual field scotoma 05/24/2013     Vitamin B12 deficiency (non anemic) 06/25/2015       Surgical History:  Past Surgical History:   Procedure Laterality Date     HYSTEROSCOPY,DIAGNOSTIC  2016     done here and at [facility]     LAPAROSCOPIC SALPINGECTOMY Left 05/01/2010    ruptured ectopic pregnancy     wisdom teeth  2014       Social History:  Social History    Substance and Sexual Activity      Alcohol use: Yes        Alcohol/week: 0.0 standard drinks        Comment: rare    History   Smoking Status     Never Smoker   Smokeless Tobacco     Never Used     History   Drug Use No     History   Sexual Activity     Sexual activity: Yes     Partners: Male     Birth control/ protection: Natural Family Planning     Comment: FLOWER     Relationship status is:  / Partnered    10 Years.  Name of Spouse / Partner:  Parmjit.    Lives in Stable housing and no concerns with Spouse.   Employment: Employed full time  History of sexual, physical or mental abuse: No.   She denies current fears or concerns for personal safety.      Review of Systems:    GENERAL: No change in weight, sleep or appetite.  Normal energy.  No fever or chills  EYES: Negative for vision changes or eye problems  ENT: No problems with ears, nose or throat.  No difficulty swallowing.  RESP: No coughing, wheezing or shortness of breath  CV: No chest pains or palpitations  GI: No nausea, vomiting,  heartburn, abdominal pain, diarrhea, constipation or change in bowel habits  : No urinary frequency or dysuria, bladder or kidney problems  MUSCULOSKELETAL: No significant muscle or joint pains  NEUROLOGIC: No headaches, numbness, tingling, weakness, problems with balance or coordination  PSYCHIATRIC: No problems with anxiety, depression or mental health  HEME/IMMUNE/ALLERGY: No history of bleeding or clotting problems or anemia.  No allergies or immune system problems  ENDOCRINE: No history of thyroid disease, diabetes or other endocrine disorders  SKIN: No rashes,worrisome lesions or skin problems      Physical Exam:  Vitals:    02/18/22 1028   BP: 128/62   Pulse: 91   Temp: 98.4  F (36.9  C)   TempSrc: Tympanic   SpO2: 99%   Weight: 83.9 kg (185  lb)     Body mass index is 32.77 kg/m .    Gen: NAD, Awake and alert, cooperative with exam  CV: regular rate and rhythm  Resp: lungs clear to auscultation bilaterally  Psych:  Normal mood and mentation    Assessment/Plan:  Radha Carl is a 31 year old  who presents for follow up  for PCOS .   (N83.201) Right ovarian cyst  (primary encounter diagnosis)  Comment:   Plan: US Pelvic Transabdominal and Transvaginal,         Ob/Gyn Referral        Repeat in 3-4 wks  May need surgical opinion    (D25.1) Intramural uterine fibroid  Comment:   Plan: Ob/Gyn Referral        She is going to check into coverage    (R10.2) Pelvic pain in female  Comment:   Plan: Ob/Gyn Referral        Chronic, worsening lately, needs further eval      Muriel Jones MD

## 2022-02-18 ENCOUNTER — OFFICE VISIT (OUTPATIENT)
Dept: FAMILY MEDICINE | Facility: OTHER | Age: 32
End: 2022-02-18
Attending: FAMILY MEDICINE
Payer: COMMERCIAL

## 2022-02-18 VITALS
WEIGHT: 185 LBS | BODY MASS INDEX: 32.77 KG/M2 | HEART RATE: 91 BPM | TEMPERATURE: 98.4 F | SYSTOLIC BLOOD PRESSURE: 128 MMHG | OXYGEN SATURATION: 99 % | DIASTOLIC BLOOD PRESSURE: 62 MMHG

## 2022-02-18 DIAGNOSIS — D25.1 INTRAMURAL UTERINE FIBROID: ICD-10-CM

## 2022-02-18 DIAGNOSIS — R73.09 ELEVATED GLUCOSE: ICD-10-CM

## 2022-02-18 DIAGNOSIS — E61.1 IRON DEFICIENCY: ICD-10-CM

## 2022-02-18 DIAGNOSIS — N83.201 RIGHT OVARIAN CYST: Primary | ICD-10-CM

## 2022-02-18 DIAGNOSIS — R10.2 PELVIC PAIN IN FEMALE: ICD-10-CM

## 2022-02-18 DIAGNOSIS — Z11.59 ENCOUNTER FOR SCREENING FOR OTHER VIRAL DISEASES: ICD-10-CM

## 2022-02-18 PROCEDURE — 99214 OFFICE O/P EST MOD 30 MIN: CPT | Performed by: FAMILY MEDICINE

## 2022-02-18 ASSESSMENT — PAIN SCALES - GENERAL: PAINLEVEL: NO PAIN (0)

## 2022-02-18 NOTE — NURSING NOTE
"Chief Complaint   Patient presents with     PCOS       Initial /62   Pulse 91   Temp 98.4  F (36.9  C) (Tympanic)   Wt 83.9 kg (185 lb)   LMP 01/25/2022   SpO2 99%   Breastfeeding Unknown   BMI 32.77 kg/m   Estimated body mass index is 32.77 kg/m  as calculated from the following:    Height as of 1/17/22: 1.6 m (5' 3\").    Weight as of this encounter: 83.9 kg (185 lb).  Medication Reconciliation: complete  Ruth Monaco LPN  "

## 2022-02-22 ENCOUNTER — ANCILLARY PROCEDURE (OUTPATIENT)
Dept: GENERAL RADIOLOGY | Facility: OTHER | Age: 32
End: 2022-02-22
Attending: FAMILY MEDICINE
Payer: COMMERCIAL

## 2022-02-22 DIAGNOSIS — M25.551 HIP PAIN, RIGHT: ICD-10-CM

## 2022-02-22 PROCEDURE — 73502 X-RAY EXAM HIP UNI 2-3 VIEWS: CPT | Mod: TC | Performed by: RADIOLOGY

## 2022-02-22 PROCEDURE — 73030 X-RAY EXAM OF SHOULDER: CPT | Mod: TC | Performed by: RADIOLOGY

## 2022-02-23 ENCOUNTER — TELEPHONE (OUTPATIENT)
Dept: NEUROLOGY | Facility: CLINIC | Age: 32
End: 2022-02-23
Payer: COMMERCIAL

## 2022-02-23 ENCOUNTER — OFFICE VISIT (OUTPATIENT)
Dept: FAMILY MEDICINE | Facility: OTHER | Age: 32
End: 2022-02-23
Attending: NURSE PRACTITIONER
Payer: COMMERCIAL

## 2022-02-23 ENCOUNTER — TELEPHONE (OUTPATIENT)
Dept: FAMILY MEDICINE | Facility: OTHER | Age: 32
End: 2022-02-23

## 2022-02-23 ENCOUNTER — ANCILLARY PROCEDURE (OUTPATIENT)
Dept: GENERAL RADIOLOGY | Facility: OTHER | Age: 32
End: 2022-02-23
Attending: NURSE PRACTITIONER
Payer: COMMERCIAL

## 2022-02-23 VITALS
BODY MASS INDEX: 32.77 KG/M2 | HEART RATE: 96 BPM | OXYGEN SATURATION: 99 % | WEIGHT: 185 LBS | DIASTOLIC BLOOD PRESSURE: 84 MMHG | SYSTOLIC BLOOD PRESSURE: 142 MMHG

## 2022-02-23 DIAGNOSIS — S93.492A SPRAIN OF OTHER LIGAMENT OF LEFT ANKLE, INITIAL ENCOUNTER: Primary | ICD-10-CM

## 2022-02-23 DIAGNOSIS — G35 MULTIPLE SCLEROSIS (H): Primary | ICD-10-CM

## 2022-02-23 DIAGNOSIS — S43.102A AC SEPARATION, LEFT, INITIAL ENCOUNTER: Primary | ICD-10-CM

## 2022-02-23 DIAGNOSIS — M25.572 PAIN IN JOINT INVOLVING ANKLE AND FOOT, LEFT: ICD-10-CM

## 2022-02-23 PROCEDURE — 99213 OFFICE O/P EST LOW 20 MIN: CPT | Performed by: NURSE PRACTITIONER

## 2022-02-23 PROCEDURE — 73610 X-RAY EXAM OF ANKLE: CPT | Mod: TC | Performed by: RADIOLOGY

## 2022-02-23 RX ORDER — GLATIRAMER 40 MG/ML
40 INJECTION, SOLUTION SUBCUTANEOUS
Qty: 12 ML | Refills: 11 | Status: SHIPPED | OUTPATIENT
Start: 2022-02-23 | End: 2023-06-23

## 2022-02-23 ASSESSMENT — PAIN SCALES - GENERAL: PAINLEVEL: SEVERE PAIN (7)

## 2022-02-23 NOTE — NURSING NOTE
"Chief Complaint   Patient presents with     Ankle Pain     fall outside work . Injured her left ankle      Fall       Initial /80 (BP Location: Left arm, Patient Position: Chair, Cuff Size: Adult Regular)   Pulse 96   Wt 83.9 kg (185 lb)   LMP 01/25/2022   SpO2 99%   BMI 32.77 kg/m   Estimated body mass index is 32.77 kg/m  as calculated from the following:    Height as of 1/17/22: 1.6 m (5' 3\").    Weight as of this encounter: 83.9 kg (185 lb).  Medication Reconciliation: complete  Cherie Waldron LPN  "

## 2022-02-23 NOTE — LETTER
February 25, 2022      Radha Clarosicle  [address]  [address]   [facility] [address]        To Whom It May Concern:    Radha Carl  was seen on 2/23/2022  .  Please excuse her thru 2/28/22 due to injury.        Sincerely,        Eunice Celeste, [place] CNP

## 2022-02-23 NOTE — PROGRESS NOTES
Assessment & Plan     Sprain of other ligament of left ankle, initial encounter  Reviewed imaging. No sign of fracture or dislocation.  Sprains can take 4-6 weeks to heal. If any worsening pain or discomfort should follow up   Discussed used of crutches as needed for support with ambulation  REST: rest, ice, compression (ace wrap/ankle brace), and elevation   Follow up as needed  - XR ANKLE LT G/E 3 VW (Clinic Performed); Future  - Miscellaneous Order for DME - ONLY FOR DME    Workability filled out  -limited activity due to crutch use continuous for the next 1-3 days and non-weight bearing for 1-3 days then add weight as tolerated with continued support of crutches as needed  Ace wrap/ankle brace for support  RICE: rest, ice, compression and elevation  Tylenol and/or ibuprofen for comfort  Follow up next week        See Patient Instructions    No follow-ups on file.    CHINA Moreno [facility] - ZORAIDA Garcia is a 31 year old who presents for the following health issues     HPI     Concern -  Fall out side of work . Injured her Left ankle   Onset:  Today   Description:   Left ankle pain , swelling   Intensity: 7 /10  Progression of Symptoms:  same  Accompanying Signs & Symptoms:  Pain   Previous history of similar problem:   Precipitating factors:        Worsened by: walking , standing   Alleviating factors:        Improved by: rest   Therapies tried and outcome: ice and ace wrap   Slipped outside walking into work.  Denies hitting had.  Pain is posterior ankle.  She does have some ROM with pain with pushing toes down.or turning to left/outwards         Review of Systems   CONSTITUTIONAL: NEGATIVE for fever, chills, change in weight  INTEGUMENTARY/SKIN: NEGATIVE for worrisome rashes, moles or lesions  RESP: NEGATIVE for significant cough or SOB  CV: NEGATIVE for chest pain, palpitations or peripheral edema  MUSCULOSKELETAL: pain and swelling to posterior left  ankle   NEURO: decreased ROM to left ankle due to pain       Objective    /80 (BP Location: Left arm, Patient Position: Chair, Cuff Size: Adult Regular)   Pulse 96   Wt 83.9 kg (185 lb)   LMP 01/25/2022   SpO2 99%   BMI 32.77 kg/m    Body mass index is 32.77 kg/m .  Physical Exam   GENERAL: healthy, alert and no distress  RESP: regular non-labored   CV: peripheral pulses strong  MS: swelling and tenderness to lateral and posterior left ankle with decreased ROM due to pain and swelling   SKIN: no suspicious lesions or rashes  NEURO: weakness of left ankle and sensory exam grossly normal and unable to bear weight due to pain     Results for orders placed or performed in visit on 02/23/22   XR ANKLE LT G/E 3 VW (Clinic Performed)     Status: None    Narrative    PROCEDURE:  XR ANKLE LEFT G/E 3 VIEWS    HISTORY: Pain in joint involving ankle and foot, left    COMPARISON:  None.    TECHNIQUE:  3 views of the left ankle were obtained.    FINDINGS:  No fracture or dislocation is identified. The joint spaces  are preserved.        Impression    IMPRESSION: Normal left ankle      ALONDRA DAMON MD         SYSTEM ID:  [id number]

## 2022-02-23 NOTE — TELEPHONE ENCOUNTER
----- Message from Muriel Jones MD sent at 2/22/2022  3:29 PM CST -----  Please notify patient of the following results:  Partial AC separation -- should see ortho

## 2022-02-23 NOTE — PATIENT INSTRUCTIONS
Patient Education     Treating Ankle Sprains  Treatment will depend on how bad your sprain is. For a severe sprain, healing may take 3 months or more.  Right after your injury: Use R.I.C.E.    BIG: Rest: At first, keep weight off the ankle as much as you can. You may be given crutches to help you walk without putting weight on the ankle.    BIG: Ice: Put an ice pack on the ankle for 20 minutes. Remove the pack and wait at least 30 minutes. Repeat for up to 3 days. This helps reduce swelling.    BIG: Compression: To reduce swelling and keep the joint stable, you may need to wrap the ankle with an elastic bandage. For more severe sprains, you may need an ankle brace, a boot, or a cast.    BIG: Elevation: To reduce swelling, keep your ankle raised above your heart when you sit or lie down.  Medicine  Your healthcare provider may suggest oral nonsteroidal anti-inflammatory medicine (NSAIDs), such as ibuprofen. This relieves the pain and helps reduce swelling. Be sure to take your medicine as directed.  Exercises    After about 2 to 3 weeks, you may be given exercises to strengthen the ligaments and muscles in the ankle. Doing these exercises will help prevent another ankle sprain. Exercises may include standing on your toes and then on your heels and doing ankle curls.    Sit on the edge of a sturdy table or lie on your back.    Pull your toes toward you. Then point them away from you. Repeat for 2 to 3 minutes.  [employer] last reviewed this educational content on 1/1/2018 2000-2021 [employer]. All rights reserved. This information is not intended as a substitute for professional medical care. Always follow your healthcare professional's instructions.         -limited activity due to crutch use continuous for the next 1-3 days and non-weight bearing for 1-3 days then add weight as tolerated with continued support of crutches as needed  Ace wrap/ankle brace for support  RICE: rest, ice, compression and  elevation  Tylenol and/or ibuprofen for comfort  Follow up next week

## 2022-02-23 NOTE — TELEPHONE ENCOUNTER
"Received the following message from [facility]:    \"I just talked to Radha. She found a voicemail on her phone from September last year and was calling me to say that she has been off the glatiramer injections for many months d/t side effects that she now feels were related to low iron levels. She would now like to get restarted on the glatiramer injections.\"    Rx sent to [facility].  Will notify Dr. Gracia.  Radha due for a follow up in March, will notify her to schedule.    Taylor Jordan RN       "

## 2022-02-24 ENCOUNTER — TELEPHONE (OUTPATIENT)
Dept: FAMILY MEDICINE | Facility: OTHER | Age: 32
End: 2022-02-24
Payer: COMMERCIAL

## 2022-02-24 NOTE — TELEPHONE ENCOUNTER
Bill 12813 For Specimen Handling/Conveyance To Laboratory?: no Patient is scheduled during a Dr only spot on Monday 2/28.  That is the only time she can come in to get cleared to go back to work.  If this is not ok please call her back.   Electrodesiccation Text: The wound bed was treated with electrodesiccation after the biopsy was performed. Depth Of Biopsy: dermis Additional Anesthesia Volume In Cc (Will Not Render If 0): 0 Anesthesia Type: 1% lidocaine with epinephrine Size Of Lesion In Cm: 0.6 Render Post-Care Instructions In Note?: yes Wound Care: Petrolatum Anesthesia Volume In Cc (Will Not Render If 0): 0.5 Billing Type: Third-Party Bill Electrodesiccation And Curettage Text: The wound bed was treated with electrodesiccation and curettage after the biopsy was performed. Biopsy Type: H and E X Size Of Lesion In Cm: 0.4 Type Of Destruction Used: Curettage Hemostasis: Aluminum Chloride Notification Instructions: Patient will be notified of biopsy results. However, patient instructed to call the office if not contacted within 2 weeks. Curettage Text: The wound bed was treated with curettage after the biopsy was performed. Dressing: bandage Post-Care Instructions: I reviewed with the patient in detail post-care instructions. Patient is to keep the biopsy site dry overnight, and then apply Vaseline or Aquaphor daily with bandage until healed. Wash daily with gentle soap and water. Patient may apply hydrogen peroxide soaks to remove any crusting. Silver Nitrate Text: The wound bed was treated with silver nitrate after the biopsy was performed. Lab Facility: 3 Cryotherapy Text: The wound bed was treated with cryotherapy after the biopsy was performed. Consent: Written consent was obtained and risks were reviewed including but not limited to scarring, infection, bleeding, scabbing, incomplete removal, nerve damage and allergy to anesthesia. Detail Level: Detailed Lab: -46 Biopsy Method: double edge Personna blade

## 2022-02-24 NOTE — TELEPHONE ENCOUNTER
Patient called with questions about workability form filled out by Eunice Celeste. Patient called for status of form. Per primary nurse form was complete. [employer] sent form in today 02/24/22. Patient states provider stated patient will need to be out of work until at least Monday. Patient going to speak with employee health and will call back with any further questions.

## 2022-02-25 ENCOUNTER — TELEPHONE (OUTPATIENT)
Dept: FAMILY MEDICINE | Facility: OTHER | Age: 32
End: 2022-02-25
Payer: COMMERCIAL

## 2022-02-25 NOTE — TELEPHONE ENCOUNTER
Call from patient requesting a work excuse to excuse from work on 2/23/22-2/28/22.    Patient requesting to provide work excuse to Primary Care Supervisor, Jacquelin CABALLERO     Patient can be reached at [phone] with any questions.

## 2022-02-28 ENCOUNTER — OFFICE VISIT (OUTPATIENT)
Dept: FAMILY MEDICINE | Facility: OTHER | Age: 32
End: 2022-02-28
Attending: NURSE PRACTITIONER
Payer: OTHER MISCELLANEOUS

## 2022-02-28 VITALS
DIASTOLIC BLOOD PRESSURE: 80 MMHG | TEMPERATURE: 99.3 F | OXYGEN SATURATION: 99 % | HEART RATE: 98 BPM | SYSTOLIC BLOOD PRESSURE: 170 MMHG

## 2022-02-28 DIAGNOSIS — S93.492A SPRAIN OF OTHER LIGAMENT OF LEFT ANKLE, INITIAL ENCOUNTER: Primary | ICD-10-CM

## 2022-02-28 PROCEDURE — 99213 OFFICE O/P EST LOW 20 MIN: CPT | Performed by: NURSE PRACTITIONER

## 2022-02-28 ASSESSMENT — PAIN SCALES - GENERAL: PAINLEVEL: MILD PAIN (3)

## 2022-02-28 NOTE — PROGRESS NOTES
Occupational Visit     SUBJECTIVE:  Radha Carl, 31 year old, female is seen for follow up of occupational injury. Date of injury is 2/23/22    Linked Episodes   Type: Episode: Status: Noted: Resolved: Last update: Updated by:   WORK COMP ankle Active 2/22/2022 2/28/2022  1:37 PM Leo Wahl LPN      Comments:       Musculoskeletal problem/pain      Duration:  5 days    Description  Location: left ankle    Intensity:  6/10    Accompanying signs and symptoms: swelling    History  Previous similar problem: no   Previous evaluation:  x-ray    Precipitating or alleviating factors:  Trauma or overuse: YES  Aggravating factors include: sitting, standing and walking    Therapies tried and outcome: RICE -     Ibuprofen and tylenol as needed         Allergies   Allergen Reactions     Cats      Sulfa Drugs Rash     Sulfonamide antibiotics         Review of Systems:  CONSTITUTIONAL:Negative  INTEGUMENTARY/SKIN: lateral left ankle bruising   MUSCULOSKELETAL: lateral left ankle pain and swelling   NEURO: pain and weakness left ankle       OBJECTIVE:  Vitals:    02/28/22 1339   BP: (!) 170/80   Pulse: 98   Temp: 99.3  F (37.4  C)   SpO2: 99%               Exam:  GENERAL: alert and no distress  MS: left lateral ankle edema, decreased range of motion left ankle and tender to palpation left lateral ankel  SKIN: no suspicious lesions, no rashes      PROCEDURE:  XR ANKLE LEFT G/E 3 VIEWS 2/23/22   HISTORY: Pain in joint involving ankle and foot, left   COMPARISON:  None.   TECHNIQUE:  3 views of the left ankle were obtained.     FINDINGS:  No fracture or dislocation is identified. The joint spaces  are preserved.                                                                     IMPRESSION: Normal left ankle       ALONDRA DAMON MD     ASSESSMENT / PLAN:  (S93.492A) Sprain of other ligament of left ankle, initial encounter  (primary encounter diagnosis)  Comment: continues to have swelling left lateral ankle with  instability   Plan:  Physical Therapy Referral,    Miscellaneous Order for DME - ONLY FOR DME - for cane             She is having difficulty with crutches.  She is encouraged to use crutches, cane or walker as needed    RICE  Rest, ice, compression (ankle brace) and elevate     Continue to wear ankle brace for support  Elevate leg at least once an hour while awake    Continue to use crutches/cane as needed for support     Referral for physical therapy

## 2022-02-28 NOTE — NURSING NOTE
"Chief Complaint   Patient presents with     Work Comp       Initial LMP 12/25/2021  Estimated body mass index is 32.77 kg/m  as calculated from the following:    Height as of 1/17/22: 1.6 m (5' 3\").    Weight as of 2/23/22: 83.9 kg (185 lb).  Medication Reconciliation: nicole Wahl LPN  "

## 2022-02-28 NOTE — PATIENT INSTRUCTIONS
RICE  Rest, ice, compression (ankle brace) and elevate     Continue to wear ankle brace for support  Elevate leg at least once an hour while awake    Continue to use crutches/cane as needed for support     Referral for physical therapy    [phone]

## 2022-03-03 ENCOUNTER — TELEPHONE (OUTPATIENT)
Dept: FAMILY MEDICINE | Facility: OTHER | Age: 32
End: 2022-03-03
Payer: COMMERCIAL

## 2022-03-03 NOTE — TELEPHONE ENCOUNTER
Form received for provider ,placed in provider's wall bin.   After form is completed patient would like form to be filled out and faxed. Call pt when complete to .

## 2022-03-14 ENCOUNTER — OFFICE VISIT (OUTPATIENT)
Dept: FAMILY MEDICINE | Facility: OTHER | Age: 32
End: 2022-03-14
Attending: NURSE PRACTITIONER
Payer: OTHER MISCELLANEOUS

## 2022-03-14 ENCOUNTER — APPOINTMENT (OUTPATIENT)
Dept: LAB | Facility: OTHER | Age: 32
End: 2022-03-14
Payer: COMMERCIAL

## 2022-03-14 VITALS
TEMPERATURE: 98 F | DIASTOLIC BLOOD PRESSURE: 90 MMHG | OXYGEN SATURATION: 99 % | SYSTOLIC BLOOD PRESSURE: 152 MMHG | HEART RATE: 120 BPM

## 2022-03-14 DIAGNOSIS — S93.492D SPRAIN OF OTHER LIGAMENT OF LEFT ANKLE, SUBSEQUENT ENCOUNTER: Primary | ICD-10-CM

## 2022-03-14 PROCEDURE — 99213 OFFICE O/P EST LOW 20 MIN: CPT | Performed by: NURSE PRACTITIONER

## 2022-03-14 ASSESSMENT — PAIN SCALES - GENERAL: PAINLEVEL: MODERATE PAIN (4)

## 2022-03-14 NOTE — NURSING NOTE
"Chief Complaint   Patient presents with     Work Comp       Initial BP (!) 152/90   Pulse 120   Temp 98  F (36.7  C)   LMP 12/25/2021   SpO2 99%  Estimated body mass index is 32.77 kg/m  as calculated from the following:    Height as of 1/17/22: 1.6 m (5' 3\").    Weight as of 2/23/22: 83.9 kg (185 lb).  Medication Reconciliation: complete  Leo Wahl LPN  "

## 2022-03-14 NOTE — PROGRESS NOTES
Occupational Visit     SUBJECTIVE:  Radha Carl, 31 year old, female is seen for follow up of occupational injury. Date of injury is 2/23/22.    Linked Episodes   Type: Episode: Status: Noted: Resolved: Last update: Updated by:   WORK COMP ankle Active 2/22/2022  3/14/2022 11:03 Leo Sanchez LPN      Comments:       Musculoskeletal problem/pain      Duration: 3 weeks since 2/23/22    Description  Location: left ankle    Intensity:  4/10    Accompanying signs and symptoms: none    History  Previous similar problem: no   Previous evaluation:  x-ray    Precipitating or alleviating factors:  Trauma or overuse: YES  Aggravating factors include: walking    Therapies tried and outcome: rest/inactivity and support wrap        Allergies   Allergen Reactions     Cats      Sulfa Drugs Rash     Sulfonamide antibiotics         Review of Systems:  INTEGUMENTARY/SKIN: NEGATIVE for worrisome rashes, moles or lesions  MUSCULOSKELETAL: left lateral ankle pain   NEURO: swelling left ankle, increased pain with pointing and some pain with walking       OBJECTIVE:  Vitals:    03/14/22 1103   BP: (!) 152/90   Pulse: 120   Temp: 98  F (36.7  C)   SpO2: 99%               Exam:  GENERAL: alert and active  MS: tenderness left lateral ankle with some swelling around left ankle   SKIN: no suspicious lesions, no rashes  NEURO: continue to have limp with ambulation       Labs: No results found for this or any previous visit (from the past 24 hour(s)).  PROCEDURE:  XR ANKLE LEFT G/E 3 VIEWS     HISTORY: Pain in joint involving ankle and foot, left   COMPARISON:  None.  TECHNIQUE:  3 views of the left ankle were obtained.  FINDINGS:  No fracture or dislocation is identified. The joint spaces  are preserved.                                                                     IMPRESSION: Normal left ankle       ALONDRA DAMON MD          ASSESSMENT / PLAN:  (S93.492D) Sprain of other ligament of left ankle, subsequent encounter   (primary encounter diagnosis)  Follow up as needed  Can or walker as needed for support with ambulation  Ankle brace as needed for support   Elevate ankle as needed

## 2022-03-15 ENCOUNTER — TRANSFERRED RECORDS (OUTPATIENT)
Dept: HEALTH INFORMATION MANAGEMENT | Facility: CLINIC | Age: 32
End: 2022-03-15
Payer: COMMERCIAL

## 2022-03-16 ENCOUNTER — LAB (OUTPATIENT)
Dept: LAB | Facility: OTHER | Age: 32
End: 2022-03-16
Payer: COMMERCIAL

## 2022-03-16 DIAGNOSIS — Z11.59 ENCOUNTER FOR SCREENING FOR OTHER VIRAL DISEASES: ICD-10-CM

## 2022-03-16 DIAGNOSIS — E61.1 IRON DEFICIENCY: ICD-10-CM

## 2022-03-16 LAB
FERRITIN SERPL-MCNC: 25 NG/ML (ref 12–150)
IRON SATN MFR SERPL: 7 % (ref 15–46)
IRON SERPL-MCNC: 24 UG/DL (ref 35–180)
TIBC SERPL-MCNC: 348 UG/DL (ref 240–430)

## 2022-03-16 PROCEDURE — 86803 HEPATITIS C AB TEST: CPT

## 2022-03-16 PROCEDURE — 36415 COLL VENOUS BLD VENIPUNCTURE: CPT

## 2022-03-16 PROCEDURE — 82728 ASSAY OF FERRITIN: CPT

## 2022-03-16 PROCEDURE — 83550 IRON BINDING TEST: CPT

## 2022-03-17 ENCOUNTER — HOSPITAL ENCOUNTER (OUTPATIENT)
Dept: ULTRASOUND IMAGING | Facility: HOSPITAL | Age: 32
Discharge: HOME | End: 2022-03-17
Attending: FAMILY MEDICINE | Admitting: FAMILY MEDICINE
Payer: COMMERCIAL

## 2022-03-17 ENCOUNTER — HOSPITAL ENCOUNTER (OUTPATIENT)
Dept: PHYSICAL THERAPY | Facility: HOSPITAL | Age: 32
Setting detail: THERAPIES SERIES
Discharge: HOME | End: 2022-03-17
Attending: FAMILY MEDICINE
Payer: OTHER MISCELLANEOUS

## 2022-03-17 DIAGNOSIS — N83.201 RIGHT OVARIAN CYST: ICD-10-CM

## 2022-03-17 LAB — HCV AB SERPL QL IA: NONREACTIVE

## 2022-03-17 PROCEDURE — 97110 THERAPEUTIC EXERCISES: CPT | Mod: GP

## 2022-03-17 PROCEDURE — 76856 US EXAM PELVIC COMPLETE: CPT

## 2022-03-17 PROCEDURE — 97530 THERAPEUTIC ACTIVITIES: CPT | Mod: GP

## 2022-03-19 ENCOUNTER — HEALTH MAINTENANCE LETTER (OUTPATIENT)
Age: 32
End: 2022-03-19

## 2022-03-23 ENCOUNTER — HOSPITAL ENCOUNTER (OUTPATIENT)
Dept: PHYSICAL THERAPY | Facility: HOSPITAL | Age: 32
Setting detail: THERAPIES SERIES
Discharge: HOME | End: 2022-03-23
Attending: FAMILY MEDICINE
Payer: OTHER MISCELLANEOUS

## 2022-03-23 DIAGNOSIS — M25.512 CHRONIC LEFT SHOULDER PAIN: ICD-10-CM

## 2022-03-23 DIAGNOSIS — K59.02 CONSTIPATION DUE TO OUTLET DYSFUNCTION: ICD-10-CM

## 2022-03-23 DIAGNOSIS — G89.29 CHRONIC LEFT SHOULDER PAIN: ICD-10-CM

## 2022-03-23 DIAGNOSIS — M25.551 HIP PAIN, RIGHT: ICD-10-CM

## 2022-03-23 PROCEDURE — 97161 PT EVAL LOW COMPLEX 20 MIN: CPT | Mod: GP

## 2022-03-23 PROCEDURE — 97140 MANUAL THERAPY 1/> REGIONS: CPT | Mod: GP

## 2022-03-23 PROCEDURE — 97110 THERAPEUTIC EXERCISES: CPT | Mod: GP

## 2022-03-24 ENCOUNTER — HOSPITAL ENCOUNTER (OUTPATIENT)
Dept: PHYSICAL THERAPY | Facility: HOSPITAL | Age: 32
Setting detail: THERAPIES SERIES
Discharge: HOME | End: 2022-03-24
Attending: FAMILY MEDICINE
Payer: OTHER MISCELLANEOUS

## 2022-03-24 PROCEDURE — 97140 MANUAL THERAPY 1/> REGIONS: CPT | Mod: GP

## 2022-03-24 PROCEDURE — 97530 THERAPEUTIC ACTIVITIES: CPT | Mod: GP

## 2022-03-24 PROCEDURE — 97110 THERAPEUTIC EXERCISES: CPT | Mod: GP

## 2022-03-28 NOTE — PROGRESS NOTES
"   03/23/22 1300   General Information   Type of Visit Initial OP Ortho PT Evaluation   Start of Care Date 03/23/22   Referring Physician Eunice Celeste NP    Orders Evaluate and Treat   Date of Order 03/23/22   Certification Required? No   Medical Diagnosis L ankle Pain   Surgical/Medical history reviewed Yes   Precautions/Limitations other (see comments)  (MS )   Weight-Bearing Status - LUE full weight-bearing   Weight-Bearing Status - RUE full weight-bearing   Weight-Bearing Status - LLE full weight-bearing   Weight-Bearing Status - RLE full weight-bearing   Special Instructions 9.25\" navicular, 12\" calc, 10.5 \" malleoli (LEFT); 9\", 12\", 10\"    General Information Comments Radha arrived to the clinic today stating she slipped on the ice in the parking lot on Feb 23rd. She states she felt more like it was in the [place] region but she notes she has gotten a lot better. She states her ankle still bothers her quite a bit, especially if she is barefoot in the house. She notes the pain to be a 4/10 at rest and up to a 7/10 with weightbearing. She states she does take Ibuprofen and Tylenol as needed. 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 She notes she just came back to work last Tuesday after taking 2 weeks off due to increased pain. She notes she is agreeable to work with the therapist today.        Present No   Body Part(s)   Body Part(s) Ankle/Foot   Presentation and Etiology   Pertinent history of current problem (include personal factors and/or comorbidities that impact the POC) L ankle   Impairments A. Pain;B. Decreased WB tolerance;C. Swelling;E. Decreased flexibility;G. Impaired balance;H. Impaired gait   Functional Limitations perform activities of daily living;perform required work activities;perform desired leisure / sports activities   Symptom Location L ankle   How/Where did it occur With a fall   Onset date of current episode/exacerbation 02/23/22   Chronicity New   Pain " rating (0-10 point scale) Best (/10);Worst (/10)   Best (/10) 3   Worst (/10) 8   Pain quality A. Sharp;B. Dull;C. Aching   Frequency of pain/symptoms B. Intermittent   Pain/symptoms are: Worse during the night   Pain/symptoms exacerbated by B. Walking;I. Bending   Pain/symptoms eased by A. Sitting;C. Rest;I. OTC medication(s)   Prior Level of Function   Prior Level of Function-Mobility Indep with all tasks prior to fall    Prior Level of Function-ADLs Indep with all tasks prior to fall    Current Level of Function   Patient role/employment history A. Employed   Employment Comments RN    Living environment [facility]   Current equipment-Gait/Locomotion None   Current equipment-ADL None   Fall Risk Screen   Fall screen completed by PT   Have you fallen 2 or more times in the past year? No   Have you fallen and had an injury in the past year? No   Is patient a fall risk? No   Abuse Screen (yes response referral indicated)   Feels Unsafe at Home or Work/School no   Feels Threatened by Someone no   Does Anyone Try to Keep You From Having Contact with Others or Doing Things Outside Your Home? no   Physical Signs of Abuse Present no   Patient needs abuse support services and resources No   Ankle/Foot Objective Findings   Side (if bilateral, select both right and left) Left   Observation Ataxic/antalgic gait pattern noted upon arrival   Gait/Locomotion Ataxic/antalgic gait pattern noted upon arrival   Johniger ER Test (DF/Eversion Test) Pos    Windlass Test Neg   Longitudinal Arch Angle Test Neg   Anterior Drawer Test Pos    Posterior Drawer Test Pos    Talar Tilt Test Neg   Palpation Tenderness noted over the ATFL lig region   Left DF (Knee Ext) AROM limited at end range   Left PF AROM limited at end range   Left Calcanceal Inversion AROM limited at end range   Left Calcaneal Eversion AROM limited at end range   Left Great Toe Flexion AROM WNL    Left DF/Inversion Strength 4/5   Left DF/Eversion Strength 4/5   Left  PF/Inversion Strength 4/5   Left PF/Eversion Strength 4/5   Left PF Strength 4/5   Left Gastroc (in WB) Flexibility Tightness noted    Left Soleus (in WB) Flexibility Tightness noted    Planned Therapy Interventions   Planned Therapy Interventions gait training;balance training;manual therapy;neuromuscular re-education;ROM;strengthening;stretching   Planned Modality Interventions   Planned Modality Interventions Electrical stimulation;TENS;Iontophoresis;Ultrasound   Clinical Impression   Criteria for Skilled Therapeutic Interventions Met yes, treatment indicated   PT Diagnosis L ankle Pain   Influenced by the following impairments Pain; weakness, limited ROM    Functional limitations due to impairments Difficulty performing ADLs, lifting, gait, etc.    Clinical Presentation Stable/Uncomplicated   Clinical Presentation Rationale Due to the patient's inability to work without compensation strategies or pain, she is requesting PT services at this time.    Clinical Decision Making (Complexity) Low complexity   Therapy Frequency 2 times/Week   Predicted Duration of Therapy Intervention (days/wks) 6 weeks   Risk & Benefits of therapy have been explained Yes   Patient, Family & other staff in agreement with plan of care Yes   Education Assessment   Preferred Learning Style Listening   Barriers to Learning No barriers   ORTHO GOALS   PT Ortho Eval Goals 1;2   Ortho Goal 1   Goal Identifier STG-1    Goal Description Patient will note 2/10 pain or less throughout her day while at work with sitting and ambulation.    Goal Progress New   Target Date 04/27/22   Ortho Goal 2   Goal Identifier LTG-1    Goal Description Patient will be able to return to work without a brace, taping or restrictions/compensation patterns.    Goal Progress New   Target Date 05/30/22   Total Evaluation Time   PT Eval, Low Complexity Minutes (22298) 19

## 2022-03-29 ENCOUNTER — HOSPITAL ENCOUNTER (OUTPATIENT)
Dept: PHYSICAL THERAPY | Facility: HOSPITAL | Age: 32
Setting detail: THERAPIES SERIES
Discharge: HOME | End: 2022-03-29
Attending: FAMILY MEDICINE
Payer: OTHER MISCELLANEOUS

## 2022-03-29 PROCEDURE — 97140 MANUAL THERAPY 1/> REGIONS: CPT | Mod: GP

## 2022-03-29 PROCEDURE — 97110 THERAPEUTIC EXERCISES: CPT | Mod: GP

## 2022-03-30 ENCOUNTER — HOSPITAL ENCOUNTER (OUTPATIENT)
Dept: PHYSICAL THERAPY | Facility: HOSPITAL | Age: 32
Setting detail: THERAPIES SERIES
Discharge: HOME | End: 2022-03-30
Attending: ORTHOPAEDIC SURGERY
Payer: OTHER MISCELLANEOUS

## 2022-03-30 PROCEDURE — 97140 MANUAL THERAPY 1/> REGIONS: CPT | Mod: GP

## 2022-03-30 PROCEDURE — 97110 THERAPEUTIC EXERCISES: CPT | Mod: GP

## 2022-03-30 PROCEDURE — 97161 PT EVAL LOW COMPLEX 20 MIN: CPT | Mod: GP

## 2022-03-30 NOTE — PROGRESS NOTES
"Radha Carl is a 29 year old female who is being evaluated via a billable video visit.      The patient has been notified of following:     \"This video visit will be conducted via a call between you and your physician/provider. We have found that certain health care needs can be provided without the need for an in-person physical exam.  This service lets us provide the care you need with a video conversation.  If a prescription is necessary we can send it directly to your pharmacy.  If lab work is needed we can place an order for that and you can then stop by our lab to have the test done at a later time.    Video visits are billed at different rates depending on your insurance coverage.  Please reach out to your insurance provider with any questions.    If during the course of the call the physician/provider feels a video visit is not appropriate, you will not be charged for this service.\"    Patient has given verbal consent for Video visit? Yes    How would you like to obtain your AVS? Thais    Patient would like the video invitation sent by: Text to cell phone: [phone]    Will anyone else be joining your video visit? No      Subjective     Radha Carl is a 29 year old female who presents today via video visit for the following health issues:    HPI  Stress       Duration: 1 month    Description (location/character/radiation): work stress    Intensity:  moderate    Accompanying signs and symptoms: not accommodating pt at work      History (similar episodes/previous evaluation): MS    Precipitating or alleviating factors: walking has gotten worse     Therapies tried and outcome: pt is requesting a FMLA from May 26 until ?    has been having more weakness in her legs and hip again and unable to see her chiropractor regularly due to pandemic  Has filed for accomodation to use a closer door at work and has been 3 wks and no response -- she is very frustrated as she has to take 2 elevators to get " to her work space currently and those make her dizzy.      Video Start Time: 116      Patient Active Problem List   Diagnosis     Attention deficit hyperactivity disorder (ADHD), predominantly inattentive type     Hyperlipidemia with target LDL less than 100     PCOS (polycystic ovarian syndrome)     Elevated liver enzymes     MS (multiple sclerosis) (H)     Vitamin B12 deficiency (non anemic)     Adiposity     Skin sensation disturbance     Tubal pregnancy without intrauterine pregnancy     Visual field scotoma     ACP (advance care planning)     Dysmenorrhea     MARIBETH (generalized anxiety disorder)     Hypovitaminosis D     Infertility, female, secondary     Elevated glucose     Costochondral pain     Stress     Benign essential hypertension     Prediabetes     Chronic tension-type headache, not intractable     Past Surgical History:   Procedure Laterality Date     GYN SURGERY  5/2010    ectopic pregnancy ruptured -L salpingectomy     HYSTEROSCOPY,DIAGNOSTIC  2016    done here and at [facility]  2014       Social History     Tobacco Use     Smoking status: Never Smoker     Smokeless tobacco: Never Used   Substance Use Topics     Alcohol use: Yes     Alcohol/week: 0.0 standard drinks     Frequency: Monthly or less     Drinks per session: 1 or 2     Comment: rare     Family History   Problem Relation Age of Onset     Family History Negative Mother      Family History Negative Father      Family History Negative Sister      Family History Negative Sister      Obesity Sister      Breast Cancer Maternal Grandmother 64     Unknown/Adopted Maternal Grandfather         severe gerd     Lung Cancer Paternal Grandmother         +tobacco     Heart Disease Paternal Grandfather         AMI/CAD or CVA     Family History Negative Brother      Psychotic Disorder Sister         ADHD         Current Outpatient Medications   Medication Sig Dispense Refill     acetaminophen (TYLENOL) 500 MG tablet Take 2 tablets by  mouth. Every 6 hours as needed       cyclobenzaprine (FLEXERIL) 10 MG tablet Take 0.5-1 tablets (5-10 mg) by mouth daily as needed for muscle spasms 90 tablet 0     ibuprofen (ADVIL,MOTRIN) 200 MG tablet Take 2 tablets by mouth. Every 6 hours as needed with food       lisdexamfetamine (VYVANSE) 10 MG capsule Take 1 capsule (10 mg) by mouth every morning 30 capsule 0     lisinopril (ZESTRIL) 2.5 MG tablet Take 1 tablet (2.5 mg) by mouth daily 30 tablet 1     Meloxicam (MOBIC PO)        Multiple Vitamins-Minerals (OPTIVITE P.M.T.) TABS Take 3 tablets by mouth 2 times daily 180 tablet 3     order for DME Right ankle ASO 1 Device 0     progesterone (PROMETRIUM) 100 MG capsule Take 1 capsule (100 mg) by mouth daily Starting on peak +3 for 10 days out of month 30 capsule 3     valACYclovir (VALTREX) 500 MG tablet TAKE 1 TABLET BY MOUTH TWICE DAILY WITH  ACTIVE  LESION 30 tablet 5     vitamin D2 (ERGOCALCIFEROL) 85225 units (1250 mcg) capsule TAKE 1 CAPSULE BY MOUTH ONCE A WEEK 12 capsule 3     ACETYLCYSTEINE PO Take 600 mg by mouth 2 times daily       FLUoxetine (PROZAC) 10 MG capsule Take 1 capsule (10 mg) by mouth daily For one week and then increase to 2 capsules daily (Patient not taking: Reported on 5/21/2020) 60 capsule 1     Pyridoxine HCl (VITAMIN  B-6) 500 MG TABS Take 1 tablet by mouth daily       Allergies   Allergen Reactions     Cats      Sulfa Drugs Rash     Sulfonamide antibiotics     Recent Labs   Lab Test 06/12/19  0803 06/13/18  1623 09/05/17  0806 01/25/17  1700 07/08/16  0732 02/26/16  1148   A1C 5.9* 6.0* 5.8 5.8 5.8 6.1*   *  --  119*  --  118*  --    HDL 41*  --  38*  --  38*  --    TRIG 120  --  111  --  86  --    ALT 32  --  20 30 29 30   CR 0.65  --  0.62 0.82 0.55 0.59   GFRESTIMATED >90  --  >90 82 >90  Non  GFR Calc   >90  Non  GFR Calc     GFRESTBLACK >90  --  >90 >90   GFR Calc   >90   GFR Calc   >90   GFR  "Calc     POTASSIUM 3.7  --  3.8 3.6 4.1 3.9   TSH  --   --   --  0.85  --  0.65      BP Readings from Last 3 Encounters:   03/24/20 (!) 148/98   03/18/20 (!) 152/84   03/11/20 (!) 134/90    Wt Readings from Last 3 Encounters:   03/24/20 79.4 kg (175 lb)   03/18/20 79.4 kg (175 lb)   03/11/20 79.4 kg (175 lb)                    Reviewed and updated as needed this visit by Provider         Review of Systems   Constitutional, HEENT, cardiovascular, pulmonary, gi and gu systems are negative, except as otherwise noted.      Objective    There were no vitals taken for this visit.  Estimated body mass index is 31 kg/m  as calculated from the following:    Height as of 3/24/20: 1.6 m (5' 3\").    Weight as of 3/24/20: 79.4 kg (175 lb).  Physical Exam     GENERAL: Healthy, alert and no distress  EYES: Eyes grossly normal to inspection.  No discharge or erythema, or obvious scleral/conjunctival abnormalities.  RESP: No audible wheeze, cough, or visible cyanosis.  No visible retractions or increased work of breathing.    SKIN: Visible skin clear. No significant rash, abnormal pigmentation or lesions.  NEURO: Cranial nerves grossly intact.  Mentation and speech appropriate for age.  PSYCH: Mentation appears normal, affect normal/bright, judgement and insight intact, normal speech and appearance well-groomed.      Diagnostic Test Results:  Labs reviewed in Epic  none         Assessment & Plan     (G35) MS (multiple sclerosis) (H)  (primary encounter diagnosis)  Comment:   Plan: with worsening symptoms -- awaiting work accomodation    (F43.9) Stress  Comment:   Plan: feels she needs 2 weeks off work for rest of symptoms and to do self cares as the alternative has become more stressful for her which worsens her MS     BMI:   Estimated body mass index is 31 kg/m  as calculated from the following:    Height as of 3/24/20: 1.6 m (5' 3\").    Weight as of 3/24/20: 79.4 kg (175 lb).   Weight management plan: Discussed healthy diet and " exercise guidelines        There are no Patient Instructions on file for this visit.    No follow-ups on file.    Muriel Jones MD  [facility]      Video-Visit Details    Type of service:  Video Visit    Video End Time:1132    Originating Location (pt. Location): Other work    Distant Location (provider location):  [facility]     Platform used for Video Visit: NicoWell    No follow-ups on file.       Muriel Jones MD         1 person assist

## 2022-03-31 ENCOUNTER — OFFICE VISIT (OUTPATIENT)
Dept: FAMILY MEDICINE | Facility: OTHER | Age: 32
End: 2022-03-31
Attending: FAMILY MEDICINE
Payer: COMMERCIAL

## 2022-03-31 VITALS
OXYGEN SATURATION: 99 % | TEMPERATURE: 97.6 F | SYSTOLIC BLOOD PRESSURE: 150 MMHG | BODY MASS INDEX: 32.42 KG/M2 | HEART RATE: 86 BPM | DIASTOLIC BLOOD PRESSURE: 100 MMHG | RESPIRATION RATE: 18 BRPM | WEIGHT: 183 LBS

## 2022-03-31 DIAGNOSIS — J35.8 TONSIL STONE: Primary | ICD-10-CM

## 2022-03-31 PROCEDURE — 99213 OFFICE O/P EST LOW 20 MIN: CPT | Performed by: FAMILY MEDICINE

## 2022-03-31 ASSESSMENT — PAIN SCALES - GENERAL: PAINLEVEL: SEVERE PAIN (6)

## 2022-03-31 NOTE — NURSING NOTE
"No chief complaint on file.      Initial BP (!) 150/100   Pulse 86   Temp 97.6  F (36.4  C)   Resp 18   Wt 83 kg (183 lb)   LMP 12/25/2021   SpO2 99%   BMI 32.42 kg/m   Estimated body mass index is 32.42 kg/m  as calculated from the following:    Height as of 1/17/22: 1.6 m (5' 3\").    Weight as of this encounter: 83 kg (183 lb).  Medication Reconciliation: complete  Milady Jaramillo LPN      "

## 2022-03-31 NOTE — PROGRESS NOTES
Assessment & Plan     Tonsil stone  Discussed water pic and ENT referral if needed  No antibiotic needed      Provider  Link to [facility] Help Grid :[id number]}     Patient was agreeable to this plan and had no further questions.  There are no Patient Instructions on file for this visit.    No follow-ups on file.    Muriel Jones MD  [facility] - ZORAIDA Garcia is a 31 year old who presents for the following health issues     HPI     Concern - Swollen Tonsil /tonsil stone  Onset: 2 weeks   Description: pain   Intensity: mild  Progression of Symptoms:  same  Accompanying Signs & Symptoms: unable to remove stone  Previous history of similar problem: yes   Precipitating factors:        Worsened by: nothing   Alleviating factors:        Improved by: nothing   Therapies tried and outcome: trying to remove the stone.       Review of Systems   Constitutional, HEENT, cardiovascular, pulmonary, gi and gu systems are negative, except as otherwise noted.      Objective    BP (!) 150/100   Pulse 86   Temp 97.6  F (36.4  C)   Resp 18   Wt 83 kg (183 lb)   LMP 12/25/2021   SpO2 99%   BMI 32.42 kg/m    Body mass index is 32.42 kg/m .  Physical Exam   GENERAL: healthy, alert and no distress  HENT: ear canals and TM's normal, nose and mouth without ulcers or lesions  HENT: tonsil stones bilaterally visible  NECK: no adenopathy, no asymmetry, masses, or scars and thyroid normal to palpation  RESP: lungs clear to auscultation - no rales, rhonchi or wheezes  CV: regular rate and rhythm, normal S1 S2, no S3 or S4, no murmur, click or rub, no peripheral edema and peripheral pulses strong  ABDOMEN: soft, nontender, no hepatosplenomegaly, no masses and bowel sounds normal  MS: no gross musculoskeletal defects noted, no edema  PSYCH: mentation appears normal, affect normal/bright    No results found for any visits on 03/31/22.

## 2022-04-04 ENCOUNTER — HOSPITAL ENCOUNTER (OUTPATIENT)
Dept: PHYSICAL THERAPY | Facility: HOSPITAL | Age: 32
Setting detail: THERAPIES SERIES
Discharge: HOME | End: 2022-04-04
Attending: FAMILY MEDICINE
Payer: OTHER MISCELLANEOUS

## 2022-04-04 ENCOUNTER — HOSPITAL ENCOUNTER (OUTPATIENT)
Dept: PHYSICAL THERAPY | Facility: HOSPITAL | Age: 32
Setting detail: THERAPIES SERIES
Discharge: HOME | End: 2022-04-04
Attending: FAMILY MEDICINE
Payer: COMMERCIAL

## 2022-04-04 NOTE — PROGRESS NOTES
"   04/01/22 1200   General Information   Type of Visit Initial OP Ortho PT Evaluation   Start of Care Date 03/30/22   Referring Physician Tanna Jones MD   Orders Evaluate and Treat   Date of Order 03/30/22   Certification Required? No   Medical Diagnosis L Shoulder Pain    Surgical/Medical history reviewed Yes   Precautions/Limitations no known precautions/limitations;other (see comments)  (Patient does have MS diagnosis)   Weight-Bearing Status - LUE full weight-bearing   Weight-Bearing Status - RUE full weight-bearing   Weight-Bearing Status - LLE full weight-bearing   Weight-Bearing Status - RLE full weight-bearing   General Information Comments Radha arrived to therapy today stating she has had L shoulder pain but she is unsure when it started. She states she slipped on a dress going down the stairs about 5 years ago but otherwise is not sure why she has shoulder pain. She notes she can \"pop\" her shoulder by internally rotating her arm with full elbow extension. She states she is already coming to therapy for her ankle so she figured she would try to work on two things at once. She states she is agreeable to work with the PT today.        Present No   Body Part(s)   Body Part(s) Shoulder   Presentation and Etiology   Pertinent history of current problem (include personal factors and/or comorbidities that impact the POC) Patient is unsure of how she injured it. She notes she slipped going down the stairs and thinks this is maybe how. She states she has a 5/10 pain upon arrival. She states she is agreeable to work with the PT today.    Impairments A. Pain;E. Decreased flexibility;F. Decreased strength and endurance;J. Burning;K. Numbness;M. Locking or catching   Functional Limitations perform activities of daily living;perform required work activities;perform desired leisure / sports activities   Symptom Location L shoulder   How/Where did it occur With a fall   Onset date of current " episode/exacerbation   (Approx. 5 years ago )   Chronicity Chronic   Pain rating (0-10 point scale) Best (/10);Worst (/10)   Best (/10) 3   Worst (/10) 8   Pain quality A. Sharp;C. Aching;B. Dull;D. Burning;E. Shooting   Frequency of pain/symptoms B. Intermittent   Pain/symptoms are: Other  (varies depending on what she is doing)   Pain/symptoms exacerbated by C. Lifting;D. Carrying;G. Certain positions;F. Nothing;H. Overhead reach;K. Home tasks;L. Work tasks   Pain/symptoms eased by D. Nothing;E. Changing positions;I. OTC medication(s);K. Other   Progression of symptoms since onset: Unchanged   Prior Level of Function   Prior Level of Function-Mobility Indep with all tasks prior to fall    Prior Level of Function-ADLs Indep with all tasks prior to fall    Current Level of Function   Patient role/employment history A. Employed   [facility]   Current equipment-Gait/Locomotion None   Current equipment-ADL None   Fall Risk Screen   Fall screen completed by PT   Have you fallen 2 or more times in the past year? No   Have you fallen and had an injury in the past year? No   Is patient a fall risk? No   Abuse Screen (yes response referral indicated)   Feels Unsafe at Home or Work/School no   Feels Threatened by Someone no   Does Anyone Try to Keep You From Having Contact with Others or Doing Things Outside Your Home? no   Physical Signs of Abuse Present no   Patient needs abuse support services and resources No   Shoulder Objective Findings   Side (if bilateral, select both right and left) Left   Cervical Screen (ROM, quadrant) WNL    Thoracic Mobility Screen WNL    Thoracic Outlet Syndrom (Kennedy, Rachid, Daniela, Borjas) WNL    Shoulder ROM Comment WFL for all motions - popping noted with max internal rotation   Scapulothoracic Rhythm Distal portion retracted    Pec Minor (supine) Flexibility Tightness noted    Neer's Test Pos   Brown-Zoltan Test Pos    Coracoid Test Pos    Bursa Test Pos    Wallowa's  "Test Pos    Load and Shift Test Neg   Relocation Test Neg   Sulcus Test Neg   Crossover Test Neg   Palpation Tender to palpation over the AC joint and the bicep tendon (anterior)    Left Shoulder Flexion AROM WFL    Left Shoulder Flexion PROM WFL    Left Shoulder Abduction AROM WFL    Left Shoulder Abduction PROM WFL    Left Shoulder ER AROM WFL    Left Shoulder ER PROM WFL    Left Shoulder IR AROM WFL    Left Shoulder IR PROM WFL    Left Shoulder Flexion Strength 4/5   Left Shoulder Abduction Strength 4/5   Left Shoulder ER Strength 4/5   Left Shoulder IR Strength 4/5   Left Shoulder Extension Strength 4/5   Left Mid Trapezius Strength 4/5   Left Lower Trapezius Strength 4/5   Planned Therapy Interventions   Planned Therapy Interventions manual therapy;neuromuscular re-education;ROM;strengthening;stretching   Planned Modality Interventions   Planned Modality Interventions Electrical stimulation;Hot packs;Iontophoresis;TENS   Clinical Impression   Criteria for Skilled Therapeutic Interventions Met yes, treatment indicated   PT Diagnosis L shoulder pain   Influenced by the following impairments Pain, Weakness, Decreased ROM    Functional limitations due to impairments Difficulty performing ADLs, lifting, transfers, etc.    Clinical Presentation Stable/Uncomplicated   Clinical Presentation Rationale Due to the patien't inability to work without pain/altered mechanics, she is requesting PT services at this time.    Clinical Decision Making (Complexity) Low complexity   Therapy Frequency 2 times/Week   Predicted Duration of Therapy Intervention (days/wks) 8 weeks   Risk & Benefits of therapy have been explained Yes   Patient, Family & other staff in agreement with plan of care Yes   Education Assessment   Preferred Learning Style Listening   Barriers to Learning No barriers   Ortho Goal 1   Goal Identifier STG-1    Goal Description Patient will be able to complete full AROM without a \"popping\" noise to decrease her " chance of injury.    Goal Progress New   Target Date 05/04/22   Ortho Goal 2   Goal Identifier LTG-1    Goal Description Patient will note a 1/10 pain or less to allow functional daily tasks/work tasks to be completed without pain.    Goal Progress New   Target Date 06/30/22   Total Evaluation Time   PT Annaal, Low Complexity Minutes (21832) 12

## 2022-04-07 ENCOUNTER — HOSPITAL ENCOUNTER (OUTPATIENT)
Dept: PHYSICAL THERAPY | Facility: HOSPITAL | Age: 32
Setting detail: THERAPIES SERIES
Discharge: HOME | End: 2022-04-07
Attending: FAMILY MEDICINE
Payer: COMMERCIAL

## 2022-04-07 PROCEDURE — 97530 THERAPEUTIC ACTIVITIES: CPT | Mod: GP

## 2022-04-07 PROCEDURE — 97140 MANUAL THERAPY 1/> REGIONS: CPT | Mod: GP

## 2022-04-07 PROCEDURE — 97110 THERAPEUTIC EXERCISES: CPT | Mod: GP

## 2022-04-08 ENCOUNTER — ANCILLARY PROCEDURE (OUTPATIENT)
Dept: GENERAL RADIOLOGY | Facility: OTHER | Age: 32
End: 2022-04-08
Attending: NURSE PRACTITIONER
Payer: COMMERCIAL

## 2022-04-08 ENCOUNTER — HOSPITAL ENCOUNTER (OUTPATIENT)
Dept: PHYSICAL THERAPY | Facility: HOSPITAL | Age: 32
Setting detail: THERAPIES SERIES
Discharge: HOME | End: 2022-04-08
Attending: FAMILY MEDICINE
Payer: OTHER MISCELLANEOUS

## 2022-04-08 DIAGNOSIS — M79.671 RIGHT FOOT PAIN: Primary | ICD-10-CM

## 2022-04-08 DIAGNOSIS — M79.671 RIGHT FOOT PAIN: ICD-10-CM

## 2022-04-08 PROCEDURE — 97016 VASOPNEUMATIC DEVICE THERAPY: CPT | Mod: GP

## 2022-04-08 PROCEDURE — 97110 THERAPEUTIC EXERCISES: CPT | Mod: GP

## 2022-04-08 PROCEDURE — 73630 X-RAY EXAM OF FOOT: CPT | Mod: TC | Performed by: RADIOLOGY

## 2022-04-08 NOTE — PROGRESS NOTES
Continued to have pain into right foot since sprained ankle on the left.  Pain from toes to base of ankle. Plan to check XR for the right foot

## 2022-04-12 ENCOUNTER — HOSPITAL ENCOUNTER (OUTPATIENT)
Dept: PHYSICAL THERAPY | Facility: HOSPITAL | Age: 32
Setting detail: THERAPIES SERIES
Discharge: HOME | End: 2022-04-12
Attending: FAMILY MEDICINE
Payer: COMMERCIAL

## 2022-04-12 PROCEDURE — 97110 THERAPEUTIC EXERCISES: CPT | Mod: GP

## 2022-04-12 PROCEDURE — 97530 THERAPEUTIC ACTIVITIES: CPT | Mod: GP

## 2022-04-14 ENCOUNTER — VIRTUAL VISIT (OUTPATIENT)
Dept: NEUROLOGY | Facility: CLINIC | Age: 32
End: 2022-04-14
Attending: PSYCHIATRY & NEUROLOGY
Payer: COMMERCIAL

## 2022-04-14 DIAGNOSIS — G35 MS (MULTIPLE SCLEROSIS) (H): Primary | ICD-10-CM

## 2022-04-14 PROCEDURE — 99213 OFFICE O/P EST LOW 20 MIN: CPT | Mod: 95 | Performed by: PSYCHIATRY & NEUROLOGY

## 2022-04-14 ASSESSMENT — PATIENT HEALTH QUESTIONNAIRE - PHQ9: SUM OF ALL RESPONSES TO PHQ QUESTIONS 1-9: 0

## 2022-04-14 NOTE — PATIENT INSTRUCTIONS
Continue glatiramer acetate injections    2.  We will plan to have you return to this clinic in 6 months with MRI scans prior to that visit (unless you are pregnant, in which case the MRI would be deferred)

## 2022-04-14 NOTE — LETTER
"4/14/2022      RE: Radha Carl  [address]  [address]  [facility] [address]     Referral source: Established patient    Chief complaint: Multiple sclerosis    History of the Present Illness: Ms. Radha Carl is a 31 year old woman who is evaluated in the Multiple Sclerosis Clinic today for follow up regarding her diagnosis of multiple sclerosis.     At the request of the patient, the appointment was conducted by video to limit unnecessary exposure in light of the COVID-19 pandemic.     The patient's history is as per my previous notes. Her history of symptoms of demyelinating disease dates back to May 2013, at which time she was admitted to hospital for ascending paresthesias. MRI imaging was suggestive of demyelinating disease of the type seen in multiple sclerosis. 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 A formal MS diagnosis was confirmed in 2018 after she developed increased weakness of the legs and MRI imaging showed multiple new lesions, but she continued to decline disease modifying treatment. Further radiologic progression was noted in December of last year after she developed vertiginous symptoms, and she was referred to this clinic for an opinion on management. We initially started her on disease modifying therapy with dimethyl fumarate, but she discontinued that medication after a few weeks due to gastrointestinal side effects. She next started glatiramer acetate, but had been off of that medication for a number of months due to concern about \"side effects\" of the shots as described at her last visit, as well as due to pregnancy that she subsequently lost.    She has now been back on glatiramer acetate for three weeks. She now feels that the symptoms she was noticing previously were related to iron deficiency, and she is no longer experiencing these after her low iron levels were " treated.     She denies any new episodic changes in vision, balance, strength or sensation suggestive of relapse of multiple sclerosis since her last visit.    Assessment/plan:    1. Multiple sclerosis  I am glad that the patient is back on glatiramer acetate and is tolerating this well to date. This is a good option for her given that she is trying to conceive. I reviewed with her that we think it is safe to remain on this medication up until the time of a positive pregnancy test.     I would like to see her back in 6 months with MRI scans of the brain and cervical spine performed prior to that visit in order to monitor for ongoing active inflammation once she is well established on glatiramer. I told her that if she is pregnant at that time, however, imaging will be deferred until after delivery.    I spent a total of 25 minutes on patient care activities related to this encounter on the date of service, including time spent in reviewing the chart, obtaining history and examination from and in counseling the patient, and in documentation in the electronic medical record.    Santo Gracia MD   of Neurology  [facility]    Cc:  Muriel Jones MD (PCP)  Patient

## 2022-04-14 NOTE — PROGRESS NOTES
"Radha is a 31 year old who is being evaluated via a billable video visit.      How would you like to obtain your AVS? MyChart  If the video visit is dropped, the invitation should be resent by: Other e-mail: [email or web address]  Will anyone else be joining your video visit? No    Referral source: Established patient    Chief complaint: Multiple sclerosis    History of the Present Illness: Ms. Radha Carl is a 31 year old woman who is evaluated in the Multiple Sclerosis Clinic today for follow up regarding her diagnosis of multiple sclerosis.     At the request of the patient, the appointment was conducted by video to limit unnecessary exposure in light of the COVID-19 pandemic.     The patient's history is as per my previous notes. Her history of symptoms of demyelinating disease dates back to May 2013, at which time she was admitted to hospital for ascending paresthesias. MRI imaging was suggestive of demyelinating disease of the type seen in multiple sclerosis. 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 A formal MS diagnosis was confirmed in 2018 after she developed increased weakness of the legs and MRI imaging showed multiple new lesions, but she continued to decline disease modifying treatment. Further radiologic progression was noted in December of last year after she developed vertiginous symptoms, and she was referred to this clinic for an opinion on management. We initially started her on disease modifying therapy with dimethyl fumarate, but she discontinued that medication after a few weeks due to gastrointestinal side effects. She next started glatiramer acetate, but had been off of that medication for a number of months due to concern about \"side effects\" of the shots as described at her last visit, as well as due to pregnancy that she subsequently lost.    She has now been back on glatiramer acetate for three " weeks. She now feels that the symptoms she was noticing previously were related to iron deficiency, and she is no longer experiencing these after her low iron levels were treated.     She denies any new episodic changes in vision, balance, strength or sensation suggestive of relapse of multiple sclerosis since her last visit.    Assessment/plan:    1. Multiple sclerosis  I am glad that the patient is back on glatiramer acetate and is tolerating this well to date. This is a good option for her given that she is trying to conceive. I reviewed with her that we think it is safe to remain on this medication up until the time of a positive pregnancy test.     I would like to see her back in 6 months with MRI scans of the brain and cervical spine performed prior to that visit in order to monitor for ongoing active inflammation once she is well established on glatiramer. I told her that if she is pregnant at that time, however, imaging will be deferred until after delivery.    I spent a total of 25 minutes on patient care activities related to this encounter on the date of service, including time spent in reviewing the chart, obtaining history and examination from and in counseling the patient, and in documentation in the electronic medical record.      Video-Visit Details    Type of service:  Video Visit    Video Start Time: 1:02 pm    Video End Time: 1:12 pm    Originating Location (pt. Location): Home    Distant Location (provider location):  [facility] [place]     Platform used for Video Visit:Upplication

## 2022-04-19 ENCOUNTER — TELEPHONE (OUTPATIENT)
Dept: FAMILY MEDICINE | Facility: OTHER | Age: 32
End: 2022-04-19
Payer: COMMERCIAL

## 2022-04-20 ENCOUNTER — HOSPITAL ENCOUNTER (OUTPATIENT)
Dept: PHYSICAL THERAPY | Facility: HOSPITAL | Age: 32
Setting detail: THERAPIES SERIES
Discharge: HOME | End: 2022-04-20
Attending: FAMILY MEDICINE
Payer: COMMERCIAL

## 2022-04-20 ENCOUNTER — HOSPITAL ENCOUNTER (OUTPATIENT)
Dept: PHYSICAL THERAPY | Facility: HOSPITAL | Age: 32
Setting detail: THERAPIES SERIES
Discharge: HOME | End: 2022-04-20
Attending: FAMILY MEDICINE
Payer: OTHER MISCELLANEOUS

## 2022-04-20 ENCOUNTER — HOSPITAL ENCOUNTER (OUTPATIENT)
Facility: HOSPITAL | Age: 32
End: 2022-04-20
Attending: OBSTETRICS & GYNECOLOGY | Admitting: OBSTETRICS & GYNECOLOGY
Payer: COMMERCIAL

## 2022-04-20 PROCEDURE — 97140 MANUAL THERAPY 1/> REGIONS: CPT | Mod: GP

## 2022-04-20 NOTE — TELEPHONE ENCOUNTER
----- Message from Nela Blanc MD sent at 4/15/2022  5:25 PM CDT -----  Thank you for referring this patient.   I realized if the patient's are in Epic I can send you my dictation this way. As you know, we have paper charts so the history other than HPI is usually not dictated but thought you may appreciate the HPI and plan. If you would rather not have me send these, just let me know.    Best Regards,  Nela    2022 Radha Carl  1990  HPI: The patient presents for a history of polycystic ovarian syndrome and severe dysmenorrhea.  Her menstrual history includes menarche at age 8.  She states she bled for 3 months straight as a child after this and then was put on birth control pills, she took them at intermittent times for several years, however she stopped taking them in  due to side effects including mood swings and sadness.  She has menses every 30-34 days with bleeding for 5-7 days, one day of premenstrual spotting, up to 2 days of brown bleeding at the end of her cycles, and no intermenstrual bleeding.  She has a change a pad every 1-2 hours during the heavy days of menses.  She also has severe pain with her menses.  She rates the pain 8 out of 10 and has had to miss work and school in the past for the pain.  Birth control pills did not help this pain.  She has tried heat and ibuprofen but they do not completely relieve the pain either.  N-acetylcysteine has somewhat helped.  She has pain in her low pelvis which radiates to her back.  She denies pain or bleeding with bowel movements or urination.  She does have loose stools during menses.  She also has ovulation pain as well as left-sided deep dyspareunia.  She has PMS symptoms in the form of irritability, sadness, fatigue, breast tenderness, bloating, headache, back pain, joint pain for 3 days prior to her menses.  Of note she has a history of a right ovarian cyst which resolved.  She also has a history of a uterine fibroid  which was last measured on ultrasound 3/17/22 at 4.6 x 4.2 x 3.5 cm and is posterior and subserosal.  She has a history of an ectopic pregnancy in May 2010 for which the left tube was removed by laparotomy.  She also had a miscarriage in 2022 which was managed expectantly.  She states she has an arcuate uterus.  She also states she had a few HSG's which show her right tube is open as well as in the SIS that was normal.  She has tried Clomid in the past but this made her very dizzy.    Assessment/plan: 31-year-old  with polycystic ovarian syndrome, abnormal uterine bleeding, heavy menses, dysmenorrhea, ovulation pain, dyspareunia, uterine fibroid, premenstrual syndrome, history of miscarriage, history of ectopic pregnancy, history of low iron, vitamin D deficiency, class I obesity, elevated blood pressure.      We extensively discussed the approaches to treatment for multiple of the above conditions including diet and lifestyle, supplements, medical evaluation, medical treatment, surgical evaluation, surgical treatment.  For diet and lifestyle, due to the patient's history of multiple sclerosis, I recommended the autoimmune protocol diet, and she has already began looking into this and has resources for it.  I encouraged her to continue pursuing this.  We also discussed the importance of maintaining a healthy weight.  Baseline abnormal uterine bleeding labs today along with vitamin D panel and ferritin due to history of low iron and vitamin D deficiency.  I gave her a list of supplements that can be helpful with dysmenorrhea and PCO S, and discussed that we would discuss them further after her labs return.  I discussed the more extensive evaluation of luteal phase and periovulatory hormone series to assess her abnormal uterine bleeding and premenstrual syndrome, we discussed that these could be done at the [facility] through a kit she would receive in the mail, and she will plan to  order a kit from them.   We discussed the diagnosis of polycystic ovarian syndrome and the increased risk of miscarriage, infertility, insulin resistance, type 2 diabetes, and cardiovascular disease.  We discussed that with lifestyle modifications and treatment, these risks would be significantly reduced.  Recommend 2 hour insulin and glucose test with glucose and insulin lab draws at fasting, 30 minutes after glucose load, 90 minutes after glucose load, 120 minutes after glucose load. Further recommendations following laboratory results.  We reviewed the ovarian wedge resection as well as adhesion prevention measures, the rate of pregnancy of 60-80% within 18 months, and return of normal cycles of 90%  per the data at the [facility].  For the 10% that do not return to normal cycles, the ovaries often respond better to ovulation induction.  We also discussed normalization of androgens are usually a result of this surgery, and, if present, insulin resistance trends in a better direction, although may not completely correct.  There is a theoretical but possible cause of slightly earlier menopause after ovarian wedge resection, however this has not been found to be a risk over the years of doing the surgery at the [facility].  We discussed that with her history of dysmenorrhea, dyspareunia, polycystic ovarian syndrome she has a high risk of endometriosis (at least 50% by just having PCO S and additional with her other symptoms).  We extensively discussed the approach I take to endometriosis surgery including detailed evaluation for endometriosis, laser excision of endometriosis, preservation of fertility whenever possible, and adhesion prevention measures.  Evaluation would start with a diagnostic laparoscopy, hysteroscopy, endometrial and endocervical cultures and biopsies and, if desired, selective hysterosalpingogram with possible fallopian tube recannulization.  If minimal  endometriosis or pelvic adhesions are found at the time of the surgery, they would be treated with a carbon dioxide laser.  If extensive endometriosis or pelvic adhesions are found, or if other pathology that would benefit from a more detailed and precise surgery is present, a robotic surgery would be scheduled at a later time as this would allow for more precision in excision and treatment of pathology as well as careful repair of the pelvic tissues to prevent pelvic adhesions.  We discussed that I would need to evaluate her androgen levels as well as an ultrasound here that would carefully count the follicles in her ovaries prior to deciding if an ovarian wedge resection would be beneficial for her.  After our discussion about surgical evaluation and treatment, the patient would like to schedule a diagnostic and robotic surgery just in case she needs robotic surgery to have it on the schedule.  Recommend following up with primary care provider and checking blood pressure on a regular basis as it was elevated today (160/104), the patient states it is 130s at home.  I discussed that her  can do a seminal fluid analysis, and he states she has a history of low testosterone so I recommended he follow-up with his primary care provider to further assess this.  Follow-up to discuss lab results by telehealth in 2-3 weeks.  Greater than 60 minutes was spent in care of this patient  Nela Blanc MD

## 2022-04-22 ENCOUNTER — HOSPITAL ENCOUNTER (OUTPATIENT)
Dept: PHYSICAL THERAPY | Facility: HOSPITAL | Age: 32
Setting detail: THERAPIES SERIES
Discharge: HOME | End: 2022-04-22
Attending: FAMILY MEDICINE
Payer: COMMERCIAL

## 2022-04-22 ENCOUNTER — HOSPITAL ENCOUNTER (OUTPATIENT)
Dept: PHYSICAL THERAPY | Facility: HOSPITAL | Age: 32
Setting detail: THERAPIES SERIES
Discharge: HOME | End: 2022-04-22
Attending: FAMILY MEDICINE
Payer: OTHER MISCELLANEOUS

## 2022-04-22 PROCEDURE — 97140 MANUAL THERAPY 1/> REGIONS: CPT | Mod: GP

## 2022-04-26 ENCOUNTER — HOSPITAL ENCOUNTER (OUTPATIENT)
Dept: PHYSICAL THERAPY | Facility: HOSPITAL | Age: 32
Setting detail: THERAPIES SERIES
Discharge: HOME | End: 2022-04-26
Attending: FAMILY MEDICINE
Payer: COMMERCIAL

## 2022-04-26 PROCEDURE — 97110 THERAPEUTIC EXERCISES: CPT | Mod: GP

## 2022-04-26 PROCEDURE — 97140 MANUAL THERAPY 1/> REGIONS: CPT | Mod: GP

## 2022-04-27 ENCOUNTER — HOSPITAL ENCOUNTER (OUTPATIENT)
Dept: PHYSICAL THERAPY | Facility: HOSPITAL | Age: 32
Setting detail: THERAPIES SERIES
Discharge: HOME | End: 2022-04-27
Attending: FAMILY MEDICINE
Payer: OTHER MISCELLANEOUS

## 2022-04-27 ENCOUNTER — HOSPITAL ENCOUNTER (OUTPATIENT)
Dept: PHYSICAL THERAPY | Facility: HOSPITAL | Age: 32
Setting detail: THERAPIES SERIES
Discharge: HOME | End: 2022-04-27
Attending: FAMILY MEDICINE
Payer: COMMERCIAL

## 2022-04-27 PROCEDURE — 97140 MANUAL THERAPY 1/> REGIONS: CPT | Mod: GP

## 2022-04-27 PROCEDURE — 97110 THERAPEUTIC EXERCISES: CPT | Mod: GP

## 2022-04-29 ENCOUNTER — HOSPITAL ENCOUNTER (OUTPATIENT)
Dept: PHYSICAL THERAPY | Facility: HOSPITAL | Age: 32
Setting detail: THERAPIES SERIES
Discharge: HOME | End: 2022-04-29
Attending: FAMILY MEDICINE
Payer: COMMERCIAL

## 2022-04-29 ENCOUNTER — HOSPITAL ENCOUNTER (OUTPATIENT)
Dept: PHYSICAL THERAPY | Facility: HOSPITAL | Age: 32
Setting detail: THERAPIES SERIES
Discharge: HOME | End: 2022-04-29
Attending: FAMILY MEDICINE
Payer: OTHER MISCELLANEOUS

## 2022-04-29 PROCEDURE — 97140 MANUAL THERAPY 1/> REGIONS: CPT | Mod: GP

## 2022-04-29 PROCEDURE — 97110 THERAPEUTIC EXERCISES: CPT | Mod: GP

## 2022-05-03 ENCOUNTER — HOSPITAL ENCOUNTER (OUTPATIENT)
Dept: PHYSICAL THERAPY | Facility: HOSPITAL | Age: 32
Setting detail: THERAPIES SERIES
Discharge: HOME | End: 2022-05-03
Attending: FAMILY MEDICINE
Payer: OTHER MISCELLANEOUS

## 2022-05-03 ENCOUNTER — HOSPITAL ENCOUNTER (OUTPATIENT)
Dept: PHYSICAL THERAPY | Facility: HOSPITAL | Age: 32
Setting detail: THERAPIES SERIES
Discharge: HOME | End: 2022-05-03
Attending: FAMILY MEDICINE
Payer: COMMERCIAL

## 2022-05-03 PROCEDURE — 97140 MANUAL THERAPY 1/> REGIONS: CPT | Mod: GP

## 2022-05-03 PROCEDURE — 97110 THERAPEUTIC EXERCISES: CPT | Mod: GP

## 2022-05-11 ENCOUNTER — HOSPITAL ENCOUNTER (OUTPATIENT)
Dept: PHYSICAL THERAPY | Facility: HOSPITAL | Age: 32
Setting detail: THERAPIES SERIES
Discharge: HOME | End: 2022-05-11
Attending: FAMILY MEDICINE
Payer: COMMERCIAL

## 2022-05-11 ENCOUNTER — HOSPITAL ENCOUNTER (OUTPATIENT)
Dept: PHYSICAL THERAPY | Facility: HOSPITAL | Age: 32
Setting detail: THERAPIES SERIES
Discharge: HOME | End: 2022-05-11
Attending: FAMILY MEDICINE
Payer: OTHER MISCELLANEOUS

## 2022-05-11 PROCEDURE — 97110 THERAPEUTIC EXERCISES: CPT | Mod: GP

## 2022-05-11 PROCEDURE — 97140 MANUAL THERAPY 1/> REGIONS: CPT | Mod: GP

## 2022-05-12 ENCOUNTER — HOSPITAL ENCOUNTER (OUTPATIENT)
Dept: PHYSICAL THERAPY | Facility: HOSPITAL | Age: 32
Setting detail: THERAPIES SERIES
Discharge: HOME | End: 2022-05-12
Attending: FAMILY MEDICINE
Payer: COMMERCIAL

## 2022-05-12 PROCEDURE — 97110 THERAPEUTIC EXERCISES: CPT | Mod: GP

## 2022-05-26 ENCOUNTER — HOSPITAL ENCOUNTER (OUTPATIENT)
Dept: PHYSICAL THERAPY | Facility: HOSPITAL | Age: 32
Setting detail: THERAPIES SERIES
Discharge: HOME | End: 2022-05-26
Attending: FAMILY MEDICINE
Payer: COMMERCIAL

## 2022-05-26 PROCEDURE — 97110 THERAPEUTIC EXERCISES: CPT | Mod: GP

## 2022-05-26 PROCEDURE — 97530 THERAPEUTIC ACTIVITIES: CPT | Mod: GP

## 2022-05-26 PROCEDURE — 97140 MANUAL THERAPY 1/> REGIONS: CPT | Mod: GP

## 2022-06-01 ENCOUNTER — LAB REQUISITION (OUTPATIENT)
Dept: LAB | Facility: HOSPITAL | Age: 32
End: 2022-06-01

## 2022-06-01 ENCOUNTER — LAB (OUTPATIENT)
Dept: OCCUPATIONAL MEDICINE | Facility: OTHER | Age: 32
End: 2022-06-01
Attending: FAMILY MEDICINE
Payer: OTHER MISCELLANEOUS

## 2022-06-01 PROCEDURE — 87637 SARSCOV2&INF A&B&RSV AMP PRB: CPT | Performed by: FAMILY MEDICINE

## 2022-06-08 ENCOUNTER — LAB (OUTPATIENT)
Dept: LAB | Facility: OTHER | Age: 32
End: 2022-06-08
Payer: COMMERCIAL

## 2022-06-08 DIAGNOSIS — R39.9 UTI SYMPTOMS: ICD-10-CM

## 2022-06-08 DIAGNOSIS — R39.9 UTI SYMPTOMS: Primary | ICD-10-CM

## 2022-06-08 LAB
ALBUMIN UR-MCNC: NEGATIVE MG/DL
APPEARANCE UR: ABNORMAL
BACTERIA #/AREA URNS HPF: ABNORMAL /HPF
BILIRUB UR QL STRIP: NEGATIVE
COLOR UR AUTO: ABNORMAL
GLUCOSE UR STRIP-MCNC: NEGATIVE MG/DL
HGB UR QL STRIP: ABNORMAL
KETONES UR STRIP-MCNC: NEGATIVE MG/DL
LEUKOCYTE ESTERASE UR QL STRIP: ABNORMAL
MUCOUS THREADS #/AREA URNS LPF: PRESENT /LPF
NITRATE UR QL: POSITIVE
PH UR STRIP: 6.5 [PH] (ref 4.7–8)
RBC URINE: 1 /HPF
SP GR UR STRIP: 1.01 (ref 1–1.03)
SQUAMOUS EPITHELIAL: 10 /HPF
UROBILINOGEN UR STRIP-MCNC: NORMAL MG/DL
WBC URINE: 7 /HPF

## 2022-06-08 PROCEDURE — 81001 URINALYSIS AUTO W/SCOPE: CPT

## 2022-06-08 PROCEDURE — 87086 URINE CULTURE/COLONY COUNT: CPT

## 2022-06-08 PROCEDURE — 87088 URINE BACTERIA CULTURE: CPT

## 2022-06-08 NOTE — PROGRESS NOTES
Patient with dysuria and foul smelling urine. UA ordered. Will notify patient of the results when available and intervene accordingly.

## 2022-06-10 ENCOUNTER — TELEPHONE (OUTPATIENT)
Dept: FAMILY MEDICINE | Facility: OTHER | Age: 32
End: 2022-06-10
Payer: COMMERCIAL

## 2022-06-10 DIAGNOSIS — N30.00 ACUTE CYSTITIS WITHOUT HEMATURIA: Primary | ICD-10-CM

## 2022-06-10 LAB — BACTERIA UR CULT: ABNORMAL

## 2022-06-10 RX ORDER — NITROFURANTOIN 25; 75 MG/1; MG/1
100 CAPSULE ORAL 2 TIMES DAILY
Qty: 14 CAPSULE | Refills: 0 | Status: SHIPPED | OUTPATIENT
Start: 2022-06-10 | End: 2022-06-17

## 2022-06-20 ENCOUNTER — HOSPITAL ENCOUNTER (OUTPATIENT)
Dept: PHYSICAL THERAPY | Facility: HOSPITAL | Age: 32
Setting detail: THERAPIES SERIES
Discharge: HOME | End: 2022-06-20
Attending: FAMILY MEDICINE
Payer: COMMERCIAL

## 2022-06-20 ENCOUNTER — HOSPITAL ENCOUNTER (OUTPATIENT)
Dept: PHYSICAL THERAPY | Facility: HOSPITAL | Age: 32
Setting detail: THERAPIES SERIES
Discharge: HOME | End: 2022-06-20
Attending: FAMILY MEDICINE
Payer: OTHER MISCELLANEOUS

## 2022-06-20 PROCEDURE — 97110 THERAPEUTIC EXERCISES: CPT | Mod: GP

## 2022-06-20 PROCEDURE — 97535 SELF CARE MNGMENT TRAINING: CPT | Mod: GP

## 2022-06-20 PROCEDURE — 97140 MANUAL THERAPY 1/> REGIONS: CPT | Mod: GP

## 2022-06-20 NOTE — PROGRESS NOTES
"   06/20/22 1430   Signing Clinician's Name / Credentials   Signing clinician's name / credentials Martine Florian (Tom), DPROSELIA   Session Number   Session Number 23   Subjective Report   Subjective Report Pt reports that her shoulder is doing quite well over the past month and feels that she is a good place in regards to her IND management confidence. Pt states that she needs to \"pop it\" frequently 3-4x weekly in order to release tension and help with pain down her arm.   Treatment Interventions   Interventions Therapeutic Procedure/Exercise;Self Care/Home Management   Therapeutic Procedure/exercise   Therapeutic Procedures: strength, endurance, ROM, flexibillity minutes (53275) 18   Skilled Intervention Ther Ex   Patient Response Good   Treatment Detail Access Code: [email or web address]  URL: [email or web address]  Date: 06/20/2022  Prepared by: Bonilla Florian    Exercises  Standing Shoulder Abduction AAROM with Dowel - 1 x daily - 7 x weekly - 3 sets - 10 reps  Seated Shoulder Scaption AAROM with Pulley at Side - 1 x daily - 7 x weekly - 3 sets - 10 reps  Shoulder Overhead Press in Abduction with Dumbbells - 1 x daily - 7 x weekly - 3 sets - 10 reps  Standing Eccentric Bicep Curl Pronated then Supinated - 1 x daily - 7 x weekly - 3 sets - 10 reps    Patient Education  Bicipital Tendonitis   Self Care/home Management   ADL/Home Mgmt Training (21012) 10   Skilled Intervention Education   Patient Response Good   Treatment Detail Discussion/collaboration about bicipital tendon location and symptoms of tendonitis along with beneficial treatments and exercises that PT can offer.   Plan   Home program See Ther Pro above   Plan for next session D/C for shoulder complaint   Total Session Time   Timed Code Treatment Minutes 28   Total Treatment Time (sum of timed and untimed services) 28     "

## 2022-06-23 ENCOUNTER — HOSPITAL ENCOUNTER (OUTPATIENT)
Dept: PHYSICAL THERAPY | Facility: HOSPITAL | Age: 32
Setting detail: THERAPIES SERIES
Discharge: HOME | End: 2022-06-23
Attending: FAMILY MEDICINE
Payer: OTHER MISCELLANEOUS

## 2022-06-23 PROCEDURE — 97110 THERAPEUTIC EXERCISES: CPT | Mod: GP

## 2022-07-09 ENCOUNTER — HEALTH MAINTENANCE LETTER (OUTPATIENT)
Age: 32
End: 2022-07-09

## 2022-07-12 ENCOUNTER — HOSPITAL ENCOUNTER (OUTPATIENT)
Dept: PHYSICAL THERAPY | Facility: HOSPITAL | Age: 32
Setting detail: THERAPIES SERIES
Discharge: HOME | End: 2022-07-12
Attending: FAMILY MEDICINE
Payer: OTHER MISCELLANEOUS

## 2022-07-12 ENCOUNTER — HOSPITAL ENCOUNTER (OUTPATIENT)
Facility: HOSPITAL | Age: 32
End: 2022-07-12
Attending: OBSTETRICS & GYNECOLOGY | Admitting: OBSTETRICS & GYNECOLOGY
Payer: COMMERCIAL

## 2022-07-12 PROCEDURE — 97016 VASOPNEUMATIC DEVICE THERAPY: CPT | Mod: GP

## 2022-07-12 PROCEDURE — 97140 MANUAL THERAPY 1/> REGIONS: CPT | Mod: GP

## 2022-07-19 ENCOUNTER — HOSPITAL ENCOUNTER (OUTPATIENT)
Dept: PHYSICAL THERAPY | Facility: HOSPITAL | Age: 32
Setting detail: THERAPIES SERIES
Discharge: HOME | End: 2022-07-19
Attending: FAMILY MEDICINE
Payer: OTHER MISCELLANEOUS

## 2022-07-19 PROCEDURE — 97140 MANUAL THERAPY 1/> REGIONS: CPT | Mod: GP

## 2022-07-19 PROCEDURE — 97110 THERAPEUTIC EXERCISES: CPT | Mod: GP

## 2022-07-26 ENCOUNTER — HOSPITAL ENCOUNTER (OUTPATIENT)
Dept: PHYSICAL THERAPY | Facility: HOSPITAL | Age: 32
Setting detail: THERAPIES SERIES
Discharge: HOME | End: 2022-07-26
Attending: FAMILY MEDICINE
Payer: OTHER MISCELLANEOUS

## 2022-07-26 PROCEDURE — 97535 SELF CARE MNGMENT TRAINING: CPT | Mod: GP

## 2022-07-26 NOTE — PROGRESS NOTES
07/26/22 1400   Signing Clinician's Name / Credentials   Signing clinician's name / credentials Martine (Bonilla) MARYANN Florian   Session Number   Session Number 28 (14 WC)   Subjective Report   Subjective Report Pt states that she had less soreness in ankle following last session and feels that it is more mobile. Pt endorses some increased ROM with ankle movement and increase ease with gait. Pt amicable to participation in PT today. Pt is agreeable to D/C and comfortable with maintenance program recommendations.   Treatment Interventions   Interventions Self Care/Home Management   Self Care/home Management   ADL/Home Mgmt Training (44941) 25   Skilled Intervention Education   Patient Response Good   Treatment Detail Provided patient with education on appropriate performance of HEP for next 30 days with instructions to taper into a maintenance program afterwards. Patient and PT also discussed reasons to return to therapy in the future if difficulties return.   Progress Access Code: [email or web address]  URL: [email or web address]   Assessments Completed   Assessments Completed Goals MET; Pt denies further deficits that she would like addressed with her ankle at this time   Plan   Home program Continue for 30 days and then taper to maintenance as discussed   Plan for next session D/C to IND management   Total Session Time   Timed Code Treatment Minutes 25   Total Treatment Time (sum of timed and untimed services) 25

## 2022-07-28 ENCOUNTER — HOSPITAL ENCOUNTER (OUTPATIENT)
Dept: PHYSICAL THERAPY | Facility: HOSPITAL | Age: 32
Setting detail: THERAPIES SERIES
Discharge: HOME | End: 2022-07-28
Attending: FAMILY MEDICINE
Payer: COMMERCIAL

## 2022-07-28 PROCEDURE — 97110 THERAPEUTIC EXERCISES: CPT | Mod: GP

## 2022-07-28 PROCEDURE — 97530 THERAPEUTIC ACTIVITIES: CPT | Mod: GP

## 2022-08-05 ENCOUNTER — LAB (OUTPATIENT)
Dept: LAB | Facility: OTHER | Age: 32
End: 2022-08-05
Payer: COMMERCIAL

## 2022-08-05 DIAGNOSIS — E61.1 IRON DEFICIENCY: ICD-10-CM

## 2022-08-05 LAB
FERRITIN SERPL-MCNC: 16 NG/ML (ref 12–150)
IRON SATN MFR SERPL: 33 % (ref 15–46)
IRON SERPL-MCNC: 133 UG/DL (ref 35–180)
TIBC SERPL-MCNC: 402 UG/DL (ref 240–430)

## 2022-08-05 PROCEDURE — 83550 IRON BINDING TEST: CPT

## 2022-08-05 PROCEDURE — 82728 ASSAY OF FERRITIN: CPT

## 2022-08-05 PROCEDURE — 36415 COLL VENOUS BLD VENIPUNCTURE: CPT

## 2022-08-08 NOTE — NURSING NOTE
"Chief Complaint   Patient presents with     Hypertension       Initial BP (!) 134/90 (BP Location: Left arm, Patient Position: Chair, Cuff Size: Adult Large)   Pulse 102   Temp 97.2  F (36.2  C) (Tympanic)   Resp 20   Wt 79.4 kg (175 lb)   SpO2 100%   BMI 31.00 kg/m   Estimated body mass index is 31 kg/m  as calculated from the following:    Height as of 6/13/18: 1.6 m (5' 3\").    Weight as of this encounter: 79.4 kg (175 lb).  Medication Reconciliation: complete  Judi Gallegos LPN    " Patient requests all Lab, Cardiology, and Radiology Results on their Discharge Instructions

## 2022-08-25 ENCOUNTER — OFFICE VISIT (OUTPATIENT)
Dept: FAMILY MEDICINE | Facility: OTHER | Age: 32
End: 2022-08-25
Attending: FAMILY MEDICINE
Payer: COMMERCIAL

## 2022-08-25 VITALS
WEIGHT: 185 LBS | RESPIRATION RATE: 20 BRPM | TEMPERATURE: 97.4 F | DIASTOLIC BLOOD PRESSURE: 88 MMHG | OXYGEN SATURATION: 98 % | SYSTOLIC BLOOD PRESSURE: 170 MMHG | BODY MASS INDEX: 32.77 KG/M2 | HEART RATE: 108 BPM

## 2022-08-25 DIAGNOSIS — F41.1 GAD (GENERALIZED ANXIETY DISORDER): ICD-10-CM

## 2022-08-25 DIAGNOSIS — I10 BENIGN ESSENTIAL HYPERTENSION: Primary | ICD-10-CM

## 2022-08-25 PROCEDURE — 99214 OFFICE O/P EST MOD 30 MIN: CPT | Performed by: FAMILY MEDICINE

## 2022-08-25 RX ORDER — BLOOD-GLUCOSE METER
EACH MISCELLANEOUS SEE ADMIN INSTRUCTIONS
COMMUNITY
Start: 2022-05-25

## 2022-08-25 RX ORDER — LANCETS 33 GAUGE
EACH MISCELLANEOUS
COMMUNITY
Start: 2022-05-25

## 2022-08-25 RX ORDER — PROPRANOLOL HYDROCHLORIDE 10 MG/1
10 TABLET ORAL 3 TIMES DAILY
Qty: 90 TABLET | Refills: 1 | Status: ON HOLD | OUTPATIENT
Start: 2022-08-25 | End: 2024-01-29

## 2022-08-25 RX ORDER — BLOOD SUGAR DIAGNOSTIC
STRIP MISCELLANEOUS
COMMUNITY
Start: 2022-05-25

## 2022-08-25 ASSESSMENT — PAIN SCALES - GENERAL: PAINLEVEL: NO PAIN (0)

## 2022-08-25 NOTE — PROGRESS NOTES
"  Assessment & Plan     Benign essential hypertension  Start bid for now and can titrate depending on symptoms up to TID  Follow-up 2 wks  - propranolol (INDERAL) 10 MG tablet; Take 1 tablet (10 mg) by mouth 3 times daily    MARIBETH (generalized anxiety disorder)  See above -- will double treat  - propranolol (INDERAL) 10 MG tablet; Take 1 tablet (10 mg) by mouth 3 times daily    Provider  Link to [facility] Help Grid :[id number]}     BMI:   Estimated body mass index is 32.77 kg/m  as calculated from the following:    Height as of 1/17/22: 1.6 m (5' 3\").    Weight as of this encounter: 83.9 kg (185 lb).   Weight management plan: Discussed healthy diet and exercise guidelines    Patient was agreeable to this plan and had no further questions.  There are no Patient Instructions on file for this visit.    No follow-ups on file.    Muriel Jones MD  [facility] - ZORAIDA Garcia is a 31 year old, presenting for the following health issues:  Hypertension      HPI     Hypertension Follow-up  Could also be from her MS meds    Do you check your blood pressure regularly outside of the clinic? Yes     Are you following a low salt diet? Yes    Are your blood pressures ever more than 140 on the top number (systolic) OR more   than 90 on the bottom number (diastolic), for example 140/90? Yes      Anxiety Follow-Up    How are you doing with your anxiety since your last visit? Worsened work stress but maybe high bp    Are you having other symptoms that might be associated with anxiety? Yes:  htn    Have you had a significant life event? Job Concerns     Are you feeling depressed? No    Do you have any concerns with your use of alcohol or other drugs? No    Social History     Tobacco Use     Smoking status: Never Smoker     Smokeless tobacco: Never Used   Vaping Use     Vaping Use: Never used   Substance Use Topics     Alcohol use: Yes     Alcohol/week: 0.0 standard drinks     Comment: rare     Drug use: No "     MARIBETH-7 SCORE 12/30/2020 7/2/2021 11/12/2021   Total Score 0 0 7     PHQ 7/2/2021 11/12/2021 4/14/2022   PHQ-9 Total Score 0 7 0   Q9: Thoughts of better off dead/self-harm past 2 weeks Not at all Not at all Not at all     Last PHQ-9 4/14/2022   1.  Little interest or pleasure in doing things 0   2.  Feeling down, depressed, or hopeless 0   3.  Trouble falling or staying asleep, or sleeping too much 0   4.  Feeling tired or having little energy 0   5.  Poor appetite or overeating 0   6.  Feeling bad about yourself 0   7.  Trouble concentrating 0   8.  Moving slowly or restless 0   Q9: Thoughts of better off dead/self-harm past 2 weeks 0   PHQ-9 Total Score 0   Difficulty at work, home, or with people Not difficult at all     MARIBETH-7  11/12/2021   1. Feeling nervous, anxious, or on edge 1   2. Not being able to stop or control worrying 1   3. Worrying too much about different things 1   4. Trouble relaxing 2   5. Being so restless that it is hard to sit still 1   6. Becoming easily annoyed or irritable 1   7. Feeling afraid, as if something awful might happen 0   MARIBETH-7 Total Score 7   If you checked any problems, how difficult have they made it for you to do your work, take care of things at home, or get along with other people? Somewhat difficult         How many servings of fruits and vegetables do you eat daily?  2-3    On average, how many sweetened beverages do you drink each day (Examples: soda, juice, sweet tea, etc.  Do NOT count diet or artificially sweetened beverages)?   0    How many days per week do you exercise enough to make your heart beat faster? 3 or less    How many minutes a day do you exercise enough to make your heart beat faster? 20 - 29    How many days per week do you miss taking your medication? 0      Review of Systems   Constitutional, HEENT, cardiovascular, pulmonary, gi and gu systems are negative, except as otherwise noted.      Objective    BP (!) 170/88 (BP Location: Right arm,  Patient Position: Chair, Cuff Size: Adult Regular)   Pulse 108   Temp 97.4  F (36.3  C) (Tympanic)   Resp 20   Wt 83.9 kg (185 lb)   LMP 12/25/2021   SpO2 98%   BMI 32.77 kg/m    Body mass index is 32.77 kg/m .  Physical Exam   GENERAL: healthy, alert and no distress  RESP: lungs clear to auscultation - no rales, rhonchi or wheezes  CV: regular rate and rhythm, normal S1 S2, no S3 or S4, no murmur, click or rub, no peripheral edema and peripheral pulses strong  MS: no gross musculoskeletal defects noted, no edema  PSYCH: mentation appears normal, affect normal/bright    No results found for any visits on 08/25/22.              .  ..

## 2022-08-25 NOTE — NURSING NOTE
"Chief Complaint   Patient presents with     Hypertension       Initial BP (!) 170/88 (BP Location: Right arm, Patient Position: Chair, Cuff Size: Adult Regular)   Pulse 108   Temp 97.4  F (36.3  C) (Tympanic)   Resp 20   Wt 83.9 kg (185 lb)   LMP 12/25/2021   SpO2 98%   BMI 32.77 kg/m   Estimated body mass index is 32.77 kg/m  as calculated from the following:    Height as of 1/17/22: 1.6 m (5' 3\").    Weight as of this encounter: 83.9 kg (185 lb).  Medication Reconciliation: complete  Danielle Goel    "

## 2022-09-04 ENCOUNTER — HEALTH MAINTENANCE LETTER (OUTPATIENT)
Age: 32
End: 2022-09-04

## 2022-09-19 ENCOUNTER — OFFICE VISIT (OUTPATIENT)
Dept: FAMILY MEDICINE | Facility: OTHER | Age: 32
End: 2022-09-19
Attending: STUDENT IN AN ORGANIZED HEALTH CARE EDUCATION/TRAINING PROGRAM
Payer: COMMERCIAL

## 2022-09-19 VITALS
WEIGHT: 185 LBS | HEART RATE: 90 BPM | BODY MASS INDEX: 32.78 KG/M2 | DIASTOLIC BLOOD PRESSURE: 80 MMHG | OXYGEN SATURATION: 99 % | TEMPERATURE: 98 F | RESPIRATION RATE: 16 BRPM | HEIGHT: 63 IN | SYSTOLIC BLOOD PRESSURE: 150 MMHG

## 2022-09-19 DIAGNOSIS — R10.11 RUQ ABDOMINAL PAIN: ICD-10-CM

## 2022-09-19 DIAGNOSIS — F41.1 GENERALIZED ANXIETY DISORDER: ICD-10-CM

## 2022-09-19 DIAGNOSIS — K21.00 GASTROESOPHAGEAL REFLUX DISEASE WITH ESOPHAGITIS WITHOUT HEMORRHAGE: Primary | ICD-10-CM

## 2022-09-19 LAB
ALBUMIN SERPL-MCNC: 3.9 G/DL (ref 3.4–5)
ALP SERPL-CCNC: 87 U/L (ref 40–150)
ALT SERPL W P-5'-P-CCNC: 98 U/L (ref 0–50)
ANION GAP SERPL CALCULATED.3IONS-SCNC: 6 MMOL/L (ref 3–14)
AST SERPL W P-5'-P-CCNC: 44 U/L (ref 0–45)
BASOPHILS # BLD AUTO: 0.1 10E3/UL (ref 0–0.2)
BASOPHILS NFR BLD AUTO: 1 %
BILIRUB SERPL-MCNC: 0.3 MG/DL (ref 0.2–1.3)
BUN SERPL-MCNC: 7 MG/DL (ref 7–30)
CALCIUM SERPL-MCNC: 9.2 MG/DL (ref 8.5–10.1)
CHLORIDE BLD-SCNC: 101 MMOL/L (ref 94–109)
CO2 SERPL-SCNC: 26 MMOL/L (ref 20–32)
CREAT SERPL-MCNC: 0.52 MG/DL (ref 0.52–1.04)
EOSINOPHIL # BLD AUTO: 0.8 10E3/UL (ref 0–0.7)
EOSINOPHIL NFR BLD AUTO: 5 %
ERYTHROCYTE [DISTWIDTH] IN BLOOD BY AUTOMATED COUNT: 14.7 % (ref 10–15)
GFR SERPL CREATININE-BSD FRML MDRD: >90 ML/MIN/1.73M2
GLUCOSE BLD-MCNC: 179 MG/DL (ref 70–99)
HCT VFR BLD AUTO: 41.6 % (ref 35–47)
HGB BLD-MCNC: 13.6 G/DL (ref 11.7–15.7)
IMM GRANULOCYTES # BLD: 0.1 10E3/UL
IMM GRANULOCYTES NFR BLD: 0 %
LYMPHOCYTES # BLD AUTO: 3.8 10E3/UL (ref 0.8–5.3)
LYMPHOCYTES NFR BLD AUTO: 24 %
MCH RBC QN AUTO: 26.8 PG (ref 26.5–33)
MCHC RBC AUTO-ENTMCNC: 32.7 G/DL (ref 31.5–36.5)
MCV RBC AUTO: 82 FL (ref 78–100)
MONOCYTES # BLD AUTO: 1 10E3/UL (ref 0–1.3)
MONOCYTES NFR BLD AUTO: 6 %
NEUTROPHILS # BLD AUTO: 10.2 10E3/UL (ref 1.6–8.3)
NEUTROPHILS NFR BLD AUTO: 64 %
NRBC # BLD AUTO: 0 10E3/UL
NRBC BLD AUTO-RTO: 0 /100
PLATELET # BLD AUTO: 491 10E3/UL (ref 150–450)
POTASSIUM BLD-SCNC: 3.7 MMOL/L (ref 3.4–5.3)
PROT SERPL-MCNC: 8.3 G/DL (ref 6.8–8.8)
RBC # BLD AUTO: 5.07 10E6/UL (ref 3.8–5.2)
SODIUM SERPL-SCNC: 133 MMOL/L (ref 133–144)
WBC # BLD AUTO: 15.8 10E3/UL (ref 4–11)

## 2022-09-19 PROCEDURE — 85025 COMPLETE CBC W/AUTO DIFF WBC: CPT | Performed by: STUDENT IN AN ORGANIZED HEALTH CARE EDUCATION/TRAINING PROGRAM

## 2022-09-19 PROCEDURE — 99214 OFFICE O/P EST MOD 30 MIN: CPT | Performed by: STUDENT IN AN ORGANIZED HEALTH CARE EDUCATION/TRAINING PROGRAM

## 2022-09-19 PROCEDURE — 80053 COMPREHEN METABOLIC PANEL: CPT | Performed by: STUDENT IN AN ORGANIZED HEALTH CARE EDUCATION/TRAINING PROGRAM

## 2022-09-19 PROCEDURE — 36415 COLL VENOUS BLD VENIPUNCTURE: CPT | Performed by: STUDENT IN AN ORGANIZED HEALTH CARE EDUCATION/TRAINING PROGRAM

## 2022-09-19 RX ORDER — PANTOPRAZOLE SODIUM 40 MG/1
40 TABLET, DELAYED RELEASE ORAL DAILY
Qty: 30 TABLET | Refills: 1 | Status: ON HOLD | OUTPATIENT
Start: 2022-09-19 | End: 2023-01-20

## 2022-09-19 ASSESSMENT — PAIN SCALES - GENERAL: PAINLEVEL: MODERATE PAIN (5)

## 2022-09-19 NOTE — PROGRESS NOTES
Assessment & Plan     Gastroesophageal reflux disease with esophagitis without hemorrhage  No red flag symptoms.  Anxiety I think is contributing to this  Mild epigastric tenderness to deep palpation  Discussed lifestyle and dietary changes: avoid trigger foods, timing of meals before bed time, smaller portions, elevation of head of bed  Will Rx protonix 40 mg daily.  If not improved in 2-3 weeks, will consider testing for H. Pylori.  Might benefit from upper endoscopy.  Conservative measures first  Can try OTC [place] stomach formula to naturally balance the pH of the stomach  - pantoprazole (PROTONIX) 40 MG EC tablet; Take 1 tablet (40 mg) by mouth daily    RUQ abdominal pain  Negative for red flag symptoms such as sudden onset abdominal pain, hematemesis, changes in bowels, dysphagia, persistent vomiting, abdominal distension  RUQ tenderness to deep palpation, negative pace sign.  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    Odette Tay MD  [facility] - ZORAIDA Garcia is a 32 year old female, presenting for the following health issues:  Gastrointestinal Problem      HPI     Mid upper gastric burning sensation, bad this morning. Has been bothering her for about a week now, since last Monday.  Was quite painful and needed to sleep upright.    Went away after she took Tums. Accompanied by nausea in the AM.  Symptoms worse on an empty stomach.  No weight loss, f/c, night sweats, melena or hematochezia, and no severe abdominal pain.   "Denies constipation, diarrhea, emesis, coughing.   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  Has tried Prilosec 20 mg, semeed to be helping at first.  Taking Tums as well which does seem to help somewhat.   Does take ibuprofen for menses related bloating and discomfort, always with food.          Pain History:  When did you first notice your pain? - Acute Pain   Have you seen anyone else for your pain? No  Where in your body do you have pain? Abdominal/Flank Pain  Onset/Duration: 1 week   Description:   Character: Burning  Location: right upper quadrant left upper quadrant  Radiation: None  Intensity: moderate  Progression of Symptoms:  Worsening on en empty stomach   Accompanying Signs & Symptoms:  Fever/Chills: No  Gas/Bloating: YES- bloating   Nausea: No  Vomitting: No  Diarrhea: No  Constipation: No  Dysuria or Hematuria: No  History:   Trauma: No  Previous similar pain: No  Previous tests done: none  Precipitating factors:   Does the pain change with:     Food: No    Bowel Movement: No    Urination: No   Other factors:  No  Therapies tried and outcome: None  Patient's last menstrual period was 12/25/2021.    Review of Systems   Constitutional, HEENT, cardiovascular, pulmonary, gi and gu systems are negative, except as otherwise noted.      Objective    BP (!) 150/80 (BP Location: Right arm, Patient Position: Chair)   Pulse 90   Temp 98  F (36.7  C)   Resp 16   Ht 1.6 m (5' 3\")   Wt 83.9 kg (185 lb)   LMP 12/25/2021   SpO2 99%   BMI 32.77 kg/m    Body mass index is 32.77 kg/m .  Physical Exam   GENERAL: healthy, alert and no distress  EYES: Eyes grossly normal to inspection, PERRL and conjunctivae and sclerae normal  HENT: ear canals and TM's normal, nose and mouth without ulcers or lesions  NECK: no adenopathy, no asymmetry, masses, or scars and thyroid normal to palpation  RESP: lungs clear to auscultation - no rales, rhonchi or wheezes  CV: regular rate and rhythm, normal S1 S2, no S3 " or S4, no murmur, click or rub, no peripheral edema and peripheral pulses strong  ABDOMEN: soft, RUQ tenderness to deep palpation, mild epigastric discomfort with deep palpation, negative Marte sign no hepatosplenomegaly, no masses and bowel sounds normal  MS: no gross musculoskeletal defects noted, no edema  SKIN: no suspicious lesions or rashes  NEURO: Normal strength and tone, mentation intact and speech normal  PSYCH: mentation appears normal, affect normal/bright

## 2022-09-19 NOTE — NURSING NOTE
"Chief Complaint   Patient presents with     Gastrointestinal Problem       Initial BP (!) 150/80 (BP Location: Right arm, Patient Position: Chair)   Pulse 90   Temp 98  F (36.7  C)   Resp 16   Ht 1.6 m (5' 3\")   Wt 83.9 kg (185 lb)   LMP 12/25/2021   SpO2 99%   BMI 32.77 kg/m   Estimated body mass index is 32.77 kg/m  as calculated from the following:    Height as of this encounter: 1.6 m (5' 3\").    Weight as of this encounter: 83.9 kg (185 lb).  Medication Reconciliation: complete  Izabella Mejia LPN    "

## 2022-09-19 NOTE — PATIENT INSTRUCTIONS
L-theanine 100 mg twice daily for anxiety    You can try [place] Stomach formula to try to naturally balance the pH of your stomach.

## 2022-09-21 DIAGNOSIS — R74.8 ELEVATED LIVER ENZYMES: Primary | ICD-10-CM

## 2022-09-27 DIAGNOSIS — K21.9 GASTROESOPHAGEAL REFLUX DISEASE, UNSPECIFIED WHETHER ESOPHAGITIS PRESENT: Primary | ICD-10-CM

## 2022-09-27 RX ORDER — SUCRALFATE 1 G/1
1 TABLET ORAL 4 TIMES DAILY
Qty: 120 TABLET | Refills: 1 | Status: SHIPPED | OUTPATIENT
Start: 2022-09-27 | End: 2023-01-19

## 2022-09-28 ENCOUNTER — HOSPITAL ENCOUNTER (OUTPATIENT)
Dept: ULTRASOUND IMAGING | Facility: HOSPITAL | Age: 32
Discharge: HOME | End: 2022-09-28
Attending: STUDENT IN AN ORGANIZED HEALTH CARE EDUCATION/TRAINING PROGRAM | Admitting: STUDENT IN AN ORGANIZED HEALTH CARE EDUCATION/TRAINING PROGRAM
Payer: COMMERCIAL

## 2022-09-28 DIAGNOSIS — R74.8 ELEVATED LIVER ENZYMES: ICD-10-CM

## 2022-09-28 PROCEDURE — 76705 ECHO EXAM OF ABDOMEN: CPT

## 2022-09-29 ENCOUNTER — LAB (OUTPATIENT)
Dept: LAB | Facility: OTHER | Age: 32
End: 2022-09-29
Payer: COMMERCIAL

## 2022-09-29 ENCOUNTER — APPOINTMENT (OUTPATIENT)
Dept: LAB | Facility: OTHER | Age: 32
End: 2022-09-29
Payer: COMMERCIAL

## 2022-09-29 DIAGNOSIS — K21.9 GASTROESOPHAGEAL REFLUX DISEASE, UNSPECIFIED WHETHER ESOPHAGITIS PRESENT: ICD-10-CM

## 2022-09-29 PROCEDURE — 87338 HPYLORI STOOL AG IA: CPT

## 2022-09-30 DIAGNOSIS — K21.00 GASTROESOPHAGEAL REFLUX DISEASE WITH ESOPHAGITIS, UNSPECIFIED WHETHER HEMORRHAGE: Primary | ICD-10-CM

## 2022-10-03 LAB — H PYLORI AG STL QL IA: NEGATIVE

## 2022-10-06 ENCOUNTER — HOSPITAL ENCOUNTER (OUTPATIENT)
Dept: MRI IMAGING | Facility: HOSPITAL | Age: 32
Discharge: HOME | End: 2022-10-06
Attending: PSYCHIATRY & NEUROLOGY
Payer: COMMERCIAL

## 2022-10-06 DIAGNOSIS — G35 MS (MULTIPLE SCLEROSIS) (H): ICD-10-CM

## 2022-10-06 PROCEDURE — 70553 MRI BRAIN STEM W/O & W/DYE: CPT

## 2022-10-06 PROCEDURE — A9585 GADOBUTROL INJECTION: HCPCS | Performed by: RADIOLOGY

## 2022-10-06 PROCEDURE — 255N000002 HC RX 255 OP 636: Performed by: RADIOLOGY

## 2022-10-06 PROCEDURE — 72156 MRI NECK SPINE W/O & W/DYE: CPT

## 2022-10-06 RX ORDER — GADOBUTROL 604.72 MG/ML
7.5 INJECTION INTRAVENOUS ONCE
Status: COMPLETED | OUTPATIENT
Start: 2022-10-06 | End: 2022-10-06

## 2022-10-06 RX ADMIN — GADOBUTROL 7.5 ML: 604.72 INJECTION INTRAVENOUS at 17:57

## 2022-10-07 ENCOUNTER — HOSPITAL ENCOUNTER (OUTPATIENT)
Facility: HOSPITAL | Age: 32
End: 2022-10-07
Attending: OBSTETRICS & GYNECOLOGY | Admitting: OBSTETRICS & GYNECOLOGY
Payer: COMMERCIAL

## 2022-10-12 ENCOUNTER — OFFICE VISIT (OUTPATIENT)
Dept: SURGERY | Facility: OTHER | Age: 32
End: 2022-10-12
Attending: STUDENT IN AN ORGANIZED HEALTH CARE EDUCATION/TRAINING PROGRAM
Payer: COMMERCIAL

## 2022-10-12 VITALS
HEART RATE: 103 BPM | DIASTOLIC BLOOD PRESSURE: 100 MMHG | OXYGEN SATURATION: 99 % | WEIGHT: 185 LBS | SYSTOLIC BLOOD PRESSURE: 158 MMHG | BODY MASS INDEX: 32.78 KG/M2 | TEMPERATURE: 98.4 F | HEIGHT: 63 IN

## 2022-10-12 DIAGNOSIS — R10.13 EPIGASTRIC PAIN: Primary | ICD-10-CM

## 2022-10-12 DIAGNOSIS — K21.00 GASTROESOPHAGEAL REFLUX DISEASE WITH ESOPHAGITIS, UNSPECIFIED WHETHER HEMORRHAGE: ICD-10-CM

## 2022-10-12 PROCEDURE — 99203 OFFICE O/P NEW LOW 30 MIN: CPT | Performed by: SURGERY

## 2022-10-12 ASSESSMENT — PAIN SCALES - GENERAL: PAINLEVEL: MODERATE PAIN (5)

## 2022-10-12 NOTE — PATIENT INSTRUCTIONS
Thank you for allowing Dr. Romano and our surgical team to participate in your care. Please call our health unit coordinator at [phone] with scheduling questions or the nurse at [phone] with any other questions or concerns.

## 2022-10-12 NOTE — NURSING NOTE
"Chief Complaint   Patient presents with     Consult     GERD, upper endoscopy        Initial BP (!) 158/100   Pulse 103   Temp 98.4  F (36.9  C)   Ht 1.6 m (5' 3\")   Wt 83.9 kg (185 lb)   LMP 12/25/2021   SpO2 99%   BMI 32.77 kg/m   Estimated body mass index is 32.77 kg/m  as calculated from the following:    Height as of this encounter: 1.6 m (5' 3\").    Weight as of this encounter: 83.9 kg (185 lb).  Medication Reconciliation: complete  Bailey Stanley LPN  "

## 2022-10-13 ENCOUNTER — OFFICE VISIT (OUTPATIENT)
Dept: NEUROLOGY | Facility: CLINIC | Age: 32
End: 2022-10-13
Attending: PSYCHIATRY & NEUROLOGY
Payer: COMMERCIAL

## 2022-10-13 VITALS
DIASTOLIC BLOOD PRESSURE: 103 MMHG | HEART RATE: 103 BPM | WEIGHT: 193.1 LBS | BODY MASS INDEX: 34.21 KG/M2 | OXYGEN SATURATION: 95 % | SYSTOLIC BLOOD PRESSURE: 173 MMHG

## 2022-10-13 DIAGNOSIS — G35 MS (MULTIPLE SCLEROSIS) (H): Primary | ICD-10-CM

## 2022-10-13 PROCEDURE — 99214 OFFICE O/P EST MOD 30 MIN: CPT | Mod: GC | Performed by: PSYCHIATRY & NEUROLOGY

## 2022-10-13 ASSESSMENT — PAIN SCALES - GENERAL: PAINLEVEL: NO PAIN (0)

## 2022-10-13 NOTE — Clinical Note
10/13/2022       RE: Radha Carl  [address]  [address]  [place] [address]     Dear Colleague,    Thank you for referring your patient, Radha Carl, to the [facility] [place] [facility]. Please see a copy of my visit note below.    Multiple Sclerosis Clinic Visit  10/13/2022    Reason: Multiple Sclerosis      Source of information: Patient and chart review    History of Present Symptom:  Radha Carl is a 32 year old female with a PMH significant for hypertension, ADHD, relapsing multiple sclerosis who presents for follow up.    She reports no change in symptoms. No new numbness, weakness, double vision, or balance difficulty.        She has restarted glatiramer acetate and is doing better with the injections from a side effect standpoint.     She is still off of birth control and aiming for pregnancy. She has a laparoscopic procedure to evaluate for endometriosis. She states they will likely give one more year of time and if at that point they would stop trying.     Disease course:   May 2013 developed ascending paresthesias. Held a diagnosis of clinically isolated syndrome until 2018 when she developed weakness in the legs. She declined DMT until Dec 2020 when she developed vertigo and new lesions on imaging. She started dimethyl fumarate and developed GI side effects. She then tried glatiramer acetate but developed muscle stiffness, dizziness, headaches and dots in her vision.     She developed a new demyelinating lesion 9/8/21 and restarted glatiramer acetate. Discussion was had about Ocrevus but she deferred due to planning for pregnancy.   Most recent relapse:  9/2021 + MRI with left cerebellar peduncle enhancement.     Fatigue is the biggest things she notices. This is variable but she does fall asleep frequently on the couch in the evening. This happens about 25 % of the time.     Symptom  management:  Bowel/bladder changes: Stable.   Gait/balance: Does get bilateral leg weakness with walking distances. Standing for prolonged periods of time causes back pain and worsens leg weakness. She feels this decline has happened over about 5 years, no major change in the past year.      The patient's medical, surgical, social, and family history were personally reviewed with the patient.  Past Medical History:   Diagnosis Date     ADHD (attention deficit hyperactivity disorder)      Attention deficit hyperactivity disorder (ADHD), predominantly inattentive type 02/28/2014     Problem list name updated by automated process. Provider to review     Benign essential hypertension 02/14/2020     Cervicalgia 11/12/2021     Chronic tension-type headache, not intractable 03/11/2020     Cold sore      Costochondral pain 11/29/2019     Dysmenorrhea 08/18/2016     Elevated glucose 2014    resolved 2016     Elevated liver enzymes 2014    resolved 2015     Fibrocystic breast      MARIBETH (generalized anxiety disorder)      Hyperlipidemia with target LDL less than 100 08/08/2014     Diagnosis updated by automated process. Provider to review and confirm.     Hypovitaminosis D      Infertility, female, secondary 07/19/2016     Intramural leiomyoma of uterus 01/14/2022     MS (multiple sclerosis) (H) 2012     Neuropathy     feet     Obesity      PCOS (polycystic ovarian syndrome)      Tubal pregnancy without intrauterine pregnancy 05/24/2013     Visual field scotoma 05/24/2013     Vitamin B12 deficiency (non anemic) 06/25/2015      Past Surgical History:   Procedure Laterality Date     HYSTEROSCOPY,DIAGNOSTIC  2016    done here and at [facility]     LAPAROSCOPIC SALPINGECTOMY Left 05/01/2010    ruptured ectopic pregnancy     wisdom teeth  2014     Social History     Tobacco Use     Smoking status: Never     Smokeless tobacco: Never   Vaping Use     Vaping Use: Never used   Substance Use Topics     Alcohol use: Yes      Alcohol/week: 0.0 standard drinks     Comment: rare     Drug use: No     Family History   Problem Relation Age of Onset     Family History Negative Mother      Unknown/Adopted Father      Family History Negative Sister      Family History Negative Sister      Obesity Sister         iron def     Hypertension Sister      Diabetes Sister         pre     Migraines Sister      Psychotic Disorder Sister         ADHD     Family History Negative Brother      Breast Cancer Maternal Grandmother 64     Unknown/Adopted Maternal Grandfather         severe gerd     Lung Cancer Paternal Grandmother         +tobacco     Heart Disease Paternal Grandfather         AMI/CAD or CVA     Current Outpatient Medications   Medication     acetaminophen (TYLENOL) 500 MG tablet     baclofen (LIORESAL) 10 MG tablet     blood glucose monitoring (ONE TOUCH ULTRA 2) meter device kit     cyclobenzaprine (FLEXERIL) 10 MG tablet     glatiramer acetate 40 MG/ML injection     ibuprofen (ADVIL,MOTRIN) 200 MG tablet     Lancets (ONETOUCH DELICA PLUS [id number]) MISC     lisdexamfetamine (VYVANSE) 10 MG capsule     Meloxicam (MOBIC PO)     Multiple Vitamins-Minerals (OPTIVITE P.M.T.) TABS     ONETOUCH ULTRA test strip     order for DME     pantoprazole (PROTONIX) 40 MG EC tablet     progesterone (PROMETRIUM) 100 MG capsule     propranolol (INDERAL) 10 MG tablet     sucralfate (CARAFATE) 1 GM tablet     valACYclovir (VALTREX) 500 MG tablet     vitamin D3 (CHOLECALCIFEROL) 1.25 MG (41438 UT) capsule     No current facility-administered medications for this visit.     Allergies   Allergen Reactions     Cats      Sulfa Drugs Rash     Sulfonamide antibiotics         Review of Systems:  14-point review of systems was completed. The pertinent positives and negatives are in the HPI.    Physical Examination   General: Patient appears comfortable in no acute distress.   HEENT: NC/AT, no icterus, moist mucous membranes  Chest: non-labored on RA  Extremities: Warm, no  edema  Skin: No rash or lesion   Psych: Affect appropriate for situation   Neuro:  Mental status: Awake, alert, attentive. Language is fluent with intact comprehension of commands.  Cranial nerves: PERRL with no relative afferent pupillary defect, conjugate gaze, EOMI, visual fields intact, face symmetric, shoulder shrug strong, tongue protrusion/uvula midline, no dysarthria.   Motor: Normal muscle bulk and tone. No abnormal movements. 5/5 strength in 4/4 extremities.     R L  Deltoid  5 5  Biceps  5 5  Triceps  5 5  Wrist ext 5 5  Finger ext 5 5  Finger abd 5 5-    Hip flexion 5- 5  Knee flexion 5 5  Knee ext 5- 5  Dorsiflexion 5- 5    Reflexes:  Brisk reflexes symmetric in biceps, brachioradialis, 3+ R/ brisk 2+ Lpatellae, and achilles. No clonus, toes up-going.  Sensory: Intact to light touch, pin, vibration, and proprioception. Romberg is performed for a few seconds only.    Coordination: FNF without ataxia or dysmetria.    Gait: Wide based ataxic gait with hyperextension of the right knee. Tandem walk impaired.     Laboratory:  Vitamin D 52 (20-75)     Imaging:    MRI today with ongoing enhancement L cerebellar seen on sagittal cut ped and subtle enhancement right aspect of cord at C4-C5.      Assessment/Plan:  Radha Carl is a 32 year old female who presents for follow up for relapsing remitting multiple sclerosis.     We discussed that she has one definite contrast enhancing lesion in the left cerebellar peduncle which was visualized in 2021. She also has one subtle area of possible enhancement in the cervical cord.     She remains symptomatically stable per report. On her exam she does have brisk reflexes throughout, particularly in the right LE and she does have an ataxic gait with some subtle weakness in the right leg.     We discussed that ideally she would be on a higher efficacy medication like Ocrevus considering the ongoing areas of enhancement on her MRI. We discussed that this can be a good  option in pregnancy however requires a period of about 6 month after receiving the infusion to wait for the medication to leave the system. The benefit with Ocrevus is that the effect can continue to last for 3-6 months after. We discussed breast cancer risk which was noted in the primary progressive trial. This has not seemed to be a risk in more longitudinal use demonstrated by aftermarket data. Her grandmother  of breast CA.     We discussed other options being to continue glatiramer which is typically safe around pregnancy. We also discussed Tecfidera which does not require a wash out period but also does not have lasting efficacy after it is discontinued.     She is open to switching to Ocrevus in the future but would like to wait till she is no longer trying for pregnancy. She estimates this will be in about one year. We would recommend q 6 mo imaging monitoring during this time.     - MRI 6 mo  - follow up 6 mo (in person or virtual)   - please watch for new symptoms and let the clinic know     Patient seen and discussed with Dr. Gracia.  I have reviewed the plan with the patient, who is in agreement.      Taylor Scanlon DO  Multiple Sclerosis Fellow     Attending physician: I saw and evaluated the patient with Dr. Scanlon, and I agree with her findings and plan of care as documented above, with the following additions.    I reviewed and independently interpreted earlier MRI scans of the brain and cervical spine, which show evidence of ongoing active inflammatory demyelination, including enhancement in the left cerebellar peduncle. I find reported enhancement in the cervical cord equivocal; nonetheless, the images suggest inadequate control of demyelinating disease.    We discussed therapeutic options; as above, at present she would prefer to remain on glatiramer acetate given desired conception. The plan is to repeat MRI imaging in 6 months and see her back for discussion afterwards.    Santo BACA  MD Basil   of Neurology  [facility]                  Again, thank you for allowing me to participate in the care of your patient.      Sincerely,    Santo Gracia MD

## 2022-10-13 NOTE — PROGRESS NOTES
Multiple Sclerosis Clinic Visit  10/13/2022    Reason: Multiple Sclerosis      Source of information: Patient and chart review    History of Present Symptom:  Radha Carl is a 32 year old female with a PMH significant for hypertension, ADHD, relapsing multiple sclerosis who presents for follow up.    She reports no change in symptoms. No new numbness, weakness, double vision, or balance difficulty.        She has restarted glatiramer acetate and is doing better with the injections from a side effect standpoint.     She is still off of birth control and aiming for pregnancy. She has a laparoscopic procedure to evaluate for endometriosis. She states they will likely give one more year of time and if at that point they would stop trying.     Disease course:   May 2013 developed ascending paresthesias. Held a diagnosis of clinically isolated syndrome until 2018 when she developed weakness in the legs. She declined DMT until Dec 2020 when she developed vertigo and new lesions on imaging. She started dimethyl fumarate and developed GI side effects. She then tried glatiramer acetate but developed muscle stiffness, dizziness, headaches and dots in her vision.     She developed a new demyelinating lesion 9/8/21 and restarted glatiramer acetate. Discussion was had about Ocrevus but she deferred due to planning for pregnancy.   Most recent relapse:  9/2021 + MRI with left cerebellar peduncle enhancement.     Fatigue is the biggest things she notices. This is variable but she does fall asleep frequently on the couch in the evening. This happens about 25 % of the time.     Symptom management:  Bowel/bladder changes: Stable.   Gait/balance: Does get bilateral leg weakness with walking distances. Standing for prolonged periods of time causes back pain and worsens leg weakness. She feels this decline has happened over about 5 years, no major change in the past year.      The patient's medical, surgical, social, and family  history were personally reviewed with the patient.  Past Medical History:   Diagnosis Date     ADHD (attention deficit hyperactivity disorder)      Attention deficit hyperactivity disorder (ADHD), predominantly inattentive type 02/28/2014     Problem list name updated by automated process. Provider to review     Benign essential hypertension 02/14/2020     Cervicalgia 11/12/2021     Chronic tension-type headache, not intractable 03/11/2020     Cold sore      Costochondral pain 11/29/2019     Dysmenorrhea 08/18/2016     Elevated glucose 2014    resolved 2016     Elevated liver enzymes 2014    resolved 2015     Fibrocystic breast      MARIBETH (generalized anxiety disorder)      Hyperlipidemia with target LDL less than 100 08/08/2014     Diagnosis updated by automated process. Provider to review and confirm.     Hypovitaminosis D      Infertility, female, secondary 07/19/2016     Intramural leiomyoma of uterus 01/14/2022     MS (multiple sclerosis) (H) 2012     Neuropathy     feet     Obesity      PCOS (polycystic ovarian syndrome)      Tubal pregnancy without intrauterine pregnancy 05/24/2013     Visual field scotoma 05/24/2013     Vitamin B12 deficiency (non anemic) 06/25/2015      Past Surgical History:   Procedure Laterality Date     HYSTEROSCOPY,DIAGNOSTIC  2016    done here and at [facility]     LAPAROSCOPIC SALPINGECTOMY Left 05/01/2010    ruptured ectopic pregnancy     wisdom teeth  2014     Social History     Tobacco Use     Smoking status: Never     Smokeless tobacco: Never   Vaping Use     Vaping Use: Never used   Substance Use Topics     Alcohol use: Yes     Alcohol/week: 0.0 standard drinks     Comment: rare     Drug use: No     Family History   Problem Relation Age of Onset     Family History Negative Mother      Unknown/Adopted Father      Family History Negative Sister      Family History Negative Sister      Obesity Sister         iron def     Hypertension Sister      Diabetes Sister         pre      Migraines Sister      Psychotic Disorder Sister         ADHD     Family History Negative Brother      Breast Cancer Maternal Grandmother 64     Unknown/Adopted Maternal Grandfather         severe gerd     Lung Cancer Paternal Grandmother         +tobacco     Heart Disease Paternal Grandfather         AMI/CAD or CVA     Current Outpatient Medications   Medication     acetaminophen (TYLENOL) 500 MG tablet     baclofen (LIORESAL) 10 MG tablet     blood glucose monitoring (ONE TOUCH ULTRA 2) meter device kit     cyclobenzaprine (FLEXERIL) 10 MG tablet     glatiramer acetate 40 MG/ML injection     ibuprofen (ADVIL,MOTRIN) 200 MG tablet     Lancets (ONETOUCH DELICA PLUS [id number]) MISC     lisdexamfetamine (VYVANSE) 10 MG capsule     Meloxicam (MOBIC PO)     Multiple Vitamins-Minerals (OPTIVITE P.M.T.) TABS     ONETOUCH ULTRA test strip     order for DME     pantoprazole (PROTONIX) 40 MG EC tablet     progesterone (PROMETRIUM) 100 MG capsule     propranolol (INDERAL) 10 MG tablet     sucralfate (CARAFATE) 1 GM tablet     valACYclovir (VALTREX) 500 MG tablet     vitamin D3 (CHOLECALCIFEROL) 1.25 MG (08707 UT) capsule     No current facility-administered medications for this visit.     Allergies   Allergen Reactions     Cats      Sulfa Drugs Rash     Sulfonamide antibiotics         Review of Systems:  14-point review of systems was completed. The pertinent positives and negatives are in the HPI.    Physical Examination   General: Patient appears comfortable in no acute distress.   HEENT: NC/AT, no icterus, moist mucous membranes  Chest: non-labored on RA  Extremities: Warm, no edema  Skin: No rash or lesion   Psych: Affect appropriate for situation   Neuro:  Mental status: Awake, alert, attentive. Language is fluent with intact comprehension of commands.  Cranial nerves: PERRL with no relative afferent pupillary defect, conjugate gaze, EOMI, visual fields intact, face symmetric, shoulder shrug strong, tongue  protrusion/uvula midline, no dysarthria.   Motor: Normal muscle bulk and tone. No abnormal movements. 5/5 strength in 4/4 extremities.     R L  Deltoid  5 5  Biceps  5 5  Triceps  5 5  Wrist ext 5 5  Finger ext 5 5  Finger abd 5 5-    Hip flexion 5- 5  Knee flexion 5 5  Knee ext 5- 5  Dorsiflexion 5- 5    Reflexes:  Brisk reflexes symmetric in biceps, brachioradialis, 3+ R/ brisk 2+ Lpatellae, and achilles. No clonus, toes up-going.  Sensory: Intact to light touch, pin, vibration, and proprioception. Romberg is performed for a few seconds only.    Coordination: FNF without ataxia or dysmetria.    Gait: Wide based ataxic gait with hyperextension of the right knee. Tandem walk impaired.     Laboratory:  Vitamin D 52 (20-75)     Imaging:    MRI today with ongoing enhancement L cerebellar seen on sagittal cut ped and subtle enhancement right aspect of cord at C4-C5.      Assessment/Plan:  Radha Carl is a 32 year old female who presents for follow up for relapsing remitting multiple sclerosis.     We discussed that she has one definite contrast enhancing lesion in the left cerebellar peduncle which was visualized in 2021. She also has one subtle area of possible enhancement in the cervical cord.     She remains symptomatically stable per report. On her exam she does have brisk reflexes throughout, particularly in the right LE and she does have an ataxic gait with some subtle weakness in the right leg.     We discussed that ideally she would be on a higher efficacy medication like Ocrevus considering the ongoing areas of enhancement on her MRI. We discussed that this can be a good option in pregnancy however requires a period of about 6 month after receiving the infusion to wait for the medication to leave the system. The benefit with Ocrevus is that the effect can continue to last for 3-6 months after. We discussed breast cancer risk which was noted in the primary progressive trial. This has not seemed to be a  risk in more longitudinal use demonstrated by aftermarket data. Her grandmother  of breast CA.     We discussed other options being to continue glatiramer which is typically safe around pregnancy. We also discussed Tecfidera which does not require a wash out period but also does not have lasting efficacy after it is discontinued.     She is open to switching to Ocrevus in the future but would like to wait till she is no longer trying for pregnancy. She estimates this will be in about one year. We would recommend q 6 mo imaging monitoring during this time.     - MRI 6 mo  - follow up 6 mo (in person or virtual)   - please watch for new symptoms and let the clinic know     Patient seen and discussed with Dr. Gracia.  I have reviewed the plan with the patient, who is in agreement.      Taylor Scanlon DO  Multiple Sclerosis Fellow     Attending physician: I saw and evaluated the patient with Dr. Scanlon, and I agree with her findings and plan of care as documented above, with the following additions.    I reviewed and independently interpreted earlier MRI scans of the brain and cervical spine, which show evidence of ongoing active inflammatory demyelination, including enhancement in the left cerebellar peduncle. I find reported enhancement in the cervical cord equivocal; nonetheless, the images suggest inadequate control of demyelinating disease.    We discussed therapeutic options; as above, at present she would prefer to remain on glatiramer acetate given desired conception. The plan is to repeat MRI imaging in 6 months and see her back for discussion afterwards.    Santo Gracia MD   of Neurology  [facility]

## 2022-10-13 NOTE — PROGRESS NOTES
[facility] Surgery Consultation    CC:  Upper abdominal pain     HPI:  This 32 year old year old female is seen at the request of Dr. Tay for evaluation of upper abdominal pain. She reports has gotten significantly worse over the last month. It is located just inferior to the xiphoid. It is point tender. It is not associated with eating. She does not take ibuprofen often. She is not on any new medications. She did get an ultrasound of her abdomen which showed no gallstones. Does show fatty liver. No prior surgery in region. She has had a tubal ligation. She is moving her bowels. No bleeding.     Past Medical History:   Diagnosis Date     ADHD (attention deficit hyperactivity disorder)      Attention deficit hyperactivity disorder (ADHD), predominantly inattentive type 02/28/2014     Problem list name updated by automated process. Provider to review     Benign essential hypertension 02/14/2020     Cervicalgia 11/12/2021     Chronic tension-type headache, not intractable 03/11/2020     Cold sore      Costochondral pain 11/29/2019     Dysmenorrhea 08/18/2016     Elevated glucose 2014    resolved 2016     Elevated liver enzymes 2014    resolved 2015     Fibrocystic breast      MARIBETH (generalized anxiety disorder)      Hyperlipidemia with target LDL less than 100 08/08/2014     Diagnosis updated by automated process. Provider to review and confirm.     Hypovitaminosis D      Infertility, female, secondary 07/19/2016     Intramural leiomyoma of uterus 01/14/2022     MS (multiple sclerosis) (H) 2012     Neuropathy     feet     Obesity      PCOS (polycystic ovarian syndrome)      Tubal pregnancy without intrauterine pregnancy 05/24/2013     Visual field scotoma 05/24/2013     Vitamin B12 deficiency (non anemic) 06/25/2015       Past Surgical History:   Procedure Laterality Date     HYSTEROSCOPY,DIAGNOSTIC  2016    done here and at [facility]     LAPAROSCOPIC SALPINGECTOMY Left 05/01/2010    ruptured ectopic  pregnancy     wisdom teeth  2014       Allergies   Allergen Reactions     Cats      Sulfa Drugs Rash     Sulfonamide antibiotics       Current Outpatient Medications   Medication     acetaminophen (TYLENOL) 500 MG tablet     baclofen (LIORESAL) 10 MG tablet     blood glucose monitoring (ONE TOUCH ULTRA 2) meter device kit     cyclobenzaprine (FLEXERIL) 10 MG tablet     glatiramer acetate 40 MG/ML injection     ibuprofen (ADVIL,MOTRIN) 200 MG tablet     Lancets (ONETOUCH DELICA PLUS [id number]) MISC     lisdexamfetamine (VYVANSE) 10 MG capsule     Meloxicam (MOBIC PO)     Multiple Vitamins-Minerals (OPTIVITE P.M.T.) TABS     ONETOUCH ULTRA test strip     order for DME     pantoprazole (PROTONIX) 40 MG EC tablet     progesterone (PROMETRIUM) 100 MG capsule     propranolol (INDERAL) 10 MG tablet     sucralfate (CARAFATE) 1 GM tablet     valACYclovir (VALTREX) 500 MG tablet     vitamin D3 (CHOLECALCIFEROL) 1.25 MG (20472 UT) capsule     No current facility-administered medications for this visit.       HABITS:    Social History     Tobacco Use     Smoking status: Never     Smokeless tobacco: Never   Vaping Use     Vaping Use: Never used   Substance Use Topics     Alcohol use: Yes     Alcohol/week: 0.0 standard drinks     Comment: rare     Drug use: No       Family History   Problem Relation Age of Onset     Family History Negative Mother      Unknown/Adopted Father      Family History Negative Sister      Family History Negative Sister      Obesity Sister         iron def     Hypertension Sister      Diabetes Sister         pre     Migraines Sister      Psychotic Disorder Sister         ADHD     Family History Negative Brother      Breast Cancer Maternal Grandmother 64     Unknown/Adopted Maternal Grandfather         severe gerd     Lung Cancer Paternal Grandmother         +tobacco     Heart Disease Paternal Grandfather         AMI/CAD or CVA       REVIEW OF SYSTEMS:  Ten point review of systems negative except those  "mentioned in the HPI.     OBJECTIVE:    BP (!) 158/100   Pulse 103   Temp 98.4  F (36.9  C)   Ht 1.6 m (5' 3\")   Wt 83.9 kg (185 lb)   LMP 12/25/2021   SpO2 99%   BMI 32.77 kg/m      GENERAL: Generally appears well, in no distress with appropriate affect.  HEENT:   Sclerae anicteric - normocephalic atraumatic   Respiratory:  No acute distress, no splinting   Cardiovascular:  Regular Rate and Rhythm  Abdomen: Point tender in upper abdomen no obvious fascial defect.   :  deferred  Extremities:  Extremities normal. No deformities, edema, or skin discoloration.  Skin:  no suspicious lesions or rashes  Neurological: grossly intact  Psych:  Alert, oriented, affect appropriate with normal decision making ability.    IMPRESSION:    Point epigastric tenderness, less likely GERD, would like to evaluate for epigastric hernia in region of falciform. Will get CT scan without contrast. If normal will proceed on with upper endoscopy to rule out ulcer though think this is less likely.     PLAN:    See above.     Rudi Romano MD,     10/13/2022  6:32 PM      "

## 2022-10-13 NOTE — PATIENT INSTRUCTIONS
Continue glatiramer acetate for now    2.  Repeat MRI scans of the brain and cervical spine in 6 months; follow up via virtual visit at that time

## 2022-10-13 NOTE — LETTER
10/13/2022      RE: Radha Carl  [address]  [address]  [facility] [address]     Multiple Sclerosis Clinic Visit  10/13/2022    Reason: Multiple Sclerosis      Source of information: Patient and chart review    History of Present Symptom:  Radha Carl is a 32 year old female with a PMH significant for hypertension, ADHD, relapsing multiple sclerosis who presents for follow up.    She reports no change in symptoms. No new numbness, weakness, double vision, or balance difficulty.        She has restarted glatiramer acetate and is doing better with the injections from a side effect standpoint.     She is still off of birth control and aiming for pregnancy. She has a laparoscopic procedure to evaluate for endometriosis. She states they will likely give one more year of time and if at that point they would stop trying.     Disease course:   May 2013 developed ascending paresthesias. Held a diagnosis of clinically isolated syndrome until 2018 when she developed weakness in the legs. She declined DMT until Dec 2020 when she developed vertigo and new lesions on imaging. She started dimethyl fumarate and developed GI side effects. She then tried glatiramer acetate but developed muscle stiffness, dizziness, headaches and dots in her vision.     She developed a new demyelinating lesion 9/8/21 and restarted glatiramer acetate. Discussion was had about Ocrevus but she deferred due to planning for pregnancy.   Most recent relapse:  9/2021 + MRI with left cerebellar peduncle enhancement.     Fatigue is the biggest things she notices. This is variable but she does fall asleep frequently on the couch in the evening. This happens about 25 % of the time.     Symptom management:  Bowel/bladder changes: Stable.   Gait/balance: Does get bilateral leg weakness with walking distances. Standing for prolonged periods of time causes back pain and worsens leg weakness. She feels this decline has happened over about 5  years, no major change in the past year.      The patient's medical, surgical, social, and family history were personally reviewed with the patient.  Past Medical History:   Diagnosis Date     ADHD (attention deficit hyperactivity disorder)      Attention deficit hyperactivity disorder (ADHD), predominantly inattentive type 02/28/2014     Problem list name updated by automated process. Provider to review     Benign essential hypertension 02/14/2020     Cervicalgia 11/12/2021     Chronic tension-type headache, not intractable 03/11/2020     Cold sore      Costochondral pain 11/29/2019     Dysmenorrhea 08/18/2016     Elevated glucose 2014    resolved 2016     Elevated liver enzymes 2014    resolved 2015     Fibrocystic breast      MARIBETH (generalized anxiety disorder)      Hyperlipidemia with target LDL less than 100 08/08/2014     Diagnosis updated by automated process. Provider to review and confirm.     Hypovitaminosis D      Infertility, female, secondary 07/19/2016     Intramural leiomyoma of uterus 01/14/2022     MS (multiple sclerosis) (H) 2012     Neuropathy     feet     Obesity      PCOS (polycystic ovarian syndrome)      Tubal pregnancy without intrauterine pregnancy 05/24/2013     Visual field scotoma 05/24/2013     Vitamin B12 deficiency (non anemic) 06/25/2015      Past Surgical History:   Procedure Laterality Date     HYSTEROSCOPY,DIAGNOSTIC  2016    done here and at [facility]     LAPAROSCOPIC SALPINGECTOMY Left 05/01/2010    ruptured ectopic pregnancy     wisdom teeth  2014     Social History     Tobacco Use     Smoking status: Never     Smokeless tobacco: Never   Vaping Use     Vaping Use: Never used   Substance Use Topics     Alcohol use: Yes     Alcohol/week: 0.0 standard drinks     Comment: rare     Drug use: No     Family History   Problem Relation Age of Onset     Family History Negative Mother      Unknown/Adopted Father      Family History Negative Sister      Family History Negative Sister       Obesity Sister         iron def     Hypertension Sister      Diabetes Sister         pre     Migraines Sister      Psychotic Disorder Sister         ADHD     Family History Negative Brother      Breast Cancer Maternal Grandmother 64     Unknown/Adopted Maternal Grandfather         severe gerd     Lung Cancer Paternal Grandmother         +tobacco     Heart Disease Paternal Grandfather         AMI/CAD or CVA     Current Outpatient Medications   Medication     acetaminophen (TYLENOL) 500 MG tablet     baclofen (LIORESAL) 10 MG tablet     blood glucose monitoring (ONE TOUCH ULTRA 2) meter device kit     cyclobenzaprine (FLEXERIL) 10 MG tablet     glatiramer acetate 40 MG/ML injection     ibuprofen (ADVIL,MOTRIN) 200 MG tablet     Lancets (ONETOUCH DELICA PLUS [id number]) MISC     lisdexamfetamine (VYVANSE) 10 MG capsule     Meloxicam (MOBIC PO)     Multiple Vitamins-Minerals (OPTIVITE P.M.T.) TABS     ONETOUCH ULTRA test strip     order for DME     pantoprazole (PROTONIX) 40 MG EC tablet     progesterone (PROMETRIUM) 100 MG capsule     propranolol (INDERAL) 10 MG tablet     sucralfate (CARAFATE) 1 GM tablet     valACYclovir (VALTREX) 500 MG tablet     vitamin D3 (CHOLECALCIFEROL) 1.25 MG (40682 UT) capsule     No current facility-administered medications for this visit.     Allergies   Allergen Reactions     Cats      Sulfa Drugs Rash     Sulfonamide antibiotics         Review of Systems:  14-point review of systems was completed. The pertinent positives and negatives are in the HPI.    Physical Examination   General: Patient appears comfortable in no acute distress.   HEENT: NC/AT, no icterus, moist mucous membranes  Chest: non-labored on RA  Extremities: Warm, no edema  Skin: No rash or lesion   Psych: Affect appropriate for situation   Neuro:  Mental status: Awake, alert, attentive. Language is fluent with intact comprehension of commands.  Cranial nerves: PERRL with no relative afferent pupillary defect,  conjugate gaze, EOMI, visual fields intact, face symmetric, shoulder shrug strong, tongue protrusion/uvula midline, no dysarthria.   Motor: Normal muscle bulk and tone. No abnormal movements. 5/5 strength in 4/4 extremities.     R L  Deltoid  5 5  Biceps  5 5  Triceps 5 5  Wrist ext 5 5  Finger ext 5 5  Finger abd 5 5-    Hip flexion 5- 5  Knee flexion 5 5  Knee ext 5- 5  Dorsiflexion 5- 5    Reflexes:  Brisk reflexes symmetric in biceps, brachioradialis, 3+ R/ brisk 2+ Lpatellae, and achilles. No clonus, toes up-going.  Sensory: Intact to light touch, pin, vibration, and proprioception. Romberg is performed for a few seconds only.    Coordination: FNF without ataxia or dysmetria.    Gait: Wide based ataxic gait with hyperextension of the right knee. Tandem walk impaired.     Laboratory:  Vitamin D 52 (20-75)     Imaging:    MRI today with ongoing enhancement L cerebellar seen on sagittal cut ped and subtle enhancement right aspect of cord at C4-C5.      Assessment/Plan:  Radha Carl is a 32 year old female who presents for follow up for relapsing remitting multiple sclerosis.     We discussed that she has one definite contrast enhancing lesion in the left cerebellar peduncle which was visualized in 2021. She also has one subtle area of possible enhancement in the cervical cord.     She remains symptomatically stable per report. On her exam she does have brisk reflexes throughout, particularly in the right LE and she does have an ataxic gait with some subtle weakness in the right leg.     We discussed that ideally she would be on a higher efficacy medication like Ocrevus considering the ongoing areas of enhancement on her MRI. We discussed that this can be a good option in pregnancy however requires a period of about 6 month after receiving the infusion to wait for the medication to leave the system. The benefit with Ocrevus is that the effect can continue to last for 3-6 months after. We discussed breast  cancer risk which was noted in the primary progressive trial. This has not seemed to be a risk in more longitudinal use demonstrated by aftermarket data. Her grandmother  of breast CA.     We discussed other options being to continue glatiramer which is typically safe around pregnancy. We also discussed Tecfidera which does not require a wash out period but also does not have lasting efficacy after it is discontinued.     She is open to switching to Ocrevus in the future but would like to wait till she is no longer trying for pregnancy. She estimates this will be in about one year. We would recommend q 6 mo imaging monitoring during this time.     - MRI 6 mo  - follow up 6 mo (in person or virtual)   - please watch for new symptoms and let the clinic know     Patient seen and discussed with Dr. Gracia.  I have reviewed the plan with the patient, who is in agreement.      Taylor Scanlon DO  Multiple Sclerosis Fellow     Attending physician: I saw and evaluated the patient with Dr. Scanlon, and I agree with her findings and plan of care as documented above, with the following additions.    I reviewed and independently interpreted earlier MRI scans of the brain and cervical spine, which show evidence of ongoing active inflammatory demyelination, including enhancement in the left cerebellar peduncle. I find reported enhancement in the cervical cord equivocal; nonetheless, the images suggest inadequate control of demyelinating disease.          We discussed therapeutic options; as above, at present she would prefer to remain on glatiramer acetate given desired conception. The plan is to repeat MRI imaging in 6 months and see her back for discussion afterwards.    Santo Gracia MD   of Neurology  [facility]    Cc:  Muriel Jones MD (PCP)  Patient

## 2022-10-14 ENCOUNTER — HOSPITAL ENCOUNTER (OUTPATIENT)
Dept: CT IMAGING | Facility: HOSPITAL | Age: 32
Discharge: HOME | End: 2022-10-14
Attending: SURGERY | Admitting: SURGERY
Payer: COMMERCIAL

## 2022-10-14 DIAGNOSIS — R10.13 EPIGASTRIC PAIN: ICD-10-CM

## 2022-10-14 PROCEDURE — 74176 CT ABD & PELVIS W/O CONTRAST: CPT

## 2022-10-31 ENCOUNTER — OFFICE VISIT (OUTPATIENT)
Dept: FAMILY MEDICINE | Facility: OTHER | Age: 32
End: 2022-10-31
Attending: FAMILY MEDICINE
Payer: COMMERCIAL

## 2022-10-31 VITALS
HEART RATE: 95 BPM | BODY MASS INDEX: 32.78 KG/M2 | OXYGEN SATURATION: 99 % | SYSTOLIC BLOOD PRESSURE: 132 MMHG | DIASTOLIC BLOOD PRESSURE: 82 MMHG | TEMPERATURE: 97 F | WEIGHT: 185 LBS | HEIGHT: 63 IN

## 2022-10-31 DIAGNOSIS — H10.31 ACUTE BACTERIAL CONJUNCTIVITIS OF RIGHT EYE: Primary | ICD-10-CM

## 2022-10-31 PROCEDURE — 99213 OFFICE O/P EST LOW 20 MIN: CPT | Performed by: FAMILY MEDICINE

## 2022-10-31 RX ORDER — POLYMYXIN B SULFATE AND TRIMETHOPRIM 1; 10000 MG/ML; [USP'U]/ML
1-2 SOLUTION OPHTHALMIC EVERY 4 HOURS
Qty: 10 ML | Refills: 0 | Status: ON HOLD | OUTPATIENT
Start: 2022-10-31 | End: 2023-01-20

## 2022-10-31 ASSESSMENT — PAIN SCALES - GENERAL: PAINLEVEL: MILD PAIN (3)

## 2022-10-31 NOTE — PROGRESS NOTES
"  Assessment & Plan     Acute bacterial conjunctivitis of right eye  Ok to stay at work or return to work 11/1/22  - trimethoprim-polymyxin b (POLYTRIM) 27737-7.1 UNIT/ML-% ophthalmic solution; Place 1-2 drops into the right eye every 4 hours        Patient was agreeable to this plan and had no further questions.  There are no Patient Instructions on file for this visit.    No follow-ups on file.    Muriel Jones MD  [facility] ZORAIDA Garcia is a 32 year old, presenting for the following health issues:  Eye Problem      HPI     Eye(s) Problem    Needs note for work    Duration: 1-2 days    Description:  Location: bilateral  Pain: YES  Redness: YES  Discharge: no    Accompanying signs and symptoms: none    History (Trauma, foreign body exposure,): None    Precipitating or alleviating factors (contact use): pruritic    Therapies tried and outcome: None     Review of Systems   Constitutional, HEENT, cardiovascular, pulmonary, gi and gu systems are negative, except as otherwise noted.      Objective    /82   Pulse 95   Temp 97  F (36.1  C) (Tympanic)   Ht 1.6 m (5' 3\")   Wt 83.9 kg (185 lb)   LMP 12/25/2021   SpO2 99%   BMI 32.77 kg/m    Body mass index is 32.77 kg/m .  Physical Exam   GENERAL: healthy, alert and no distress  EYES: conjunctiva/corneas- conjunctival injection right lateral  NECK: no adenopathy, no asymmetry, masses, or scars and thyroid normal to palpation  MS: no gross musculoskeletal defects noted, no edema  PSYCH: mentation appears normal, affect normal/bright    Patient was agreeable to this plan and had no further questions.              "

## 2022-10-31 NOTE — LETTER
October 31, 2022      Radha Carl  [address]  [address]   [facility] [address]        To Whom It May Concern:    Radha Carl was seen in our clinic. She may return to work 11/1/22 without restrictions.      Sincerely,        Muriel Jones MD

## 2022-10-31 NOTE — NURSING NOTE
"Chief Complaint   Patient presents with     Eye Problem       Initial /82   Pulse 95   Temp 97  F (36.1  C) (Tympanic)   Ht 1.6 m (5' 3\")   Wt 83.9 kg (185 lb)   LMP 12/25/2021   SpO2 99%   BMI 32.77 kg/m   Estimated body mass index is 32.77 kg/m  as calculated from the following:    Height as of this encounter: 1.6 m (5' 3\").    Weight as of this encounter: 83.9 kg (185 lb).  Medication Reconciliation: complete  Ruth Monaco LPN  "

## 2022-11-02 ENCOUNTER — PREP FOR PROCEDURE (OUTPATIENT)
Dept: SURGERY | Facility: OTHER | Age: 32
End: 2022-11-02

## 2022-11-02 DIAGNOSIS — R10.13 EPIGASTRIC PAIN: Primary | ICD-10-CM

## 2022-11-10 ENCOUNTER — ALLIED HEALTH/NURSE VISIT (OUTPATIENT)
Dept: FAMILY MEDICINE | Facility: OTHER | Age: 32
End: 2022-11-10
Attending: FAMILY MEDICINE
Payer: COMMERCIAL

## 2022-11-10 DIAGNOSIS — Z23 NEED FOR PROPHYLACTIC VACCINATION AND INOCULATION AGAINST INFLUENZA: Primary | ICD-10-CM

## 2022-11-10 PROCEDURE — 90471 IMMUNIZATION ADMIN: CPT

## 2022-11-10 PROCEDURE — 90686 IIV4 VACC NO PRSV 0.5 ML IM: CPT

## 2022-11-14 ENCOUNTER — ANESTHESIA EVENT (OUTPATIENT)
Dept: SURGERY | Facility: HOSPITAL | Age: 32
End: 2022-11-14
Payer: COMMERCIAL

## 2022-11-14 NOTE — ANESTHESIA PREPROCEDURE EVALUATION
Anesthesia Pre-Procedure Evaluation    Patient: Radha Carl   MRN: [id number] : 1990        Procedure : Procedure(s):  Upper endoscopy          Past Medical History:   Diagnosis Date     ADHD (attention deficit hyperactivity disorder)      Attention deficit hyperactivity disorder (ADHD), predominantly inattentive type 2014     Problem list name updated by automated process. Provider to review     Benign essential hypertension 2020     Cervicalgia 2021     Chronic tension-type headache, not intractable 2020     Cold sore      Costochondral pain 2019     Dysmenorrhea 2016     Elevated glucose 2014    resolved 2016     Elevated liver enzymes 2014    resolved      Fibrocystic breast      MARIBETH (generalized anxiety disorder)      Hyperlipidemia with target LDL less than 100 2014     Diagnosis updated by automated process. Provider to review and confirm.     Hypovitaminosis D      Infertility, female, secondary 2016     Intramural leiomyoma of uterus 2022     MS (multiple sclerosis) (H) 2012     Neuropathy     feet     Obesity      PCOS (polycystic ovarian syndrome)      Tubal pregnancy without intrauterine pregnancy 2013     Visual field scotoma 2013     Vitamin B12 deficiency (non anemic) 2015      Past Surgical History:   Procedure Laterality Date     HYSTEROSCOPY,DIAGNOSTIC  2016    done here and at [facility]     LAPAROSCOPIC SALPINGECTOMY Left 2010    ruptured ectopic pregnancy     wisdom teeth  2014      Allergies   Allergen Reactions     Cats      Sulfa Drugs Rash     Sulfonamide antibiotics      Social History     Tobacco Use     Smoking status: Never     Smokeless tobacco: Never   Substance Use Topics     Alcohol use: Yes     Alcohol/week: 0.0 standard drinks     Comment: rare      Wt Readings from Last 1 Encounters:   10/31/22 83.9 kg (185 lb)        Anesthesia Evaluation   Pt has had prior anesthetic. Type:  General.        ROS/MED HX  ENT/Pulmonary:     (+) RITA risk factors, hypertension,     Neurologic: Comment: Chronic tension-type headache, not intractable    (+) migraines, Multiple Sclerosis, limitations: h/o iritis, numbness tingling in feet, weakness in legs.     Cardiovascular:     (+) Dyslipidemia hypertension-----    METS/Exercise Tolerance: >4 METS    Hematologic:     (+) history of blood transfusion, no previous transfusion reaction, Known PRBC Anitbodies:No - after tubal pregancy 10 years ago,     Musculoskeletal:   (+) arthritis,     GI/Hepatic: Comment: Elevated liver enzymes    (+) GERD, Symptomatic,     Renal/Genitourinary:  - neg Renal ROS     Endo:     (+) Obesity,     Psychiatric/Substance Use: Comment: ADHD    (+) psychiatric history anxiety and other (comment) (ADHD)     Infectious Disease:  - neg infectious disease ROS     Malignancy:  - neg malignancy ROS     Other:  - neg other ROS          Physical Exam    Airway        Mallampati: III   TM distance: > 3 FB   Neck ROM: limited   Mouth opening: > 3 cm    Respiratory Devices and Support         Dental       (+) Completely normal teeth      Cardiovascular   cardiovascular exam normal       Rhythm and rate: regular and normal     Pulmonary   pulmonary exam normal        breath sounds clear to auscultation           OUTSIDE LABS:  CBC:   Lab Results   Component Value Date    WBC 15.8 (H) 09/19/2022    WBC 13.4 (H) 01/14/2022    HGB 13.6 09/19/2022    HGB 11.3 (L) 01/14/2022    HCT 41.6 09/19/2022    HCT 36.6 01/14/2022     (H) 09/19/2022     (H) 01/14/2022     BMP:   Lab Results   Component Value Date     09/19/2022     01/14/2022    POTASSIUM 3.7 09/19/2022    POTASSIUM 3.8 01/14/2022    CHLORIDE 101 09/19/2022    CHLORIDE 104 01/14/2022    CO2 26 09/19/2022    CO2 23 01/14/2022    BUN 7 09/19/2022    BUN 14 01/14/2022    CR 0.52 09/19/2022    CR 0.52 01/14/2022     (H) 09/19/2022     (H) 01/14/2022      COAGS:   Lab Results   Component Value Date    PTT 34 01/14/2022    INR 0.98 01/14/2022     POC:   Lab Results   Component Value Date    HCGS Positive (A) 01/14/2022     HEPATIC:   Lab Results   Component Value Date    ALBUMIN 3.9 09/19/2022    PROTTOTAL 8.3 09/19/2022    ALT 98 (H) 09/19/2022    AST 44 09/19/2022    ALKPHOS 87 09/19/2022    BILITOTAL 0.3 09/19/2022     OTHER:   Lab Results   Component Value Date    LACT 1.7 01/14/2022    A1C 6.5 (H) 11/15/2021    FLAVIO 9.2 09/19/2022    TSH 0.89 11/15/2021    T4 0.90 01/26/2016    T3 137 11/15/2021    CRP 3.6 (H) 05/23/2013       Anesthesia Plan    ASA Status:  3   NPO Status:  NPO Appropriate    Anesthesia Type: MAC.     - Reason for MAC: straight local not clinically adequate   Induction: Intravenous, Propofol.   Maintenance: Balanced.        Consents    Anesthesia Plan(s) and associated risks, benefits, and realistic alternatives discussed. Questions answered and patient/representative(s) expressed understanding.     - Discussed: Risks, Benefits and Alternatives for BOTH SEDATION and the PROCEDURE were discussed     - Discussed with:  Patient      - Extended Intubation/Ventilatory Support Discussed: No.      - Patient is DNR/DNI Status: No    Use of blood products discussed: No .     Postoperative Care            Comments:                CHINA Adan CRNA

## 2022-11-22 ENCOUNTER — ANESTHESIA (OUTPATIENT)
Dept: SURGERY | Facility: HOSPITAL | Age: 32
End: 2022-11-22
Payer: COMMERCIAL

## 2022-12-13 NOTE — PROGRESS NOTES
SUBJECTIVE:   CC: Radha is an 32 year old who presents for preventive health visit.     Patient has been advised of split billing requirements and indicates understanding: Yes     Healthy Habits:     Getting at least 3 servings of Calcium per day:  Yes    Bi-annual eye exam:  Yes    Dental care twice a year:  Yes    Sleep apnea or symptoms of sleep apnea:  Daytime drowsiness    Diet:  Regular (no restrictions), Low fat/cholesterol and Carbohydrate counting    Frequency of exercise:  2-3 days/week    Duration of exercise:  15-30 minutes    Taking medications regularly:  Yes    Barriers to taking medications:  None    Medication side effects:  None    PHQ-2 Total Score: 1    Additional concerns today:  No          Hyperlipidemia Follow-Up      Are you regularly taking any medication or supplement to lower your cholesterol?   No    Are you having muscle aches or other side effects that you think could be caused by your cholesterol lowering medication?  No    Hypertension Follow-up      Do you check your blood pressure regularly outside of the clinic? No     Are you following a low salt diet? Yes    Are your blood pressures ever more than 140 on the top number (systolic) OR more   than 90 on the bottom number (diastolic), for example 140/90? Yes    Depression and Anxiety Follow-Up    How are you doing with your depression since your last visit? No change    How are you doing with your anxiety since your last visit?  No change    Are you having other symptoms that might be associated with depression or anxiety? No    Have you had a significant life event? OTHER: situational     Do you have any concerns with your use of alcohol or other drugs? No    Social History     Tobacco Use     Smoking status: Never     Smokeless tobacco: Never   Vaping Use     Vaping Use: Never used   Substance Use Topics     Alcohol use: Yes     Alcohol/week: 0.0 standard drinks     Comment: rare     Drug use: No     PHQ 7/2/2021 11/12/2021  4/14/2022   PHQ-9 Total Score 0 7 0   Q9: Thoughts of better off dead/self-harm past 2 weeks Not at all Not at all Not at all     MARIBETH-7 SCORE 12/30/2020 7/2/2021 11/12/2021   Total Score 0 0 7     Last PHQ-9 4/14/2022   1.  Little interest or pleasure in doing things 0   2.  Feeling down, depressed, or hopeless 0   3.  Trouble falling or staying asleep, or sleeping too much 0   4.  Feeling tired or having little energy 0   5.  Poor appetite or overeating 0   6.  Feeling bad about yourself 0   7.  Trouble concentrating 0   8.  Moving slowly or restless 0   Q9: Thoughts of better off dead/self-harm past 2 weeks 0   PHQ-9 Total Score 0   Difficulty at work, home, or with people Not difficult at all     MARIBETH-7  11/12/2021   1. Feeling nervous, anxious, or on edge 1   2. Not being able to stop or control worrying 1   3. Worrying too much about different things 1   4. Trouble relaxing 2   5. Being so restless that it is hard to sit still 1   6. Becoming easily annoyed or irritable 1   7. Feeling afraid, as if something awful might happen 0   MARIBETH-7 Total Score 7   If you checked any problems, how difficult have they made it for you to do your work, take care of things at home, or get along with other people? Somewhat difficult       Suicide Assessment Five-step Evaluation and Treatment (SAFE-T)      Today's PHQ-2 Score:   PHQ-2 ( 1999 [employer]) 12/14/2022   Q1: Little interest or pleasure in doing things 1   Q2: Feeling down, depressed or hopeless 0   PHQ-2 Score 1   PHQ-2 Total Score (12-17 Years)- Positive if 3 or more points; Administer PHQ-A if positive -   Q1: Little interest or pleasure in doing things Several days   Q2: Feeling down, depressed or hopeless Not at all   PHQ-2 Score 1           Social History     Tobacco Use     Smoking status: Never     Smokeless tobacco: Never   Substance Use Topics     Alcohol use: Yes     Alcohol/week: 0.0 standard drinks     Comment: rare         Alcohol Use 12/14/2022   Prescreen:  >3 drinks/day or >7 drinks/week? No   Prescreen: >3 drinks/day or >7 drinks/week? -       Reviewed orders with patient.  Reviewed health maintenance and updated orders accordingly - Yes  Lab work is in process  Labs reviewed in EPIC  BP Readings from Last 3 Encounters:   12/14/22 (!) 140/90   10/31/22 132/82   10/13/22 (!) 173/103    Wt Readings from Last 3 Encounters:   12/14/22 84.4 kg (186 lb)   10/31/22 83.9 kg (185 lb)   10/13/22 87.6 kg (193 lb 1.6 oz)                  Patient Active Problem List   Diagnosis     Attention deficit hyperactivity disorder (ADHD), predominantly inattentive type     Hyperlipidemia with target LDL less than 100     PCOS (polycystic ovarian syndrome)     MS (multiple sclerosis) (H)     Vitamin B12 deficiency (non anemic)     Adiposity     Skin sensation disturbance     ACP (advance care planning)     Dysmenorrhea     MARIBETH (generalized anxiety disorder)     Hypovitaminosis D     Infertility, female, secondary     Elevated glucose     Stress     Benign essential hypertension     Prediabetes     Chronic tension-type headache, not intractable     Cold sore     Cervicalgia     Weight gain     Intramural leiomyoma of uterus     Pelvic pain in female     Right ovarian cyst     Tonsil stone     Iron deficiency     Past Surgical History:   Procedure Laterality Date     HYSTEROSCOPY,DIAGNOSTIC  2016    done here and at [facility]     LAPAROSCOPIC SALPINGECTOMY Left 05/01/2010    ruptured ectopic pregnancy     wisdom teeth  2014       Social History     Tobacco Use     Smoking status: Never     Smokeless tobacco: Never   Substance Use Topics     Alcohol use: Yes     Alcohol/week: 0.0 standard drinks     Comment: rare     Family History   Problem Relation Age of Onset     Family History Negative Mother      Unknown/Adopted Father      Family History Negative Sister      Family History Negative Sister      Obesity Sister         iron def     Hypertension Sister      Diabetes Sister          pre     Migraines Sister      Psychotic Disorder Sister         ADHD     Family History Negative Brother      Breast Cancer Maternal Grandmother 64     Unknown/Adopted Maternal Grandfather         severe gerd     Lung Cancer Paternal Grandmother         +tobacco     Heart Disease Paternal Grandfather         AMI/CAD or CVA         Current Outpatient Medications   Medication Sig Dispense Refill     acetaminophen (TYLENOL) 500 MG tablet Take 2 tablets by mouth Every 6 hours as needed       baclofen (LIORESAL) 10 MG tablet Take 0.5-1 tablets (5-10 mg) by mouth 2 times daily as needed for muscle spasms 30 tablet 1     blood glucose monitoring (ONE TOUCH ULTRA 2) meter device kit See Admin Instructions       cyclobenzaprine (FLEXERIL) 10 MG tablet Take 0.5-1 tablets (5-10 mg) by mouth daily as needed for muscle spasms 30 tablet 1     glatiramer acetate 40 MG/ML injection Inject 40 mg Subcutaneous three times a week 12 mL 11     ibuprofen (ADVIL,MOTRIN) 200 MG tablet Take 2 tablets by mouth Every 6 hours as needed with food       Lancets (ONETOUCH DELICA PLUS [id number]) MISC USE TO CHECK GLUCOSE 5 TIMES DAILY       lisdexamfetamine (VYVANSE) 10 MG capsule Take 1 capsule (10 mg) by mouth every morning 30 capsule 0     Meloxicam (MOBIC PO)        Multiple Vitamins-Minerals (OPTIVITE P.M.T.) TABS Take 3 tablets by mouth 2 times daily 180 tablet 3     ONETOUCH ULTRA test strip USE STRIP TO CHECK GLUCOSE 5 TIMES DAILY       order for DME Right ankle ASO 1 Device 0     pantoprazole (PROTONIX) 40 MG EC tablet Take 1 tablet (40 mg) by mouth daily 30 tablet 1     progesterone (PROMETRIUM) 100 MG capsule Take 1 capsule (100 mg) by mouth daily Starting on peak +3 for 10 days out of month 30 capsule 3     propranolol (INDERAL) 10 MG tablet Take 1 tablet (10 mg) by mouth 3 times daily 90 tablet 1     sucralfate (CARAFATE) 1 GM tablet Take 1 tablet (1 g) by mouth 4 times daily 120 tablet 1     trimethoprim-polymyxin b (POLYTRIM)  35592-4.1 UNIT/ML-% ophthalmic solution Place 1-2 drops into the right eye every 4 hours 10 mL 0     valACYclovir (VALTREX) 500 MG tablet TAKE 1 TABLET BY MOUTH TWICE DAILY WITH  ACTIVE  LESION 30 tablet 5     vitamin D3 (CHOLECALCIFEROL) 1.25 MG (18177 UT) capsule Take 1 capsule (50,000 Units) by mouth every 7 days 4 capsule 12     Allergies   Allergen Reactions     Cats      Sulfa Drugs Rash     Sulfonamide antibiotics       Breast Cancer Screening:    Breast CA Risk Assessment (FHS-7) 11/12/2021   Do you have a family history of breast, colon, or ovarian cancer? No / Unknown       Patient under 40 years of age: Routine Mammogram Screening not recommended.   Pertinent mammograms are reviewed under the imaging tab.    History of abnormal Pap smear: NO - age 30-65 PAP every 5 years with negative HPV co-testing recommended  PAP / HPV Latest Ref Rng & Units 10/11/2017 4/15/2014   PAP (Historical) - NIL NIL   HPV16 NEG:Negative Negative -   HPV18 NEG:Negative Negative -   HRHPV NEG:Negative Negative -     Reviewed and updated as needed this visit by clinical staff   Tobacco  Allergies  Meds              Reviewed and updated as needed this visit by Provider                 Past Medical History:   Diagnosis Date     ADHD (attention deficit hyperactivity disorder)      Attention deficit hyperactivity disorder (ADHD), predominantly inattentive type 02/28/2014     Problem list name updated by automated process. Provider to review     Benign essential hypertension 02/14/2020     Cervicalgia 11/12/2021     Chronic tension-type headache, not intractable 03/11/2020     Cold sore      Costochondral pain 11/29/2019     Dysmenorrhea 08/18/2016     Elevated glucose 2014    resolved 2016     Elevated liver enzymes 2014    resolved 2015     Fibrocystic breast      MARIBETH (generalized anxiety disorder)      Hyperlipidemia with target LDL less than 100 08/08/2014     Diagnosis updated by automated process. Provider to review and  confirm.     Hypovitaminosis D      Infertility, female, secondary 2016     Intramural leiomyoma of uterus 2022     MS (multiple sclerosis) (H)      Neuropathy     feet     Obesity      PCOS (polycystic ovarian syndrome)      Tubal pregnancy without intrauterine pregnancy 2013     Visual field scotoma 2013     Vitamin B12 deficiency (non anemic) 2015      Past Surgical History:   Procedure Laterality Date     HYSTEROSCOPY,DIAGNOSTIC  2016    done here and at [facility]     LAPAROSCOPIC SALPINGECTOMY Left 2010    ruptured ectopic pregnancy     wisdom teeth       OB History    Para Term  AB Living   2 0 0 0 1 0   SAB IAB Ectopic Multiple Live Births   0 0 1 0 0      # Outcome Date GA Lbr Sage/2nd Weight Sex Delivery Anes PTL Lv   2             1 Ectopic 05/01/10     ECTOPIC   FD      Birth Comments: tubal       Review of Systems   Constitutional: Negative for chills and fever.   HENT: Negative for congestion, ear pain, hearing loss and sore throat.    Eyes: Negative for pain and visual disturbance.   Respiratory: Negative for cough and shortness of breath.    Cardiovascular: Negative for chest pain, palpitations and peripheral edema.   Gastrointestinal: Positive for heartburn. Negative for abdominal pain, constipation, diarrhea, hematochezia and nausea.   Breasts:  Negative for tenderness, breast mass and discharge.   Genitourinary: Negative for dysuria, frequency, genital sores, hematuria, pelvic pain, urgency, vaginal bleeding and vaginal discharge.   Musculoskeletal: Positive for myalgias. Negative for arthralgias and joint swelling.   Skin: Negative for rash.   Neurological: Negative for dizziness, weakness, headaches and paresthesias.   Psychiatric/Behavioral: Negative for mood changes. The patient is not nervous/anxious.      CONSTITUTIONAL: NEGATIVE for fever, chills, change in weight  INTEGUMENTARU/SKIN: NEGATIVE for worrisome rashes, moles  "or lesions  EYES: NEGATIVE for vision changes or irritation  ENT: NEGATIVE for ear, mouth and throat problems  RESP: NEGATIVE for significant cough or SOB  BREAST: NEGATIVE for masses, tenderness or discharge  CV: NEGATIVE for chest pain, palpitations or peripheral edema  GI: NEGATIVE for nausea, abdominal pain, heartburn, or change in bowel habits  : NEGATIVE for unusual urinary or vaginal symptoms. Periods are regular.  MUSCULOSKELETAL: NEGATIVE for significant arthralgias or myalgia  NEURO: NEGATIVE for weakness, dizziness or paresthesias  PSYCHIATRIC: NEGATIVE for changes in mood or affect     OBJECTIVE:   BP (!) 140/90   Pulse 77   Temp 97.6  F (36.4  C)   Ht 1.6 m (5' 3\")   Wt 84.4 kg (186 lb)   LMP 11/22/2021   SpO2 98%   BMI 32.95 kg/m    Physical Exam  GENERAL: healthy, alert and no distress  EYES: Eyes grossly normal to inspection, PERRL and conjunctivae and sclerae normal  HENT: ear canals and TM's normal, nose and mouth without ulcers or lesions  NECK: no adenopathy, no asymmetry, masses, or scars and thyroid normal to palpation  RESP: lungs clear to auscultation - no rales, rhonchi or wheezes  BREAST: normal without masses, tenderness or nipple discharge and no palpable axillary masses or adenopathy  CV: regular rate and rhythm, normal S1 S2, no S3 or S4, no murmur, click or rub, no peripheral edema and peripheral pulses strong  ABDOMEN: soft, nontender, no hepatosplenomegaly, no masses and bowel sounds normal   (female): normal female external genitalia, normal urethral meatus, vaginal mucosa pink, moist, well rugated, and normal cervix/adnexa/uterus without masses or discharge   (female): pap done  MS: no gross musculoskeletal defects noted, no edema  SKIN: no suspicious lesions or rashes  NEURO: Normal strength and tone, mentation intact and speech normal  PSYCH: mentation appears normal, affect normal/bright    Diagnostic Test Results:  Labs reviewed in Epic  No results found for any " visits on 12/14/22.    ASSESSMENT/PLAN:   (Z00.00) Routine general medical examination at a health care facility  (primary encounter diagnosis)  Comment:   Plan: A pap thin layer screen with  HPV - recommended        age 30 - 65 years, Comprehensive metabolic         panel (BMP + Alb, Alk Phos, ALT, AST, Total.         Bili, TP)        Follow-up 1 year    (Z23) Need for vaccination  Comment:   Plan: TD (ADULT) ,2 LF TETANUS TOXOID,PF,ADSORBED         (TDVAX)        due    (E55.9) Hypovitaminosis D  Comment:   Plan: Vitamin D Deficiency            (E78.5) Hyperlipidemia with target LDL less than 100  Comment:   Plan: Lipid Profile (Chol, Trig, HDL, LDL calc)          (E61.1) Iron deficiency  Comment:   Plan: Iron and iron binding capacity, Ferritin          (R73.09) Elevated glucose  Comment:   Plan: Hemoglobin A1c          (G35) MS (multiple sclerosis) (H)  Comment:   Plan: CRP, inflammation, ESR: Erythrocyte         sedimentation rate        Discussed maybe trying a different med for her MS and preferrably not with increased risk of breast cancer    (Z31.61) Procreative counseling and advice using natural family planning  Comment:   Plan: reports she is not necessarily trying to conceive any longer    (K21.9) Gastroesophageal reflux disease without esophagitis  Comment:   Plan: has EGD in January  I wonder if this isn't second to her MS med, or worsening constipation    (K59.01) Slow transit constipation  Comment:   Plan: restart colon broom      COUNSELING:  Reviewed preventive health counseling, as reflected in patient instructions  Special attention given to:        Regular exercise       Healthy diet/nutrition       Vision screening       Hearing screening        She reports that she has never smoked. She has never used smokeless tobacco.          Muriel Jones MD  [facility] - [place]

## 2022-12-14 ENCOUNTER — OFFICE VISIT (OUTPATIENT)
Dept: FAMILY MEDICINE | Facility: OTHER | Age: 32
End: 2022-12-14
Attending: FAMILY MEDICINE
Payer: COMMERCIAL

## 2022-12-14 VITALS
SYSTOLIC BLOOD PRESSURE: 140 MMHG | HEART RATE: 77 BPM | HEIGHT: 63 IN | OXYGEN SATURATION: 98 % | TEMPERATURE: 97.6 F | WEIGHT: 186 LBS | DIASTOLIC BLOOD PRESSURE: 90 MMHG | BODY MASS INDEX: 32.96 KG/M2

## 2022-12-14 DIAGNOSIS — Z00.00 ROUTINE GENERAL MEDICAL EXAMINATION AT A HEALTH CARE FACILITY: Primary | ICD-10-CM

## 2022-12-14 DIAGNOSIS — Z31.61 PROCREATIVE COUNSELING AND ADVICE USING NATURAL FAMILY PLANNING: ICD-10-CM

## 2022-12-14 DIAGNOSIS — Z23 NEED FOR VACCINATION: ICD-10-CM

## 2022-12-14 DIAGNOSIS — E61.1 IRON DEFICIENCY: ICD-10-CM

## 2022-12-14 DIAGNOSIS — K21.9 GASTROESOPHAGEAL REFLUX DISEASE WITHOUT ESOPHAGITIS: ICD-10-CM

## 2022-12-14 DIAGNOSIS — E55.9 HYPOVITAMINOSIS D: ICD-10-CM

## 2022-12-14 DIAGNOSIS — K59.01 SLOW TRANSIT CONSTIPATION: ICD-10-CM

## 2022-12-14 DIAGNOSIS — R73.09 ELEVATED GLUCOSE: ICD-10-CM

## 2022-12-14 DIAGNOSIS — G35 MS (MULTIPLE SCLEROSIS) (H): ICD-10-CM

## 2022-12-14 DIAGNOSIS — E78.5 HYPERLIPIDEMIA WITH TARGET LDL LESS THAN 100: ICD-10-CM

## 2022-12-14 PROCEDURE — 87624 HPV HI-RISK TYP POOLED RSLT: CPT | Performed by: FAMILY MEDICINE

## 2022-12-14 PROCEDURE — 99395 PREV VISIT EST AGE 18-39: CPT | Mod: 25 | Performed by: FAMILY MEDICINE

## 2022-12-14 PROCEDURE — 99213 OFFICE O/P EST LOW 20 MIN: CPT | Mod: 25 | Performed by: FAMILY MEDICINE

## 2022-12-14 PROCEDURE — 90714 TD VACC NO PRESV 7 YRS+ IM: CPT | Performed by: FAMILY MEDICINE

## 2022-12-14 PROCEDURE — G0123 SCREEN CERV/VAG THIN LAYER: HCPCS | Performed by: FAMILY MEDICINE

## 2022-12-14 PROCEDURE — 90471 IMMUNIZATION ADMIN: CPT | Performed by: FAMILY MEDICINE

## 2022-12-14 ASSESSMENT — PAIN SCALES - GENERAL: PAINLEVEL: EXTREME PAIN (8)

## 2022-12-14 ASSESSMENT — ENCOUNTER SYMPTOMS
DYSURIA: 0
EYE PAIN: 0
HEMATURIA: 0
ABDOMINAL PAIN: 0
JOINT SWELLING: 0
DIZZINESS: 0
NAUSEA: 0
MYALGIAS: 1
SORE THROAT: 0
HEMATOCHEZIA: 0
FEVER: 0
WEAKNESS: 0
DIARRHEA: 0
BREAST MASS: 0
PALPITATIONS: 0
CONSTIPATION: 0
ARTHRALGIAS: 0
COUGH: 0
FREQUENCY: 0
NERVOUS/ANXIOUS: 0
CHILLS: 0
HEARTBURN: 1
PARESTHESIAS: 0
HEADACHES: 0
SHORTNESS OF BREATH: 0

## 2022-12-15 ENCOUNTER — LAB (OUTPATIENT)
Dept: LAB | Facility: OTHER | Age: 32
End: 2022-12-15
Payer: COMMERCIAL

## 2022-12-15 DIAGNOSIS — R73.09 ELEVATED GLUCOSE: ICD-10-CM

## 2022-12-15 DIAGNOSIS — G35 MS (MULTIPLE SCLEROSIS) (H): ICD-10-CM

## 2022-12-15 DIAGNOSIS — E55.9 HYPOVITAMINOSIS D: ICD-10-CM

## 2022-12-15 DIAGNOSIS — Z00.00 ROUTINE GENERAL MEDICAL EXAMINATION AT A HEALTH CARE FACILITY: ICD-10-CM

## 2022-12-15 DIAGNOSIS — E61.1 IRON DEFICIENCY: ICD-10-CM

## 2022-12-15 DIAGNOSIS — E78.5 HYPERLIPIDEMIA WITH TARGET LDL LESS THAN 100: ICD-10-CM

## 2022-12-15 LAB
ALBUMIN SERPL BCG-MCNC: 4.5 G/DL (ref 3.5–5.2)
ALP SERPL-CCNC: 93 U/L (ref 35–104)
ALT SERPL W P-5'-P-CCNC: 121 U/L (ref 10–35)
ANION GAP SERPL CALCULATED.3IONS-SCNC: 12 MMOL/L (ref 7–15)
AST SERPL W P-5'-P-CCNC: 71 U/L (ref 10–35)
BILIRUB SERPL-MCNC: 0.4 MG/DL
BUN SERPL-MCNC: 10 MG/DL (ref 6–20)
CALCIUM SERPL-MCNC: 9.3 MG/DL (ref 8.6–10)
CHLORIDE SERPL-SCNC: 101 MMOL/L (ref 98–107)
CHOLEST SERPL-MCNC: 218 MG/DL
CREAT SERPL-MCNC: 0.52 MG/DL (ref 0.51–0.95)
CRP SERPL-MCNC: 6.05 MG/L
DEPRECATED HCO3 PLAS-SCNC: 24 MMOL/L (ref 22–29)
ERYTHROCYTE [SEDIMENTATION RATE] IN BLOOD BY WESTERGREN METHOD: 11 MM/HR (ref 0–20)
EST. AVERAGE GLUCOSE BLD GHB EST-MCNC: 166 MG/DL
FERRITIN SERPL-MCNC: 51 NG/ML (ref 6–175)
GFR SERPL CREATININE-BSD FRML MDRD: >90 ML/MIN/1.73M2
GLUCOSE SERPL-MCNC: 197 MG/DL (ref 70–99)
HBA1C MFR BLD: 7.4 %
HDLC SERPL-MCNC: 41 MG/DL
IRON BINDING CAPACITY (ROCHE): 317 UG/DL (ref 240–430)
IRON SATN MFR SERPL: 15 % (ref 15–46)
IRON SERPL-MCNC: 46 UG/DL (ref 37–145)
LDLC SERPL CALC-MCNC: 154 MG/DL
NONHDLC SERPL-MCNC: 177 MG/DL
POTASSIUM SERPL-SCNC: 4.1 MMOL/L (ref 3.4–5.3)
PROT SERPL-MCNC: 7.8 G/DL (ref 6.4–8.3)
SODIUM SERPL-SCNC: 137 MMOL/L (ref 136–145)
TRIGL SERPL-MCNC: 113 MG/DL

## 2022-12-15 PROCEDURE — 83540 ASSAY OF IRON: CPT

## 2022-12-15 PROCEDURE — 80053 COMPREHEN METABOLIC PANEL: CPT

## 2022-12-15 PROCEDURE — 82306 VITAMIN D 25 HYDROXY: CPT

## 2022-12-15 PROCEDURE — 86140 C-REACTIVE PROTEIN: CPT

## 2022-12-15 PROCEDURE — 85652 RBC SED RATE AUTOMATED: CPT

## 2022-12-15 PROCEDURE — 83550 IRON BINDING TEST: CPT

## 2022-12-15 PROCEDURE — 82728 ASSAY OF FERRITIN: CPT

## 2022-12-15 PROCEDURE — 36415 COLL VENOUS BLD VENIPUNCTURE: CPT

## 2022-12-15 PROCEDURE — 83036 HEMOGLOBIN GLYCOSYLATED A1C: CPT

## 2022-12-15 PROCEDURE — 80061 LIPID PANEL: CPT

## 2022-12-16 LAB — DEPRECATED CALCIDIOL+CALCIFEROL SERPL-MC: 28 UG/L (ref 20–75)

## 2022-12-19 LAB
BKR LAB AP GYN ADEQUACY: NORMAL
BKR LAB AP GYN INTERPRETATION: NORMAL
BKR LAB AP HPV REFLEX: NORMAL
BKR LAB AP PREVIOUS ABNORMAL: NORMAL
PATH REPORT.COMMENTS IMP SPEC: NORMAL
PATH REPORT.COMMENTS IMP SPEC: NORMAL
PATH REPORT.RELEVANT HX SPEC: NORMAL

## 2022-12-21 LAB
HUMAN PAPILLOMA VIRUS 16 DNA: NEGATIVE
HUMAN PAPILLOMA VIRUS 18 DNA: NEGATIVE
HUMAN PAPILLOMA VIRUS FINAL DIAGNOSIS: NORMAL
HUMAN PAPILLOMA VIRUS OTHER HR: NEGATIVE

## 2023-01-19 DIAGNOSIS — K21.9 GASTROESOPHAGEAL REFLUX DISEASE, UNSPECIFIED WHETHER ESOPHAGITIS PRESENT: ICD-10-CM

## 2023-01-19 RX ORDER — SUCRALFATE 1 G/1
1 TABLET ORAL 4 TIMES DAILY
Qty: 120 TABLET | Refills: 5 | Status: SHIPPED | OUTPATIENT
Start: 2023-01-19 | End: 2023-12-20

## 2023-01-20 ENCOUNTER — HOSPITAL ENCOUNTER (OUTPATIENT)
Facility: HOSPITAL | Age: 33
Discharge: HOME | End: 2023-01-20
Attending: SURGERY | Admitting: SURGERY
Payer: COMMERCIAL

## 2023-01-20 ENCOUNTER — APPOINTMENT (OUTPATIENT)
Dept: LAB | Facility: HOSPITAL | Age: 33
End: 2023-01-20
Attending: SURGERY
Payer: COMMERCIAL

## 2023-01-20 VITALS
BODY MASS INDEX: 33.27 KG/M2 | DIASTOLIC BLOOD PRESSURE: 109 MMHG | OXYGEN SATURATION: 95 % | WEIGHT: 187.8 LBS | RESPIRATION RATE: 14 BRPM | HEART RATE: 80 BPM | HEIGHT: 63 IN | TEMPERATURE: 97.8 F | SYSTOLIC BLOOD PRESSURE: 156 MMHG

## 2023-01-20 DIAGNOSIS — K21.9 GASTROESOPHAGEAL REFLUX DISEASE WITHOUT ESOPHAGITIS: Primary | ICD-10-CM

## 2023-01-20 LAB — HCG SERPL QL: NEGATIVE

## 2023-01-20 PROCEDURE — 999N000141 HC STATISTIC PRE-PROCEDURE NURSING ASSESSMENT: Performed by: SURGERY

## 2023-01-20 PROCEDURE — 84703 CHORIONIC GONADOTROPIN ASSAY: CPT | Performed by: SURGERY

## 2023-01-20 PROCEDURE — 43239 EGD BIOPSY SINGLE/MULTIPLE: CPT | Performed by: NURSE ANESTHETIST, CERTIFIED REGISTERED

## 2023-01-20 PROCEDURE — 250N000011 HC RX IP 250 OP 636: Performed by: NURSE ANESTHETIST, CERTIFIED REGISTERED

## 2023-01-20 PROCEDURE — 360N000075 HC SURGERY LEVEL 2, PER MIN: Performed by: SURGERY

## 2023-01-20 PROCEDURE — 36415 COLL VENOUS BLD VENIPUNCTURE: CPT | Performed by: SURGERY

## 2023-01-20 PROCEDURE — 43239 EGD BIOPSY SINGLE/MULTIPLE: CPT | Performed by: SURGERY

## 2023-01-20 PROCEDURE — 258N000003 HC RX IP 258 OP 636: Performed by: NURSE ANESTHETIST, CERTIFIED REGISTERED

## 2023-01-20 PROCEDURE — 250N000009 HC RX 250: Performed by: NURSE ANESTHETIST, CERTIFIED REGISTERED

## 2023-01-20 PROCEDURE — 370N000017 HC ANESTHESIA TECHNICAL FEE, PER MIN: Performed by: SURGERY

## 2023-01-20 PROCEDURE — 88305 TISSUE EXAM BY PATHOLOGIST: CPT | Mod: 26 | Performed by: PATHOLOGY

## 2023-01-20 PROCEDURE — 88305 TISSUE EXAM BY PATHOLOGIST: CPT | Mod: TC | Performed by: SURGERY

## 2023-01-20 PROCEDURE — 710N000012 HC RECOVERY PHASE 2, PER MINUTE: Performed by: SURGERY

## 2023-01-20 PROCEDURE — 272N000001 HC OR GENERAL SUPPLY STERILE: Performed by: SURGERY

## 2023-01-20 RX ORDER — LIDOCAINE HYDROCHLORIDE 20 MG/ML
INJECTION, SOLUTION INFILTRATION; PERINEURAL PRN
Status: DISCONTINUED | OUTPATIENT
Start: 2023-01-20 | End: 2023-01-20

## 2023-01-20 RX ORDER — NALOXONE HYDROCHLORIDE 0.4 MG/ML
0.2 INJECTION, SOLUTION INTRAMUSCULAR; INTRAVENOUS; SUBCUTANEOUS
Status: DISCONTINUED | OUTPATIENT
Start: 2023-01-20 | End: 2023-01-20 | Stop reason: HOSPADM

## 2023-01-20 RX ORDER — HYDROMORPHONE HCL IN WATER/PF 6 MG/30 ML
0.2 PATIENT CONTROLLED ANALGESIA SYRINGE INTRAVENOUS EVERY 5 MIN PRN
Status: DISCONTINUED | OUTPATIENT
Start: 2023-01-20 | End: 2023-01-20 | Stop reason: HOSPADM

## 2023-01-20 RX ORDER — NALOXONE HYDROCHLORIDE 0.4 MG/ML
0.4 INJECTION, SOLUTION INTRAMUSCULAR; INTRAVENOUS; SUBCUTANEOUS
Status: DISCONTINUED | OUTPATIENT
Start: 2023-01-20 | End: 2023-01-20 | Stop reason: HOSPADM

## 2023-01-20 RX ORDER — SODIUM CHLORIDE, SODIUM LACTATE, POTASSIUM CHLORIDE, CALCIUM CHLORIDE 600; 310; 30; 20 MG/100ML; MG/100ML; MG/100ML; MG/100ML
INJECTION, SOLUTION INTRAVENOUS CONTINUOUS
Status: DISCONTINUED | OUTPATIENT
Start: 2023-01-20 | End: 2023-01-20 | Stop reason: HOSPADM

## 2023-01-20 RX ORDER — LIDOCAINE 40 MG/G
CREAM TOPICAL
Status: DISCONTINUED | OUTPATIENT
Start: 2023-01-20 | End: 2023-01-20 | Stop reason: HOSPADM

## 2023-01-20 RX ORDER — OMEPRAZOLE 40 MG/1
40 CAPSULE, DELAYED RELEASE ORAL DAILY
COMMUNITY
End: 2023-03-31

## 2023-01-20 RX ORDER — HYDRALAZINE HYDROCHLORIDE 20 MG/ML
5-10 INJECTION INTRAMUSCULAR; INTRAVENOUS EVERY 10 MIN PRN
Status: DISCONTINUED | OUTPATIENT
Start: 2023-01-20 | End: 2023-01-20 | Stop reason: HOSPADM

## 2023-01-20 RX ORDER — FENTANYL CITRATE 50 UG/ML
25 INJECTION, SOLUTION INTRAMUSCULAR; INTRAVENOUS EVERY 5 MIN PRN
Status: DISCONTINUED | OUTPATIENT
Start: 2023-01-20 | End: 2023-01-20 | Stop reason: HOSPADM

## 2023-01-20 RX ORDER — MEPERIDINE HYDROCHLORIDE 25 MG/ML
12.5 INJECTION INTRAMUSCULAR; INTRAVENOUS; SUBCUTANEOUS
Status: DISCONTINUED | OUTPATIENT
Start: 2023-01-20 | End: 2023-01-20 | Stop reason: HOSPADM

## 2023-01-20 RX ORDER — ONDANSETRON 2 MG/ML
4 INJECTION INTRAMUSCULAR; INTRAVENOUS EVERY 30 MIN PRN
Status: DISCONTINUED | OUTPATIENT
Start: 2023-01-20 | End: 2023-01-20 | Stop reason: HOSPADM

## 2023-01-20 RX ORDER — OXYCODONE HYDROCHLORIDE 5 MG/1
5 TABLET ORAL EVERY 4 HOURS PRN
Status: DISCONTINUED | OUTPATIENT
Start: 2023-01-20 | End: 2023-01-20 | Stop reason: HOSPADM

## 2023-01-20 RX ORDER — HALOPERIDOL 5 MG/ML
0.5 INJECTION INTRAMUSCULAR
Status: DISCONTINUED | OUTPATIENT
Start: 2023-01-20 | End: 2023-01-20 | Stop reason: HOSPADM

## 2023-01-20 RX ORDER — ONDANSETRON 4 MG/1
4 TABLET, ORALLY DISINTEGRATING ORAL EVERY 30 MIN PRN
Status: DISCONTINUED | OUTPATIENT
Start: 2023-01-20 | End: 2023-01-20 | Stop reason: HOSPADM

## 2023-01-20 RX ORDER — HYDRALAZINE HYDROCHLORIDE 20 MG/ML
2.5-5 INJECTION INTRAMUSCULAR; INTRAVENOUS EVERY 10 MIN PRN
Status: DISCONTINUED | OUTPATIENT
Start: 2023-01-20 | End: 2023-01-20 | Stop reason: HOSPADM

## 2023-01-20 RX ORDER — PROPOFOL 10 MG/ML
INJECTION, EMULSION INTRAVENOUS PRN
Status: DISCONTINUED | OUTPATIENT
Start: 2023-01-20 | End: 2023-01-20

## 2023-01-20 RX ORDER — FLUMAZENIL 0.1 MG/ML
0.2 INJECTION, SOLUTION INTRAVENOUS
Status: DISCONTINUED | OUTPATIENT
Start: 2023-01-20 | End: 2023-01-20 | Stop reason: HOSPADM

## 2023-01-20 RX ORDER — FENTANYL CITRATE 50 UG/ML
25 INJECTION, SOLUTION INTRAMUSCULAR; INTRAVENOUS
Status: DISCONTINUED | OUTPATIENT
Start: 2023-01-20 | End: 2023-01-20 | Stop reason: HOSPADM

## 2023-01-20 RX ADMIN — SODIUM CHLORIDE, POTASSIUM CHLORIDE, SODIUM LACTATE AND CALCIUM CHLORIDE: 600; 310; 30; 20 INJECTION, SOLUTION INTRAVENOUS at 07:22

## 2023-01-20 RX ADMIN — PROPOFOL 50 MG: 10 INJECTION, EMULSION INTRAVENOUS at 07:36

## 2023-01-20 RX ADMIN — SODIUM CHLORIDE, POTASSIUM CHLORIDE, SODIUM LACTATE AND CALCIUM CHLORIDE: 600; 310; 30; 20 INJECTION, SOLUTION INTRAVENOUS at 07:21

## 2023-01-20 RX ADMIN — MIDAZOLAM 2 MG: 1 INJECTION INTRAMUSCULAR; INTRAVENOUS at 07:28

## 2023-01-20 RX ADMIN — PROPOFOL 60 MG: 10 INJECTION, EMULSION INTRAVENOUS at 07:34

## 2023-01-20 RX ADMIN — LIDOCAINE HYDROCHLORIDE 40 MG: 20 INJECTION, SOLUTION INFILTRATION; PERINEURAL at 07:34

## 2023-01-20 ASSESSMENT — ACTIVITIES OF DAILY LIVING (ADL): ADLS_ACUITY_SCORE: 16

## 2023-01-20 NOTE — DISCHARGE INSTRUCTIONS
- Continue taking the omeprazole daily.     - I have placed an order for an esophagram.  [facility] will call to schedule this with you.         UPPER ENDOSCOPY    AFTER THE PROCEDURE  You will return to Same Day Surgery to rest for about an hour before you go home.  The doctor will talk with you and your family.  A family member/friend may visit with you.  You may burp up any air remaining in your stomach.  You may feel dizzy or light-headed from the medicine.  Your nurse will go over the discharge instructions with you and your caregiver and answer any of your questions.    You will be contacted the next day to check on how you are doing.  If biopsies were taken, you will be contacted with the results usually within 3 days.  BACK AT HOME  Rest for an hour or two after you get home.  When your throat is no longer numb and you have a gag reflex, take a few sips of cool water.  If you can swallow comfortably, you may start eating again.  You may have a mild sore throat for the rest of the day.  You will be contacted with results by the surgeons office within a week. If not contacted in one week, call the surgeons office.  WHAT TO WATCH FOR:  Problems rarely occur after the exam, but it is important to be aware of the early signs of a complication.  Call your doctor immediately if you have:  Difficulty swallowing or breathing  Unusual pain in your stomach or chest  Vomiting blood or dark material that looks like coffee grounds  Black or tarry stools  Temperature over 101.5 degrees    MORE QUESTIONS?  Please ask your doctor or nurse before the exam begins  or call your doctor at the clinic.    IF YOU MUST CANCEL YOUR PROCEDURE THE EVENING/NIGHT BEFORE, PLEASE CALL HOSPITAL ADMITTING -[phone] OR TOLL FREE [phone], EXT. [phone].    Phone Numbers:  Hospital - [phone] [phone]  [facility] [place] - [phone]  Surgery Patient Education - [phone] or toll free  [phone]    Post-Anesthesia Patient Instructions    IMMEDIATELY FOLLOWING SURGERY:  Do not drive or operate machinery for the first twenty four hours after surgery.  Do not make any important decisions for twenty four hours after surgery or while taking narcotic pain medications or sedatives.  If you develop intractable nausea and vomiting or a severe headache please notify your doctor immediately.    FOLLOW-UP:  Please make an appointment with your surgeon as instructed. You do not need to follow up with anesthesia unless specifically instructed to do so.    WOUND CARE INSTRUCTIONS (if applicable):  Keep a dry clean dressing on the anesthesia/puncture wound site if there is drainage.  Once the wound has quit draining you may leave it open to air.  Generally you should leave the bandage intact for twenty four hours unless there is drainage.  If the epidural site drains for more than 36-48 hours please call the anesthesia department.    QUESTIONS?:  Please feel free to call your physician or the hospital  if you have any questions, and they will be happy to assist you.

## 2023-01-20 NOTE — CARE PLAN
Patient and responsible adult given discharge instructions with no questions regarding instructions. Joy score 18/18. Pain level 0/10.  Discharged from unit via ambulation. Patient discharged to home.

## 2023-01-20 NOTE — ANESTHESIA CARE TRANSFER NOTE
Patient: Radha DELAROSA Mericle    Procedure: Procedure(s):  Upper endoscopy  with biopsy       Diagnosis: Epigastric pain [R10.13]  Diagnosis Additional Information: No value filed.    Anesthesia Type:   MAC     Note:    Oropharynx: spontaneously breathing  Level of Consciousness: drowsy  Oxygen Supplementation: nasal cannula  Level of Supplemental Oxygen (L/min / FiO2): 2  Independent Airway: airway patency satisfactory and stable  Dentition: dentition unchanged  Vital Signs Stable: post-procedure vital signs reviewed and stable  Report to RN Given: handoff report given  Patient transferred to: Phase II    Handoff Report: Identifed the Patient, Identified the Reponsible Provider, Reviewed the pertinent medical history, Discussed the surgical course, Reviewed Intra-OP anesthesia mangement and issues during anesthesia, Set expectations for post-procedure period and Allowed opportunity for questions and acknowledgement of understanding      Vitals:  Vitals Value Taken Time   BP     Temp     Pulse     Resp     SpO2         Electronically Signed By: CHINA Reed CRNA  January 20, 2023  7:44 AM

## 2023-01-20 NOTE — H&P
Surgery Consult Clinic Note      RE: Radha Carl  : 1990      Chief Complaint:  Upper abdominal pain    History of Present Illness:  Dr. Romano originally saw Mrs. Carl on 10/12/2022 for evaluation regarding esophagogastroduodenoscopy.  Please refer to that note for further detail.   She has been treated for reflux with omeprazole.  She is here today to update her H&P.  Radha is scheduled for esophagogastroduodenoscopy on 2023, which is outside the 30 day anesthesia clearance guidelines.  Previous abdominal surgeries include laparoscopic salpingectomy.  She specifically denies fever, chills, nausea, vomiting, chest pain, shortness of breath or palpitations.     Medical history:  Past Medical History:   Diagnosis Date     ADHD (attention deficit hyperactivity disorder)      Attention deficit hyperactivity disorder (ADHD), predominantly inattentive type 2014     Problem list name updated by automated process. Provider to review     Benign essential hypertension 2020     Cervicalgia 2021     Chronic tension-type headache, not intractable 2020     Cold sore      Costochondral pain 2019     Dysmenorrhea 2016     Elevated glucose 2014    resolved 2016     Elevated liver enzymes 2014    resolved 2015     Fibrocystic breast      MARIBETH (generalized anxiety disorder)      Hyperlipidemia with target LDL less than 100 2014     Diagnosis updated by automated process. Provider to review and confirm.     Hypovitaminosis D      Infertility, female, secondary 2016     Intramural leiomyoma of uterus 2022     MS (multiple sclerosis) (H) 2012     Neuropathy     feet     Obesity      PCOS (polycystic ovarian syndrome)      Tubal pregnancy without intrauterine pregnancy 2013     Visual field scotoma 2013     Vitamin B12 deficiency (non anemic) 2015       Surgical history:  Past Surgical History:   Procedure Laterality Date      HYSTEROSCOPY,DIAGNOSTIC  2016    done here and at [facility]     LAPAROSCOPIC SALPINGECTOMY Left 05/01/2010    ruptured ectopic pregnancy     wisdom teeth  2014       Family history:  Family History   Problem Relation Age of Onset     Family History Negative Mother      Unknown/Adopted Father      Family History Negative Sister      Family History Negative Sister      Obesity Sister         iron def     Hypertension Sister      Diabetes Sister         pre     Migraines Sister      Psychotic Disorder Sister         ADHD     Family History Negative Brother      Breast Cancer Maternal Grandmother 64     Unknown/Adopted Maternal Grandfather         severe gerd     Lung Cancer Paternal Grandmother         +tobacco     Heart Disease Paternal Grandfather         AMI/CAD or CVA       Medications:  Prior to Admission medications    Medication Sig Start Date End Date Taking? Authorizing Provider   acetaminophen (TYLENOL) 500 MG tablet Take 2 tablets by mouth Every 6 hours as needed   Yes Reported, Patient   baclofen (LIORESAL) 10 MG tablet Take 0.5-1 tablets (5-10 mg) by mouth 2 times daily as needed for muscle spasms 11/12/21  Yes Muriel Jones MD   blood glucose monitoring (ONE TOUCH ULTRA 2) meter device kit See Admin Instructions 5/25/22  Yes Reported, Patient   cyclobenzaprine (FLEXERIL) 10 MG tablet Take 0.5-1 tablets (5-10 mg) by mouth daily as needed for muscle spasms 11/12/21  Yes Muriel Jones MD   glatiramer acetate 40 MG/ML injection Inject 40 mg Subcutaneous three times a week 2/23/22  Yes Santo Gracia MD   ibuprofen (ADVIL,MOTRIN) 200 MG tablet Take 2 tablets by mouth Every 6 hours as needed with food   Yes Reported, Patient   Lancets (ONETOUCH DELICA PLUS [id number]) MISC USE TO CHECK GLUCOSE 5 TIMES DAILY 5/25/22  Yes Reported, Patient   lisdexamfetamine (VYVANSE) 10 MG capsule Take 1 capsule (10 mg) by mouth every morning 4/17/20  Yes Muriel Jones MD   Multiple Vitamins-Minerals  (OPTIVITE P.M.T.) TABS Take 3 tablets by mouth 2 times daily 9/11/20  Yes Muriel Jones MD   omeprazole (PRILOSEC) 40 MG DR capsule Take 40 mg by mouth daily   Yes Reported, Patient   ONETOUCH ULTRA test strip USE STRIP TO CHECK GLUCOSE 5 TIMES DAILY 5/25/22  Yes Reported, Patient   progesterone (PROMETRIUM) 100 MG capsule Take 1 capsule (100 mg) by mouth daily Starting on peak +3 for 10 days out of month 11/12/21  Yes Muriel Jones MD   propranolol (INDERAL) 10 MG tablet Take 1 tablet (10 mg) by mouth 3 times daily 8/25/22  Yes Muriel Jones MD   sucralfate (CARAFATE) 1 GM tablet Take 1 tablet (1 g) by mouth 4 times daily 1/19/23  Yes Muriel Jones MD   valACYclovir (VALTREX) 500 MG tablet TAKE 1 TABLET BY MOUTH TWICE DAILY WITH  ACTIVE  LESION 11/12/21  Yes Muriel Jones MD   vitamin D3 (CHOLECALCIFEROL) 1.25 MG (57150 UT) capsule Take 1 capsule (50,000 Units) by mouth every 7 days 11/20/20  Yes Muriel Jones MD   Meloxicam (MOBIC PO)     Reported, Patient   order for DME Right ankle ASO 7/19/17   Muriel Jones MD   norethindrone-ethinyl estradiol (MICROGESTIN) 1-20 MG-MCG per tablet Take 1 tablet by mouth daily.  10/22/13  Reported, Patient       Allergies:  The patient is allergic to cats and sulfa drugs.  .  Social history:  Social History     Tobacco Use     Smoking status: Never     Smokeless tobacco: Never   Substance Use Topics     Alcohol use: Yes     Alcohol/week: 0.0 standard drinks     Comment: rare     Marital status: .    Review of Systems:    Constitutional: Negative for fever, chills   HENT: Negative for ear pain, congestion, sore throat    Eyes: Negative for blurred vision, double vision   Respiratory: Negative for cough, hemoptysis, shortness of breath, wheezing and stridor.  Cardiovascular: Negative for chest pain, palpitations and orthopnea.   Gastrointestinal: Negative for nausea, vomiting, and blood in stool.   Genitourinary: Negative for urgency,  "frequency and hematuria.   Musculoskeletal: Negative for myalgias, back pain and joint pain.   Neurological: Negative for tingling, speech change    Endo/Heme/Allergies: Does not bruise/bleed easily.   Psychiatric/Behavioral: Negative for depression, suicidal ideas and hallucinations. The patient is not nervous/anxious.    Physical Examination:  BP (!) 176/101   Pulse 89   Temp 97.8  F (36.6  C) (Tympanic)   Resp 16   Ht 1.6 m (5' 3\")   Wt 85.2 kg (187 lb 12.8 oz)   SpO2 96%   BMI 33.27 kg/m    General: Alert and orientedx4, no acute distress, well-developed/well-nourished, ambulating without assistance  HEENT: normocephalic atraumatic, extraocular movements intact, PERRL Sclerae anicteric; Trachea mideline, no JVD  Chest:   Clear to auscultation bilaterally.  Cardiac: S1S2 , regular rate and rhythm without additional sounds  Abdomen: Soft, non-tender, non-distended  Extremities: Cursory exam unremarkable.  No peripheral edema noted.  Skin: Warm, dry, less than 2 sec cap refill  Neuro: CN 2-12 grossly intact, no focal deficit, GCS 15  Psych: Pleasant, calm, asks appropriate questions    Assessment/Plan:  #1 Esophagogastroduodenoscopy     The indications, risks, benefits and technical aspects of esophagogastroduodenoscopy were reviewed, her questions were asked and answered. Antral biopsy for histologic examination will be performed and the place of H. pylori in gastritis was discussed. Preoperative preparation, npo after midnight preceding the date was discussed and a request made to hold aspirin containing agents one week prior to ameliorate antiplatelet effect. Will proceed with exam as scheduled.      Dena Shaw [facility]  [address] [place] [address]    [id number]    Referring Provider:  No referring provider defined for this encounter.     Primary Care Provider:  Muriel Jones  "

## 2023-01-20 NOTE — OP NOTE
Radha Carl MRN# [id number]   YOB: 1990 Age: 32 year old      Date of Admission:  1/20/2023    Primary care provider: Muriel Jones    PREOPERATIVE DIAGNOSIS:  Epigastric pain       POSTOPERATIVE DIAGNOSES:    Gastritis       PROCEDURE:  Esophagogastroduodenoscopy with cold forceps biopsies.       HISTORY OF PRESENT ILLNESS:  See clinic notes.      OPERATIVE FINDINGS:  The gastroesophageal junction was noted 35 cm from the incisors.  The Z line was regular. There was mild reactive changes in the antrum, biopsies taken in duodenum and antrum    Specimen(s) submitted to pathology:  Antral biopsies, duodenal biopsies    PROCEDURE:  With the patient in the supine position on the transport cart, IV sedation was administered by the nurse anesthetist.  The patient's correct identity and planned procedure were confirmed during a requisite timeout pause.  A bite block was placed and the fiberoptic endoscope was introduced and negotiated through the cricopharyngeus without difficulty.  The length of the esophagus was examined without findings.  The gastroesophageal junction was noted 35 cm from the incisors; the Z line was regular without ulceration, bleeding source or stricture.  There was no  evidence of Childers's change.  The stomach was entered and the pylorus was traversed easily.  The gastric mucosa was mild reactive changes in the antrum; there was no evidence of gastric polyp, ulcer or bleeding source.  The duodenum was similarly without finding. Random biopsies were taken for celiac.   The endoscope was returned to the body of the stomach and retroflex confirmed the absence of abnormality in the cardia.      Random cold forceps biopsies were obtained of the antrum for H. pylori.  Hemostasis was judged adequate on visualization.   The endoscope was returned to the GE junction.  A second circumferential examination of the esophagus was performed on slow withdrawal of the endoscope without  additional finding.  The procedure was satisfactorially tolerated; there was no appreciable blood loss and the patient was returned to Day Surgery in good condition.      Rudi Romano MD  1/20/2023  7:45 AM

## 2023-01-23 NOTE — ANESTHESIA POSTPROCEDURE EVALUATION
Patient: Radha Carl    Procedure: Procedure(s):  Upper endoscopy  with biopsy       Anesthesia Type:  MAC    Note:  Disposition: Outpatient   Postop Pain Control: Uneventful            Sign Out: Well controlled pain   PONV:    Neuro/Psych:    Airway/Respiratory:    CV/Hemodynamics:    Other NRE:    DID A NON-ROUTINE EVENT OCCUR?            Last vitals:  Vitals Value Taken Time   /109 01/20/23 0810   Temp 97.8  F (36.6  C) 01/20/23 0745   Pulse 80 01/20/23 0810   Resp 14 01/20/23 0810   SpO2 95 % 01/20/23 0810       Electronically Signed By: CHINA Reed CRNA  January 23, 2023  8:50 AM

## 2023-01-25 LAB
PATH REPORT.COMMENTS IMP SPEC: NORMAL
PATH REPORT.FINAL DX SPEC: NORMAL
PATH REPORT.GROSS SPEC: NORMAL
PATH REPORT.MICROSCOPIC SPEC OTHER STN: NORMAL
PATH REPORT.RELEVANT HX SPEC: NORMAL
PHOTO IMAGE: NORMAL

## 2023-01-31 ENCOUNTER — OFFICE VISIT (OUTPATIENT)
Dept: FAMILY MEDICINE | Facility: OTHER | Age: 33
End: 2023-01-31
Attending: STUDENT IN AN ORGANIZED HEALTH CARE EDUCATION/TRAINING PROGRAM
Payer: COMMERCIAL

## 2023-01-31 VITALS
BODY MASS INDEX: 32.95 KG/M2 | OXYGEN SATURATION: 98 % | TEMPERATURE: 98.3 F | HEART RATE: 108 BPM | SYSTOLIC BLOOD PRESSURE: 140 MMHG | WEIGHT: 186 LBS | DIASTOLIC BLOOD PRESSURE: 100 MMHG | RESPIRATION RATE: 18 BRPM

## 2023-01-31 DIAGNOSIS — R10.84 ABDOMINAL PAIN, GENERALIZED: Primary | ICD-10-CM

## 2023-01-31 LAB
ALBUMIN SERPL BCG-MCNC: 4.4 G/DL (ref 3.5–5.2)
ALP SERPL-CCNC: 110 U/L (ref 35–104)
ALT SERPL W P-5'-P-CCNC: 142 U/L (ref 10–35)
ANION GAP SERPL CALCULATED.3IONS-SCNC: 11 MMOL/L (ref 7–15)
AST SERPL W P-5'-P-CCNC: 91 U/L (ref 10–35)
BASOPHILS # BLD AUTO: 0.1 10E3/UL (ref 0–0.2)
BASOPHILS NFR BLD AUTO: 1 %
BILIRUB SERPL-MCNC: 0.3 MG/DL
BUN SERPL-MCNC: 10.5 MG/DL (ref 6–20)
CALCIUM SERPL-MCNC: 9.8 MG/DL (ref 8.6–10)
CHLORIDE SERPL-SCNC: 101 MMOL/L (ref 98–107)
CREAT SERPL-MCNC: 0.6 MG/DL (ref 0.51–0.95)
CRP SERPL-MCNC: 9.5 MG/L
DEPRECATED HCO3 PLAS-SCNC: 25 MMOL/L (ref 22–29)
EOSINOPHIL # BLD AUTO: 0.3 10E3/UL (ref 0–0.7)
EOSINOPHIL NFR BLD AUTO: 2 %
ERYTHROCYTE [DISTWIDTH] IN BLOOD BY AUTOMATED COUNT: 13.6 % (ref 10–15)
GFR SERPL CREATININE-BSD FRML MDRD: >90 ML/MIN/1.73M2
GLUCOSE SERPL-MCNC: 138 MG/DL (ref 70–99)
HCT VFR BLD AUTO: 40.9 % (ref 35–47)
HGB BLD-MCNC: 13.3 G/DL (ref 11.7–15.7)
IMM GRANULOCYTES # BLD: 0 10E3/UL
IMM GRANULOCYTES NFR BLD: 0 %
LIPASE SERPL-CCNC: 42 U/L (ref 13–60)
LYMPHOCYTES # BLD AUTO: 3.9 10E3/UL (ref 0.8–5.3)
LYMPHOCYTES NFR BLD AUTO: 26 %
MCH RBC QN AUTO: 25.9 PG (ref 26.5–33)
MCHC RBC AUTO-ENTMCNC: 32.5 G/DL (ref 31.5–36.5)
MCV RBC AUTO: 80 FL (ref 78–100)
MONOCYTES # BLD AUTO: 1 10E3/UL (ref 0–1.3)
MONOCYTES NFR BLD AUTO: 7 %
NEUTROPHILS # BLD AUTO: 9.4 10E3/UL (ref 1.6–8.3)
NEUTROPHILS NFR BLD AUTO: 64 %
NRBC # BLD AUTO: 0 10E3/UL
NRBC BLD AUTO-RTO: 0 /100
PLATELET # BLD AUTO: 515 10E3/UL (ref 150–450)
POTASSIUM SERPL-SCNC: 4.1 MMOL/L (ref 3.4–5.3)
PROT SERPL-MCNC: 8.2 G/DL (ref 6.4–8.3)
RBC # BLD AUTO: 5.14 10E6/UL (ref 3.8–5.2)
SODIUM SERPL-SCNC: 137 MMOL/L (ref 136–145)
WBC # BLD AUTO: 14.8 10E3/UL (ref 4–11)

## 2023-01-31 PROCEDURE — 99213 OFFICE O/P EST LOW 20 MIN: CPT | Performed by: STUDENT IN AN ORGANIZED HEALTH CARE EDUCATION/TRAINING PROGRAM

## 2023-01-31 PROCEDURE — 86140 C-REACTIVE PROTEIN: CPT | Performed by: STUDENT IN AN ORGANIZED HEALTH CARE EDUCATION/TRAINING PROGRAM

## 2023-01-31 PROCEDURE — 80053 COMPREHEN METABOLIC PANEL: CPT | Performed by: STUDENT IN AN ORGANIZED HEALTH CARE EDUCATION/TRAINING PROGRAM

## 2023-01-31 PROCEDURE — 83690 ASSAY OF LIPASE: CPT | Performed by: STUDENT IN AN ORGANIZED HEALTH CARE EDUCATION/TRAINING PROGRAM

## 2023-01-31 PROCEDURE — 85025 COMPLETE CBC W/AUTO DIFF WBC: CPT | Performed by: STUDENT IN AN ORGANIZED HEALTH CARE EDUCATION/TRAINING PROGRAM

## 2023-01-31 PROCEDURE — 36415 COLL VENOUS BLD VENIPUNCTURE: CPT | Performed by: STUDENT IN AN ORGANIZED HEALTH CARE EDUCATION/TRAINING PROGRAM

## 2023-01-31 ASSESSMENT — ANXIETY QUESTIONNAIRES
GAD7 TOTAL SCORE: 5
IF YOU CHECKED OFF ANY PROBLEMS ON THIS QUESTIONNAIRE, HOW DIFFICULT HAVE THESE PROBLEMS MADE IT FOR YOU TO DO YOUR WORK, TAKE CARE OF THINGS AT HOME, OR GET ALONG WITH OTHER PEOPLE: SOMEWHAT DIFFICULT
1. FEELING NERVOUS, ANXIOUS, OR ON EDGE: SEVERAL DAYS
2. NOT BEING ABLE TO STOP OR CONTROL WORRYING: NOT AT ALL
6. BECOMING EASILY ANNOYED OR IRRITABLE: SEVERAL DAYS
GAD7 TOTAL SCORE: 5
3. WORRYING TOO MUCH ABOUT DIFFERENT THINGS: NOT AT ALL
5. BEING SO RESTLESS THAT IT IS HARD TO SIT STILL: MORE THAN HALF THE DAYS
7. FEELING AFRAID AS IF SOMETHING AWFUL MIGHT HAPPEN: NOT AT ALL
4. TROUBLE RELAXING: SEVERAL DAYS

## 2023-01-31 ASSESSMENT — PATIENT HEALTH QUESTIONNAIRE - PHQ9: SUM OF ALL RESPONSES TO PHQ QUESTIONS 1-9: 6

## 2023-01-31 ASSESSMENT — PAIN SCALES - GENERAL: PAINLEVEL: SEVERE PAIN (6)

## 2023-01-31 NOTE — PROGRESS NOTES
Assessment & Plan     Abdominal pain, generalized  Patient notes feeling a 'popping sensation' in her abdomen with sudden onset of pain.  This has been rather constant across the upper abdomen.  Pain is improved today. Unclear etiology at this time. Will obtain U/S and also some blood work.    Of note, patient does have history of tubal ligation with rupture.  Reports that she almost  from this.  I think there is a significant component of anxiety contributing to this with PTSD-like symptoms.  Patient relayed concern about a possible internal hemorrhage today.  Her BP is elevated but her vital signs are otherwise reassuring.  I doubt any catastrophic cause of her symptoms.    - US Abdomen Complete; Future  - CBC with platelets and differential; Future  - Lipase; Future  - Comprehensive metabolic panel; Future  - CRP, inflammation; Future  - CBC with platelets and differential  - Lipase  - Comprehensive metabolic panel  - CRP, inflammation    Return in about 2 weeks (around 2023) for Follow up.    Odette Tay MD  [facility] - ZORAIDA Garcia is a 32 year old, presenting for the following health issues:  Musculoskeletal Problem      HPI       Felt a popping sensation- came home then went to the bathroom to urinate and bent forward and felt a 'pop'.  Was not straining, everything seemed normal.    Denies any significant pain today but was uncomfortable over the weekend and yesterday.  No nausea, no vomiting, no diarrhea.  Has history of Tubal pregnancy in 2010 with rupture.        Pain History:  When did you first notice your pain? - Acute Pain   Have you seen anyone else for your pain? No  Where in your body do you have pain? left side stomach to the side    PDMP Review     None          Review of Systems   Constitutional, HEENT, cardiovascular, pulmonary, gi and gu systems are negative, except as otherwise noted.      Objective    BP (!) 140/100 (BP Location: Right arm,  Patient Position: Sitting, Cuff Size: Adult Regular)   Pulse 108   Temp 98.3  F (36.8  C) (Tympanic)   Resp 18   Wt 84.4 kg (186 lb)   LMP 12/19/2022 (Approximate)   SpO2 98%   BMI 32.95 kg/m    Body mass index is 32.95 kg/m .  Physical Exam   GENERAL: healthy, alert and no distress  EYES: Eyes grossly normal to inspection, PERRL and conjunctivae and sclerae normal  HENT: ear canals and TM's normal, nose and mouth without ulcers or lesions  NECK: no adenopathy, no asymmetry, masses, or scars and thyroid normal to palpation  RESP: lungs clear to auscultation - no rales, rhonchi or wheezes  CV: regular rate and rhythm, normal S1 S2, no S3 or S4, no murmur, click or rub, no peripheral edema and peripheral pulses strong  ABDOMEN: soft, tender in RUQ, LUQ and epigastric region, no hepatosplenomegaly, no masses and bowel sounds normal  MS: no gross musculoskeletal defects noted, no edema  SKIN: no suspicious lesions or rashes  NEURO: Normal strength and tone, mentation intact and speech normal  PSYCH: mentation appears normal, affect normal/bright

## 2023-02-02 ENCOUNTER — HOSPITAL ENCOUNTER (OUTPATIENT)
Dept: ULTRASOUND IMAGING | Facility: HOSPITAL | Age: 33
Discharge: HOME | End: 2023-02-02
Attending: STUDENT IN AN ORGANIZED HEALTH CARE EDUCATION/TRAINING PROGRAM | Admitting: STUDENT IN AN ORGANIZED HEALTH CARE EDUCATION/TRAINING PROGRAM
Payer: COMMERCIAL

## 2023-02-02 DIAGNOSIS — R10.84 ABDOMINAL PAIN, GENERALIZED: ICD-10-CM

## 2023-02-02 PROCEDURE — 76700 US EXAM ABDOM COMPLETE: CPT

## 2023-02-17 ENCOUNTER — HOSPITAL ENCOUNTER (OUTPATIENT)
Dept: GENERAL RADIOLOGY | Facility: OTHER | Age: 33
Discharge: HOME | End: 2023-02-17
Attending: CLINICAL NURSE SPECIALIST | Admitting: CLINICAL NURSE SPECIALIST
Payer: COMMERCIAL

## 2023-02-17 DIAGNOSIS — K21.9 GASTROESOPHAGEAL REFLUX DISEASE WITHOUT ESOPHAGITIS: ICD-10-CM

## 2023-02-17 PROCEDURE — 255N000001 HC RX 255: Performed by: CLINICAL NURSE SPECIALIST

## 2023-02-17 PROCEDURE — 74246 X-RAY XM UPR GI TRC 2CNTRST: CPT

## 2023-02-17 RX ADMIN — ANTACID/ANTIFLATULENT 2 G: 380; 550; 10; 10 GRANULE, EFFERVESCENT ORAL at 16:06

## 2023-03-08 ENCOUNTER — TELEPHONE (OUTPATIENT)
Dept: FAMILY MEDICINE | Facility: OTHER | Age: 33
End: 2023-03-08

## 2023-03-08 DIAGNOSIS — G35 MS (MULTIPLE SCLEROSIS) (H): Primary | ICD-10-CM

## 2023-03-10 ENCOUNTER — OFFICE VISIT (OUTPATIENT)
Dept: FAMILY MEDICINE | Facility: OTHER | Age: 33
End: 2023-03-10
Attending: FAMILY MEDICINE
Payer: COMMERCIAL

## 2023-03-10 VITALS
HEIGHT: 63 IN | WEIGHT: 186 LBS | TEMPERATURE: 98 F | BODY MASS INDEX: 32.96 KG/M2 | HEART RATE: 90 BPM | DIASTOLIC BLOOD PRESSURE: 80 MMHG | OXYGEN SATURATION: 99 % | SYSTOLIC BLOOD PRESSURE: 132 MMHG

## 2023-03-10 DIAGNOSIS — J32.0 RIGHT MAXILLARY SINUSITIS: Primary | ICD-10-CM

## 2023-03-10 LAB
BASOPHILS # BLD AUTO: 0.1 10E3/UL (ref 0–0.2)
BASOPHILS NFR BLD AUTO: 1 %
CRP SERPL-MCNC: 10.01 MG/L
EOSINOPHIL # BLD AUTO: 0.3 10E3/UL (ref 0–0.7)
EOSINOPHIL NFR BLD AUTO: 2 %
ERYTHROCYTE [DISTWIDTH] IN BLOOD BY AUTOMATED COUNT: 14.6 % (ref 10–15)
ERYTHROCYTE [SEDIMENTATION RATE] IN BLOOD BY WESTERGREN METHOD: 16 MM/HR (ref 0–20)
HCT VFR BLD AUTO: 38.4 % (ref 35–47)
HGB BLD-MCNC: 12.2 G/DL (ref 11.7–15.7)
IMM GRANULOCYTES # BLD: 0.1 10E3/UL
IMM GRANULOCYTES NFR BLD: 1 %
LYMPHOCYTES # BLD AUTO: 3.8 10E3/UL (ref 0.8–5.3)
LYMPHOCYTES NFR BLD AUTO: 29 %
MCH RBC QN AUTO: 25.5 PG (ref 26.5–33)
MCHC RBC AUTO-ENTMCNC: 31.8 G/DL (ref 31.5–36.5)
MCV RBC AUTO: 80 FL (ref 78–100)
MONOCYTES # BLD AUTO: 0.9 10E3/UL (ref 0–1.3)
MONOCYTES NFR BLD AUTO: 7 %
NEUTROPHILS # BLD AUTO: 7.7 10E3/UL (ref 1.6–8.3)
NEUTROPHILS NFR BLD AUTO: 60 %
NRBC # BLD AUTO: 0 10E3/UL
NRBC BLD AUTO-RTO: 0 /100
PLATELET # BLD AUTO: 587 10E3/UL (ref 150–450)
RBC # BLD AUTO: 4.79 10E6/UL (ref 3.8–5.2)
WBC # BLD AUTO: 12.9 10E3/UL (ref 4–11)

## 2023-03-10 PROCEDURE — 99213 OFFICE O/P EST LOW 20 MIN: CPT | Performed by: FAMILY MEDICINE

## 2023-03-10 PROCEDURE — 86140 C-REACTIVE PROTEIN: CPT | Performed by: FAMILY MEDICINE

## 2023-03-10 PROCEDURE — 85652 RBC SED RATE AUTOMATED: CPT | Performed by: FAMILY MEDICINE

## 2023-03-10 PROCEDURE — 36415 COLL VENOUS BLD VENIPUNCTURE: CPT | Performed by: FAMILY MEDICINE

## 2023-03-10 PROCEDURE — 85025 COMPLETE CBC W/AUTO DIFF WBC: CPT | Performed by: FAMILY MEDICINE

## 2023-03-10 RX ORDER — AMOXICILLIN 875 MG
875 TABLET ORAL 2 TIMES DAILY
Qty: 14 TABLET | Refills: 0 | Status: SHIPPED | OUTPATIENT
Start: 2023-03-10 | End: 2023-03-17

## 2023-03-10 ASSESSMENT — PAIN SCALES - GENERAL: PAINLEVEL: MODERATE PAIN (5)

## 2023-03-10 NOTE — RESULT ENCOUNTER NOTE
Crp about the same, white count better, take antibiotics if feeling worse this weekend and after trying the sinus rinse

## 2023-03-10 NOTE — PROGRESS NOTES
"    Assessment & Plan     Right maxillary sinusitis  Labs are basically stable and cbc slight improved off her biologic for MS second to liver issues  - CBC with platelets and differential; Future  - ESR: Erythrocyte sedimentation rate; Future  - CRP, inflammation; Future  - amoxicillin (AMOXIL) 875 MG tablet; Take 1 tablet (875 mg) by mouth 2 times daily for 7 days  - CBC with platelets and differential  - ESR: Erythrocyte sedimentation rate  - CRP, inflammation    Provider  Link to [facility] Help Grid :[id number]}     BMI:   Estimated body mass index is 32.95 kg/m  as calculated from the following:    Height as of this encounter: 1.6 m (5' 3\").    Weight as of this encounter: 84.4 kg (186 lb).   Weight management plan: Discussed healthy diet and exercise guidelines    Patient was agreeable to this plan and had no further questions.  There are no Patient Instructions on file for this visit.    No follow-ups on file.    Muriel Jones MD  [facility] - ZORAIDA Garcia is a 32 year old, presenting for the following health issues:  Neck Pain      HPI     Concern - Neck pain  Onset: 2 weeks  Description: pain on the right side of neck, hurts when moving neck up and down  Intensity: mild, moderate  Progression of Symptoms:  worsening  Accompanying Signs & Symptoms: feelings light right ride of neck is swollen, hurts to touch, sore throat  Previous history of similar problem: none  Precipitating factors:        Worsened by: Swallowing, eating, movement.   Alleviating factors:        Improved by: unsure  Therapies tried and outcome: None    Review of Systems   Constitutional, HEENT, cardiovascular, pulmonary, gi and gu systems are negative, except as otherwise noted.      Objective    /80   Pulse 90   Temp 98  F (36.7  C)   Ht 1.6 m (5' 3\")   Wt 84.4 kg (186 lb)   LMP 02/15/2023 (Approximate)   SpO2 99%   BMI 32.95 kg/m    Body mass index is 32.95 kg/m .  Physical Exam   GENERAL: " healthy, alert and no distress  EYES: Eyes grossly normal to inspection, PERRL and conjunctivae and sclerae normal  HENT: ear canals and TM's normal, nose and mouth without ulcers or lesions  HENT: post nasal drainage visible  NECK: no adenopathy, no asymmetry, masses, or scars and thyroid normal to palpation  RESP: lungs clear to auscultation - no rales, rhonchi or wheezes  CV: regular rate and rhythm, normal S1 S2, no S3 or S4, no murmur, click or rub, no peripheral edema and peripheral pulses strong  MS: no gross musculoskeletal defects noted, no edema  PSYCH: mentation appears normal, affect normal/bright    Results for orders placed or performed in visit on 03/10/23   ESR: Erythrocyte sedimentation rate     Status: Normal   Result Value Ref Range    Erythrocyte Sedimentation Rate 16 0 - 20 mm/hr   CRP, inflammation     Status: Abnormal   Result Value Ref Range    CRP Inflammation 10.01 (H) <5.00 mg/L   CBC with platelets and differential     Status: Abnormal   Result Value Ref Range    WBC Count 12.9 (H) 4.0 - 11.0 10e3/uL    RBC Count 4.79 3.80 - 5.20 10e6/uL    Hemoglobin 12.2 11.7 - 15.7 g/dL    Hematocrit 38.4 35.0 - 47.0 %    MCV 80 78 - 100 fL    MCH 25.5 (L) 26.5 - 33.0 pg    MCHC 31.8 31.5 - 36.5 g/dL    RDW 14.6 10.0 - 15.0 %    Platelet Count 587 (H) 150 - 450 10e3/uL    % Neutrophils 60 %    % Lymphocytes 29 %    % Monocytes 7 %    % Eosinophils 2 %    % Basophils 1 %    % Immature Granulocytes 1 %    NRBCs per 100 WBC 0 <1 /100    Absolute Neutrophils 7.7 1.6 - 8.3 10e3/uL    Absolute Lymphocytes 3.8 0.8 - 5.3 10e3/uL    Absolute Monocytes 0.9 0.0 - 1.3 10e3/uL    Absolute Eosinophils 0.3 0.0 - 0.7 10e3/uL    Absolute Basophils 0.1 0.0 - 0.2 10e3/uL    Absolute Immature Granulocytes 0.1 <=0.4 10e3/uL    Absolute NRBCs 0.0 10e3/uL   CBC with platelets and differential     Status: Abnormal    Narrative    The following orders were created for panel order CBC with platelets and differential.  Procedure                                Abnormality         Status                     ---------                               -----------         ------                     CBC with platelets and d...[[id number]]  Abnormal            Final result                 Please view results for these tests on the individual orders.

## 2023-03-15 DIAGNOSIS — K21.9 GASTROESOPHAGEAL REFLUX DISEASE, UNSPECIFIED WHETHER ESOPHAGITIS PRESENT: Primary | ICD-10-CM

## 2023-03-15 RX ORDER — OMEPRAZOLE 40 MG/1
40 CAPSULE, DELAYED RELEASE ORAL DAILY
Qty: 90 CAPSULE | Refills: 3 | Status: SHIPPED | OUTPATIENT
Start: 2023-03-15 | End: 2024-01-12

## 2023-03-31 ENCOUNTER — OFFICE VISIT (OUTPATIENT)
Dept: FAMILY MEDICINE | Facility: OTHER | Age: 33
End: 2023-03-31
Attending: FAMILY MEDICINE
Payer: COMMERCIAL

## 2023-03-31 VITALS
BODY MASS INDEX: 32.95 KG/M2 | OXYGEN SATURATION: 98 % | HEART RATE: 98 BPM | TEMPERATURE: 98.6 F | DIASTOLIC BLOOD PRESSURE: 100 MMHG | RESPIRATION RATE: 18 BRPM | WEIGHT: 186 LBS | SYSTOLIC BLOOD PRESSURE: 138 MMHG

## 2023-03-31 DIAGNOSIS — E11.65 TYPE 2 DIABETES MELLITUS WITH HYPERGLYCEMIA, WITHOUT LONG-TERM CURRENT USE OF INSULIN (H): Primary | ICD-10-CM

## 2023-03-31 DIAGNOSIS — I10 BENIGN ESSENTIAL HYPERTENSION: ICD-10-CM

## 2023-03-31 DIAGNOSIS — E78.5 HYPERLIPIDEMIA WITH TARGET LDL LESS THAN 100: ICD-10-CM

## 2023-03-31 DIAGNOSIS — E61.1 IRON DEFICIENCY: ICD-10-CM

## 2023-03-31 DIAGNOSIS — E55.9 HYPOVITAMINOSIS D: ICD-10-CM

## 2023-03-31 DIAGNOSIS — B37.2 CANDIDIASIS OF SKIN: ICD-10-CM

## 2023-03-31 PROBLEM — E11.9 DIABETES MELLITUS, TYPE 2 (H): Status: ACTIVE | Noted: 2023-03-31

## 2023-03-31 LAB
ALBUMIN SERPL BCG-MCNC: 4.4 G/DL (ref 3.5–5.2)
ALP SERPL-CCNC: 118 U/L (ref 35–104)
ALT SERPL W P-5'-P-CCNC: 130 U/L (ref 10–35)
ANION GAP SERPL CALCULATED.3IONS-SCNC: 10 MMOL/L (ref 7–15)
AST SERPL W P-5'-P-CCNC: 79 U/L (ref 10–35)
BILIRUB SERPL-MCNC: 0.2 MG/DL
BUN SERPL-MCNC: 9.6 MG/DL (ref 6–20)
CALCIUM SERPL-MCNC: 9.5 MG/DL (ref 8.6–10)
CHLORIDE SERPL-SCNC: 101 MMOL/L (ref 98–107)
CREAT SERPL-MCNC: 0.5 MG/DL (ref 0.51–0.95)
DEPRECATED HCO3 PLAS-SCNC: 23 MMOL/L (ref 22–29)
EST. AVERAGE GLUCOSE BLD GHB EST-MCNC: 171 MG/DL
FERRITIN SERPL-MCNC: 33 NG/ML (ref 6–175)
GFR SERPL CREATININE-BSD FRML MDRD: >90 ML/MIN/1.73M2
GLUCOSE SERPL-MCNC: 205 MG/DL (ref 70–99)
HBA1C MFR BLD: 7.6 %
IRON BINDING CAPACITY (ROCHE): 341 UG/DL (ref 240–430)
IRON SATN MFR SERPL: 9 % (ref 15–46)
IRON SERPL-MCNC: 30 UG/DL (ref 37–145)
POTASSIUM SERPL-SCNC: 4.1 MMOL/L (ref 3.4–5.3)
PROT SERPL-MCNC: 8.2 G/DL (ref 6.4–8.3)
SODIUM SERPL-SCNC: 134 MMOL/L (ref 136–145)
TSH SERPL DL<=0.005 MIU/L-ACNC: 0.66 UIU/ML (ref 0.3–4.2)

## 2023-03-31 PROCEDURE — 84443 ASSAY THYROID STIM HORMONE: CPT | Performed by: FAMILY MEDICINE

## 2023-03-31 PROCEDURE — 83550 IRON BINDING TEST: CPT | Performed by: FAMILY MEDICINE

## 2023-03-31 PROCEDURE — 82728 ASSAY OF FERRITIN: CPT | Performed by: FAMILY MEDICINE

## 2023-03-31 PROCEDURE — 82306 VITAMIN D 25 HYDROXY: CPT | Performed by: FAMILY MEDICINE

## 2023-03-31 PROCEDURE — 99214 OFFICE O/P EST MOD 30 MIN: CPT | Performed by: FAMILY MEDICINE

## 2023-03-31 PROCEDURE — 80053 COMPREHEN METABOLIC PANEL: CPT | Performed by: FAMILY MEDICINE

## 2023-03-31 PROCEDURE — 83036 HEMOGLOBIN GLYCOSYLATED A1C: CPT | Performed by: FAMILY MEDICINE

## 2023-03-31 PROCEDURE — 36415 COLL VENOUS BLD VENIPUNCTURE: CPT | Performed by: FAMILY MEDICINE

## 2023-03-31 PROCEDURE — 83540 ASSAY OF IRON: CPT | Performed by: FAMILY MEDICINE

## 2023-03-31 RX ORDER — NYSTATIN 100000 [USP'U]/G
POWDER TOPICAL 3 TIMES DAILY
Qty: 30 G | Refills: 0 | Status: SHIPPED | OUTPATIENT
Start: 2023-03-31

## 2023-03-31 ASSESSMENT — PATIENT HEALTH QUESTIONNAIRE - PHQ9: SUM OF ALL RESPONSES TO PHQ QUESTIONS 1-9: 5

## 2023-03-31 ASSESSMENT — PAIN SCALES - GENERAL: PAINLEVEL: MILD PAIN (3)

## 2023-03-31 NOTE — PROGRESS NOTES
Assessment & Plan     Type 2 diabetes mellitus with hyperglycemia, without long-term current use of insulin (H)  Start Rybelsus daily and follow-up 3-month  - Hemoglobin A1c; Future  - TSH with free T4 reflex; Future  - Albumin Random Urine Quantitative with Creat Ratio; Future  - Comprehensive metabolic panel; Future    Hyperlipidemia with target LDL less than 100  Continue medications    Benign essential hypertension  Elevated today we will monitor    Hypovitaminosis D  Labs pending  - Vitamin D Deficiency; Future    Iron deficiency  She tried a new prescription but does not feel as though it is working as well  - Iron and iron binding capacity; Future  - Ferritin; Future      Provider  Link to [facility] Help Grid :[id number]}   Patient was agreeable to this plan and had no further questions.  There are no Patient Instructions on file for this visit.    No follow-ups on file.    Muriel Jones MD  [facility] - ZORAIDA Garcia is a 32 year old, presenting for the following health issues:  med review    Additional Questions 3/31/2023   Roomed by Aarti JEROME LPN     HPI     Concern - PCOS  Onset: prior  Progression of Symptoms:  unchanged  Accompanying Signs & Symptoms: missed period  Previous history of similar problem: yes  Precipitating factors:        Worsened by: nothing  Alleviating factors:        Improved by: nothing  Therapies tried and outcome: NAC      Diabetes Follow-up      How often are you checking your blood sugar? Not at all    What concerns do you have today about your diabetes? None     Do you have any of these symptoms? (Select all that apply)  Numbness in feet and Burning in feet      Hyperlipidemia Follow-Up      Are you regularly taking any medication or supplement to lower your cholesterol?   No    Are you having muscle aches or other side effects that you think could be caused by your cholesterol lowering medication?  No    Hypertension Follow-up      Do you check your  blood pressure regularly outside of the clinic? Yes     Are you following a low salt diet? Yes    Are your blood pressures ever more than 140 on the top number (systolic) OR more   than 90 on the bottom number (diastolic), for example 140/90? Yes    BP Readings from Last 2 Encounters:   03/31/23 (!) 138/100   03/10/23 132/80     Hemoglobin A1C (%)   Date Value   12/15/2022 7.4 (H)   11/15/2021 6.5 (H)   09/11/2020 6.4 (H)   06/12/2019 5.9 (H)     LDL Cholesterol Calculated (mg/dL)   Date Value   12/15/2022 154 (H)   11/15/2021 131 (H)   09/11/2020 134 (H)   06/12/2019 134 (H)         Review of Systems   Constitutional, HEENT, cardiovascular, pulmonary, gi and gu systems are negative, except as otherwise noted.      Objective    BP (!) 138/100 (BP Location: Right arm, Patient Position: Sitting, Cuff Size: Adult Regular)   Pulse 98   Temp 98.6  F (37  C) (Tympanic)   Resp 18   Wt 84.4 kg (186 lb)   LMP 02/15/2023 (Approximate)   SpO2 98%   BMI 32.95 kg/m    Body mass index is 32.95 kg/m .  Physical Exam   GENERAL: healthy, alert and no distress  NECK: no adenopathy, no asymmetry, masses, or scars and thyroid normal to palpation  RESP: lungs clear to auscultation - no rales, rhonchi or wheezes  CV: regular rate and rhythm, normal S1 S2, no S3 or S4, no murmur, click or rub, no peripheral edema and peripheral pulses strong  ABDOMEN: soft, nontender, no hepatosplenomegaly, no masses and bowel sounds normal  MS: no gross musculoskeletal defects noted, no edema  PSYCH: mentation appears normal, affect normal/bright    No results found for any visits on 03/31/23.

## 2023-04-01 NOTE — RESULT ENCOUNTER NOTE
Please call patient with the following results:  Liver enzymes are improved, A1c is slightly higher so the Rybelsus will help with this and it should help with the liver enzymes as well.  Her iron is low recommend either going back to the iron tablets she had or we could try an infusion

## 2023-04-03 ENCOUNTER — TELEPHONE (OUTPATIENT)
Dept: FAMILY MEDICINE | Facility: OTHER | Age: 33
End: 2023-04-03

## 2023-04-03 LAB — DEPRECATED CALCIDIOL+CALCIFEROL SERPL-MC: 33 UG/L (ref 20–75)

## 2023-04-03 NOTE — TELEPHONE ENCOUNTER
Received PA from [facility] for Rybelsus 3MG and 7MG tablets. Submitted on CMM. Received instant APPROVAL from [employer]. Effective 03/04/2023 - 04/02/2024. Forms scanned to Epic.

## 2023-04-03 NOTE — TELEPHONE ENCOUNTER
Please call patient with the following results:  Recommend iron infusions venofer 300mg weekly x 3 doses

## 2023-04-03 NOTE — TELEPHONE ENCOUNTER
Per last results patient is taking an iron supplement. Wondering about iron infusions. Rybelsus is not covered and is still awaiting prior authorization.

## 2023-04-04 DIAGNOSIS — E61.1 IRON DEFICIENCY: Primary | ICD-10-CM

## 2023-04-04 RX ORDER — ALBUTEROL SULFATE 90 UG/1
1-2 AEROSOL, METERED RESPIRATORY (INHALATION)
Status: CANCELLED
Start: 2023-04-09

## 2023-04-04 RX ORDER — ALBUTEROL SULFATE 0.83 MG/ML
2.5 SOLUTION RESPIRATORY (INHALATION)
Status: CANCELLED | OUTPATIENT
Start: 2023-04-04

## 2023-04-04 RX ORDER — DIPHENHYDRAMINE HYDROCHLORIDE 50 MG/ML
50 INJECTION INTRAMUSCULAR; INTRAVENOUS
Status: CANCELLED
Start: 2023-04-04

## 2023-04-04 RX ORDER — DIPHENHYDRAMINE HYDROCHLORIDE 50 MG/ML
50 INJECTION INTRAMUSCULAR; INTRAVENOUS
Status: CANCELLED
Start: 2023-04-09

## 2023-04-04 RX ORDER — ALBUTEROL SULFATE 0.83 MG/ML
2.5 SOLUTION RESPIRATORY (INHALATION)
Status: CANCELLED | OUTPATIENT
Start: 2023-04-09

## 2023-04-04 RX ORDER — EPINEPHRINE 1 MG/ML
0.3 INJECTION, SOLUTION, CONCENTRATE INTRAVENOUS EVERY 5 MIN PRN
Status: CANCELLED | OUTPATIENT
Start: 2023-04-04

## 2023-04-04 RX ORDER — ALBUTEROL SULFATE 90 UG/1
1-2 AEROSOL, METERED RESPIRATORY (INHALATION)
Status: CANCELLED
Start: 2023-04-04

## 2023-04-04 RX ORDER — MEPERIDINE HYDROCHLORIDE 25 MG/ML
25 INJECTION INTRAMUSCULAR; INTRAVENOUS; SUBCUTANEOUS EVERY 30 MIN PRN
Status: CANCELLED | OUTPATIENT
Start: 2023-04-09

## 2023-04-04 RX ORDER — EPINEPHRINE 1 MG/ML
0.3 INJECTION, SOLUTION, CONCENTRATE INTRAVENOUS EVERY 5 MIN PRN
Status: CANCELLED | OUTPATIENT
Start: 2023-04-09

## 2023-04-04 RX ORDER — MEPERIDINE HYDROCHLORIDE 25 MG/ML
25 INJECTION INTRAMUSCULAR; INTRAVENOUS; SUBCUTANEOUS EVERY 30 MIN PRN
Status: CANCELLED | OUTPATIENT
Start: 2023-04-04

## 2023-04-10 ENCOUNTER — HOSPITAL ENCOUNTER (OUTPATIENT)
Dept: MRI IMAGING | Facility: HOSPITAL | Age: 33
Discharge: HOME | End: 2023-04-10
Attending: PSYCHIATRY & NEUROLOGY
Payer: COMMERCIAL

## 2023-04-10 DIAGNOSIS — G35 MS (MULTIPLE SCLEROSIS) (H): ICD-10-CM

## 2023-04-10 PROCEDURE — 72156 MRI NECK SPINE W/O & W/DYE: CPT

## 2023-04-10 PROCEDURE — 255N000002 HC RX 255 OP 636: Performed by: RADIOLOGY

## 2023-04-10 PROCEDURE — A9585 GADOBUTROL INJECTION: HCPCS | Performed by: RADIOLOGY

## 2023-04-10 PROCEDURE — 70553 MRI BRAIN STEM W/O & W/DYE: CPT

## 2023-04-10 RX ORDER — GADOBUTROL 604.72 MG/ML
7.5 INJECTION INTRAVENOUS ONCE
Status: COMPLETED | OUTPATIENT
Start: 2023-04-10 | End: 2023-04-10

## 2023-04-10 RX ADMIN — GADOBUTROL 7.5 ML: 604.72 INJECTION INTRAVENOUS at 13:46

## 2023-04-13 ENCOUNTER — VIRTUAL VISIT (OUTPATIENT)
Dept: NEUROLOGY | Facility: CLINIC | Age: 33
End: 2023-04-13
Attending: PSYCHIATRY & NEUROLOGY
Payer: COMMERCIAL

## 2023-04-13 DIAGNOSIS — G35 MS (MULTIPLE SCLEROSIS) (H): Primary | ICD-10-CM

## 2023-04-13 PROCEDURE — G0463 HOSPITAL OUTPT CLINIC VISIT: HCPCS | Mod: GT

## 2023-04-13 PROCEDURE — 99214 OFFICE O/P EST MOD 30 MIN: CPT | Mod: VID | Performed by: PSYCHIATRY & NEUROLOGY

## 2023-04-13 NOTE — NURSING NOTE
Chief Complaint   Patient presents with     MS     RECHECK     6 month follow up           medications were reconciled.   Rishabh Lancaster, EMT  11:29 AM

## 2023-04-13 NOTE — Clinical Note
4/13/2023       RE: Radha Carl  [address]  [address]  [place] [facility] [address]     Dear Colleague,    Thank you for referring your patient, Radha Carl, to the [facility]. Please see a copy of my visit note below.    No notes on file    Again, thank you for allowing me to participate in the care of your patient.      Sincerely,    Santo Gracia MD

## 2023-04-13 NOTE — PATIENT INSTRUCTIONS
I would suggest taking your 5000 unit vitamin D supplement daily    2.   My nurse coordinator will be contacting you regarding the process to start Kesimpta    3.  Please have blood tests drawn at your convenience before starting the drug    4. Return to clinic in 6 months

## 2023-04-13 NOTE — LETTER
4/13/2023      RE: Radha Carl  [address]  [address]  [facility] [address]     Referral source: Established patient     Chief complaint: Multiple sclerosis     History of the Present Illness: Ms. Radha Carl is a 31 year old woman who is evaluated in the Multiple Sclerosis Clinic today for follow up after earlier MRI scans of the brain and cervical spine.    At the request of the patient, the appointment was conducted by video as she lives a long distance from our clinic in [facility], and to limit unnecessary exposure in light of the COVID-19 pandemic.    The patient's history is as per my previous notes. Her history of symptoms of demyelinating disease dates back to May 2013, at which time she was admitted to hospital for ascending paresthesias. MRI imaging was suggestive of demyelinating disease of the type seen in multiple sclerosis. 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 A formal MS diagnosis was confirmed in 2018 after she developed increased weakness of the legs and MRI imaging showed multiple new lesions, but she continued to decline disease modifying treatment. Further radiologic progression was noted in December of last year after she developed vertiginous symptoms, and she was referred to this clinic for an opinion on management. We initially started her on disease modifying therapy with dimethyl fumarate, but she discontinued that medication after a few weeks due to gastrointestinal side effects. She was next placed on glatiramer acetate and remains on that treatment up until the present time. MRI scans last October showed interval demyelination, and we discussed alternate disease modifying therapies, but at that time it was her preference to remain on glatiramer acetate.    We discussed plan to repeat MRI scans again after 6 months, and she returns today after those  studies.    Clinically, she is describing a fluctuating visual obscuration in the left eye. She feels that the symptoms are similar to prior episode of optic neuritis.    Investigations: I reviewed images from MRI scans of the brain and cervical spine performed on 4/10/2023. My independent interpretation of those images is that they show definite evidence of ongoing active demyelination with an enhancing lesion in the left corona radiata.       Assessment/plan:    1. Multiple sclerosis  I discussed with the patient that given the ongoing active inflammatory demyelinating changes on MRI, I would recommend changing therapies.    Although she is not actively trying to conceive, I would not recommend teriflunomide or fingolimod as these are either known or strongly suspected to be teratogenic, and she is not using highly effective contraception.     Other methylfumarate compounds (e.g., diroximel fumarate) are sometimes better tolerated from the standpoint of GI side effects, and could be tried, although some people cannot tolerate any of these agents.    Overall, however, I would be most inclined to a CD20 monoclonal antibody. 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     She is concerned about report of breast cancer in association with ocrelizumab. I discussed with her that this finding was only seen in one of three clinical trials of ocrelizumab and it is not known whether or not this is a genuine risk. Anecdotally, we have not seen unusual cancers in people taking ocrelizumab in the 6 years that this medication has been on the market. Lastly, the mechanisms of other CD20 monoclonal antibodies are essentially identical to that of ocrelizumab, and if there is a risk in this regard, it could be class specific.    Nonetheless, for this reason she is inclined to try ofatumumab (Kesimpta) instead, and I think that this is a fine option. We discussed that there may be an  "increased risk of sinus and lung infections with this medication, and that vaccines received while on this treatment are less likely to provide protective immunity.    We will send her the forms to get started on ofatumumab by fax and try to get this approved as expeditiously as possible. I also placed orders for her to have total immunoglobulin levels drawn prior to starting the medication, and she will have these drawn at her local [place] clinic.    She also asked about steroids for her visual symptoms. She has received low dose oral corticosteroids in the past; I explained to her that I do not think low dose steroids (e.g., \"Medrol Dose Srinath\")  have any role in the treatment of MS. The only effect of such treatments in a randomized clinical trial was an increase in the frequency of recurrent optic neuritis in the Optic Neuritis Treatment Trial. I generally would not recommend high dose steroids (e.g., methylprednisolone 1 gram IV daily x 5 days) for mild or fluctuating symptoms, although this could be given if strongly desired. For now she was comfortable with continuing to observe.    I spent a total of 35 minutes on patient care activities related to this encounter on the date of service, including time spent in reviewing the chart, performing my own review of neuroimaging, obtaining history from and in counseling the patient, and in documentation/coordination of care via the electronic medical record.     Santo Gracia MD   of Neurology  [facility]     Cc:  Muriel Jones MD (PCP)  Patient  "

## 2023-04-14 ENCOUNTER — TELEPHONE (OUTPATIENT)
Dept: NEUROLOGY | Facility: CLINIC | Age: 33
End: 2023-04-14
Payer: COMMERCIAL

## 2023-04-14 ENCOUNTER — TELEPHONE (OUTPATIENT)
Dept: NEUROLOGY | Facility: CLINIC | Age: 33
End: 2023-04-14

## 2023-04-14 NOTE — TELEPHONE ENCOUNTER
Pt to start Kesimpta. Baseline labs to be completed. Start form faxed to pt at [phone]. PA request in separate encounter.    Martha Ty RN

## 2023-04-14 NOTE — TELEPHONE ENCOUNTER
Prior Authorization Specialty Medication Request    Medication/Dose: Kesimpta Sensoready Pen -  loading doses (20 mg weekly x3 weeks) and maintenance dose (20 mg monthly)  ICD code (if different than what is on RX):  Multiple sclerosis, G35  Previously Tried and Failed:  Glatiramer acetate, dimethyl fumarate    Important Lab Values:   Rationale: Initiation of alternate disease-modifying therapy for demyelinating disease    Insurance Name: [facility]  Insurance ID: [id number]   Insurance Phone Number: [phone]     Pharmacy Information (if different than what is on RX)  Name:    Phone:

## 2023-04-17 NOTE — TELEPHONE ENCOUNTER
PA Initiation    Medication: Kesimpta 20MG/0.4mL auto-injectors (PA PENDING)  Insurance Company: [employer] - Phone [phone] Fax [phone]  Pharmacy Filling the Rx: [facility] - [place], [place] - [address]   Filling Pharmacy Phone:    Filling Pharmacy Fax:    Start Date: 4/17/2023        Thank you,    Karina Leone h-T  Specialty Pharmacy Clinic Liaison - CardiologyNeurologyMultiple Sclerosis  [facility] [address]  [address] [place] [address]  Ph: [phone] Fax: [phone]  [email or web address]

## 2023-04-17 NOTE — TELEPHONE ENCOUNTER
Kesimpta brochures, including Quick Tips for Use, mailed to pt's home. [employer] message sent to pt with update regarding process. PA pending. Start form has been faxed to pharmacy liaison. Labs still need to be collected.    Martha Ty RN

## 2023-04-19 ENCOUNTER — LAB (OUTPATIENT)
Dept: LAB | Facility: OTHER | Age: 33
End: 2023-04-19
Payer: COMMERCIAL

## 2023-04-19 DIAGNOSIS — G35 MS (MULTIPLE SCLEROSIS) (H): ICD-10-CM

## 2023-04-19 PROCEDURE — 36415 COLL VENOUS BLD VENIPUNCTURE: CPT

## 2023-04-19 PROCEDURE — 82784 ASSAY IGA/IGD/IGG/IGM EACH: CPT | Mod: 91

## 2023-04-19 PROCEDURE — 82784 ASSAY IGA/IGD/IGG/IGM EACH: CPT

## 2023-04-19 NOTE — TELEPHONE ENCOUNTER
Prior Authorization Approval    Authorization Effective Date: 4/17/2023  Authorization Expiration Date: 4/2/2024  Medication: Kesimpta 20MG/0.4mL auto-injectors (PA APPROVED)  Approved Dose/Quantity: 1.2mL approved 4/17/23-5/17/23  0.4mL approved 5/8/23-4/2/24  Reference #:     Insurance Company: [employer] - Phone [phone] Fax [phone]  Expected CoPay:       CoPay Card Available: Yes    Foundation Assistance Needed:    Which Pharmacy is filling the prescription (Not needed for infusion/clinic administered): [facility] - [place], [place] - [address]   Pharmacy Notified:    Patient Notified:              Thank you,    Karina Leone [facility]  Specialty Pharmacy Clinic Liaison - CardiologyNeurologyMultiple Sclerosis  [facility]  [address] [place] [address]  3rd [place] [address]  Ph: [phone] Fax: [phone]  [email or web address]

## 2023-04-19 NOTE — TELEPHONE ENCOUNTER
PA approved. Reminded pt that lab work needs to be completed and reviewed by Dr Gracia before starting medication.     Martha Ty RN

## 2023-04-21 ENCOUNTER — MYC MEDICAL ADVICE (OUTPATIENT)
Dept: NEUROLOGY | Facility: CLINIC | Age: 33
End: 2023-04-21
Payer: COMMERCIAL

## 2023-04-21 LAB
IGA SERPL-MCNC: 124 MG/DL (ref 84–499)
IGG SERPL-MCNC: 1267 MG/DL (ref 610–1616)
IGM SERPL-MCNC: 153 MG/DL (ref 35–242)

## 2023-04-21 NOTE — PROGRESS NOTES
Referral source: Established patient     Chief complaint: Multiple sclerosis     History of the Present Illness: Ms. Radha Carl is a 31 year old woman who is evaluated in the Multiple Sclerosis Clinic today for follow up after earlier MRI scans of the brain and cervical spine.    At the request of the patient, the appointment was conducted by video as she lives a long distance from our clinic in [facility], and to limit unnecessary exposure in light of the COVID-19 pandemic.    The patient's history is as per my previous notes. Her history of symptoms of demyelinating disease dates back to May 2013, at which time she was admitted to hospital for ascending paresthesias. MRI imaging was suggestive of demyelinating disease of the type seen in multiple sclerosis. 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 A formal MS diagnosis was confirmed in 2018 after she developed increased weakness of the legs and MRI imaging showed multiple new lesions, but she continued to decline disease modifying treatment. Further radiologic progression was noted in December of last year after she developed vertiginous symptoms, and she was referred to this clinic for an opinion on management. We initially started her on disease modifying therapy with dimethyl fumarate, but she discontinued that medication after a few weeks due to gastrointestinal side effects. She was next placed on glatiramer acetate and remains on that treatment up until the present time. MRI scans last October showed interval demyelination, and we discussed alternate disease modifying therapies, but at that time it was her preference to remain on glatiramer acetate.    We discussed plan to repeat MRI scans again after 6 months, and she returns today after those studies.    Clinically, she is describing a fluctuating visual obscuration in the left eye. She feels  that the symptoms are similar to prior episode of optic neuritis.    Investigations: I reviewed images from MRI scans of the brain and cervical spine performed on 4/10/2023. My independent interpretation of those images is that they show definite evidence of ongoing active demyelination with an enhancing lesion in the left corona radiata.     Assessment/plan:    1. Multiple sclerosis  I discussed with the patient that given the ongoing active inflammatory demyelinating changes on MRI, I would recommend changing therapies.    Although she is not actively trying to conceive, I would not recommend teriflunomide or fingolimod as these are either known or strongly suspected to be teratogenic, and she is not using highly effective contraception.     Other methylfumarate compounds (e.g., diroximel fumarate) are sometimes better tolerated from the standpoint of GI side effects, and could be tried, although some people cannot tolerate any of these agents.    Overall, however, I would be most inclined to a CD20 monoclonal antibody. 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     She is concerned about report of breast cancer in association with ocrelizumab. I discussed with her that this finding was only seen in one of three clinical trials of ocrelizumab and it is not known whether or not this is a genuine risk. Anecdotally, we have not seen unusual cancers in people taking ocrelizumab in the 6 years that this medication has been on the market. Lastly, the mechanisms of other CD20 monoclonal antibodies are essentially identical to that of ocrelizumab, and if there is a risk in this regard, it could be class specific.    Nonetheless, for this reason she is inclined to try ofatumumab (Kesimpta) instead, and I think that this is a fine option. We discussed that there may be an increased risk of sinus and lung infections with this medication, and that vaccines received while on this  "treatment are less likely to provide protective immunity.    We will send her the forms to get started on ofatumumab by fax and try to get this approved as expeditiously as possible. I also placed orders for her to have total immunoglobulin levels drawn prior to starting the medication, and she will have these drawn at her local [facility].    She also asked about steroids for her visual symptoms. She has received low dose oral corticosteroids in the past; I explained to her that I do not think low dose steroids (e.g., \"Medrol Dose Srinath\")  have any role in the treatment of MS. The only effect of such treatments in a randomized clinical trial was an increase in the frequency of recurrent optic neuritis in the Optic Neuritis Treatment Trial. I generally would not recommend high dose steroids (e.g., methylprednisolone 1 gram IV daily x 5 days) for mild or fluctuating symptoms, although this could be given if strongly desired. For now she was comfortable with continuing to observe.    I spent a total of 35 minutes on patient care activities related to this encounter on the date of service, including time spent in reviewing the chart, performing my own review of neuroimaging, obtaining history from and in counseling the patient, and in documentation/coordination of care via the electronic medical record.   "

## 2023-04-24 NOTE — TELEPHONE ENCOUNTER
Spoke with [employer] [facility]esimpta representative. Clinic address on start form apparently not clear enough on the fax they received, looks like [address] instead of [address]. Form refaxed to the number requested, with T in [address] more clearly visible.    Martha Ty RN

## 2023-04-24 NOTE — TELEPHONE ENCOUNTER
Labs completed and pt is medically cleared to start Kesimpta. Pt has been informed by Dr Gracia.    Martha Ty RN

## 2023-04-24 NOTE — TELEPHONE ENCOUNTER
Health Call Center    Phone Message    May a detailed message be left on voicemail: no     Reason for Call: Form or Letter   Type or form/letter needing completion: Start Form   Provider: Santo Gracia   Date form needed: asap  Once completed: Fax form to: [phone]     Dolly is requesting the care team to fix the doctors address on the start form for Kisempta that was sent over.     Please fix the form and send back at # [phone]          Action Taken: Message routed to:  [facility] neurology     Travel Screening: Not Applicable

## 2023-05-04 NOTE — TELEPHONE ENCOUNTER
[employer] informed us that the pt's Kesimpta rx was sent to [facility] on 4/26. [employer] message sent to pt to check in regarding medication start.    Martha Ty RN

## 2023-05-08 ENCOUNTER — MYC MEDICAL ADVICE (OUTPATIENT)
Dept: FAMILY MEDICINE | Facility: OTHER | Age: 33
End: 2023-05-08

## 2023-05-08 DIAGNOSIS — E28.2 PCOS (POLYCYSTIC OVARIAN SYNDROME): Primary | ICD-10-CM

## 2023-05-08 DIAGNOSIS — E11.65 TYPE 2 DIABETES MELLITUS WITH HYPERGLYCEMIA, WITHOUT LONG-TERM CURRENT USE OF INSULIN (H): ICD-10-CM

## 2023-05-09 ENCOUNTER — TELEPHONE (OUTPATIENT)
Dept: FAMILY MEDICINE | Facility: OTHER | Age: 33
End: 2023-05-09

## 2023-05-09 NOTE — TELEPHONE ENCOUNTER
Received PA from [employer] for Wegovy 0.25MG/0.5ML auto-injectors. Submitted on CMM. Waiting for a response.

## 2023-05-11 NOTE — TELEPHONE ENCOUNTER
Received two DENIALS from different insurance for Wegovy 0.25MG/0.5ML auto-injectors. Received DENIAL from [employer]. 05/09/2023     Received DENIAL from [employer]. 05/11/2023        All forms scanned to Epic.

## 2023-05-12 ENCOUNTER — TELEPHONE (OUTPATIENT)
Dept: FAMILY MEDICINE | Facility: OTHER | Age: 33
End: 2023-05-12

## 2023-05-15 NOTE — TELEPHONE ENCOUNTER
Received APPROVAL letter on the appeal of Wegovy 0.25MG/0.5ML auto-injectors. Effective 05/15/2023 - 12/14/2023. Letter scanned to Epic

## 2023-05-18 NOTE — TELEPHONE ENCOUNTER
Actually, it IS for diabetes and she didn't tolerate rybelsus so she would NOt be taking it at the same time as wegovy

## 2023-05-24 DIAGNOSIS — R11.0 NAUSEA: Primary | ICD-10-CM

## 2023-05-24 RX ORDER — ONDANSETRON 4 MG/1
4 TABLET, ORALLY DISINTEGRATING ORAL EVERY 8 HOURS PRN
Qty: 30 TABLET | Refills: 1 | Status: SHIPPED | OUTPATIENT
Start: 2023-05-24 | End: 2024-06-03

## 2023-05-24 NOTE — TELEPHONE ENCOUNTER
Patient requesting prescription Zofran for nausea due to new medication Wegovy.    Zofran 4 MG      Last Written Prescription Date:  Not listed on current medication list  Last Fill Quantity: n/a,   # refills: n/a  Last Office Visit: 03/31/23  Future Office visit:       Routing refill request to provider for review/approval because:  Drug not active on patient's medication list

## 2023-06-07 ENCOUNTER — TELEPHONE (OUTPATIENT)
Dept: FAMILY MEDICINE | Facility: OTHER | Age: 33
End: 2023-06-07

## 2023-06-07 DIAGNOSIS — E11.65 TYPE 2 DIABETES MELLITUS WITH HYPERGLYCEMIA, WITHOUT LONG-TERM CURRENT USE OF INSULIN (H): ICD-10-CM

## 2023-06-07 NOTE — TELEPHONE ENCOUNTER
Received a PA from [employer] for Semaglutide-Weight Management (WEGOVY) 0.25 MG/0.5ML pen. Submitted on CMM and received an APPROVAL. Effective dates 5/8/23-1/3/24. Scanned in Epic.

## 2023-06-13 ENCOUNTER — LAB (OUTPATIENT)
Dept: LAB | Facility: OTHER | Age: 33
End: 2023-06-13
Payer: COMMERCIAL

## 2023-06-13 DIAGNOSIS — E61.1 IRON DEFICIENCY: ICD-10-CM

## 2023-06-13 DIAGNOSIS — E61.1 IRON DEFICIENCY: Primary | ICD-10-CM

## 2023-06-13 DIAGNOSIS — D64.89 OTHER SPECIFIED ANEMIAS: ICD-10-CM

## 2023-06-13 LAB
BASOPHILS # BLD AUTO: 0.1 10E3/UL (ref 0–0.2)
BASOPHILS NFR BLD AUTO: 1 %
EOSINOPHIL # BLD AUTO: 0.5 10E3/UL (ref 0–0.7)
EOSINOPHIL NFR BLD AUTO: 4 %
ERYTHROCYTE [DISTWIDTH] IN BLOOD BY AUTOMATED COUNT: 14.3 % (ref 10–15)
HCT VFR BLD AUTO: 31.6 % (ref 35–47)
HGB BLD-MCNC: 10.1 G/DL (ref 11.7–15.7)
IMM GRANULOCYTES # BLD: 0.1 10E3/UL
IMM GRANULOCYTES NFR BLD: 0 %
LYMPHOCYTES # BLD AUTO: 3.7 10E3/UL (ref 0.8–5.3)
LYMPHOCYTES NFR BLD AUTO: 29 %
MCH RBC QN AUTO: 24.6 PG (ref 26.5–33)
MCHC RBC AUTO-ENTMCNC: 32 G/DL (ref 31.5–36.5)
MCV RBC AUTO: 77 FL (ref 78–100)
MONOCYTES # BLD AUTO: 0.9 10E3/UL (ref 0–1.3)
MONOCYTES NFR BLD AUTO: 7 %
NEUTROPHILS # BLD AUTO: 7.3 10E3/UL (ref 1.6–8.3)
NEUTROPHILS NFR BLD AUTO: 59 %
NRBC # BLD AUTO: 0 10E3/UL
NRBC BLD AUTO-RTO: 0 /100
PLATELET # BLD AUTO: 565 10E3/UL (ref 150–450)
RBC # BLD AUTO: 4.1 10E6/UL (ref 3.8–5.2)
WBC # BLD AUTO: 12.5 10E3/UL (ref 4–11)

## 2023-06-13 PROCEDURE — 85025 COMPLETE CBC W/AUTO DIFF WBC: CPT

## 2023-06-13 PROCEDURE — 36415 COLL VENOUS BLD VENIPUNCTURE: CPT

## 2023-06-14 ENCOUNTER — INFUSION THERAPY VISIT (OUTPATIENT)
Dept: INFUSION THERAPY | Facility: OTHER | Age: 33
End: 2023-06-14
Attending: FAMILY MEDICINE
Payer: COMMERCIAL

## 2023-06-14 VITALS
BODY MASS INDEX: 33.82 KG/M2 | WEIGHT: 190.92 LBS | SYSTOLIC BLOOD PRESSURE: 141 MMHG | DIASTOLIC BLOOD PRESSURE: 62 MMHG | RESPIRATION RATE: 18 BRPM | HEART RATE: 95 BPM | OXYGEN SATURATION: 99 %

## 2023-06-14 DIAGNOSIS — D64.89 ANEMIA DUE TO OTHER CAUSE, NOT CLASSIFIED: Primary | ICD-10-CM

## 2023-06-14 DIAGNOSIS — E61.1 IRON DEFICIENCY: ICD-10-CM

## 2023-06-14 DIAGNOSIS — D64.89 OTHER SPECIFIED ANEMIAS: Primary | ICD-10-CM

## 2023-06-14 PROCEDURE — 96366 THER/PROPH/DIAG IV INF ADDON: CPT | Performed by: INTERNAL MEDICINE

## 2023-06-14 PROCEDURE — 96365 THER/PROPH/DIAG IV INF INIT: CPT | Performed by: INTERNAL MEDICINE

## 2023-06-14 RX ORDER — METHYLPREDNISOLONE SODIUM SUCCINATE 125 MG/2ML
125 INJECTION, POWDER, LYOPHILIZED, FOR SOLUTION INTRAMUSCULAR; INTRAVENOUS
Status: CANCELLED
Start: 2023-06-21

## 2023-06-14 RX ORDER — DIPHENHYDRAMINE HYDROCHLORIDE 50 MG/ML
50 INJECTION INTRAMUSCULAR; INTRAVENOUS
Status: CANCELLED
Start: 2023-06-21

## 2023-06-14 RX ORDER — MEPERIDINE HYDROCHLORIDE 25 MG/ML
25 INJECTION INTRAMUSCULAR; INTRAVENOUS; SUBCUTANEOUS EVERY 30 MIN PRN
Status: CANCELLED | OUTPATIENT
Start: 2023-06-21

## 2023-06-14 RX ORDER — ALBUTEROL SULFATE 0.83 MG/ML
2.5 SOLUTION RESPIRATORY (INHALATION)
Status: CANCELLED | OUTPATIENT
Start: 2023-06-21

## 2023-06-14 RX ORDER — EPINEPHRINE 1 MG/ML
0.3 INJECTION, SOLUTION, CONCENTRATE INTRAVENOUS EVERY 5 MIN PRN
Status: CANCELLED | OUTPATIENT
Start: 2023-06-21

## 2023-06-14 RX ORDER — ALBUTEROL SULFATE 90 UG/1
1-2 AEROSOL, METERED RESPIRATORY (INHALATION)
Status: CANCELLED
Start: 2023-06-21

## 2023-06-14 RX ORDER — EPINEPHRINE 1 MG/ML
0.3 INJECTION, SOLUTION INTRAMUSCULAR; SUBCUTANEOUS EVERY 5 MIN PRN
Status: CANCELLED | OUTPATIENT
Start: 2023-06-21

## 2023-06-14 RX ADMIN — Medication 250 ML: at 14:23

## 2023-06-14 ASSESSMENT — PAIN SCALES - GENERAL: PAINLEVEL: NO PAIN (0)

## 2023-06-14 NOTE — PROGRESS NOTES
Infusion Nursing Note:  Radha Carl presents today for Venofer.    Patient seen by provider today: No   present during visit today: Not Applicable.    Intravenous Access:  Peripheral IV placed.    Hand off of care completed to remaining RNs on unit (Sayra Barber and Lynda).

## 2023-06-15 ENCOUNTER — VIRTUAL VISIT (OUTPATIENT)
Dept: NEUROLOGY | Facility: CLINIC | Age: 33
End: 2023-06-15
Payer: COMMERCIAL

## 2023-06-15 DIAGNOSIS — G35 MS (MULTIPLE SCLEROSIS) (H): Primary | ICD-10-CM

## 2023-06-15 PROCEDURE — 99213 OFFICE O/P EST LOW 20 MIN: CPT | Mod: VID | Performed by: PSYCHIATRY & NEUROLOGY

## 2023-06-15 NOTE — LETTER
6/15/2023      RE: Radha Carl  [address]  [address]   [address]     Referral source: Established patient     Chief complaint: Multiple sclerosis     History of the Present Illness: Ms. Radha Carl is a 31 year old woman who is evaluated in the Multiple Sclerosis Clinic today for follow up regarding her diagnosis of multiple sclerosis.    At the request of the patient, the appointment today was conducted via video.    The patient's history is as per my previous notes. Her history of symptoms of demyelinating disease dates back to May 2013, at which time she was admitted to hospital for ascending paresthesias. MRI imaging was suggestive of demyelinating disease of the type seen in multiple sclerosis. 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 A formal MS diagnosis was confirmed in 2018 after she developed increased weakness of the legs and MRI imaging showed multiple new lesions, but she continued to decline disease modifying treatment. Further radiologic progression was noted in December 2020 after she developed vertiginous symptoms, and she was referred to this clinic for an opinion on management. We initially started her on disease modifying therapy with dimethyl fumarate, but she discontinued that medication after a few weeks due to gastrointestinal side effects. She was next placed on glatiramer acetate, but has now stopped that medication as well.    MRI imaging in April demonstrated further radiologic disease progression, and at a subsequent visit we discussed plan for her to initiate ofatumumab. This has now been approved and she has the medication, but has not started it yet. She relates that she plans to do so by the end of the month.    She has no new complaints today.    Assessment/plan:    1) Multiple sclerosis  I discussed plan for follow up with the patient. I want her to  initiate the ofatumumab injections and then repeat MRI scans of the brain and cervical spine (ordered today) in 6 months to make sure that active inflammation is stabilizing, specifically assessing for contrast enhancing lesions at that time.    As she is coming from [facility], we will plan to complete the imaging at [facility] in [place] and then see her back via a video visit to review the results.    I re-assured her that ofatumumab does not have any negative interactions with IV iron supplementation or semaglutide for weight management.    She expressed understanding and agreement with the plan of care as outlined above.    Santo Gracia MD   of Neurology  [facility]     Cc:  Muriel Jones MD (PCP)  Patient

## 2023-06-15 NOTE — PATIENT INSTRUCTIONS
You can start Kesimpta at this time    2.   We will plan to perform MRI scans at [facility] in 6 months, and see you back via a virtual visit afterwards

## 2023-06-15 NOTE — NURSING NOTE
Chief Complaint   Patient presents with     MS     RECHECK     Virtual follow up      Vitals were taken and medications were reconciled.   Rishabh Lancaster, EMT  1:23 PM

## 2023-06-16 ENCOUNTER — TELEPHONE (OUTPATIENT)
Dept: NEUROLOGY | Facility: CLINIC | Age: 33
End: 2023-06-16
Payer: COMMERCIAL

## 2023-06-16 NOTE — TELEPHONE ENCOUNTER
Left Voicemail (1st Attempt) and Sent Des (1st Attempt) for the patient to call back and schedule the following:    Appointment type: follow up  Provider: Santo Gracia MD  Return date: 6 months (12/15/23 )  Specialty phone number: [phone]  Additional appointment(s) needed:   -MR Brain w/o & w Contrast  -MRI Cervical spine w & w/o contrast  Additonal Notes:   CORTNEY, sent Michell Agrawal on 6/16/2023 at 10:49 AM

## 2023-06-23 ENCOUNTER — TELEPHONE (OUTPATIENT)
Dept: NEUROLOGY | Facility: CLINIC | Age: 33
End: 2023-06-23
Payer: COMMERCIAL

## 2023-06-23 ENCOUNTER — INFUSION THERAPY VISIT (OUTPATIENT)
Dept: INFUSION THERAPY | Facility: OTHER | Age: 33
End: 2023-06-23
Attending: FAMILY MEDICINE
Payer: COMMERCIAL

## 2023-06-23 VITALS
OXYGEN SATURATION: 98 % | RESPIRATION RATE: 18 BRPM | SYSTOLIC BLOOD PRESSURE: 164 MMHG | DIASTOLIC BLOOD PRESSURE: 93 MMHG | HEART RATE: 91 BPM | TEMPERATURE: 98.9 F

## 2023-06-23 DIAGNOSIS — D64.89 ANEMIA DUE TO OTHER CAUSE, NOT CLASSIFIED: Primary | ICD-10-CM

## 2023-06-23 DIAGNOSIS — E61.1 IRON DEFICIENCY: ICD-10-CM

## 2023-06-23 DIAGNOSIS — D64.89 OTHER SPECIFIED ANEMIAS: Primary | ICD-10-CM

## 2023-06-23 PROCEDURE — 96365 THER/PROPH/DIAG IV INF INIT: CPT | Performed by: FAMILY MEDICINE

## 2023-06-23 PROCEDURE — 96366 THER/PROPH/DIAG IV INF ADDON: CPT | Performed by: FAMILY MEDICINE

## 2023-06-23 RX ORDER — METHYLPREDNISOLONE SODIUM SUCCINATE 125 MG/2ML
125 INJECTION, POWDER, LYOPHILIZED, FOR SOLUTION INTRAMUSCULAR; INTRAVENOUS
Status: CANCELLED
Start: 2023-06-28

## 2023-06-23 RX ORDER — EPINEPHRINE 1 MG/ML
0.3 INJECTION, SOLUTION, CONCENTRATE INTRAVENOUS EVERY 5 MIN PRN
Status: CANCELLED | OUTPATIENT
Start: 2023-06-28

## 2023-06-23 RX ORDER — MEPERIDINE HYDROCHLORIDE 25 MG/ML
25 INJECTION INTRAMUSCULAR; INTRAVENOUS; SUBCUTANEOUS EVERY 30 MIN PRN
Status: CANCELLED | OUTPATIENT
Start: 2023-06-28

## 2023-06-23 RX ORDER — DIPHENHYDRAMINE HYDROCHLORIDE 50 MG/ML
50 INJECTION INTRAMUSCULAR; INTRAVENOUS
Status: CANCELLED
Start: 2023-06-28

## 2023-06-23 RX ORDER — ALBUTEROL SULFATE 0.83 MG/ML
2.5 SOLUTION RESPIRATORY (INHALATION)
Status: CANCELLED | OUTPATIENT
Start: 2023-06-28

## 2023-06-23 RX ORDER — ALBUTEROL SULFATE 90 UG/1
1-2 AEROSOL, METERED RESPIRATORY (INHALATION)
Status: CANCELLED
Start: 2023-06-28

## 2023-06-23 RX ADMIN — Medication 250 ML: at 14:29

## 2023-06-23 NOTE — PROGRESS NOTES
Infusion Nursing Note:  Radha Carl presents today for Venofer.    Patient seen by provider today: No   present during visit today: Not Applicable.    Note: Labs not ordered or applicable for today.      Intravenous Access:  Peripheral IV placed.    Treatment Conditions:  Not Applicable.      Post Infusion Assessment:  Patient tolerated infusion without incident.       Discharge Plan:   Patient and/or family verbalized understanding of discharge instructions and all questions answered.      Bailey Suggs RN

## 2023-06-23 NOTE — TELEPHONE ENCOUNTER
Left Voicemail (2nd Attempt) for the patient to call back and schedule the following:    Appointment type: follow up  Provider: Dr. Gracia  Return date: 6 months (Dec15)  Specialty phone number: [phone]  Additional appointment(s) needed:   -MRI Cervical spine w & w/o contrast  -MR Brain w/o & w Contrast  Additonal Notes:   LVM- 2nd attempt     Eunice Agrawal on 6/23/2023 at 10:56 AM

## 2023-06-24 NOTE — PROGRESS NOTES
Referral source: Established patient     Chief complaint: Multiple sclerosis     History of the Present Illness: Ms. Radha Carl is a 31 year old woman who is evaluated in the Multiple Sclerosis Clinic today for follow up regarding her diagnosis of multiple sclerosis.    At the request of the patient, the appointment today was conducted via video.    The patient's history is as per my previous notes. Her history of symptoms of demyelinating disease dates back to May 2013, at which time she was admitted to hospital for ascending paresthesias. MRI imaging was suggestive of demyelinating disease of the type seen in multiple sclerosis. 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 A formal MS diagnosis was confirmed in 2018 after she developed increased weakness of the legs and MRI imaging showed multiple new lesions, but she continued to decline disease modifying treatment. Further radiologic progression was noted in December 2020 after she developed vertiginous symptoms, and she was referred to this clinic for an opinion on management. We initially started her on disease modifying therapy with dimethyl fumarate, but she discontinued that medication after a few weeks due to gastrointestinal side effects. She was next placed on glatiramer acetate, but has now stopped that medication as well.    MRI imaging in April demonstrated further radiologic disease progression, and at a subsequent visit we discussed plan for her to initiate ofatumumab. This has now been approved and she has the medication, but has not started it yet. She relates that she plans to do so by the end of the month.    She has no new complaints today.    Assessment/plan:    1) Multiple sclerosis  I discussed plan for follow up with the patient. I want her to initiate the ofatumumab injections and then repeat MRI scans of the brain and cervical spine  (ordered today) in 6 months to make sure that active inflammation is stabilizing, specifically assessing for contrast enhancing lesions at that time.    As she is coming from [facility], we will plan to complete the imaging at [facility] in [place] and then see her back via a video visit to review the results.    I re-assured her that ofatumumab does not have any negative interactions with IV iron supplementation or semaglutide for weight management.    She expressed understanding and agreement with the plan of care as outlined above.    Video-Visit Details    Type of service:  Video Visit    Video Start Time: 1:32 pm    Video End Time: 1:37 pm    Originating Location (pt. Location): Home    Distant Location (provider location): On-site ([facility], [facility] [address], [address] [place] [address].    Platform used for Video Visit: NicoWell

## 2023-07-05 ENCOUNTER — OFFICE VISIT (OUTPATIENT)
Dept: FAMILY MEDICINE | Facility: OTHER | Age: 33
End: 2023-07-05
Attending: FAMILY MEDICINE
Payer: COMMERCIAL

## 2023-07-05 VITALS
TEMPERATURE: 98.2 F | RESPIRATION RATE: 17 BRPM | WEIGHT: 182 LBS | OXYGEN SATURATION: 96 % | BODY MASS INDEX: 32.24 KG/M2 | SYSTOLIC BLOOD PRESSURE: 158 MMHG | HEART RATE: 89 BPM | DIASTOLIC BLOOD PRESSURE: 96 MMHG

## 2023-07-05 DIAGNOSIS — D62 ANEMIA DUE TO BLOOD LOSS, ACUTE: ICD-10-CM

## 2023-07-05 DIAGNOSIS — R53.82 CHRONIC FATIGUE: ICD-10-CM

## 2023-07-05 DIAGNOSIS — E28.2 PCOS (POLYCYSTIC OVARIAN SYNDROME): ICD-10-CM

## 2023-07-05 DIAGNOSIS — G35 MS (MULTIPLE SCLEROSIS) (H): Primary | ICD-10-CM

## 2023-07-05 DIAGNOSIS — E11.65 TYPE 2 DIABETES MELLITUS WITH HYPERGLYCEMIA, WITHOUT LONG-TERM CURRENT USE OF INSULIN (H): ICD-10-CM

## 2023-07-05 DIAGNOSIS — E55.9 HYPOVITAMINOSIS D: ICD-10-CM

## 2023-07-05 DIAGNOSIS — I10 BENIGN ESSENTIAL HYPERTENSION: ICD-10-CM

## 2023-07-05 DIAGNOSIS — N92.0 MENORRHAGIA WITH REGULAR CYCLE: ICD-10-CM

## 2023-07-05 PROCEDURE — 99215 OFFICE O/P EST HI 40 MIN: CPT | Performed by: FAMILY MEDICINE

## 2023-07-05 RX ORDER — LOSARTAN POTASSIUM 50 MG/1
50 TABLET ORAL DAILY
Qty: 30 TABLET | Refills: 1 | Status: SHIPPED | OUTPATIENT
Start: 2023-07-05 | End: 2023-12-20 | Stop reason: DRUGHIGH

## 2023-07-05 ASSESSMENT — PAIN SCALES - GENERAL: PAINLEVEL: MILD PAIN (3)

## 2023-07-05 NOTE — PROGRESS NOTES
Assessment & Plan     MS (multiple sclerosis) (H)  Just started new medication which is going okay so far  She cannot get pregnant while on this drug so is considering something permanent discussed ramifications from that    PCOS (polycystic ovarian syndrome)  Recommend inositol 2 g twice daily and berberine 1 tablet daily    Menorrhagia with regular cycle  She just got done with a 3-week.  Discussed that this is horribly abnormal and we will check prolactin to see if this is part of the problem  - Prolactin; Standing    Chronic fatigue  She would like to get cortisol checked  - Cortisol; Future    Type 2 diabetes mellitus with hyperglycemia, without long-term current use of insulin (H)  Follow-up 3 months  - Hemoglobin A1c; Future    Hypovitaminosis D  Due for labs  - Vitamin D Deficiency; Future    Anemia due to blood loss, acute  Anemic likely secondary to her menorrhagia we will correct that but in the meantime she is getting iron infusions    Benign essential hypertension  Start 1 tablet daily  - losartan (COZAAR) 50 MG tablet; Take 1 tablet (50 mg) by mouth daily    40 minutes spent by me on the date of the encounter doing chart review, history and exam, documentation and further activities per the note       Patient was agreeable to this plan and had no further questions.  Patient Instructions   1.  Inositol 2 grams 2x/day  pure encapsulations  2. Stop NAC for now  3.       No follow-ups on file.    Muriel Jones MD  [facility] - ZORAIDA Garcia is a 32 year old, presenting for the following health issues:  Anemia        3/31/2023     3:56 PM   Additional Questions   Roomed by Aarti JEROME LPN     HPI     Concern - Anemia  Onset: f/u  Description: Low Hemoglobin  Intensity: moderate  Progression of Symptoms:  same  Accompanying Signs & Symptoms: Low hemoglobin that is treated by infusion  Previous history of similar problem: yes  Precipitating factors:        Worsened by:  na  Alleviating factors:        Improved by: infusions  Therapies tried and outcome: iron medications with infusions    Hyperlipidemia Follow-Up      Are you regularly taking any medication or supplement to lower your cholesterol?   Yes- NA    Are you having muscle aches or other side effects that you think could be caused by your cholesterol lowering medication?  No      Review of Systems   Constitutional, HEENT, cardiovascular, pulmonary, gi and gu systems are negative, except as otherwise noted.      Objective    BP (!) 158/96   Pulse 89   Temp 98.2  F (36.8  C) (Tympanic)   Resp 17   Wt 82.6 kg (182 lb)   SpO2 96%   BMI 32.24 kg/m    Body mass index is 32.24 kg/m .  Physical Exam   GENERAL: healthy, alert and no distress  NECK: no adenopathy, no asymmetry, masses, or scars and thyroid normal to palpation  RESP: lungs clear to auscultation - no rales, rhonchi or wheezes  CV: regular rate and rhythm, normal S1 S2, no S3 or S4, no murmur, click or rub, no peripheral edema and peripheral pulses strong  ABDOMEN: soft, nontender, no hepatosplenomegaly, no masses and bowel sounds normal  MS: no gross musculoskeletal defects noted, no edema  PSYCH: mentation appears normal, affect normal/bright    No results found for any visits on 07/05/23.

## 2023-07-05 NOTE — Clinical Note
Hi Mal- Just noticed your blood pressure from earlier today we need to start losartan 50 mg and we will recheck at your next appointment Muriel Jones MD 7/5/2023 7:46 PM

## 2023-07-06 ENCOUNTER — LAB (OUTPATIENT)
Dept: LAB | Facility: OTHER | Age: 33
End: 2023-07-06
Payer: COMMERCIAL

## 2023-07-06 DIAGNOSIS — B00.1 COLD SORE: ICD-10-CM

## 2023-07-06 DIAGNOSIS — R53.82 CHRONIC FATIGUE: ICD-10-CM

## 2023-07-06 DIAGNOSIS — E11.65 TYPE 2 DIABETES MELLITUS WITH HYPERGLYCEMIA, WITHOUT LONG-TERM CURRENT USE OF INSULIN (H): ICD-10-CM

## 2023-07-06 DIAGNOSIS — E55.9 HYPOVITAMINOSIS D: ICD-10-CM

## 2023-07-06 DIAGNOSIS — N92.0 MENORRHAGIA WITH REGULAR CYCLE: ICD-10-CM

## 2023-07-06 LAB
CORTIS SERPL-MCNC: 18.8 UG/DL
EST. AVERAGE GLUCOSE BLD GHB EST-MCNC: 146 MG/DL
HBA1C MFR BLD: 6.7 %
PROLACTIN SERPL 3RD IS-MCNC: 12 NG/ML (ref 5–23)

## 2023-07-06 PROCEDURE — 84146 ASSAY OF PROLACTIN: CPT

## 2023-07-06 PROCEDURE — 82533 TOTAL CORTISOL: CPT

## 2023-07-06 PROCEDURE — 83036 HEMOGLOBIN GLYCOSYLATED A1C: CPT

## 2023-07-06 PROCEDURE — 82306 VITAMIN D 25 HYDROXY: CPT

## 2023-07-06 PROCEDURE — 36415 COLL VENOUS BLD VENIPUNCTURE: CPT

## 2023-07-06 RX ORDER — VALACYCLOVIR HYDROCHLORIDE 500 MG/1
TABLET, FILM COATED ORAL
Qty: 30 TABLET | Refills: 5 | Status: SHIPPED | OUTPATIENT
Start: 2023-07-06 | End: 2024-07-17

## 2023-07-07 ENCOUNTER — TELEPHONE (OUTPATIENT)
Dept: FAMILY MEDICINE | Facility: OTHER | Age: 33
End: 2023-07-07

## 2023-07-07 DIAGNOSIS — E55.9 HYPOVITAMINOSIS D: Primary | ICD-10-CM

## 2023-07-07 LAB — DEPRECATED CALCIDIOL+CALCIFEROL SERPL-MC: 35 UG/L (ref 20–75)

## 2023-07-07 NOTE — TELEPHONE ENCOUNTER
I'm going to send rx to take weekly (with a meal that has fat in it) for 6 months.  Stop taking her daily when she starts this

## 2023-07-10 DIAGNOSIS — D64.89 ANEMIA DUE TO OTHER CAUSE, NOT CLASSIFIED: Primary | ICD-10-CM

## 2023-07-10 DIAGNOSIS — E61.1 IRON DEFICIENCY: ICD-10-CM

## 2023-07-18 DIAGNOSIS — E61.1 IRON DEFICIENCY: ICD-10-CM

## 2023-07-18 DIAGNOSIS — D53.9 ANEMIA ASSOCIATED WITH NUTRITIONAL DEFICIENCY: Primary | ICD-10-CM

## 2023-07-23 DIAGNOSIS — E61.1 IRON DEFICIENCY: ICD-10-CM

## 2023-07-23 DIAGNOSIS — D64.89 ANEMIA DUE TO OTHER CAUSE, NOT CLASSIFIED: Primary | ICD-10-CM

## 2023-07-28 ENCOUNTER — OFFICE VISIT (OUTPATIENT)
Dept: FAMILY MEDICINE | Facility: OTHER | Age: 33
End: 2023-07-28
Attending: FAMILY MEDICINE
Payer: COMMERCIAL

## 2023-07-28 VITALS
RESPIRATION RATE: 16 BRPM | HEART RATE: 90 BPM | DIASTOLIC BLOOD PRESSURE: 98 MMHG | TEMPERATURE: 97.8 F | SYSTOLIC BLOOD PRESSURE: 162 MMHG | BODY MASS INDEX: 32.42 KG/M2 | OXYGEN SATURATION: 98 % | WEIGHT: 183 LBS

## 2023-07-28 DIAGNOSIS — G44.229 CHRONIC TENSION-TYPE HEADACHE, NOT INTRACTABLE: ICD-10-CM

## 2023-07-28 DIAGNOSIS — G35 MS (MULTIPLE SCLEROSIS) (H): ICD-10-CM

## 2023-07-28 DIAGNOSIS — I10 BENIGN ESSENTIAL HYPERTENSION: ICD-10-CM

## 2023-07-28 DIAGNOSIS — E55.9 HYPOVITAMINOSIS D: Primary | ICD-10-CM

## 2023-07-28 PROCEDURE — 99213 OFFICE O/P EST LOW 20 MIN: CPT | Performed by: FAMILY MEDICINE

## 2023-07-28 ASSESSMENT — PAIN SCALES - GENERAL: PAINLEVEL: WORST PAIN (10)

## 2023-07-28 NOTE — LETTER
July 28, 2023      Radha Carl  [address]  [address]   [facility] [address]        To Whom It May Concern,     Thank you in advance for your prompt attention to this letter.  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  Due to her diagnoses of multiple sclerosis and migraine headaches I would request that she be given an accommodation to work part-time or approximately 3 days/week.  This would give her rest & recovery time in between shifts to prevent further degeneration of her condition.  Should you need further information please do not hesitate to contact me.  Thank you.      Sincerely,        Muriel Jones MD

## 2023-07-28 NOTE — PROGRESS NOTES
Assessment & Plan     Hypovitaminosis D  Labs in the next few weeks, should be above 70  - Vitamin D Deficiency; Standing    MS (multiple sclerosis) (H)  More flares with stressors at work    Chronic tension-type headache, not intractable  Second to above    Benign essential hypertension  Second to stress and above        Patient was agreeable to this plan and had no further questions.  Patient Instructions   Dolly CORONA  Whole 30 girl -- boundaries book    No follow-ups on file.    Muriel Jones MD  [facility] - ZORAIDA Garcia is a 32 year old, presenting for the following health issues:  Follow Up        7/28/2023     2:59 PM   Additional Questions   Roomed by Danielle Otero   Accompanied by self       HPI   Letter of Medical Necessity    For disability accomodation      Review of Systems   Constitutional, HEENT, cardiovascular, pulmonary, gi and gu systems are negative, except as otherwise noted.      Objective    BP (!) 162/98 (BP Location: Right arm, Patient Position: Sitting, Cuff Size: Adult Regular)   Pulse 90   Temp 97.8  F (36.6  C) (Tympanic)   Resp 16   Wt 83 kg (183 lb)   LMP 06/24/2023   SpO2 98%   BMI 32.42 kg/m    Body mass index is 32.42 kg/m .  Physical Exam   GENERAL: healthy, alert and no distress  RESP: lungs clear to auscultation - no rales, rhonchi or wheezes  CV: regular rate and rhythm, normal S1 S2, no S3 or S4, no murmur, click or rub, no peripheral edema and peripheral pulses strong  MS: no gross musculoskeletal defects noted, no edema  PSYCH: mentation appears normal, affect normal/bright    No results found for any visits on 07/28/23.        20 minutes spent by me on the date of the encounter doing chart review, history and exam, documentation and further activities per the note

## 2023-07-30 DIAGNOSIS — E61.1 IRON DEFICIENCY: Primary | ICD-10-CM

## 2023-07-30 DIAGNOSIS — D64.89 ANEMIA DUE TO OTHER CAUSE, NOT CLASSIFIED: ICD-10-CM

## 2023-08-16 DIAGNOSIS — F41.9 ANXIETY: Primary | ICD-10-CM

## 2023-08-16 RX ORDER — DIAZEPAM 2 MG
2 TABLET ORAL EVERY 6 HOURS PRN
Qty: 4 TABLET | Refills: 0 | Status: SHIPPED | OUTPATIENT
Start: 2023-08-16

## 2023-08-29 ENCOUNTER — MYC MEDICAL ADVICE (OUTPATIENT)
Dept: NEUROLOGY | Facility: CLINIC | Age: 33
End: 2023-08-29

## 2023-08-31 NOTE — TELEPHONE ENCOUNTER
There is no need to hold the injection. She is not going to be any more immunosuppressed after the injection than before it.    That being said, if she wants to hold off on the injection until after her symptoms resolve, that will not hurt anything either.

## 2023-10-16 ENCOUNTER — ANCILLARY PROCEDURE (OUTPATIENT)
Dept: GENERAL RADIOLOGY | Facility: OTHER | Age: 33
End: 2023-10-16
Attending: FAMILY MEDICINE
Payer: COMMERCIAL

## 2023-10-16 ENCOUNTER — OFFICE VISIT (OUTPATIENT)
Dept: FAMILY MEDICINE | Facility: OTHER | Age: 33
End: 2023-10-16
Attending: FAMILY MEDICINE
Payer: COMMERCIAL

## 2023-10-16 ENCOUNTER — TELEPHONE (OUTPATIENT)
Dept: FAMILY MEDICINE | Facility: OTHER | Age: 33
End: 2023-10-16

## 2023-10-16 VITALS
HEART RATE: 104 BPM | TEMPERATURE: 97.7 F | SYSTOLIC BLOOD PRESSURE: 152 MMHG | DIASTOLIC BLOOD PRESSURE: 92 MMHG | OXYGEN SATURATION: 99 %

## 2023-10-16 DIAGNOSIS — S93.421A SPRAIN OF DELTOID LIGAMENT OF RIGHT ANKLE, INITIAL ENCOUNTER: ICD-10-CM

## 2023-10-16 DIAGNOSIS — S99.921A FOOT TRAUMA, RIGHT, INITIAL ENCOUNTER: ICD-10-CM

## 2023-10-16 DIAGNOSIS — S99.921A FOOT TRAUMA, RIGHT, INITIAL ENCOUNTER: Primary | ICD-10-CM

## 2023-10-16 PROCEDURE — 73630 X-RAY EXAM OF FOOT: CPT | Mod: TC | Performed by: RADIOLOGY

## 2023-10-16 PROCEDURE — 99213 OFFICE O/P EST LOW 20 MIN: CPT | Performed by: FAMILY MEDICINE

## 2023-10-16 ASSESSMENT — PAIN SCALES - GENERAL: PAINLEVEL: SEVERE PAIN (7)

## 2023-10-16 ASSESSMENT — ANXIETY QUESTIONNAIRES
7. FEELING AFRAID AS IF SOMETHING AWFUL MIGHT HAPPEN: NOT AT ALL
GAD7 TOTAL SCORE: 6
5. BEING SO RESTLESS THAT IT IS HARD TO SIT STILL: SEVERAL DAYS
6. BECOMING EASILY ANNOYED OR IRRITABLE: SEVERAL DAYS
GAD7 TOTAL SCORE: 6
1. FEELING NERVOUS, ANXIOUS, OR ON EDGE: SEVERAL DAYS
3. WORRYING TOO MUCH ABOUT DIFFERENT THINGS: NOT AT ALL
2. NOT BEING ABLE TO STOP OR CONTROL WORRYING: SEVERAL DAYS
IF YOU CHECKED OFF ANY PROBLEMS ON THIS QUESTIONNAIRE, HOW DIFFICULT HAVE THESE PROBLEMS MADE IT FOR YOU TO DO YOUR WORK, TAKE CARE OF THINGS AT HOME, OR GET ALONG WITH OTHER PEOPLE: SOMEWHAT DIFFICULT
4. TROUBLE RELAXING: MORE THAN HALF THE DAYS

## 2023-10-16 ASSESSMENT — PATIENT HEALTH QUESTIONNAIRE - PHQ9: SUM OF ALL RESPONSES TO PHQ QUESTIONS 1-9: 6

## 2023-10-16 NOTE — PROGRESS NOTES
"  Assessment & Plan     Foot trauma, right, initial encounter  Xray negative for fracture  - XR FOOT RT G/E 3 VW (Clinic Performed); Future    Sprain of deltoid ligament of right ankle, initial encounter  Discussed ASO brace, she has one at home, she also has exercises from before, encourage her to wait a month before starting that  Use heat      Provider  Link to [facility] Help Grid :[id number]}     BMI:   Estimated body mass index is 32.42 kg/m  as calculated from the following:    Height as of 3/10/23: 1.6 m (5' 3\").    Weight as of 7/28/23: 83 kg (183 lb).   Weight management plan: Discussed healthy diet and exercise guidelines    Patient was agreeable to this plan and had no further questions.  There are no Patient Instructions on file for this visit.    No follow-ups on file.    Muriel Jones MD  [facility] - ZORAIDA Garcia is a 33 year old, presenting for the following health issues:  Musculoskeletal Problem        10/16/2023    11:59 AM   Additional Questions   Roomed by Jb Poncebson   Accompanied by          10/16/2023    11:59 AM   Patient Reported Additional Medications   Patient reports taking the following new medications none       HPI       Musculoskeletal problem/pain    Duration: 10/15/2023  Description  Location: Right foot injury   Intensity:  mild to moderate   Accompanying signs and symptoms: swelling and slight bruising  History  Previous similar problem: YES- left foot injury   Previous evaluation:  none  Precipitating or alleviating factors:  Trauma or overuse: YES- rolled her ankle   Aggravating factors include: walking  Therapies tried and outcome: rest/inactivity and ice, elevation, and stretching         Review of Systems   Constitutional, HEENT, cardiovascular, pulmonary, gi and gu systems are negative, except as otherwise noted.      Objective    BP (!) 152/92 (BP Location: Left arm, Patient Position: Sitting, Cuff Size: Adult Large)   Pulse 104   " Temp 97.7  F (36.5  C) (Tympanic)   LMP 09/17/2023   SpO2 99%   There is no height or weight on file to calculate BMI.  Physical Exam   GENERAL: healthy, alert and no distress  RESP: lungs clear to auscultation - no rales, rhonchi or wheezes  CV: regular rate and rhythm, normal S1 S2, no S3 or S4, no murmur, click or rub, no peripheral edema and peripheral pulses strong  MS: decreased range of motion right foot  SKIN:  mild ecchymoses right lateral aspect   PSYCH: mentation appears normal, affect normal/bright    Results for orders placed or performed in visit on 10/16/23   XR FOOT RT G/E 3 VW (Clinic Performed)     Status: None    Narrative    PROCEDURE:  XR FOOT RIGHT G/E 3 VIEWS    HISTORY: Foot trauma, right, initial encounter.    COMPARISON:  4/8/2022.    TECHNIQUE:  3 views left foot.    FINDINGS:  No fracture or dislocation is identified. The joint spaces  are preserved. No foreign body is seen.       Impression    IMPRESSION: No acute or healing fracture.      MARGARET VALDES MD         SYSTEM ID:  [id number]

## 2023-10-16 NOTE — TELEPHONE ENCOUNTER
Pt called and scheduled.    Future Appointments 10/16/2023 - 4/13/2024        Date Visit Type Length Department Provider     10/16/2023 12:00 PM SHORT 30 min [facility] Muriel Jones MD    Location Instructions:     From [facility]: Take [address]. Turn left at [address]. Turn left at the first stoplight on [address]. At the first stop sign, take a right onto [address]. The upper level parking lot will be on the left. East Entrance Door number 10.   From [place]: Take [address]. Take a right at [address]. At the first stop sign, take a right onto [address]. The upper level parking lot will be on the left. East Entrance Door number 10.   From [place]: Take [address]. Take the [address] ramp towards [place]. Turn left onto [address]. Take a slight right onto [address]. Turn left at the first stoplight on [address]. At the first stop sign, take a right onto [address]. The upper level parking lot will be on the left. East Entrance Door number 10.              12/20/2023  1:15 PM PHYSICAL 30 min [facility] Muriel Jones MD    Location Instructions:     From [facility]: Take [address]. Turn left at [address]. Turn left at the first stoplight on [address]. At the first stop sign, take a right onto [address]. The upper level parking lot will be on the left. East Entrance Door number 10.   From [place]: Take [address]. Take a right at [address]. At the first stop sign, take a right onto [address]. The upper level parking lot will be on the left. East Entrance Door number 10.   From [place]: Take [address]. Take the [address] ramp towards [place]. Turn left onto [address]. Take a slight right onto [address]. Turn left at the first stoplight on [address]. At the first stop sign, take a right onto [address]. The upper level parking lot will be on the left. East Entrance Door number 10.

## 2023-10-16 NOTE — TELEPHONE ENCOUNTER
10/16/2023 8:14 AM  Pt called reports she rolled both her ankles when walking yesterday. Pt inquiring if she can do evisit or come in today to be seen please advise. Pt reports pain on top of right foot rates as 7 on 0-10 pain scale. Bruising present. Pt can only put partial weight on right foot.    Jennifer Mitchell RN

## 2023-10-25 ENCOUNTER — ANCILLARY PROCEDURE (OUTPATIENT)
Dept: GENERAL RADIOLOGY | Facility: OTHER | Age: 33
End: 2023-10-25
Attending: NURSE PRACTITIONER
Payer: COMMERCIAL

## 2023-10-25 ENCOUNTER — TELEPHONE (OUTPATIENT)
Dept: FAMILY MEDICINE | Facility: OTHER | Age: 33
End: 2023-10-25
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
## 2023-10-25 DIAGNOSIS — M25.551 HIP PAIN, RIGHT: ICD-10-CM

## 2023-10-25 PROCEDURE — 73502 X-RAY EXAM HIP UNI 2-3 VIEWS: CPT | Mod: TC | Performed by: STUDENT IN AN ORGANIZED HEALTH CARE EDUCATION/TRAINING PROGRAM

## 2023-11-13 ENCOUNTER — APPOINTMENT (OUTPATIENT)
Dept: LAB | Facility: OTHER | Age: 33
End: 2023-11-13
Payer: COMMERCIAL

## 2023-11-13 ENCOUNTER — ALLIED HEALTH/NURSE VISIT (OUTPATIENT)
Dept: FAMILY MEDICINE | Facility: OTHER | Age: 33
End: 2023-11-13
Attending: FAMILY MEDICINE
Payer: COMMERCIAL

## 2023-11-13 ENCOUNTER — OFFICE VISIT (OUTPATIENT)
Dept: FAMILY MEDICINE | Facility: OTHER | Age: 33
End: 2023-11-13
Payer: COMMERCIAL

## 2023-11-13 VITALS
TEMPERATURE: 98.2 F | HEIGHT: 63 IN | RESPIRATION RATE: 17 BRPM | DIASTOLIC BLOOD PRESSURE: 80 MMHG | WEIGHT: 183 LBS | SYSTOLIC BLOOD PRESSURE: 124 MMHG | BODY MASS INDEX: 32.43 KG/M2 | HEART RATE: 98 BPM | OXYGEN SATURATION: 98 %

## 2023-11-13 DIAGNOSIS — N30.00 ACUTE CYSTITIS WITHOUT HEMATURIA: ICD-10-CM

## 2023-11-13 DIAGNOSIS — R30.0 DYSURIA: Primary | ICD-10-CM

## 2023-11-13 DIAGNOSIS — Z23 ENCOUNTER FOR IMMUNIZATION: Primary | ICD-10-CM

## 2023-11-13 LAB
ALBUMIN UR-MCNC: NEGATIVE MG/DL
APPEARANCE UR: ABNORMAL
BACTERIA #/AREA URNS HPF: ABNORMAL /HPF
BILIRUB UR QL STRIP: NEGATIVE
COLOR UR AUTO: ABNORMAL
GLUCOSE UR STRIP-MCNC: 200 MG/DL
HGB UR QL STRIP: NEGATIVE
KETONES UR STRIP-MCNC: NEGATIVE MG/DL
LEUKOCYTE ESTERASE UR QL STRIP: ABNORMAL
MUCOUS THREADS #/AREA URNS LPF: PRESENT /LPF
NITRATE UR QL: POSITIVE
PH UR STRIP: 5.5 [PH] (ref 4.7–8)
RBC URINE: 1 /HPF
SP GR UR STRIP: 1.01 (ref 1–1.03)
SQUAMOUS EPITHELIAL: 4 /HPF
UROBILINOGEN UR STRIP-MCNC: NORMAL MG/DL
WBC URINE: 25 /HPF

## 2023-11-13 PROCEDURE — 90471 IMMUNIZATION ADMIN: CPT

## 2023-11-13 PROCEDURE — 90686 IIV4 VACC NO PRSV 0.5 ML IM: CPT

## 2023-11-13 PROCEDURE — 81001 URINALYSIS AUTO W/SCOPE: CPT | Performed by: FAMILY MEDICINE

## 2023-11-13 PROCEDURE — 87088 URINE BACTERIA CULTURE: CPT | Performed by: FAMILY MEDICINE

## 2023-11-13 PROCEDURE — 99213 OFFICE O/P EST LOW 20 MIN: CPT | Mod: 25 | Performed by: FAMILY MEDICINE

## 2023-11-13 PROCEDURE — 87086 URINE CULTURE/COLONY COUNT: CPT | Performed by: FAMILY MEDICINE

## 2023-11-13 RX ORDER — NITROFURANTOIN 25; 75 MG/1; MG/1
100 CAPSULE ORAL 2 TIMES DAILY
Qty: 14 CAPSULE | Refills: 0 | Status: SHIPPED | OUTPATIENT
Start: 2023-11-13 | End: 2023-11-20

## 2023-11-13 ASSESSMENT — PAIN SCALES - GENERAL: PAINLEVEL: SEVERE PAIN (6)

## 2023-11-13 NOTE — PROGRESS NOTES
"  Assessment & Plan     Dysuria  Common side effect with her MS medication  - UA Macroscopic with reflex to Microscopic and Culture; Future  - UA Macroscopic with reflex to Microscopic and Culture  - Urine Culture    Acute cystitis without hematuria  Follow-up if not improving  - nitroFURantoin macrocrystal-monohydrate (MACROBID) 100 MG capsule; Take 1 capsule (100 mg) by mouth 2 times daily for 7 days    Provider  Link to [facility] Help Grid :[id number]}    Patient was agreeable to this plan and had no further questions.  There are no Patient Instructions on file for this visit.    No follow-ups on file.    Muriel Jones MD  [facility] - ZORAIDA Garcia is a 33 year old, presenting for the following health issues:  UTI        11/13/2023     1:11 PM   Additional Questions   Roomed by HUA Mcdermott   Accompanied by self`       HPI     Genitourinary - Female  Onset/Duration: 1 week  Description:   Painful urination (Dysuria): YES           Frequency: YES  Blood in urine (Hematuria): No  Delay in urine (Hesitency): No  Intensity: mild  Progression of Symptoms:  same  Accompanying Signs & Symptoms:  Fever/chills: No  Flank pain: No  Nausea and vomiting: No  Vaginal symptoms: none  Abdominal/Pelvic Pain: YES  History:   History of frequent UTI s: YES  History of kidney stones: No  Sexually Active: YES  Possibility of pregnancy: No  Precipitating or alleviating factors: None  Therapies tried and outcome: Cranberry juice prn (contraindicated in Coumadin patients)      Review of Systems   Constitutional, HEENT, cardiovascular, pulmonary, gi and gu systems are negative, except as otherwise noted.      Objective    /80 (BP Location: Left arm, Patient Position: Sitting, Cuff Size: Adult Regular)   Pulse 98   Temp 98.2  F (36.8  C) (Tympanic)   Resp 17   Ht 1.6 m (5' 3\")   Wt 83 kg (183 lb)   LMP 09/17/2023   SpO2 98%   BMI 32.42 kg/m    Body mass index is 32.42 kg/m .  Physical Exam "   GENERAL: healthy, alert and no distress  RESP: lungs clear to auscultation - no rales, rhonchi or wheezes  CV: regular rate and rhythm, normal S1 S2, no S3 or S4, no murmur, click or rub, no peripheral edema and peripheral pulses strong  ABDOMEN: soft, nontender, without hepatosplenomegaly or masses, bowel sounds normal, and suprapubic tenderness  MS: normal range of motion and negative CVA tenderness  PSYCH: mentation appears normal, affect normal/bright    Results for orders placed or performed in visit on 11/13/23   UA Macroscopic with reflex to Microscopic and Culture     Status: Abnormal    Specimen: Urine, Clean Catch   Result Value Ref Range    Color Urine Light Yellow Colorless, Straw, Light Yellow, Yellow    Appearance Urine Slightly Cloudy (A) Clear    Glucose Urine 200 (A) Negative mg/dL    Bilirubin Urine Negative Negative    Ketones Urine Negative Negative mg/dL    Specific Gravity Urine 1.009 1.003 - 1.035    Blood Urine Negative Negative    pH Urine 5.5 4.7 - 8.0    Protein Albumin Urine Negative Negative mg/dL    Urobilinogen Urine Normal Normal, 2.0 mg/dL    Nitrite Urine Positive (A) Negative    Leukocyte Esterase Urine Moderate (A) Negative    Bacteria Urine Few (A) None Seen /HPF    Mucus Urine Present (A) None Seen /LPF    RBC Urine 1 <=2 /HPF    WBC Urine 25 (H) <=5 /HPF    Squamous Epithelials Urine 4 (H) <=1 /HPF    Narrative    Urine Culture ordered based on laboratory criteria

## 2023-11-15 LAB — BACTERIA UR CULT: ABNORMAL

## 2023-11-15 RX ORDER — SULFAMETHOXAZOLE/TRIMETHOPRIM 800-160 MG
1 TABLET ORAL 2 TIMES DAILY
Qty: 14 TABLET | Refills: 0 | Status: SHIPPED | OUTPATIENT
Start: 2023-11-15 | End: 2023-11-22

## 2023-11-17 ENCOUNTER — TELEPHONE (OUTPATIENT)
Dept: FAMILY MEDICINE | Facility: OTHER | Age: 33
End: 2023-11-17

## 2023-11-17 DIAGNOSIS — N30.00 ACUTE CYSTITIS WITHOUT HEMATURIA: Primary | ICD-10-CM

## 2023-11-17 NOTE — TELEPHONE ENCOUNTER
Patient started Bactrim last Wednesday  C/o itching, slight wheezing, & all around discomfort  Con't UA symptoms to include:  Odor  Cramping  Pelvic discomfort  Asking if PCP will order alternate abx?    Pended to PCP to review & advise

## 2023-11-17 NOTE — CONFIDENTIAL NOTE
Patient is requesting to switch antibiotics from Augmentin to cipro. [facility] to [facility]. Patient stated augmentin makes her head feel floaty.

## 2023-11-20 ENCOUNTER — TELEPHONE (OUTPATIENT)
Dept: FAMILY MEDICINE | Facility: OTHER | Age: 33
End: 2023-11-20

## 2023-11-20 NOTE — TELEPHONE ENCOUNTER
You placed orders back in April for patient to have 3 doses of Venofer. It appears she came for 2 doses but did not fully complete therapy.      Can we discontinue plan due to amount of time lapsed since last dose (6-23-23)?

## 2023-12-09 ENCOUNTER — HEALTH MAINTENANCE LETTER (OUTPATIENT)
Age: 33
End: 2023-12-09

## 2023-12-19 ASSESSMENT — ENCOUNTER SYMPTOMS
PALPITATIONS: 0
SORE THROAT: 0
WEAKNESS: 0
DIARRHEA: 0
HEARTBURN: 0
CHILLS: 0
COUGH: 0
BREAST MASS: 0
NERVOUS/ANXIOUS: 0
NAUSEA: 0
FEVER: 0
CONSTIPATION: 0
HEADACHES: 0
EYE PAIN: 0
ABDOMINAL PAIN: 0
JOINT SWELLING: 0
FREQUENCY: 0
DYSURIA: 0
ARTHRALGIAS: 0
DIZZINESS: 0
HEMATOCHEZIA: 0
PARESTHESIAS: 0
SHORTNESS OF BREATH: 0
MYALGIAS: 0
HEMATURIA: 0

## 2023-12-20 ENCOUNTER — OFFICE VISIT (OUTPATIENT)
Dept: FAMILY MEDICINE | Facility: OTHER | Age: 33
End: 2023-12-20
Attending: FAMILY MEDICINE
Payer: COMMERCIAL

## 2023-12-20 ENCOUNTER — TELEPHONE (OUTPATIENT)
Dept: FAMILY MEDICINE | Facility: OTHER | Age: 33
End: 2023-12-20

## 2023-12-20 VITALS
OXYGEN SATURATION: 98 % | BODY MASS INDEX: 32.96 KG/M2 | WEIGHT: 186 LBS | HEIGHT: 63 IN | SYSTOLIC BLOOD PRESSURE: 150 MMHG | TEMPERATURE: 99 F | DIASTOLIC BLOOD PRESSURE: 80 MMHG | HEART RATE: 110 BPM

## 2023-12-20 DIAGNOSIS — E28.2 PCOS (POLYCYSTIC OVARIAN SYNDROME): ICD-10-CM

## 2023-12-20 DIAGNOSIS — E78.5 HYPERLIPIDEMIA LDL GOAL <130: ICD-10-CM

## 2023-12-20 DIAGNOSIS — N64.4 BREAST PAIN, RIGHT: ICD-10-CM

## 2023-12-20 DIAGNOSIS — E61.1 IRON DEFICIENCY: ICD-10-CM

## 2023-12-20 DIAGNOSIS — N94.6 DYSMENORRHEA: ICD-10-CM

## 2023-12-20 DIAGNOSIS — E55.9 HYPOVITAMINOSIS D: ICD-10-CM

## 2023-12-20 DIAGNOSIS — K21.9 GASTROESOPHAGEAL REFLUX DISEASE WITHOUT ESOPHAGITIS: ICD-10-CM

## 2023-12-20 DIAGNOSIS — I10 BENIGN ESSENTIAL HYPERTENSION: ICD-10-CM

## 2023-12-20 DIAGNOSIS — R73.09 ELEVATED GLUCOSE: ICD-10-CM

## 2023-12-20 DIAGNOSIS — G35 MS (MULTIPLE SCLEROSIS) (H): ICD-10-CM

## 2023-12-20 DIAGNOSIS — E11.65 TYPE 2 DIABETES MELLITUS WITH HYPERGLYCEMIA, WITHOUT LONG-TERM CURRENT USE OF INSULIN (H): ICD-10-CM

## 2023-12-20 DIAGNOSIS — E53.8 VITAMIN B12 DEFICIENCY (NON ANEMIC): ICD-10-CM

## 2023-12-20 DIAGNOSIS — N64.4 BREAST PAIN, LEFT: ICD-10-CM

## 2023-12-20 DIAGNOSIS — F41.1 GAD (GENERALIZED ANXIETY DISORDER): ICD-10-CM

## 2023-12-20 DIAGNOSIS — Z00.00 ROUTINE GENERAL MEDICAL EXAMINATION AT A HEALTH CARE FACILITY: Primary | ICD-10-CM

## 2023-12-20 PROCEDURE — 99395 PREV VISIT EST AGE 18-39: CPT | Performed by: FAMILY MEDICINE

## 2023-12-20 PROCEDURE — 99214 OFFICE O/P EST MOD 30 MIN: CPT | Mod: 25 | Performed by: FAMILY MEDICINE

## 2023-12-20 RX ORDER — LOSARTAN POTASSIUM 25 MG/1
25 TABLET ORAL DAILY
Qty: 90 TABLET | Refills: 1 | Status: SHIPPED | OUTPATIENT
Start: 2023-12-20

## 2023-12-20 SDOH — HEALTH STABILITY: PHYSICAL HEALTH: ON AVERAGE, HOW MANY MINUTES DO YOU ENGAGE IN EXERCISE AT THIS LEVEL?: 20 MIN

## 2023-12-20 SDOH — HEALTH STABILITY: PHYSICAL HEALTH: ON AVERAGE, HOW MANY DAYS PER WEEK DO YOU ENGAGE IN MODERATE TO STRENUOUS EXERCISE (LIKE A BRISK WALK)?: 6 DAYS

## 2023-12-20 ASSESSMENT — ANXIETY QUESTIONNAIRES
4. TROUBLE RELAXING: NEARLY EVERY DAY
1. FEELING NERVOUS, ANXIOUS, OR ON EDGE: SEVERAL DAYS
5. BEING SO RESTLESS THAT IT IS HARD TO SIT STILL: SEVERAL DAYS
2. NOT BEING ABLE TO STOP OR CONTROL WORRYING: SEVERAL DAYS
GAD7 TOTAL SCORE: 8
3. WORRYING TOO MUCH ABOUT DIFFERENT THINGS: SEVERAL DAYS
GAD7 TOTAL SCORE: 8
6. BECOMING EASILY ANNOYED OR IRRITABLE: SEVERAL DAYS
IF YOU CHECKED OFF ANY PROBLEMS ON THIS QUESTIONNAIRE, HOW DIFFICULT HAVE THESE PROBLEMS MADE IT FOR YOU TO DO YOUR WORK, TAKE CARE OF THINGS AT HOME, OR GET ALONG WITH OTHER PEOPLE: SOMEWHAT DIFFICULT
7. FEELING AFRAID AS IF SOMETHING AWFUL MIGHT HAPPEN: NOT AT ALL

## 2023-12-20 ASSESSMENT — LIFESTYLE VARIABLES
SKIP TO QUESTIONS 9-10: 1
HOW OFTEN DO YOU HAVE SIX OR MORE DRINKS ON ONE OCCASION: NEVER
HOW MANY STANDARD DRINKS CONTAINING ALCOHOL DO YOU HAVE ON A TYPICAL DAY: 1 OR 2
HOW OFTEN DO YOU HAVE A DRINK CONTAINING ALCOHOL: MONTHLY OR LESS
AUDIT-C TOTAL SCORE: 1

## 2023-12-20 ASSESSMENT — ENCOUNTER SYMPTOMS
HEADACHES: 0
COUGH: 0
FEVER: 0
ABDOMINAL PAIN: 0
DIZZINESS: 0
PALPITATIONS: 0
MYALGIAS: 0
WEAKNESS: 0
NAUSEA: 0
CHILLS: 0
DYSURIA: 0
FREQUENCY: 0
NERVOUS/ANXIOUS: 0
EYE PAIN: 0
SHORTNESS OF BREATH: 0
JOINT SWELLING: 0
HEMATURIA: 0
SORE THROAT: 0
HEMATOCHEZIA: 0
DIARRHEA: 0
CONSTIPATION: 0
ARTHRALGIAS: 0
HEARTBURN: 0
PARESTHESIAS: 0
BREAST MASS: 0

## 2023-12-20 ASSESSMENT — PATIENT HEALTH QUESTIONNAIRE - PHQ9
SUM OF ALL RESPONSES TO PHQ QUESTIONS 1-9: 10
SUM OF ALL RESPONSES TO PHQ QUESTIONS 1-9: 10
10. IF YOU CHECKED OFF ANY PROBLEMS, HOW DIFFICULT HAVE THESE PROBLEMS MADE IT FOR YOU TO DO YOUR WORK, TAKE CARE OF THINGS AT HOME, OR GET ALONG WITH OTHER PEOPLE: SOMEWHAT DIFFICULT

## 2023-12-20 ASSESSMENT — PAIN SCALES - GENERAL: PAINLEVEL: SEVERE PAIN (6)

## 2023-12-20 NOTE — COMMUNITY RESOURCES LIST (ENGLISH)
12/20/2023   [facility]  N/A  For additional resource needs, please contact your health insurance member services or your primary care team.  Phone: [phone]   Email: N/A   Address: [address] [place] [address]   Hours: N/A        Exercise and Recreation       Gym or workout facility  1  [place] [facility] Distance: 23.7 miles      In-Person   [address] [place] [address]  Language: English  Hours: Mon - Fri 5:00 AM - 9:00 PM , Sat 7:00 AM - 7:00 PM , Sun 10:00 AM - 6:00 PM  Fees: Self Pay, Sliding Fee   Phone: [phone] Email: [email or web address] Website: [email or web address]          Important Numbers & Websites       [address]  Poison Control   (144) 101-2538 [place]  Suicide and Crisis Lifeline   [address] [place], [place] [address]line.org  Childhelp [place] Child Abuse Hotline   606.361.9783 Childhelphotline.org  [place] Sexual Assault Hotline   (808) 674-5964 (HOPE) Rainn.org  National Runaway Safeline   (684) 422-5570 (RUNAWAY) [facility]org  Pregnancy & Postpartum Support [place]   Call/text 480-322-2857 Ppsupport[place].org  Substance Abuse National Helpline ([facility]   922-498-HELP (6669) Findtreatment.gov  Emergency Services   911

## 2023-12-20 NOTE — TELEPHONE ENCOUNTER
Received PA request from [facility] for Wegovy 0.25MG/0.5ML auto-injectors.    I see the diagnosis on the order in Epic is for type 2 diabetes. Is this the medication you intended to order?     Let me know ASAP if there is a different diagnosis so a PA can be submitted for patient.    Thank you

## 2023-12-20 NOTE — TELEPHONE ENCOUNTER
Yes - verified that diagnosis tied to medication order is   Type 2 diabetes mellitus with hyperglycemia, without long-term current use of insulin (H) [E11.65]     Thank you!  Lana MACE RN

## 2023-12-20 NOTE — PROGRESS NOTES
SUBJECTIVE:   Radha is a 33 year old, presenting for the following:  Physical        12/20/2023     1:16 PM   Additional Questions   Roomed by Susi MACE LPN   Accompanied by Self       Healthy Habits:     Getting at least 3 servings of Calcium per day:  NO    Bi-annual eye exam:  Yes    Dental care twice a year:  Yes    Sleep apnea or symptoms of sleep apnea:  Daytime drowsiness    Diet:  Regular (no restrictions) and Carbohydrate counting    Frequency of exercise:  2-3 days/week    Duration of exercise:  15-30 minutes    Taking medications regularly:  Yes    Medication side effects:  Not applicable    Additional concerns today:  No      Hyperlipidemia Follow-Up    Are you regularly taking any medication or supplement to lower your cholesterol?   No  Are you having muscle aches or other side effects that you think could be caused by your cholesterol lowering medication?  No  How many servings of fruits and vegetables do you eat daily?  2-3  On average, how many sweetened beverages do you drink each day (Examples: soda, juice, sweet tea, etc.  Do NOT count diet or artificially sweetened beverages)?   0  How many days per week do you exercise enough to make your heart beat faster? 5  How many minutes a day do you exercise enough to make your heart beat faster? 10 - 19  How many days per week do you miss taking your medication? 0     Diabetes Follow-up    How often are you checking your blood sugar? Not at all  What concerns do you have today about your diabetes? None   Do you have any of these symptoms? (Select all that apply)  No numbness or tingling in feet.  No redness, sores or blisters on feet.  No complaints of excessive thirst.  No reports of blurry vision.  No significant changes to weight.      BP Readings from Last 2 Encounters:   12/20/23 (!) 150/80   11/13/23 124/80     Hemoglobin A1C (%)   Date Value   07/06/2023 6.7 (H)   03/31/2023 7.6 (H)   09/11/2020 6.4 (H)   06/12/2019 5.9 (H)     LDL  Cholesterol Calculated (mg/dL)   Date Value   12/15/2022 154 (H)   11/15/2021 131 (H)   09/11/2020 134 (H)   06/12/2019 134 (H)             Hypertension Follow-up    Do you check your blood pressure regularly outside of the clinic? Yes   Are you following a low salt diet? No  Are your blood pressures ever more than 140 on the top number (systolic) OR more   than 90 on the bottom number (diastolic), for example 140/90? No  How many servings of fruits and vegetables do you eat daily?  2-3  On average, how many sweetened beverages do you drink each day (Examples: soda, juice, sweet tea, etc.  Do NOT count diet or artificially sweetened beverages)?   0  How many days per week do you exercise enough to make your heart beat faster? 3 or less  How many minutes a day do you exercise enough to make your heart beat faster? 9 or less  How many days per week do you miss taking your medication? 0    Bilateral breast pain    Duration: 1-2 wks  Description (location/character/radiation): not associated with cycle  Intensity:  mild, moderate  Accompanying signs and symptoms: sharp at times  History (similar episodes/previous evaluation): None  Precipitating or alleviating factors: None  Therapies tried and outcome: None       Breast pains     Social History     Tobacco Use    Smoking status: Never     Passive exposure: Never    Smokeless tobacco: Never   Substance Use Topics    Alcohol use: Yes     Comment: rare             12/19/2023     5:54 PM   Alcohol Use   Prescreen: >3 drinks/day or >7 drinks/week? No     Reviewed orders with patient.  Reviewed health maintenance and updated orders accordingly - Yes  Lab work is in process  Labs reviewed in EPIC  BP Readings from Last 3 Encounters:   12/20/23 (!) 150/80   11/13/23 124/80   10/16/23 (!) 152/92    Wt Readings from Last 3 Encounters:   12/20/23 84.4 kg (186 lb)   11/13/23 83 kg (183 lb)   07/28/23 83 kg (183 lb)                  Patient Active Problem List   Diagnosis     Attention deficit hyperactivity disorder (ADHD), predominantly inattentive type    Hyperlipidemia LDL goal <130    PCOS (polycystic ovarian syndrome)    MS (multiple sclerosis) (H)    Vitamin B12 deficiency (non anemic)    Adiposity    Skin sensation disturbance    ACP (advance care planning)    Dysmenorrhea    MARIBETH (generalized anxiety disorder)    Hypovitaminosis D    Infertility, female, secondary    Elevated glucose    Stress    Benign essential hypertension    Prediabetes    Chronic tension-type headache, not intractable    Cold sore    Cervicalgia    Weight gain    Intramural leiomyoma of uterus    Pelvic pain in female    Right ovarian cyst    Tonsil stone    Iron deficiency    Gastroesophageal reflux disease without esophagitis    Procreative counseling and advice using natural family planning    Slow transit constipation    Candidiasis of skin    Other specified anemias    Menorrhagia with regular cycle    Chronic fatigue    Anemia due to blood loss, acute    Sprain of deltoid ligament of right ankle, initial encounter    Type 2 diabetes mellitus with hyperglycemia, without long-term current use of insulin (H)     Past Surgical History:   Procedure Laterality Date    ESOPHAGOSCOPY, GASTROSCOPY, DUODENOSCOPY (EGD), COMBINED N/A 1/20/2023    Procedure: Upper endoscopy  with biopsy;  Surgeon: Rudi Romano MD;  Location: [facility]    HYSTEROSCOPY,DIAGNOSTIC  2016    done here and at [facility]    LAPAROSCOPIC SALPINGECTOMY Left 05/01/2010    ruptured ectopic pregnancy    wisdom teeth  2014       Social History     Tobacco Use    Smoking status: Never     Passive exposure: Never    Smokeless tobacco: Never   Substance Use Topics    Alcohol use: Yes     Comment: rare     Family History   Problem Relation Age of Onset    Endometriosis Mother         s/p ablation    Osteoarthritis Father         due to work as a martin    Family History Negative Sister         +tobacco    Hypertension Sister     Obesity  Sister         iron def    Hypertension Sister     Diabetes Sister         pre    Migraines Sister     Attention Deficit Disorder Sister     Family History Negative Brother         +tobacco    Breast Cancer Maternal Grandmother 64    Unknown/Adopted Maternal Grandfather         severe gerd    Lung Cancer Paternal Grandmother         +tobacco    Heart Disease Paternal Grandfather         AMI/CAD or CVA    Meeker Disease Maternal Uncle          Current Outpatient Medications   Medication Sig Dispense Refill    acetaminophen (TYLENOL) 500 MG tablet Take 2 tablets by mouth Every 6 hours as needed      acetylcysteine (NAC) 600 MG CAPS capsule Take 600 mg by mouth 2 times daily      baclofen (LIORESAL) 10 MG tablet Take 0.5-1 tablets (5-10 mg) by mouth 2 times daily as needed for muscle spasms 30 tablet 1    blood glucose monitoring (ONE TOUCH ULTRA 2) meter device kit See Admin Instructions      cyclobenzaprine (FLEXERIL) 10 MG tablet Take 0.5-1 tablets (5-10 mg) by mouth daily as needed for muscle spasms 30 tablet 1    diazepam (VALIUM) 2 MG tablet Take 1 tablet (2 mg) by mouth every 6 hours as needed for anxiety Take one to two tablets as needed prior to travel 4 tablet 0    ibuprofen (ADVIL,MOTRIN) 200 MG tablet Take 2 tablets by mouth Every 6 hours as needed with food      KESIMPTA 20 MG/0.4ML injection       Lancets (ONETOUCH DELICA PLUS [id number]) MISC       losartan (COZAAR) 25 MG tablet Take 1 tablet (25 mg) by mouth daily 90 tablet 1    Multiple Vitamin (MULTIVITAMIN ADULT PO) Take 1 tablet by mouth daily      nystatin (MYCOSTATIN) 648055 UNIT/GM external powder Apply topically 3 times daily 30 g 0    omeprazole (PRILOSEC) 40 MG DR capsule Take 1 capsule (40 mg) by mouth daily 90 capsule 3    ondansetron (ZOFRAN ODT) 4 MG ODT tab Take 1 tablet (4 mg) by mouth every 8 hours as needed for nausea 30 tablet 1    ONETOUCH ULTRA test strip       order for DME Right ankle ASO 1 Device 0    progesterone (PROMETRIUM)  100 MG capsule Take 1 capsule (100 mg) by mouth daily Starting on peak +3 for 10 days out of month 30 capsule 3    propranolol (INDERAL) 10 MG tablet Take 1 tablet (10 mg) by mouth 3 times daily 90 tablet 1    Semaglutide-Weight Management (WEGOVY) 0.25 MG/0.5ML pen Inject 0.25 mg Subcutaneous once a week 2 mL 1    valACYclovir (VALTREX) 500 MG tablet TAKE 1 TABLET BY MOUTH TWICE DAILY WITH  ACTIVE  LESION 30 tablet 5     Allergies   Allergen Reactions    Cats     Sulfa Antibiotics Rash     Sulfonamide antibiotics     Recent Labs   Lab Test 23  0750 23  1706 23  1351 12/15/22  0748 22  0627 11/15/21  0736 20  0840 20  0816   0000   A1C 6.7* 7.6*  --  7.4*  --  6.5*  --  6.4*   < >   LDL  --   --   --  154*  --  131*  --  134*  --    HDL  --   --   --  41*  --  41*  --  39*  --    TRIG  --   --   --  113  --  111  --  96  --    ALT  --  130* 142* 121*   < > 63* 88* 101*  --    CR  --  0.50* 0.60 0.52   < > 0.60 0.60 0.56  --    GFRESTIMATED  --  >90 >90 >90   < > >90 >90 >90  --    GFRESTBLACK  --   --   --   --   --   --  >90 >90  --    POTASSIUM  --  4.1 4.1 4.1   < > 3.8 4.1 3.9  --    TSH  --  0.66  --   --   --  0.89  --   --   --     < > = values in this interval not displayed.        Breast Cancer Screenin/12/2021     9:22 AM   Breast CA Risk Assessment (FHS-7)   Do you have a family history of breast, colon, or ovarian cancer? No / Unknown       Patient under 40 years of age: Routine Mammogram Screening not recommended.   Pertinent mammograms are reviewed under the imaging tab.    History of abnormal Pap smear: NO - age 30-65 PAP every 5 years with negative HPV co-testing recommended      Latest Ref Rng & Units 2022     3:50 PM 10/11/2017     4:27 PM 4/15/2014    12:00 AM   PAP / HPV   PAP  Negative for Intraepithelial Lesion or Malignancy (NILM)      PAP (Historical)   NIL  NIL    HPV 16 DNA Negative Negative  Negative     HPV 18 DNA Negative Negative   Negative     Other HR HPV Negative Negative  Negative       Reviewed and updated as needed this visit by clinical staff   Tobacco  Allergies  Meds  Problems             Reviewed and updated as needed this visit by Provider      Problems            Past Medical History:   Diagnosis Date    AC separation, left, sequela     saw Dr Andre    ADHD (attention deficit hyperactivity disorder)     Attention deficit hyperactivity disorder (ADHD), predominantly inattentive type 2014     Problem list name updated by automated process. Provider to review    Benign essential hypertension 2020    Cervicalgia 2021    Cold sore     Costochondral pain 2019    Diabetes mellitus, type 2 (H) 2023    Dysmenorrhea 2016    Elevated liver enzymes 2014    resolved 2015    Fibrocystic breast     MARIBETH (generalized anxiety disorder)     Hyperlipidemia with target LDL less than 100 2014     Diagnosis updated by automated process. Provider to review and confirm.    Hypovitaminosis D     Infertility, female, secondary 2016    Intramural leiomyoma of uterus 2022    Migraine     MS (multiple sclerosis) (H)     Neuropathy     feet    Obesity     Other specified anemias 2023    PCOS (polycystic ovarian syndrome)     Tubal pregnancy without intrauterine pregnancy 2013    Visual field scotoma 2013    Vitamin B12 deficiency (non anemic) 2015      Past Surgical History:   Procedure Laterality Date    ESOPHAGOSCOPY, GASTROSCOPY, DUODENOSCOPY (EGD), COMBINED N/A 2023    Procedure: Upper endoscopy  with biopsy;  Surgeon: Rudi Romano MD;  Location: [facility]    HYSTEROSCOPY,DIAGNOSTIC  2016    done here and at [facility]    LAPAROSCOPIC SALPINGECTOMY Left 2010    ruptured ectopic pregnancy    wisdom teeth       OB History    Para Term  AB Living   2 0 0 0 2 0   SAB IAB Ectopic Multiple Live Births   1 0 1 0 0      # Outcome Date GA Lbr  "Sage/2nd Weight Sex Delivery Anes PTL Lv   2 SAB 01/2023    U SAB      1 Ectopic 05/01/10     ECTOPIC   FD      Birth Comments: tubal       Review of Systems   Constitutional:  Negative for chills and fever.   HENT:  Negative for congestion, ear pain, hearing loss and sore throat.    Eyes:  Negative for pain and visual disturbance.   Respiratory:  Negative for cough and shortness of breath.    Cardiovascular:  Negative for chest pain, palpitations and peripheral edema.   Gastrointestinal:  Negative for abdominal pain, constipation, diarrhea, heartburn, hematochezia and nausea.   Breasts:  Positive for tenderness. Negative for breast mass and discharge.   Genitourinary:  Negative for dysuria, frequency, genital sores, hematuria, pelvic pain, urgency, vaginal bleeding and vaginal discharge.   Musculoskeletal:  Negative for arthralgias, joint swelling and myalgias.   Skin:  Negative for rash.   Neurological:  Negative for dizziness, weakness, headaches and paresthesias.   Psychiatric/Behavioral:  Negative for mood changes. The patient is not nervous/anxious.       OBJECTIVE:   BP (!) 150/80 (BP Location: Right arm, Patient Position: Sitting, Cuff Size: Adult Large)   Pulse 110   Temp 99  F (37.2  C) (Tympanic)   Ht 1.6 m (5' 3\")   Wt 84.4 kg (186 lb)   LMP 12/04/2023 (Approximate)   SpO2 98%   BMI 32.95 kg/m    Physical Exam  GENERAL: healthy, alert and no distress  EYES: Eyes grossly normal to inspection, PERRL and conjunctivae and sclerae normal  HENT: ear canals and TM's normal, nose and mouth without ulcers or lesions  NECK: no adenopathy, no asymmetry, masses, or scars and thyroid normal to palpation  RESP: lungs clear to auscultation - no rales, rhonchi or wheezes  BREAST: normal without masses, tenderness or nipple discharge and no palpable axillary masses or adenopathy  CV: regular rate and rhythm, normal S1 S2, no S3 or S4, no murmur, click or rub, no peripheral edema and peripheral pulses " strong  ABDOMEN: soft, nontender, no hepatosplenomegaly, no masses and bowel sounds normal   (female): normal female external genitalia, normal urethral meatus, vaginal mucosa pink, moist, well rugated, and normal cervix/adnexa/uterus without masses or discharge  MS: no gross musculoskeletal defects noted, no edema  SKIN: no suspicious lesions or rashes  NEURO: Normal strength and tone, mentation intact and speech normal  PSYCH: mentation appears normal, affect normal/bright    ASSESSMENT/PLAN:   (Z00.00) Routine general medical examination at a health care facility  (primary encounter diagnosis)  Comment:   Plan: follow-up 1 year    (G35) MS (multiple sclerosis) (H)  Comment:   Plan: on new medication    (N94.6) Dysmenorrhea  Comment:   Plan: improved this last cycle    (E53.8) Vitamin B12 deficiency (non anemic)  Comment:   Plan: Vitamin B12        Will recheck    (E61.1) Iron deficiency  Comment:   Plan: Iron and iron binding capacity, Ferritin, CBC         with platelets        Needs updated labs    (E55.9) Hypovitaminosis D  Comment:   Plan: needs labs, will come tomorrow or friday    (K21.9) Gastroesophageal reflux disease without esophagitis  Comment:   Plan: doing ok  Taking papaya enzymes    (I10) Benign essential hypertension  Comment:   Plan: losartan (COZAAR) 25 MG tablet        Never started, start lower dose  Recheck 3 mos    (F41.1) MARIBETH (generalized anxiety disorder)  Comment:   Plan: doing ok    (R73.09) Elevated glucose  Comment:   Plan: Hemoglobin A1c          (E78.5) Hyperlipidemia LDL goal <130  Comment:   Plan: Comprehensive metabolic panel, Lipid Profile         (Chol, Trig, HDL, LDL calc)        Will come fasting    (E11.65) Type 2 diabetes mellitus with hyperglycemia, without long-term current use of insulin (H)  Comment:   Plan: Semaglutide-Weight Management (WEGOVY) 0.25         MG/0.5ML pen        Follow-up 3-4 mos    (E28.2) PCOS (polycystic ovarian syndrome)  Comment:   Plan: cycle is  better    (N64.4) Breast pain, left  Comment:   Plan: MA Diagnostic Bilateral w/Ignacio, US Breast Left         Limited 1-3 Quadrants          (N64.4) Breast pain, right  Comment:   Plan: MA Diagnostic Bilateral w/Ignacio, US Breast Right        Limited 1-3 Quadrants          COUNSELING:  Reviewed preventive health counseling, as reflected in patient instructions  Special attention given to:        Regular exercise       Healthy diet/nutrition       Vision screening       Hearing screening      She reports that she has never smoked. She has never been exposed to tobacco smoke. She has never used smokeless tobacco.          Muriel Jones MD  [facility]  Answers submitted by the patient for this visit:  Patient Health Questionnaire (Submitted on 12/20/2023)  If you checked off any problems, how difficult have these problems made it for you to do your work, take care of things at home, or get along with other people?: Somewhat difficult  PHQ9 TOTAL SCORE: 10  MARIBETH-7 (Submitted on 12/20/2023)  MARIBETH 7 TOTAL SCORE: 8

## 2023-12-21 ENCOUNTER — LAB (OUTPATIENT)
Dept: LAB | Facility: OTHER | Age: 33
End: 2023-12-21
Payer: COMMERCIAL

## 2023-12-21 DIAGNOSIS — N92.0 MENORRHAGIA WITH REGULAR CYCLE: ICD-10-CM

## 2023-12-21 DIAGNOSIS — E78.5 HYPERLIPIDEMIA LDL GOAL <130: ICD-10-CM

## 2023-12-21 DIAGNOSIS — E61.1 IRON DEFICIENCY: ICD-10-CM

## 2023-12-21 DIAGNOSIS — E55.9 HYPOVITAMINOSIS D: ICD-10-CM

## 2023-12-21 DIAGNOSIS — R73.09 ELEVATED GLUCOSE: ICD-10-CM

## 2023-12-21 DIAGNOSIS — E53.8 VITAMIN B12 DEFICIENCY (NON ANEMIC): ICD-10-CM

## 2023-12-21 LAB
ACANTHOCYTES BLD QL SMEAR: NORMAL
ALBUMIN SERPL BCG-MCNC: 4.4 G/DL (ref 3.5–5.2)
ALP SERPL-CCNC: 129 U/L (ref 40–150)
ALT SERPL W P-5'-P-CCNC: 279 U/L (ref 0–50)
ANION GAP SERPL CALCULATED.3IONS-SCNC: 13 MMOL/L (ref 7–15)
AST SERPL W P-5'-P-CCNC: 278 U/L (ref 0–45)
AUER BODIES BLD QL SMEAR: NORMAL
BASO STIPL BLD QL SMEAR: NORMAL
BILIRUB SERPL-MCNC: 0.6 MG/DL
BITE CELLS BLD QL SMEAR: NORMAL
BLISTER CELLS BLD QL SMEAR: NORMAL
BUN SERPL-MCNC: 6.6 MG/DL (ref 6–20)
BURR CELLS BLD QL SMEAR: NORMAL
CALCIUM SERPL-MCNC: 9.3 MG/DL (ref 8.6–10)
CHLORIDE SERPL-SCNC: 96 MMOL/L (ref 98–107)
CHOLEST SERPL-MCNC: 205 MG/DL
CREAT SERPL-MCNC: 0.56 MG/DL (ref 0.51–0.95)
DACRYOCYTES BLD QL SMEAR: NORMAL
DEPRECATED HCO3 PLAS-SCNC: 22 MMOL/L (ref 22–29)
EGFRCR SERPLBLD CKD-EPI 2021: >90 ML/MIN/1.73M2
ELLIPTOCYTES BLD QL SMEAR: NORMAL
ERYTHROCYTE [DISTWIDTH] IN BLOOD BY AUTOMATED COUNT: 20.3 % (ref 10–15)
EST. AVERAGE GLUCOSE BLD GHB EST-MCNC: 243 MG/DL
FASTING STATUS PATIENT QL REPORTED: YES
FERRITIN SERPL-MCNC: 37 NG/ML (ref 6–175)
FRAGMENTS BLD QL SMEAR: NORMAL
GLUCOSE SERPL-MCNC: 262 MG/DL (ref 70–99)
HBA1C MFR BLD: 10.1 %
HCT VFR BLD AUTO: 30.1 % (ref 35–47)
HDLC SERPL-MCNC: 34 MG/DL
HGB BLD-MCNC: 8.4 G/DL (ref 11.7–15.7)
HGB C CRYSTALS: NORMAL
HOWELL-JOLLY BOD BLD QL SMEAR: NORMAL
IRON BINDING CAPACITY (ROCHE): 412 UG/DL (ref 240–430)
IRON SATN MFR SERPL: 5 % (ref 15–46)
IRON SERPL-MCNC: 22 UG/DL (ref 37–145)
LDLC SERPL CALC-MCNC: 147 MG/DL
MCH RBC QN AUTO: 17.5 PG (ref 26.5–33)
MCHC RBC AUTO-ENTMCNC: 27.9 G/DL (ref 31.5–36.5)
MCV RBC AUTO: 63 FL (ref 78–100)
NEUTS HYPERSEG BLD QL SMEAR: NORMAL
NONHDLC SERPL-MCNC: 171 MG/DL
PLAT MORPH BLD: NORMAL
PLATELET # BLD AUTO: 560 10E3/UL (ref 150–450)
POLYCHROMASIA BLD QL SMEAR: NORMAL
POTASSIUM SERPL-SCNC: 3.6 MMOL/L (ref 3.4–5.3)
PROT SERPL-MCNC: 7.6 G/DL (ref 6.4–8.3)
RBC # BLD AUTO: 4.79 10E6/UL (ref 3.8–5.2)
RBC AGGLUT BLD QL: NORMAL
RBC MORPH BLD: NORMAL
ROULEAUX BLD QL SMEAR: NORMAL
SICKLE CELLS BLD QL SMEAR: NORMAL
SMUDGE CELLS BLD QL SMEAR: NORMAL
SODIUM SERPL-SCNC: 131 MMOL/L (ref 135–145)
SPHEROCYTES BLD QL SMEAR: NORMAL
STOMATOCYTES BLD QL SMEAR: NORMAL
TARGETS BLD QL SMEAR: NORMAL
TOXIC GRANULES BLD QL SMEAR: NORMAL
TRIGL SERPL-MCNC: 119 MG/DL
VARIANT LYMPHS BLD QL SMEAR: NORMAL
WBC # BLD AUTO: 10.1 10E3/UL (ref 4–11)

## 2023-12-21 PROCEDURE — 83550 IRON BINDING TEST: CPT

## 2023-12-21 PROCEDURE — 80053 COMPREHEN METABOLIC PANEL: CPT

## 2023-12-21 PROCEDURE — 82728 ASSAY OF FERRITIN: CPT

## 2023-12-21 PROCEDURE — 82607 VITAMIN B-12: CPT

## 2023-12-21 PROCEDURE — 80061 LIPID PANEL: CPT

## 2023-12-21 PROCEDURE — 36415 COLL VENOUS BLD VENIPUNCTURE: CPT

## 2023-12-21 PROCEDURE — 83036 HEMOGLOBIN GLYCOSYLATED A1C: CPT

## 2023-12-21 PROCEDURE — 84146 ASSAY OF PROLACTIN: CPT

## 2023-12-21 PROCEDURE — 85027 COMPLETE CBC AUTOMATED: CPT

## 2023-12-21 PROCEDURE — 83540 ASSAY OF IRON: CPT

## 2023-12-21 PROCEDURE — 82306 VITAMIN D 25 HYDROXY: CPT

## 2023-12-23 LAB
PROLACTIN SERPL 3RD IS-MCNC: 8 NG/ML (ref 5–23)
VIT B12 SERPL-MCNC: 1182 PG/ML (ref 232–1245)
VIT D+METAB SERPL-MCNC: 59 NG/ML (ref 20–50)

## 2023-12-26 NOTE — TELEPHONE ENCOUNTER
Received DENIAL from [employer] for Wegovy 0.25MG/0.5ML auto-injectors 12/26/2023.      This medication is used for indications such as weight loss, not Diabetes.   Letter scanned to Epic.

## 2023-12-27 ENCOUNTER — TELEPHONE (OUTPATIENT)
Dept: FAMILY MEDICINE | Facility: OTHER | Age: 33
End: 2023-12-27

## 2023-12-27 NOTE — TELEPHONE ENCOUNTER
Good morning~  Please review lab note & draft the Therapy Plan for PCP to review.  Thank you!  RHETT Schwartz    See lab results note 12/21/23 RBC & platelet morphology

## 2024-01-04 NOTE — TELEPHONE ENCOUNTER
Looks like this medication was appealed and determination was turned around and approved.  Valid Dates:01/02/2024-07/23/2024   Letter scanned to Epic

## 2024-01-04 NOTE — TELEPHONE ENCOUNTER
Writer phoned Infusion Therapy Nurse for update on request to draft Venofer orders.   Writer was told that the PCP needs to draft the therapy plan then submit to Infusion scheduling.    Pended to PCP to review & advise

## 2024-01-05 DIAGNOSIS — E61.1 IRON DEFICIENCY: Primary | ICD-10-CM

## 2024-01-05 DIAGNOSIS — D64.89 ANEMIA DUE TO OTHER CAUSE, NOT CLASSIFIED: ICD-10-CM

## 2024-01-05 RX ORDER — ALBUTEROL SULFATE 90 UG/1
1-2 AEROSOL, METERED RESPIRATORY (INHALATION)
Status: CANCELLED
Start: 2024-01-12

## 2024-01-05 RX ORDER — METHYLPREDNISOLONE SODIUM SUCCINATE 125 MG/2ML
125 INJECTION, POWDER, LYOPHILIZED, FOR SOLUTION INTRAMUSCULAR; INTRAVENOUS
Status: CANCELLED
Start: 2024-01-12

## 2024-01-05 RX ORDER — MEPERIDINE HYDROCHLORIDE 25 MG/ML
25 INJECTION INTRAMUSCULAR; INTRAVENOUS; SUBCUTANEOUS EVERY 30 MIN PRN
Status: CANCELLED | OUTPATIENT
Start: 2024-01-12

## 2024-01-05 RX ORDER — ALBUTEROL SULFATE 0.83 MG/ML
2.5 SOLUTION RESPIRATORY (INHALATION)
Status: CANCELLED | OUTPATIENT
Start: 2024-01-12

## 2024-01-05 RX ORDER — EPINEPHRINE 1 MG/ML
0.3 INJECTION, SOLUTION INTRAMUSCULAR; SUBCUTANEOUS EVERY 5 MIN PRN
Status: CANCELLED | OUTPATIENT
Start: 2024-01-12

## 2024-01-05 RX ORDER — DIPHENHYDRAMINE HYDROCHLORIDE 50 MG/ML
50 INJECTION INTRAMUSCULAR; INTRAVENOUS
Status: CANCELLED
Start: 2024-01-12

## 2024-01-12 DIAGNOSIS — E28.2 PCOS (POLYCYSTIC OVARIAN SYNDROME): ICD-10-CM

## 2024-01-12 DIAGNOSIS — K21.9 GASTROESOPHAGEAL REFLUX DISEASE, UNSPECIFIED WHETHER ESOPHAGITIS PRESENT: ICD-10-CM

## 2024-01-12 RX ORDER — OMEPRAZOLE 40 MG/1
40 CAPSULE, DELAYED RELEASE ORAL DAILY
Qty: 90 CAPSULE | Refills: 3 | Status: SHIPPED | OUTPATIENT
Start: 2024-01-12

## 2024-01-12 RX ORDER — PROGESTERONE 100 MG/1
100 CAPSULE ORAL DAILY
Qty: 30 CAPSULE | Refills: 3 | Status: SHIPPED | OUTPATIENT
Start: 2024-01-12

## 2024-01-16 DIAGNOSIS — E11.65 TYPE 2 DIABETES MELLITUS WITH HYPERGLYCEMIA, WITHOUT LONG-TERM CURRENT USE OF INSULIN (H): ICD-10-CM

## 2024-01-24 ENCOUNTER — HOSPITAL ENCOUNTER (OUTPATIENT)
Dept: ULTRASOUND IMAGING | Facility: HOSPITAL | Age: 34
Discharge: HOME | End: 2024-01-24
Attending: FAMILY MEDICINE
Payer: COMMERCIAL

## 2024-01-24 ENCOUNTER — HOSPITAL ENCOUNTER (OUTPATIENT)
Dept: MAMMOGRAPHY | Facility: OTHER | Age: 34
Discharge: HOME | End: 2024-01-24
Attending: FAMILY MEDICINE
Payer: COMMERCIAL

## 2024-01-24 DIAGNOSIS — N64.4 BREAST PAIN, RIGHT: ICD-10-CM

## 2024-01-24 DIAGNOSIS — N64.4 BREAST PAIN, LEFT: ICD-10-CM

## 2024-01-24 PROCEDURE — 77066 DX MAMMO INCL CAD BI: CPT | Mod: TC | Performed by: STUDENT IN AN ORGANIZED HEALTH CARE EDUCATION/TRAINING PROGRAM

## 2024-01-24 PROCEDURE — G0279 TOMOSYNTHESIS, MAMMO: HCPCS | Mod: TC | Performed by: STUDENT IN AN ORGANIZED HEALTH CARE EDUCATION/TRAINING PROGRAM

## 2024-01-24 PROCEDURE — 76642 ULTRASOUND BREAST LIMITED: CPT | Mod: 50

## 2024-01-29 ENCOUNTER — HOSPITAL ENCOUNTER (OUTPATIENT)
Dept: ULTRASOUND IMAGING | Facility: HOSPITAL | Age: 34
Discharge: HOME | End: 2024-01-29
Attending: SURGERY
Payer: COMMERCIAL

## 2024-01-29 ENCOUNTER — HOSPITAL ENCOUNTER (OUTPATIENT)
Facility: HOSPITAL | Age: 34
Discharge: HOME | End: 2024-01-29
Attending: RADIOLOGY | Admitting: RADIOLOGY
Payer: COMMERCIAL

## 2024-01-29 VITALS — DIASTOLIC BLOOD PRESSURE: 107 MMHG | HEART RATE: 103 BPM | OXYGEN SATURATION: 97 % | SYSTOLIC BLOOD PRESSURE: 163 MMHG

## 2024-01-29 DIAGNOSIS — N63.20 MASS OF LEFT BREAST, UNSPECIFIED QUADRANT: ICD-10-CM

## 2024-01-29 PROCEDURE — 76642 ULTRASOUND BREAST LIMITED: CPT | Mod: LT

## 2024-01-29 RX ORDER — LIDOCAINE HYDROCHLORIDE 10 MG/ML
5-10 INJECTION, SOLUTION EPIDURAL; INFILTRATION; INTRACAUDAL; PERINEURAL
Status: COMPLETED | OUTPATIENT
Start: 2024-01-29 | End: 2024-01-29

## 2024-01-29 NOTE — PROGRESS NOTES
Procedure was canceled as no area of concern was seen on ultrasound. Per radiologist follow up ultrasound in 6 months of this breast. Lidocaine was wasted.     Carlee SULLIVAN RN

## 2024-02-16 ENCOUNTER — INFUSION THERAPY VISIT (OUTPATIENT)
Dept: INFUSION THERAPY | Facility: OTHER | Age: 34
End: 2024-02-16
Attending: FAMILY MEDICINE
Payer: COMMERCIAL

## 2024-02-16 VITALS
HEART RATE: 81 BPM | RESPIRATION RATE: 17 BRPM | SYSTOLIC BLOOD PRESSURE: 157 MMHG | DIASTOLIC BLOOD PRESSURE: 97 MMHG | TEMPERATURE: 98.2 F | WEIGHT: 177.1 LBS | BODY MASS INDEX: 31.37 KG/M2 | OXYGEN SATURATION: 97 %

## 2024-02-16 DIAGNOSIS — D64.89 ANEMIA DUE TO OTHER CAUSE, NOT CLASSIFIED: Primary | ICD-10-CM

## 2024-02-16 DIAGNOSIS — E61.1 IRON DEFICIENCY: ICD-10-CM

## 2024-02-16 PROCEDURE — 96365 THER/PROPH/DIAG IV INF INIT: CPT | Performed by: FAMILY MEDICINE

## 2024-02-16 RX ORDER — ALBUTEROL SULFATE 90 UG/1
1-2 AEROSOL, METERED RESPIRATORY (INHALATION)
Status: CANCELLED
Start: 2024-02-22

## 2024-02-16 RX ORDER — DIPHENHYDRAMINE HYDROCHLORIDE 50 MG/ML
50 INJECTION INTRAMUSCULAR; INTRAVENOUS
Status: CANCELLED
Start: 2024-02-22

## 2024-02-16 RX ORDER — MEPERIDINE HYDROCHLORIDE 25 MG/ML
25 INJECTION INTRAMUSCULAR; INTRAVENOUS; SUBCUTANEOUS EVERY 30 MIN PRN
Status: CANCELLED | OUTPATIENT
Start: 2024-02-22

## 2024-02-16 RX ORDER — ALBUTEROL SULFATE 0.83 MG/ML
2.5 SOLUTION RESPIRATORY (INHALATION)
Status: CANCELLED | OUTPATIENT
Start: 2024-02-22

## 2024-02-16 RX ORDER — METHYLPREDNISOLONE SODIUM SUCCINATE 125 MG/2ML
125 INJECTION, POWDER, LYOPHILIZED, FOR SOLUTION INTRAMUSCULAR; INTRAVENOUS
Status: CANCELLED
Start: 2024-02-22

## 2024-02-16 RX ORDER — EPINEPHRINE 1 MG/ML
0.3 INJECTION, SOLUTION, CONCENTRATE INTRAVENOUS EVERY 5 MIN PRN
Status: CANCELLED | OUTPATIENT
Start: 2024-02-22

## 2024-02-16 RX ADMIN — Medication 250 ML: at 11:20

## 2024-02-16 ASSESSMENT — PAIN SCALES - GENERAL: PAINLEVEL: NO PAIN (0)

## 2024-02-16 NOTE — PROGRESS NOTES
Infusion Nursing Note:  Radha Carl presents today for IV venofer.    Patient seen by provider today: No   present during visit today: Not Applicable.    Note: .      Intravenous Access:  No Intravenous access/labs at this visit.    Treatment Conditions:  Lab Results   Component Value Date    HGB 8.4 (L) 12/21/2023    WBC 10.1 12/21/2023    ANEU 6.6 03/31/2015    ANEUTAUTO 7.3 06/13/2023     (H) 12/21/2023        Lab Results   Component Value Date     (L) 12/21/2023    POTASSIUM 3.6 12/21/2023    CR 0.56 12/21/2023    FLAVIO 9.3 12/21/2023    BILITOTAL 0.6 12/21/2023    ALBUMIN 4.4 12/21/2023     (H) 12/21/2023     (H) 12/21/2023       Results reviewed, labs MET treatment parameters, ok to proceed with treatment.      Post Infusion Assessment:  Patient tolerated infusion without incident.       Discharge Plan:   Patient and/or family verbalized understanding of discharge instructions and all questions answered.  AVS to patient via Expert TAT.  Patient will return next Friday for next appointment.   Patient discharged in stable condition accompanied by: .  Departure Mode: Ambulatory.      Sayra Thomas RN

## 2024-02-23 ENCOUNTER — INFUSION THERAPY VISIT (OUTPATIENT)
Dept: INFUSION THERAPY | Facility: OTHER | Age: 34
End: 2024-02-23
Attending: FAMILY MEDICINE
Payer: COMMERCIAL

## 2024-02-23 VITALS
TEMPERATURE: 98 F | HEART RATE: 98 BPM | RESPIRATION RATE: 17 BRPM | SYSTOLIC BLOOD PRESSURE: 165 MMHG | DIASTOLIC BLOOD PRESSURE: 96 MMHG

## 2024-02-23 DIAGNOSIS — D64.89 ANEMIA DUE TO OTHER CAUSE, NOT CLASSIFIED: Primary | ICD-10-CM

## 2024-02-23 DIAGNOSIS — E61.1 IRON DEFICIENCY: ICD-10-CM

## 2024-02-23 PROCEDURE — 96365 THER/PROPH/DIAG IV INF INIT: CPT | Performed by: FAMILY MEDICINE

## 2024-02-23 RX ORDER — DIPHENHYDRAMINE HYDROCHLORIDE 50 MG/ML
50 INJECTION INTRAMUSCULAR; INTRAVENOUS
Status: CANCELLED
Start: 2024-02-28

## 2024-02-23 RX ORDER — ALBUTEROL SULFATE 90 UG/1
1-2 AEROSOL, METERED RESPIRATORY (INHALATION)
Status: CANCELLED
Start: 2024-02-28

## 2024-02-23 RX ORDER — EPINEPHRINE 1 MG/ML
0.3 INJECTION, SOLUTION, CONCENTRATE INTRAVENOUS EVERY 5 MIN PRN
Status: CANCELLED | OUTPATIENT
Start: 2024-02-28

## 2024-02-23 RX ORDER — ALBUTEROL SULFATE 0.83 MG/ML
2.5 SOLUTION RESPIRATORY (INHALATION)
Status: CANCELLED | OUTPATIENT
Start: 2024-02-28

## 2024-02-23 RX ORDER — METHYLPREDNISOLONE SODIUM SUCCINATE 125 MG/2ML
125 INJECTION, POWDER, LYOPHILIZED, FOR SOLUTION INTRAMUSCULAR; INTRAVENOUS
Status: CANCELLED
Start: 2024-02-28

## 2024-02-23 RX ORDER — MEPERIDINE HYDROCHLORIDE 25 MG/ML
25 INJECTION INTRAMUSCULAR; INTRAVENOUS; SUBCUTANEOUS EVERY 30 MIN PRN
Status: CANCELLED | OUTPATIENT
Start: 2024-02-28

## 2024-02-23 RX ADMIN — Medication 250 ML: at 11:39

## 2024-02-23 NOTE — PROGRESS NOTES
Post Infusion Assessment:  Patient tolerated infusion without incident.  Site patent and intact, free from redness, edema or discomfort.  No evidence of extravasations.  Access discontinued per protocol.       Discharge Plan:   AVS to patient via MYCHART.    Patient discharged in stable condition accompanied by: self.  Departure Mode: Ambulatory.

## 2024-02-26 ENCOUNTER — HOSPITAL ENCOUNTER (OUTPATIENT)
Dept: MRI IMAGING | Facility: HOSPITAL | Age: 34
Discharge: HOME | End: 2024-02-26
Attending: PSYCHIATRY & NEUROLOGY
Payer: COMMERCIAL

## 2024-02-26 DIAGNOSIS — G35 MS (MULTIPLE SCLEROSIS) (H): ICD-10-CM

## 2024-02-26 PROCEDURE — A9585 GADOBUTROL INJECTION: HCPCS | Performed by: RADIOLOGY

## 2024-02-26 PROCEDURE — 70553 MRI BRAIN STEM W/O & W/DYE: CPT

## 2024-02-26 PROCEDURE — 255N000002 HC RX 255 OP 636: Performed by: RADIOLOGY

## 2024-02-26 PROCEDURE — 72156 MRI NECK SPINE W/O & W/DYE: CPT

## 2024-02-26 RX ORDER — GADOBUTROL 604.72 MG/ML
7.5 INJECTION INTRAVENOUS ONCE
Status: COMPLETED | OUTPATIENT
Start: 2024-02-26 | End: 2024-02-26

## 2024-02-26 RX ADMIN — GADOBUTROL 7.5 ML: 604.72 INJECTION INTRAVENOUS at 11:48

## 2024-02-29 ENCOUNTER — VIRTUAL VISIT (OUTPATIENT)
Dept: NEUROLOGY | Facility: CLINIC | Age: 34
End: 2024-02-29
Attending: PSYCHIATRY & NEUROLOGY
Payer: COMMERCIAL

## 2024-02-29 VITALS — BODY MASS INDEX: 31.35 KG/M2 | WEIGHT: 177 LBS

## 2024-02-29 DIAGNOSIS — R74.01 ELEVATED TRANSAMINASE LEVEL: ICD-10-CM

## 2024-02-29 DIAGNOSIS — R53.82 CHRONIC FATIGUE: ICD-10-CM

## 2024-02-29 DIAGNOSIS — G35 MS (MULTIPLE SCLEROSIS) (H): Primary | ICD-10-CM

## 2024-02-29 PROCEDURE — 99214 OFFICE O/P EST MOD 30 MIN: CPT | Mod: 95 | Performed by: PSYCHIATRY & NEUROLOGY

## 2024-02-29 NOTE — LETTER
2/29/2024      RE: Radha Carl  [address]  [address]  [facility] [address]     Referral source: Established patient    Chief complaint: Multiple sclerosis    History of the Present Illness: Ms. Radha Carl is a 33 year old woman who is evaluated in the Multiple Sclerosis Clinic today after recent MRI scans of the brain and cervical spine.    At the request of the patient, today's encounter was conducted via telemedicine (video visit).    The patient's history is as per my previous notes. Her history of symptoms of demyelinating disease dates back to May 2013, at which time she was admitted to hospital for ascending paresthesias. MRI imaging was suggestive of demyelinating disease of the type seen in multiple sclerosis. 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 A formal MS diagnosis was confirmed in 2018 after she developed increased weakness of the legs and MRI imaging showed multiple new lesions, but she continued to decline disease modifying treatment. Further radiologic progression was noted in December 2020 after she developed vertiginous symptoms, and she was referred to this clinic for an opinion on management. We initially started her on disease modifying therapy with dimethyl fumarate, but she discontinued that medication after a few weeks due to gastrointestinal side effects. She was next placed on glatiramer acetate, but subsequently stopped that medication as well.  After an MRI scan of the brain in April 2023 demonstrated further radiologic disease progression, we initiated treatment with ofatumumab in June 2023.    Today, she relates that the injections are going well.  She denies any new episodic changes in vision, balance, strength or sensation suggestive of new relapse of multiple sclerosis since she was last seen.    Symptomatically, she still experiences intermittent discomfort  in the left eye that comes and goes.  This is not associated with any change in vision.    She is quite fatigued on a chronic basis.  She has reduced to a part-time schedule at work to try to help to manage this.    Investigations: I reviewed images from MRI scans of the brain and cervical spine performed earlier on 2/26/2024, and the following is my independent interpretation of those images.  There is no abnormal enhancement with contrast in the cervical cord parenchyma and no apparent change in multiple short segment foci of T2 hyperintensity in the cervical cord parenchyma compared to 4/10/2023.  Likewise, multiple discrete and confluent foci of T2 hyperintensity in periventricular and juxtacortical locations are unchanged from 4/10/2023, and no new lesions are seen.  The interpreting radiologist made note of subtle enhancement in the right posterior frontal lobe and left temporo-parietal region, not felt to be significantly changed from 4/10/2023.  I did look at these areas.  I think that this may be artifactual, although persistent localized breakdown of the blood-brain barrier can occur occasionally and lead to persistent enhancement of MS lesions.  I do not think that this reflects active inflammation.    I did note that her transaminase levels, which were checked by her primary care provider in December, had gone up substantially over the last year with  U/L and  U/L.    Assessment/plan:    1. Multiple sclerosis  The patient is clinically and radiologically stable as regards any evidence of active inflammatory demyelination on disease-modifying treatment with ofatumumab.  She will continue these injections monthly.  As long as she remains stable in the interim, next MRI imaging will be performed in approximately 1 year.    I would like to see her back in 6 months for an in person appointment so we can update her neurologic examination, which has not been performed since October 2022.    2.  Chronic fatigue  I counseled the patient on benefits of regular aerobic exercise for MS associated fatigue and advised a goal of at least 90 minutes of aerobic activity weekly.  Several medications including amantadine, modafinil, and amphetamine stimulants have been used for treatment of MS associated fatigue, although recent studies have cast some doubt on the overall efficacy of these treatments.    3.  Elevated transaminases  Previous tests for chronic viral hepatitis have been negative.  She has also had a liver ultrasound in the past demonstrating some hepatic steatosis.  I think that this is the most likely explanation for the elevated liver function tests, although it is not clear why these increased substantially.     I do not think that it is likely that the new treatment with ofatumumab that is related to this finding.  She does have a follow-up with her primary care provider next month and will recheck those values at that time.    Santo Gracia MD   of Neurology  [facility]    Cc:  Muriel Jones MD (PCP)  Patient

## 2024-02-29 NOTE — NURSING NOTE
Is the patient currently in the state of [place]? YES    Visit mode:VIDEO    If the visit is dropped, the patient can be reconnected by: VIDEO VISIT: Text to cell phone:   Telephone Information:   Mobile [phone]       Will anyone else be joining the visit? NO  (If patient encounters technical issues they should call [phone]    How would you like to obtain your AVS? MyChart    Are changes needed to the allergy or medication list? No    Reason for visit: Follow Up    Nara HERNANDEZ

## 2024-02-29 NOTE — PROGRESS NOTES
Referral source: Established patient    Chief complaint: Multiple sclerosis    History of the Present Illness: Ms. Radha Carl is a 33 year old woman who is evaluated in the Multiple Sclerosis Clinic today after recent MRI scans of the brain and cervical spine.    At the request of the patient, today's encounter was conducted via telemedicine (video visit).    The patient's history is as per my previous notes. Her history of symptoms of demyelinating disease dates back to May 2013, at which time she was admitted to hospital for ascending paresthesias. MRI imaging was suggestive of demyelinating disease of the type seen in multiple sclerosis. 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 A formal MS diagnosis was confirmed in 2018 after she developed increased weakness of the legs and MRI imaging showed multiple new lesions, but she continued to decline disease modifying treatment. Further radiologic progression was noted in December 2020 after she developed vertiginous symptoms, and she was referred to this clinic for an opinion on management. We initially started her on disease modifying therapy with dimethyl fumarate, but she discontinued that medication after a few weeks due to gastrointestinal side effects. She was next placed on glatiramer acetate, but subsequently stopped that medication as well.  After an MRI scan of the brain in April 2023 demonstrated further radiologic disease progression, we initiated treatment with ofatumumab in June 2023.    Today, she relates that the injections are going well.  She denies any new episodic changes in vision, balance, strength or sensation suggestive of new relapse of multiple sclerosis since she was last seen.    Symptomatically, she still experiences intermittent discomfort in the left eye that comes and goes.  This is not associated with any change in vision.    She is  quite fatigued on a chronic basis.  She has reduced to a part-time schedule at work to try to help to manage this.    Investigations: I reviewed images from MRI scans of the brain and cervical spine performed earlier on 2/26/2024, and the following is my independent interpretation of those images.  There is no abnormal enhancement with contrast in the cervical cord parenchyma and no apparent change in multiple short segment foci of T2 hyperintensity in the cervical cord parenchyma compared to 4/10/2023.  Likewise, multiple discrete and confluent foci of T2 hyperintensity in periventricular and juxtacortical locations are unchanged from 4/10/2023, and no new lesions are seen.  The interpreting radiologist made note of subtle enhancement in the right posterior frontal lobe and left temporo-parietal region, not felt to be significantly changed from 4/10/2023.  I did look at these areas.  I think that this may be artifactual, although persistent localized breakdown of the blood-brain barrier can occur occasionally and lead to persistent enhancement of MS lesions.  I do not think that this reflects active inflammation.    I did note that her transaminase levels, which were checked by her primary care provider in December, had gone up substantially over the last year with  U/L and  U/L.    Assessment/plan:    1. Multiple sclerosis  The patient is clinically and radiologically stable as regards any evidence of active inflammatory demyelination on disease-modifying treatment with ofatumumab.  She will continue these injections monthly.  As long as she remains stable in the interim, next MRI imaging will be performed in approximately 1 year.    I would like to see her back in 6 months for an in person appointment so we can update her neurologic examination, which has not been performed since October 2022.    2. Chronic fatigue  I counseled the patient on benefits of regular aerobic exercise for MS associated  fatigue and advised a goal of at least 90 minutes of aerobic activity weekly.  Several medications including amantadine, modafinil, and amphetamine stimulants have been used for treatment of MS associated fatigue, although recent studies have cast some doubt on the overall efficacy of these treatments.    3.  Elevated transaminases  Previous tests for chronic viral hepatitis have been negative.  She has also had a liver ultrasound in the past demonstrating some hepatic steatosis.  I think that this is the most likely explanation for the elevated liver function tests, although it is not clear why these increased substantially.     I do not think that it is likely that the new treatment with ofatumumab that is related to this finding.  She does have a follow-up with her primary care provider next month and will recheck those values at that time.    Video-Visit Details    Type of service:  Video Visit    Video Start Time: 9:20 am    Video End Time: 9:32 am    Originating Location (pt. Location): Home    Distant Location (provider location): On-site ([facility], [facility], [address] [place] [address].    Platform used for Video Visit: [employer]

## 2024-02-29 NOTE — Clinical Note
2/29/2024       RE: Radha Carl  [address]  [address]  [facility] [address]     Dear Colleague,    Thank you for referring your patient, Radha Carl, to the [facility] [place] at [facility]. Please see a copy of my visit note below.    Referral source: Established patient    Chief complaint: Multiple sclerosis    History of the Present Illness: Ms. Radha Carl is a 33 year old woman who is evaluated in the Multiple Sclerosis Clinic today after recent MRI scans of the brain and cervical spine.    At the request of the patient, today's encounter was conducted via telemedicine (video visit).    The patient's history is as per my previous notes. Her history of symptoms of demyelinating disease dates back to May 2013, at which time she was admitted to hospital for ascending paresthesias. MRI imaging was suggestive of demyelinating disease of the type seen in multiple sclerosis. 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 A formal MS diagnosis was confirmed in 2018 after she developed increased weakness of the legs and MRI imaging showed multiple new lesions, but she continued to decline disease modifying treatment. Further radiologic progression was noted in December 2020 after she developed vertiginous symptoms, and she was referred to this clinic for an opinion on management. We initially started her on disease modifying therapy with dimethyl fumarate, but she discontinued that medication after a few weeks due to gastrointestinal side effects. She was next placed on glatiramer acetate, but subsequently stopped that medication as well.  After an MRI scan of the brain in April 2023 demonstrated further radiologic disease progression, we initiated treatment with ofatumumab in June 2023.    Today, she relates that  the injections are going well.  She denies any new episodic changes in vision, balance, strength or sensation suggestive of new relapse of multiple sclerosis since she was last seen.    Symptomatically, she still experiences intermittent discomfort in the left eye that comes and goes.  This is not associated with any change in vision.    She is quite fatigued on a chronic basis.  She has reduced to a part-time schedule at work to try to help to manage this.    Investigations: I reviewed images from MRI scans of the brain and cervical spine performed earlier on 2/26/2024, and the following is my independent interpretation of those images.  There is no abnormal enhancement with contrast in the cervical cord parenchyma and no apparent change in multiple short segment foci of T2 hyperintensity in the cervical cord parenchyma compared to 4/10/2023.  Likewise, multiple discrete and confluent foci of T2 hyperintensity in periventricular and juxtacortical locations are unchanged from 4/10/2023, and no new lesions are seen.  The interpreting radiologist made note of subtle enhancement in the right posterior frontal lobe and left temporo-parietal region, not felt to be significantly changed from 4/10/2023.  I did look at these areas.  I think that this may be artifactual, although persistent localized breakdown of the blood-brain barrier can occur occasionally and lead to persistent enhancement of MS lesions.  I do not think that this reflects active inflammation.    I did note that her transaminase levels, which were checked by her primary care provider in December, had gone up substantially over the last year with  U/L and  U/L.    Assessment/plan:    1. Multiple sclerosis  The patient is clinically and radiologically stable as regards any evidence of active inflammatory demyelination on disease-modifying treatment with ofatumumab.  She will continue these injections monthly.  As long as she remains stable in  the interim, next MRI imaging will be performed in approximately 1 year.    I would like to see her back in 6 months for an in person appointment so we can update her neurologic examination, which has not been performed since October 2022.    2. Chronic fatigue  I counseled the patient on benefits of regular aerobic exercise for MS associated fatigue and advised a goal of at least 90 minutes of aerobic activity weekly.  Several medications including amantadine, modafinil, and amphetamine stimulants have been used for treatment of MS associated fatigue, although recent studies have cast some doubt on the overall efficacy of these treatments.    3.  Elevated transaminases  Previous tests for chronic viral hepatitis have been negative.  She has also had a liver ultrasound in the past demonstrating some hepatic steatosis.  I think that this is the most likely explanation for the elevated liver function tests, although it is not clear why these increased substantially.     I do not think that it is likely that the new treatment with ofatumumab that is related to this finding.  She does have a follow-up with her primary care provider next month and will recheck those values at that time.    Video-Visit Details    Type of service:  Video Visit    Video Start Time: 9:20 am    Video End Time: 9:32 am    Originating Location (pt. Location): Home    Distant Location (provider location): On-site ([facility], [facility], [address] [place] [address].    Platform used for Video Visit: Nicolas[employer]      Again, thank you for allowing me to participate in the care of your patient.      Sincerely,    Santo Gracia MD

## 2024-03-06 ENCOUNTER — TELEPHONE (OUTPATIENT)
Dept: NEUROLOGY | Facility: CLINIC | Age: 34
End: 2024-03-06
Payer: COMMERCIAL

## 2024-03-06 NOTE — TELEPHONE ENCOUNTER
Left Voicemail (1st Attempt) and Sent Mychart (1st Attempt) for the patient to call back and schedule the following:    Appointment type: Return MS (in person)  Provider: Basil  Return date: Around 8/29/2024  Specialty phone number: [phone]  Additional appointment(s) needed: N/A  Additional Notes: N/A    Lionel Stephens on 3/6/2024 at 4:02 PM

## 2024-03-08 ENCOUNTER — TELEPHONE (OUTPATIENT)
Dept: NEUROLOGY | Facility: CLINIC | Age: 34
End: 2024-03-08
Payer: COMMERCIAL

## 2024-03-08 NOTE — TELEPHONE ENCOUNTER
Left Voicemail (2nd Attempt) for the patient to call back and schedule the following:    Appointment type: Return MS (in person)  Provider: Basil  Return date: Around 8/29/2024  Specialty phone number: [phone]  Additional appointment(s) needed: N/A  Additional Notes: N/A    Lionel Stephens on 3/8/2024 at 9:54 AM

## 2024-03-13 DIAGNOSIS — E11.65 TYPE 2 DIABETES MELLITUS WITH HYPERGLYCEMIA, WITHOUT LONG-TERM CURRENT USE OF INSULIN (H): Primary | ICD-10-CM

## 2024-03-20 ENCOUNTER — TELEPHONE (OUTPATIENT)
Dept: NEUROLOGY | Facility: CLINIC | Age: 34
End: 2024-03-20
Payer: COMMERCIAL

## 2024-03-20 NOTE — TELEPHONE ENCOUNTER
PA Initiation    Medication: KESIMPTA 20 MG/0.4ML SC SOAJ  Insurance Company: [employer] - Phone [phone] Fax [phone]  Pharmacy Filling the Rx: [facility] - [place], [place] - [address]  Filling Pharmacy Phone:    Filling Pharmacy Fax:    Start Date: 3/20/2024        Thank you,    Karina Leone [facility]  Specialty Pharmacy Clinic Liaison - CardiologyNeurologyMultiple [address]  [address] [place] [address]  Ph: [phone] Fax: [phone]  [email or web address]

## 2024-03-22 NOTE — TELEPHONE ENCOUNTER
Prior Authorization Approval    Medication: KESIMPTA 20 MG/0.4ML SC SOAJ  Authorization Effective Date: 4/3/2024  Authorization Expiration Date: 4/2/2025  Approved Dose/Quantity: 28 days  Reference #:     Insurance Company: [employer] - Phone [phone] Fax [phone]  Expected CoPay: $    CoPay Card Available:      Financial Assistance Needed:   Which Pharmacy is filling the prescription: [facility] - [place], [place] - [address]  Pharmacy Notified: Yes  Patient Notified: Yes          Thank you,    Karina Leone h-T  Specialty Pharmacy Clinic Liaison - [facility] [address] [address]  Ph: [phone] Fax: [phone]  [email or web address]

## 2024-03-27 ENCOUNTER — OFFICE VISIT (OUTPATIENT)
Dept: FAMILY MEDICINE | Facility: OTHER | Age: 34
End: 2024-03-27
Attending: FAMILY MEDICINE
Payer: COMMERCIAL

## 2024-03-27 VITALS
SYSTOLIC BLOOD PRESSURE: 132 MMHG | BODY MASS INDEX: 31.22 KG/M2 | DIASTOLIC BLOOD PRESSURE: 80 MMHG | HEIGHT: 63 IN | HEART RATE: 90 BPM | OXYGEN SATURATION: 100 % | TEMPERATURE: 97.9 F | RESPIRATION RATE: 18 BRPM | WEIGHT: 176.2 LBS

## 2024-03-27 DIAGNOSIS — D62 ANEMIA DUE TO BLOOD LOSS, ACUTE: ICD-10-CM

## 2024-03-27 DIAGNOSIS — E61.1 IRON DEFICIENCY: ICD-10-CM

## 2024-03-27 DIAGNOSIS — E11.65 TYPE 2 DIABETES MELLITUS WITH HYPERGLYCEMIA, WITHOUT LONG-TERM CURRENT USE OF INSULIN (H): ICD-10-CM

## 2024-03-27 DIAGNOSIS — E55.9 HYPOVITAMINOSIS D: ICD-10-CM

## 2024-03-27 DIAGNOSIS — R74.8 ELEVATED LIVER ENZYMES: ICD-10-CM

## 2024-03-27 DIAGNOSIS — G35 MS (MULTIPLE SCLEROSIS) (H): Primary | ICD-10-CM

## 2024-03-27 LAB
ALBUMIN SERPL BCG-MCNC: 4.3 G/DL (ref 3.5–5.2)
ALP SERPL-CCNC: 92 U/L (ref 40–150)
ALT SERPL W P-5'-P-CCNC: 48 U/L (ref 0–50)
ANION GAP SERPL CALCULATED.3IONS-SCNC: 11 MMOL/L (ref 7–15)
AST SERPL W P-5'-P-CCNC: 32 U/L (ref 0–45)
BILIRUB SERPL-MCNC: 0.2 MG/DL
BUN SERPL-MCNC: 11.5 MG/DL (ref 6–20)
CALCIUM SERPL-MCNC: 9.9 MG/DL (ref 8.6–10)
CHLORIDE SERPL-SCNC: 101 MMOL/L (ref 98–107)
CREAT SERPL-MCNC: 0.66 MG/DL (ref 0.51–0.95)
DEPRECATED HCO3 PLAS-SCNC: 25 MMOL/L (ref 22–29)
EGFRCR SERPLBLD CKD-EPI 2021: >90 ML/MIN/1.73M2
ERYTHROCYTE [DISTWIDTH] IN BLOOD BY AUTOMATED COUNT: 21.3 % (ref 10–15)
EST. AVERAGE GLUCOSE BLD GHB EST-MCNC: 157 MG/DL
GLUCOSE SERPL-MCNC: 123 MG/DL (ref 70–99)
HBA1C MFR BLD: 7.1 %
HCT VFR BLD AUTO: 35 % (ref 35–47)
HGB BLD-MCNC: 10.7 G/DL (ref 11.7–15.7)
IRON BINDING CAPACITY (ROCHE): 346 UG/DL (ref 240–430)
IRON SATN MFR SERPL: 7 % (ref 15–46)
IRON SERPL-MCNC: 23 UG/DL (ref 37–145)
MCH RBC QN AUTO: 22.4 PG (ref 26.5–33)
MCHC RBC AUTO-ENTMCNC: 30.6 G/DL (ref 31.5–36.5)
MCV RBC AUTO: 73 FL (ref 78–100)
PLATELET # BLD AUTO: 706 10E3/UL (ref 150–450)
POTASSIUM SERPL-SCNC: 4.1 MMOL/L (ref 3.4–5.3)
PROT SERPL-MCNC: 7.8 G/DL (ref 6.4–8.3)
RBC # BLD AUTO: 4.77 10E6/UL (ref 3.8–5.2)
SODIUM SERPL-SCNC: 137 MMOL/L (ref 135–145)
WBC # BLD AUTO: 13.5 10E3/UL (ref 4–11)

## 2024-03-27 PROCEDURE — 83036 HEMOGLOBIN GLYCOSYLATED A1C: CPT | Performed by: FAMILY MEDICINE

## 2024-03-27 PROCEDURE — 85027 COMPLETE CBC AUTOMATED: CPT | Performed by: FAMILY MEDICINE

## 2024-03-27 PROCEDURE — 36415 COLL VENOUS BLD VENIPUNCTURE: CPT | Performed by: FAMILY MEDICINE

## 2024-03-27 PROCEDURE — 83540 ASSAY OF IRON: CPT | Performed by: FAMILY MEDICINE

## 2024-03-27 PROCEDURE — 99214 OFFICE O/P EST MOD 30 MIN: CPT | Performed by: FAMILY MEDICINE

## 2024-03-27 PROCEDURE — 82306 VITAMIN D 25 HYDROXY: CPT | Performed by: FAMILY MEDICINE

## 2024-03-27 PROCEDURE — 80053 COMPREHEN METABOLIC PANEL: CPT | Performed by: FAMILY MEDICINE

## 2024-03-27 PROCEDURE — 83550 IRON BINDING TEST: CPT | Performed by: FAMILY MEDICINE

## 2024-03-27 ASSESSMENT — ANXIETY QUESTIONNAIRES
GAD7 TOTAL SCORE: 0
7. FEELING AFRAID AS IF SOMETHING AWFUL MIGHT HAPPEN: NOT AT ALL
3. WORRYING TOO MUCH ABOUT DIFFERENT THINGS: NOT AT ALL
6. BECOMING EASILY ANNOYED OR IRRITABLE: NOT AT ALL
IF YOU CHECKED OFF ANY PROBLEMS ON THIS QUESTIONNAIRE, HOW DIFFICULT HAVE THESE PROBLEMS MADE IT FOR YOU TO DO YOUR WORK, TAKE CARE OF THINGS AT HOME, OR GET ALONG WITH OTHER PEOPLE: NOT DIFFICULT AT ALL
5. BEING SO RESTLESS THAT IT IS HARD TO SIT STILL: NOT AT ALL
1. FEELING NERVOUS, ANXIOUS, OR ON EDGE: NOT AT ALL
GAD7 TOTAL SCORE: 0
4. TROUBLE RELAXING: NOT AT ALL
2. NOT BEING ABLE TO STOP OR CONTROL WORRYING: NOT AT ALL

## 2024-03-27 NOTE — PROGRESS NOTES
Assessment & Plan     MS (multiple sclerosis) (H)  Discussed current medication and possible side effects of increasing liver enzymes, will monitor    Type 2 diabetes mellitus with hyperglycemia, without long-term current use of insulin (H)  Follow-up 3 mos, labs pending  - Hemoglobin A1c; Future  - Albumin Random Urine Quantitative with Creat Ratio; Future  - Semaglutide-Weight Management (WEGOVY) 1 MG/0.5ML pen; Inject 1 mg Subcutaneous once a week    Anemia due to blood loss, acute  She has color back in her cheeks, more energy  - CBC with platelets; Future    Elevated liver enzymes  Labs pending  - Comprehensive metabolic panel; Future    Iron deficiency  Labs pending, she has been taking oral supplement  - Iron and iron binding capacity; Future    Patient was agreeable to this plan and had no further questions.  Patient Instructions   Vitamin C 1000mg daily    No follow-ups on file.    Maya Garcia is a 33 year old, presenting for the following health issues:  RECHECK           Additional Questions   Roomed by Susi MACE LPN   Accompanied by Self     HPI     Diabetes Follow-up    How often are you checking your blood sugar? Not at all  What concerns do you have today about your diabetes? None   Do you have any of these symptoms? (Select all that apply)  No numbness or tingling in feet.  No redness, sores or blisters on feet.  No complaints of excessive thirst.  No reports of blurry vision.  No significant changes to weight.      BP Readings from Last 2 Encounters:   03/27/24 132/80   02/23/24 (!) 165/96     Hemoglobin A1C (%)   Date Value   12/21/2023 10.1 (H)   07/06/2023 6.7 (H)   09/11/2020 6.4 (H)   06/12/2019 5.9 (H)     LDL Cholesterol Calculated (mg/dL)   Date Value   12/21/2023 147 (H)   12/15/2022 154 (H)   09/11/2020 134 (H)   06/12/2019 134 (H)             Review of Systems  Constitutional, HEENT, cardiovascular, pulmonary, GI, , musculoskeletal, neuro, skin, endocrine and psych systems  "are negative, except as otherwise noted.      Objective    /80 (BP Location: Left arm, Patient Position: Sitting, Cuff Size: Adult Large)   Pulse 90   Temp 97.9  F (36.6  C) (Tympanic)   Resp 18   Ht 1.6 m (5' 3\")   Wt 79.9 kg (176 lb 3.2 oz)   LMP 03/16/2024 (Exact Date)   SpO2 100%   BMI 31.21 kg/m    Body mass index is 31.21 kg/m .    Physical Exam     GENERAL: alert and no distress  RESP: lungs clear to auscultation - no rales, rhonchi or wheezes  CV: regular rate and rhythm, normal S1 S2, no S3 or S4, no murmur, click or rub, no peripheral edema  ABDOMEN: soft, nontender, no hepatosplenomegaly, no masses and bowel sounds normal  MS: no gross musculoskeletal defects noted, no edema  PSYCH: mentation appears normal, affect normal/bright    No results found for any visits on 03/27/24.        Signed Electronically by: Muriel Jones MD    "

## 2024-03-28 LAB — VIT D+METAB SERPL-MCNC: 38 NG/ML (ref 20–50)

## 2024-03-29 ENCOUNTER — TELEPHONE (OUTPATIENT)
Dept: CARE COORDINATION | Facility: OTHER | Age: 34
End: 2024-03-29

## 2024-03-29 DIAGNOSIS — G35 MS (MULTIPLE SCLEROSIS) (H): Primary | ICD-10-CM

## 2024-04-05 ENCOUNTER — TELEPHONE (OUTPATIENT)
Dept: CARE COORDINATION | Facility: OTHER | Age: 34
End: 2024-04-05

## 2024-04-05 DIAGNOSIS — R74.8 ELEVATED LIVER ENZYMES: Primary | ICD-10-CM

## 2024-04-05 NOTE — TELEPHONE ENCOUNTER
Writer spoke with MICHAEL Reza. Radiology had called and stated the patient has orders in for a US/ABD complete. Radiology stated they want the order change to US Limited to view just the liver. Please advise.    show

## 2024-04-05 NOTE — TELEPHONE ENCOUNTER
Pt c/o liver pain   Onset approx past month  Discussed with PCP at [facility]    Asking for US   Pended to PCP to review

## 2024-04-09 ENCOUNTER — TELEPHONE (OUTPATIENT)
Dept: FAMILY MEDICINE | Facility: OTHER | Age: 34
End: 2024-04-09

## 2024-04-09 ENCOUNTER — INFUSION THERAPY VISIT (OUTPATIENT)
Dept: INFUSION THERAPY | Facility: OTHER | Age: 34
End: 2024-04-09
Attending: FAMILY MEDICINE
Payer: COMMERCIAL

## 2024-04-09 VITALS
DIASTOLIC BLOOD PRESSURE: 100 MMHG | WEIGHT: 175 LBS | HEIGHT: 63 IN | HEART RATE: 92 BPM | BODY MASS INDEX: 31.01 KG/M2 | SYSTOLIC BLOOD PRESSURE: 158 MMHG | TEMPERATURE: 97.6 F | OXYGEN SATURATION: 98 %

## 2024-04-09 DIAGNOSIS — E61.1 IRON DEFICIENCY: ICD-10-CM

## 2024-04-09 DIAGNOSIS — M54.2 CERVICALGIA: ICD-10-CM

## 2024-04-09 DIAGNOSIS — D64.89 ANEMIA DUE TO OTHER CAUSE, NOT CLASSIFIED: Primary | ICD-10-CM

## 2024-04-09 PROCEDURE — 96366 THER/PROPH/DIAG IV INF ADDON: CPT | Performed by: FAMILY MEDICINE

## 2024-04-09 PROCEDURE — 96365 THER/PROPH/DIAG IV INF INIT: CPT | Performed by: FAMILY MEDICINE

## 2024-04-09 RX ORDER — ALBUTEROL SULFATE 90 UG/1
1-2 AEROSOL, METERED RESPIRATORY (INHALATION)
Start: 2024-04-11

## 2024-04-09 RX ORDER — EPINEPHRINE 1 MG/ML
0.3 INJECTION, SOLUTION, CONCENTRATE INTRAVENOUS EVERY 5 MIN PRN
OUTPATIENT
Start: 2024-04-11

## 2024-04-09 RX ORDER — MEPERIDINE HYDROCHLORIDE 25 MG/ML
25 INJECTION INTRAMUSCULAR; INTRAVENOUS; SUBCUTANEOUS EVERY 30 MIN PRN
OUTPATIENT
Start: 2024-04-11

## 2024-04-09 RX ORDER — DIPHENHYDRAMINE HYDROCHLORIDE 50 MG/ML
50 INJECTION INTRAMUSCULAR; INTRAVENOUS
Start: 2024-04-11

## 2024-04-09 RX ORDER — ALBUTEROL SULFATE 0.83 MG/ML
2.5 SOLUTION RESPIRATORY (INHALATION)
OUTPATIENT
Start: 2024-04-11

## 2024-04-09 RX ORDER — METHYLPREDNISOLONE SODIUM SUCCINATE 125 MG/2ML
125 INJECTION, POWDER, LYOPHILIZED, FOR SOLUTION INTRAMUSCULAR; INTRAVENOUS
Start: 2024-04-11

## 2024-04-09 RX ADMIN — Medication 250 ML: at 13:17

## 2024-04-09 ASSESSMENT — PAIN SCALES - GENERAL: PAINLEVEL: NO PAIN (0)

## 2024-04-09 NOTE — PROGRESS NOTES
Infusion Nursing Note:  Radha Carl presents today for Venofer.    Patient seen by provider today: No   present during visit today: Not Applicable.    Note: Spoke with patient re last order for Venofer and review of MD result notes/documentation. Per patient she also interpreted that MD likely wanted more than just this infusion      Treatment Conditions:  Not Applicable.      Post Infusion Assessment:  Patient tolerated infusion without incident.  Site patent and intact, free from redness, edema or discomfort.  No evidence of extravasations.  Access discontinued per protocol.       Discharge Plan:   Patient discharged in stable condition accompanied by: self.  Departure Mode: Ambulatory.

## 2024-04-09 NOTE — TELEPHONE ENCOUNTER
"Patient is here for 300mg IV Venofer, number 3 of 3 of her prior plan/orders. I do see some documentation from you that you wanted \"a couple\" more venofer infusions, but we do not have any further orders at this time.    Insurance will typically cover 3 doses of 300mg in a set, so would suggest only ordering 2 more doses at this time to ensure coverage off most recent labs.    Would you like to add more doses? If so, how many and is weekly dosing ok to continue?   "

## 2024-04-11 NOTE — CONFIDENTIAL NOTE
"    Per infusion,  Patient is here for 300mg IV Venofer, number 3 of 3 of her prior plan/orders. I do see some documentation from you that you wanted \"a couple\" more venofer infusions, but we do not have any further orders at this time.     Insurance will typically cover 3 doses of 300mg in a set, so would suggest only ordering 2 more doses at this time to ensure coverage off most recent labs.     Would you like to add more doses? If so, how many and is weekly dosing ok to continue?         Please advise.   "

## 2024-04-12 ENCOUNTER — HOSPITAL ENCOUNTER (OUTPATIENT)
Dept: ULTRASOUND IMAGING | Facility: HOSPITAL | Age: 34
Discharge: HOME | End: 2024-04-12
Attending: FAMILY MEDICINE | Admitting: FAMILY MEDICINE
Payer: COMMERCIAL

## 2024-04-12 DIAGNOSIS — R74.8 ELEVATED LIVER ENZYMES: ICD-10-CM

## 2024-04-12 PROCEDURE — 76705 ECHO EXAM OF ABDOMEN: CPT

## 2024-04-12 NOTE — TELEPHONE ENCOUNTER
Response from MD, and then her nurse, alerting that Dr Jones wants 3 more doses. Plan updated. Note to PAC asking they call patient to schedule. Call to pharmacy and they do have drug available if patient added next week.

## 2024-05-02 DIAGNOSIS — G35 MULTIPLE SCLEROSIS (H): Primary | ICD-10-CM

## 2024-05-02 RX ORDER — OFATUMUMAB 20 MG/.4ML
INJECTION, SOLUTION SUBCUTANEOUS
Qty: 0.4 ML | Refills: 12 | Status: SHIPPED | OUTPATIENT
Start: 2024-05-02

## 2024-05-02 NOTE — TELEPHONE ENCOUNTER
Patient requesting refill of their Kesimpta; Patient was last seen in Feb 2024 and has follow up appointment in Aug 2024 with Dr Gracia. Pended rx to Dr Gracia for signature and will send electronically to the pharmacy once signed.    Martha Ty RN

## 2024-05-03 ENCOUNTER — OFFICE VISIT (OUTPATIENT)
Dept: FAMILY MEDICINE | Facility: OTHER | Age: 34
End: 2024-05-03
Attending: FAMILY MEDICINE
Payer: COMMERCIAL

## 2024-05-03 VITALS
TEMPERATURE: 97.7 F | DIASTOLIC BLOOD PRESSURE: 80 MMHG | WEIGHT: 175 LBS | BODY MASS INDEX: 31.01 KG/M2 | SYSTOLIC BLOOD PRESSURE: 130 MMHG | HEART RATE: 93 BPM | HEIGHT: 63 IN | OXYGEN SATURATION: 99 % | RESPIRATION RATE: 16 BRPM

## 2024-05-03 DIAGNOSIS — G35 MS (MULTIPLE SCLEROSIS) (H): ICD-10-CM

## 2024-05-03 DIAGNOSIS — N39.0 ACUTE UTI (URINARY TRACT INFECTION): Primary | ICD-10-CM

## 2024-05-03 DIAGNOSIS — R30.0 DYSURIA: ICD-10-CM

## 2024-05-03 DIAGNOSIS — E11.65 TYPE 2 DIABETES MELLITUS WITH HYPERGLYCEMIA, WITHOUT LONG-TERM CURRENT USE OF INSULIN (H): ICD-10-CM

## 2024-05-03 LAB
ALBUMIN UR-MCNC: NEGATIVE MG/DL
APPEARANCE UR: ABNORMAL
BACTERIA #/AREA URNS HPF: ABNORMAL /HPF
BILIRUB UR QL STRIP: NEGATIVE
COLOR UR AUTO: ABNORMAL
GLUCOSE UR STRIP-MCNC: NEGATIVE MG/DL
HGB UR QL STRIP: ABNORMAL
KETONES UR STRIP-MCNC: NEGATIVE MG/DL
LEUKOCYTE ESTERASE UR QL STRIP: ABNORMAL
MUCOUS THREADS #/AREA URNS LPF: PRESENT /LPF
NITRATE UR QL: POSITIVE
PH UR STRIP: 5.5 [PH] (ref 4.7–8)
RBC URINE: 9 /HPF
SP GR UR STRIP: 1.01 (ref 1–1.03)
SQUAMOUS EPITHELIAL: 19 /HPF
UROBILINOGEN UR STRIP-MCNC: NORMAL MG/DL
WBC URINE: 59 /HPF

## 2024-05-03 PROCEDURE — 81001 URINALYSIS AUTO W/SCOPE: CPT | Performed by: FAMILY MEDICINE

## 2024-05-03 PROCEDURE — 99213 OFFICE O/P EST LOW 20 MIN: CPT | Performed by: FAMILY MEDICINE

## 2024-05-03 RX ORDER — NITROFURANTOIN 25; 75 MG/1; MG/1
100 CAPSULE ORAL 2 TIMES DAILY
Qty: 14 CAPSULE | Refills: 0 | Status: SHIPPED | OUTPATIENT
Start: 2024-05-03 | End: 2024-05-10

## 2024-05-03 ASSESSMENT — PAIN SCALES - GENERAL: PAINLEVEL: NO PAIN (0)

## 2024-05-03 NOTE — PROGRESS NOTES
"  Assessment & Plan     Acute UTI (urinary tract infection)    - nitroFURantoin macrocrystal-monohydrate (MACROBID) 100 MG capsule; Take 1 capsule (100 mg) by mouth 2 times daily for 7 days    Dysuria    - UA Macroscopic with reflex to Microscopic and Culture; Future  - UA Macroscopic with reflex to Microscopic and Culture    MS (multiple sclerosis) (H)  due  - Albumin Random Urine Quantitative with Creat Ratio    Type 2 diabetes mellitus with hyperglycemia, without long-term current use of insulin (H)    - Albumin Random Urine Quantitative with Creat Ratio    BMI  Estimated body mass index is 31 kg/m  as calculated from the following:    Height as of this encounter: 1.6 m (5' 3\").    Weight as of this encounter: 79.4 kg (175 lb).   Weight management plan: Discussed healthy diet and exercise guidelines    Patient was agreeable to this plan and had no further questions.  There are no Patient Instructions on file for this visit.    No follow-ups on file.    Maya Garcia is a 33 year old, presenting for the following health issues:  UTI        5/3/2024     1:17 PM   Additional Questions   Roomed by HUA Mcdermott   Accompanied by self`     HPI     Genitourinary - Female  Onset/Duration: 1 week  Description:   Painful urination (Dysuria): No           Frequency: No  Blood in urine (Hematuria): No  Delay in urine (Hesitency): YES  Intensity: mild  Progression of Symptoms:  worsening  Accompanying Signs & Symptoms:  Fever/chills: No  Flank pain: No  Nausea and vomiting: No  Vaginal symptoms: odor  Abdominal/Pelvic Pain: YES  History:   History of frequent UTI s: YES  History of kidney stones: No  Sexually Active: YES  Possibility of pregnancy: No  Precipitating or alleviating factors: None  Therapies tried and outcome: Increase fluid intake      Review of Systems  Constitutional, neuro, ENT, endocrine, pulmonary, cardiac, gastrointestinal, genitourinary, musculoskeletal, integument and psychiatric systems are " "negative, except as otherwise noted.      Objective    /80 (BP Location: Left arm, Patient Position: Sitting, Cuff Size: Adult Large)   Pulse 93   Temp 97.7  F (36.5  C) (Tympanic)   Resp 16   Ht 1.6 m (5' 3\")   Wt 79.4 kg (175 lb)   LMP 03/16/2024 (Exact Date)   SpO2 99%   BMI 31.00 kg/m    Body mass index is 31 kg/m .  Physical Exam   GENERAL: alert and no distress  RESP: lungs clear to auscultation - no rales, rhonchi or wheezes  CV: regular rate and rhythm, normal S1 S2, no S3 or S4, no murmur, click or rub, no peripheral edema  MS: no gross musculoskeletal defects noted, no edema  MS: no CVA tenderness  PSYCH: mentation appears normal, affect normal/bright    Results for orders placed or performed in visit on 05/03/24   UA Macroscopic with reflex to Microscopic and Culture     Status: Abnormal    Specimen: Urine, Midstream   Result Value Ref Range    Color Urine Light Yellow Colorless, Straw, Light Yellow, Yellow    Appearance Urine Slightly Cloudy (A) Clear    Glucose Urine Negative Negative mg/dL    Bilirubin Urine Negative Negative    Ketones Urine Negative Negative mg/dL    Specific Gravity Urine 1.012 1.003 - 1.035    Blood Urine Moderate (A) Negative    pH Urine 5.5 4.7 - 8.0    Protein Albumin Urine Negative Negative mg/dL    Urobilinogen Urine Normal Normal, 2.0 mg/dL    Nitrite Urine Positive (A) Negative    Leukocyte Esterase Urine Large (A) Negative    Bacteria Urine Many (A) None Seen /HPF    Mucus Urine Present (A) None Seen /LPF    RBC Urine 9 (H) <=2 /HPF    WBC Urine 59 (H) <=5 /HPF    Squamous Epithelials Urine 19 (H) <=1 /HPF           Signed Electronically by: Muriel Jones MD    "

## 2024-05-07 ENCOUNTER — TRANSCRIBE ORDERS (OUTPATIENT)
Dept: CARE COORDINATION | Facility: OTHER | Age: 34
End: 2024-05-07

## 2024-05-07 ENCOUNTER — LAB (OUTPATIENT)
Dept: LAB | Facility: OTHER | Age: 34
End: 2024-05-07
Payer: COMMERCIAL

## 2024-05-07 DIAGNOSIS — R30.0 DYSURIA: Primary | ICD-10-CM

## 2024-05-07 DIAGNOSIS — R30.0 DYSURIA: ICD-10-CM

## 2024-05-07 LAB
ALBUMIN UR-MCNC: NEGATIVE MG/DL
APPEARANCE UR: CLEAR
BACTERIA #/AREA URNS HPF: ABNORMAL /HPF
BILIRUB UR QL STRIP: NEGATIVE
COLOR UR AUTO: ABNORMAL
CREAT UR-MCNC: 25.1 MG/DL
GLUCOSE UR STRIP-MCNC: NEGATIVE MG/DL
HGB UR QL STRIP: NEGATIVE
KETONES UR STRIP-MCNC: NEGATIVE MG/DL
LEUKOCYTE ESTERASE UR QL STRIP: NEGATIVE
MICROALBUMIN UR-MCNC: <12 MG/L
MICROALBUMIN/CREAT UR: NORMAL MG/G{CREAT}
NITRATE UR QL: NEGATIVE
PH UR STRIP: 5.5 [PH] (ref 4.7–8)
RBC URINE: <1 /HPF
SP GR UR STRIP: 1 (ref 1–1.03)
SQUAMOUS EPITHELIAL: 3 /HPF
UROBILINOGEN UR STRIP-MCNC: NORMAL MG/DL
WBC URINE: <1 /HPF

## 2024-05-07 PROCEDURE — 82570 ASSAY OF URINE CREATININE: CPT | Performed by: FAMILY MEDICINE

## 2024-05-07 PROCEDURE — 81001 URINALYSIS AUTO W/SCOPE: CPT

## 2024-05-07 PROCEDURE — 82043 UR ALBUMIN QUANTITATIVE: CPT | Performed by: FAMILY MEDICINE

## 2024-05-07 PROCEDURE — 87086 URINE CULTURE/COLONY COUNT: CPT

## 2024-05-08 DIAGNOSIS — E11.65 TYPE 2 DIABETES MELLITUS WITH HYPERGLYCEMIA, WITHOUT LONG-TERM CURRENT USE OF INSULIN (H): Primary | ICD-10-CM

## 2024-05-09 LAB — BACTERIA UR CULT: NO GROWTH

## 2024-06-03 DIAGNOSIS — R11.0 NAUSEA: ICD-10-CM

## 2024-06-03 RX ORDER — ONDANSETRON 4 MG/1
4 TABLET, ORALLY DISINTEGRATING ORAL EVERY 8 HOURS PRN
Qty: 30 TABLET | Refills: 1 | Status: SHIPPED | OUTPATIENT
Start: 2024-06-03 | End: 2024-09-20

## 2024-07-01 ENCOUNTER — OFFICE VISIT (OUTPATIENT)
Dept: FAMILY MEDICINE | Facility: OTHER | Age: 34
End: 2024-07-01
Attending: FAMILY MEDICINE
Payer: COMMERCIAL

## 2024-07-01 VITALS
TEMPERATURE: 97.7 F | HEART RATE: 85 BPM | WEIGHT: 162 LBS | SYSTOLIC BLOOD PRESSURE: 127 MMHG | DIASTOLIC BLOOD PRESSURE: 80 MMHG | RESPIRATION RATE: 17 BRPM | OXYGEN SATURATION: 100 % | HEIGHT: 63 IN | BODY MASS INDEX: 28.7 KG/M2

## 2024-07-01 DIAGNOSIS — K59.01 SLOW TRANSIT CONSTIPATION: ICD-10-CM

## 2024-07-01 DIAGNOSIS — K21.9 GASTROESOPHAGEAL REFLUX DISEASE WITHOUT ESOPHAGITIS: ICD-10-CM

## 2024-07-01 DIAGNOSIS — R74.8 ELEVATED LIVER ENZYMES: ICD-10-CM

## 2024-07-01 DIAGNOSIS — S43.102S AC SEPARATION, LEFT, SEQUELA: ICD-10-CM

## 2024-07-01 DIAGNOSIS — G35 MS (MULTIPLE SCLEROSIS) (H): ICD-10-CM

## 2024-07-01 DIAGNOSIS — E11.65 TYPE 2 DIABETES MELLITUS WITH HYPERGLYCEMIA, WITHOUT LONG-TERM CURRENT USE OF INSULIN (H): Primary | ICD-10-CM

## 2024-07-01 DIAGNOSIS — E78.5 HYPERLIPIDEMIA LDL GOAL <130: ICD-10-CM

## 2024-07-01 DIAGNOSIS — E55.9 HYPOVITAMINOSIS D: ICD-10-CM

## 2024-07-01 DIAGNOSIS — D22.9 ATYPICAL NEVI: ICD-10-CM

## 2024-07-01 DIAGNOSIS — I10 BENIGN ESSENTIAL HYPERTENSION: ICD-10-CM

## 2024-07-01 DIAGNOSIS — D62 ANEMIA DUE TO BLOOD LOSS, ACUTE: ICD-10-CM

## 2024-07-01 LAB
ERYTHROCYTE [DISTWIDTH] IN BLOOD BY AUTOMATED COUNT: 15.6 % (ref 10–15)
EST. AVERAGE GLUCOSE BLD GHB EST-MCNC: 111 MG/DL
FERRITIN SERPL-MCNC: 12 NG/ML (ref 6–175)
HBA1C MFR BLD: 5.5 %
HCT VFR BLD AUTO: 37.3 % (ref 35–47)
HGB BLD-MCNC: 11.5 G/DL (ref 11.7–15.7)
IRON BINDING CAPACITY (ROCHE): 363 UG/DL (ref 240–430)
IRON SATN MFR SERPL: 6 % (ref 15–46)
IRON SERPL-MCNC: 21 UG/DL (ref 37–145)
MCH RBC QN AUTO: 23 PG (ref 26.5–33)
MCHC RBC AUTO-ENTMCNC: 30.8 G/DL (ref 31.5–36.5)
MCV RBC AUTO: 75 FL (ref 78–100)
PLATELET # BLD AUTO: 805 10E3/UL (ref 150–450)
RBC # BLD AUTO: 5.01 10E6/UL (ref 3.8–5.2)
WBC # BLD AUTO: 13.1 10E3/UL (ref 4–11)

## 2024-07-01 PROCEDURE — 82728 ASSAY OF FERRITIN: CPT | Performed by: FAMILY MEDICINE

## 2024-07-01 PROCEDURE — 83540 ASSAY OF IRON: CPT | Performed by: FAMILY MEDICINE

## 2024-07-01 PROCEDURE — 99214 OFFICE O/P EST MOD 30 MIN: CPT | Mod: 25 | Performed by: FAMILY MEDICINE

## 2024-07-01 PROCEDURE — 85027 COMPLETE CBC AUTOMATED: CPT | Performed by: FAMILY MEDICINE

## 2024-07-01 PROCEDURE — 82306 VITAMIN D 25 HYDROXY: CPT | Performed by: FAMILY MEDICINE

## 2024-07-01 PROCEDURE — 83036 HEMOGLOBIN GLYCOSYLATED A1C: CPT | Performed by: FAMILY MEDICINE

## 2024-07-01 PROCEDURE — 36415 COLL VENOUS BLD VENIPUNCTURE: CPT | Performed by: FAMILY MEDICINE

## 2024-07-01 PROCEDURE — 88305 TISSUE EXAM BY PATHOLOGIST: CPT | Mod: 26 | Performed by: PATHOLOGY

## 2024-07-01 PROCEDURE — 83550 IRON BINDING TEST: CPT | Performed by: FAMILY MEDICINE

## 2024-07-01 PROCEDURE — 88305 TISSUE EXAM BY PATHOLOGIST: CPT | Mod: TC | Performed by: FAMILY MEDICINE

## 2024-07-01 RX ORDER — FAMOTIDINE 40 MG/1
40 TABLET, FILM COATED ORAL DAILY
Qty: 90 TABLET | Refills: 1 | Status: SHIPPED | OUTPATIENT
Start: 2024-07-01

## 2024-07-01 ASSESSMENT — ANXIETY QUESTIONNAIRES
7. FEELING AFRAID AS IF SOMETHING AWFUL MIGHT HAPPEN: NOT AT ALL
IF YOU CHECKED OFF ANY PROBLEMS ON THIS QUESTIONNAIRE, HOW DIFFICULT HAVE THESE PROBLEMS MADE IT FOR YOU TO DO YOUR WORK, TAKE CARE OF THINGS AT HOME, OR GET ALONG WITH OTHER PEOPLE: SOMEWHAT DIFFICULT
4. TROUBLE RELAXING: SEVERAL DAYS
2. NOT BEING ABLE TO STOP OR CONTROL WORRYING: NOT AT ALL
6. BECOMING EASILY ANNOYED OR IRRITABLE: SEVERAL DAYS
GAD7 TOTAL SCORE: 3
8. IF YOU CHECKED OFF ANY PROBLEMS, HOW DIFFICULT HAVE THESE MADE IT FOR YOU TO DO YOUR WORK, TAKE CARE OF THINGS AT HOME, OR GET ALONG WITH OTHER PEOPLE?: SOMEWHAT DIFFICULT
GAD7 TOTAL SCORE: 3
3. WORRYING TOO MUCH ABOUT DIFFERENT THINGS: NOT AT ALL
1. FEELING NERVOUS, ANXIOUS, OR ON EDGE: SEVERAL DAYS
5. BEING SO RESTLESS THAT IT IS HARD TO SIT STILL: NOT AT ALL
7. FEELING AFRAID AS IF SOMETHING AWFUL MIGHT HAPPEN: NOT AT ALL
GAD7 TOTAL SCORE: 3

## 2024-07-01 ASSESSMENT — PATIENT HEALTH QUESTIONNAIRE - PHQ9
SUM OF ALL RESPONSES TO PHQ QUESTIONS 1-9: 6
10. IF YOU CHECKED OFF ANY PROBLEMS, HOW DIFFICULT HAVE THESE PROBLEMS MADE IT FOR YOU TO DO YOUR WORK, TAKE CARE OF THINGS AT HOME, OR GET ALONG WITH OTHER PEOPLE: SOMEWHAT DIFFICULT
SUM OF ALL RESPONSES TO PHQ QUESTIONS 1-9: 6

## 2024-07-01 ASSESSMENT — PAIN SCALES - GENERAL: PAINLEVEL: EXTREME PAIN (9)

## 2024-07-01 NOTE — PROGRESS NOTES
The longitudinal plan of care for the diagnosis(es)/condition(s) as documented were addressed during this visit. Due to the added complexity in care, I will continue to support Radha in the subsequent management and with ongoing continuity of care.    Answers submitted by the patient for this visit:  Patient Health Questionnaire (Submitted on 7/1/2024)  If you checked off any problems, how difficult have these problems made it for you to do your work, take care of things at home, or get along with other people?: Somewhat difficult  PHQ9 TOTAL SCORE: 6  MARIBETH-7 (Submitted on 7/1/2024)  MARIBETH 7 TOTAL SCORE: 3

## 2024-07-01 NOTE — PROGRESS NOTES
Assessment & Plan     Type 2 diabetes mellitus with hyperglycemia, without long-term current use of insulin (H)  Doing really well, continue wegovy  - Hemoglobin A1c; Future  - Semaglutide-Weight Management (WEGOVY) 2.4 MG/0.75ML pen; Inject 2.4 mg Subcutaneous once a week  - Hemoglobin A1c    Hypovitaminosis D  pending  - Vitamin D Deficiency    MS (multiple sclerosis) (H)  Doing well    Hyperlipidemia LDL goal <130  stable    Elevated liver enzymes  Will monitor    Benign essential hypertension  stable    Anemia due to blood loss, acute  Improving!  - CBC with platelets; Future  - Iron and iron binding capacity; Future  - Ferritin; Future  - Ferritin  - Iron and iron binding capacity  - CBC with platelets    AC separation, left, sequela  No better after therapy  - MR Shoulder Left w/o Contrast; Future    Slow transit constipation  Starting pavel welli, may need fleets or suppository    Gastroesophageal reflux disease without esophagitis  Start daily  - famotidine (PEPCID) 40 MG tablet; Take 1 tablet (40 mg) by mouth daily    Atypical nevi  See procedure note  - lidocaine 2%-EPINEPHrine 1:100,000 injection 1 mL  - Surgical Pathology Exam    Patient was agreeable to this plan and had no further questions.  There are no Patient Instructions on file for this visit.    No follow-ups on file.    Maya Garcia is a 33 year old, presenting for the following health issues:  Diabetes, Hypertension, Lipids, and RECHECK        7/1/2024     1:01 PM   Additional Questions   Roomed by Susi Carter LPN, CCP, MHP   Accompanied by self     HPI     **Lost 24lbs   **Right calf mole patient would like checked      Diabetes Follow-up    How often are you checking your blood sugar? Not at all  What concerns do you have today about your diabetes? None   Do you have any of these symptoms? (Select all that apply)  No numbness or tingling in feet.  No redness, sores or blisters on feet.  No complaints of excessive thirst.  No  "reports of blurry vision.  No significant changes to weight.          Hyperlipidemia Follow-Up    Are you regularly taking any medication or supplement to lower your cholesterol?   No  Are you having muscle aches or other side effects that you think could be caused by your cholesterol lowering medication?  No    Hypertension Follow-up    Do you check your blood pressure regularly outside of the clinic? No   Are you following a low salt diet? No  Are your blood pressures ever more than 140 on the top number (systolic) OR more   than 90 on the bottom number (diastolic), for example 140/90? No    BP Readings from Last 2 Encounters:   07/01/24 127/80   05/03/24 130/80     Hemoglobin A1C (%)   Date Value   03/27/2024 7.1 (H)   12/21/2023 10.1 (H)   09/11/2020 6.4 (H)   06/12/2019 5.9 (H)     LDL Cholesterol Calculated (mg/dL)   Date Value   12/21/2023 147 (H)   12/15/2022 154 (H)   09/11/2020 134 (H)   06/12/2019 134 (H)       Answers submitted by the patient for this visit:  Patient Health Questionnaire (Submitted on 7/1/2024)  If you checked off any problems, how difficult have these problems made it for you to do your work, take care of things at home, or get along with other people?: Somewhat difficult  PHQ9 TOTAL SCORE: 6  MARIBETH-7 (Submitted on 7/1/2024)  MARIBETH 7 TOTAL SCORE: 3    Review of Systems  Constitutional, neuro, ENT, endocrine, pulmonary, cardiac, gastrointestinal, genitourinary, musculoskeletal, integument and psychiatric systems are negative, except as otherwise noted.      Objective    /80 (BP Location: Left arm, Patient Position: Sitting, Cuff Size: Adult Large)   Pulse 85   Temp 97.7  F (36.5  C) (Tympanic)   Resp 17   Ht 1.6 m (5' 3\")   Wt 73.5 kg (162 lb)   LMP 06/19/2024 (Exact Date)   SpO2 100%   BMI 28.70 kg/m    Body mass index is 28.7 kg/m .    Physical Exam   GENERAL: alert and no distress  EYES: Eyes grossly normal to inspection, PERRL and conjunctivae and sclerae normal  RESP: " lungs clear to auscultation - no rales, rhonchi or wheezes  CV: regular rate and rhythm, normal S1 S2, no S3 or S4, no murmur, click or rub, no peripheral edema  ABDOMEN: soft, nontender, no hepatosplenomegaly, no masses and bowel sounds normal  MS: no gross musculoskeletal defects noted, no edema  SKIN: papule - lower legs right mid-calf/shin  NEURO: Normal strength and tone, mentation intact and speech normal  PSYCH: mentation appears normal, affect normal/bright    No results found for any visits on 07/01/24.        Signed Electronically by: Muriel Jones MD      Procedure: Shave Biopsy x 1  The patient was informed of the nature of the procedure and was informed that the procedure will heal with a scar. She  was also informed of the risk of unattractive scarring, recurrence of the lesion, infection, bleeding and pain and that further treatment may be needed. She  consents to the procedure.  The area surrounding the lesion(s) was  prepped with alcohol. Local anesthesia (2% xylocaine with epinephrine) was injected. The lesion(s) was  biopsied with a blue blade. The specimen was sent to Pathology. silver nitratewas used for hemostasis. The wound(s) was  dressed with bacitracin ointment and a bandage. Wound care instructions were explained and a wound care sheet was given. Follow up with any wound problems, poor healing or infection.     The following lesions were removed/biopsied:  A) Location: right mid calf/shin Clinical Diagnosis: atypical nevi

## 2024-07-02 LAB — VIT D+METAB SERPL-MCNC: 34 NG/ML (ref 20–50)

## 2024-07-08 ENCOUNTER — HOSPITAL ENCOUNTER (OUTPATIENT)
Dept: ULTRASOUND IMAGING | Facility: HOSPITAL | Age: 34
Discharge: HOME | End: 2024-07-08
Attending: FAMILY MEDICINE | Admitting: FAMILY MEDICINE
Payer: COMMERCIAL

## 2024-07-08 DIAGNOSIS — Z09 FOLLOW-UP EXAM, 3-6 MONTHS SINCE PREVIOUS EXAM: ICD-10-CM

## 2024-07-08 PROCEDURE — 76642 ULTRASOUND BREAST LIMITED: CPT | Mod: LT

## 2024-07-17 DIAGNOSIS — B00.1 COLD SORE: ICD-10-CM

## 2024-07-17 DIAGNOSIS — E11.65 TYPE 2 DIABETES MELLITUS WITH HYPERGLYCEMIA, WITHOUT LONG-TERM CURRENT USE OF INSULIN (H): ICD-10-CM

## 2024-07-17 RX ORDER — VALACYCLOVIR HYDROCHLORIDE 500 MG/1
TABLET, FILM COATED ORAL
Qty: 30 TABLET | Refills: 5 | Status: SHIPPED | OUTPATIENT
Start: 2024-07-17

## 2024-07-23 ENCOUNTER — HOSPITAL ENCOUNTER (OUTPATIENT)
Dept: MRI IMAGING | Facility: HOSPITAL | Age: 34
Discharge: HOME | End: 2024-07-23
Attending: FAMILY MEDICINE | Admitting: FAMILY MEDICINE
Payer: COMMERCIAL

## 2024-07-23 DIAGNOSIS — S43.102S AC SEPARATION, LEFT, SEQUELA: ICD-10-CM

## 2024-07-23 PROCEDURE — 73221 MRI JOINT UPR EXTREM W/O DYE: CPT | Mod: LT

## 2024-07-29 ENCOUNTER — TELEPHONE (OUTPATIENT)
Dept: FAMILY MEDICINE | Facility: OTHER | Age: 34
End: 2024-07-29

## 2024-07-29 NOTE — TELEPHONE ENCOUNTER
Received a PA request from [employer] for Semaglutide-Weight Management (WEGOVY) 2.4 MG/0.75ML pen. Submitted on CMM. Waiting for a response.

## 2024-07-30 NOTE — TELEPHONE ENCOUNTER
Received an APPROVAL from [employer] for Semaglutide-Weight Management (WEGOVY) 2.4 MG/0.75ML pen. Effective dates 7/30/24-7/29/25. Auth # [id number]

## 2024-09-20 DIAGNOSIS — R11.0 NAUSEA: ICD-10-CM

## 2024-09-20 RX ORDER — ONDANSETRON 4 MG/1
4 TABLET, ORALLY DISINTEGRATING ORAL EVERY 8 HOURS PRN
Qty: 30 TABLET | Refills: 11 | Status: SHIPPED | OUTPATIENT
Start: 2024-09-20

## 2024-10-21 ENCOUNTER — OFFICE VISIT (OUTPATIENT)
Dept: FAMILY MEDICINE | Facility: OTHER | Age: 34
End: 2024-10-21
Attending: FAMILY MEDICINE
Payer: COMMERCIAL

## 2024-10-21 ENCOUNTER — LAB (OUTPATIENT)
Dept: LAB | Facility: OTHER | Age: 34
End: 2024-10-21
Attending: FAMILY MEDICINE
Payer: COMMERCIAL

## 2024-10-21 VITALS
OXYGEN SATURATION: 99 % | DIASTOLIC BLOOD PRESSURE: 101 MMHG | BODY MASS INDEX: 27.18 KG/M2 | TEMPERATURE: 97.7 F | HEART RATE: 84 BPM | WEIGHT: 153.4 LBS | HEIGHT: 63 IN | SYSTOLIC BLOOD PRESSURE: 148 MMHG

## 2024-10-21 DIAGNOSIS — M25.551 BILATERAL HIP PAIN: ICD-10-CM

## 2024-10-21 DIAGNOSIS — R30.0 DYSURIA: ICD-10-CM

## 2024-10-21 DIAGNOSIS — R74.8 ELEVATED LIVER ENZYMES: ICD-10-CM

## 2024-10-21 DIAGNOSIS — E61.1 IRON DEFICIENCY: ICD-10-CM

## 2024-10-21 DIAGNOSIS — E55.9 HYPOVITAMINOSIS D: ICD-10-CM

## 2024-10-21 DIAGNOSIS — E11.65 TYPE 2 DIABETES MELLITUS WITH HYPERGLYCEMIA, WITHOUT LONG-TERM CURRENT USE OF INSULIN (H): Primary | ICD-10-CM

## 2024-10-21 DIAGNOSIS — M25.552 BILATERAL HIP PAIN: ICD-10-CM

## 2024-10-21 DIAGNOSIS — D62 ANEMIA DUE TO BLOOD LOSS, ACUTE: ICD-10-CM

## 2024-10-21 DIAGNOSIS — K21.9 GASTROESOPHAGEAL REFLUX DISEASE WITHOUT ESOPHAGITIS: ICD-10-CM

## 2024-10-21 DIAGNOSIS — G35 MS (MULTIPLE SCLEROSIS) (H): ICD-10-CM

## 2024-10-21 DIAGNOSIS — E11.65 TYPE 2 DIABETES MELLITUS WITH HYPERGLYCEMIA, WITHOUT LONG-TERM CURRENT USE OF INSULIN (H): ICD-10-CM

## 2024-10-21 DIAGNOSIS — F90.0 ATTENTION DEFICIT HYPERACTIVITY DISORDER (ADHD), PREDOMINANTLY INATTENTIVE TYPE: ICD-10-CM

## 2024-10-21 DIAGNOSIS — E53.8 VITAMIN B12 DEFICIENCY (NON ANEMIC): ICD-10-CM

## 2024-10-21 DIAGNOSIS — K59.01 SLOW TRANSIT CONSTIPATION: ICD-10-CM

## 2024-10-21 LAB
ALBUMIN SERPL BCG-MCNC: 4.6 G/DL (ref 3.5–5.2)
ALBUMIN UR-MCNC: NEGATIVE MG/DL
ALP SERPL-CCNC: 66 U/L (ref 40–150)
ALT SERPL W P-5'-P-CCNC: 14 U/L (ref 0–50)
ANION GAP SERPL CALCULATED.3IONS-SCNC: 14 MMOL/L (ref 7–15)
APPEARANCE UR: CLEAR
AST SERPL W P-5'-P-CCNC: 14 U/L (ref 0–45)
BACTERIA #/AREA URNS HPF: ABNORMAL /HPF
BILIRUB SERPL-MCNC: 0.2 MG/DL
BILIRUB UR QL STRIP: NEGATIVE
BUN SERPL-MCNC: 9.8 MG/DL (ref 6–20)
CALCIUM SERPL-MCNC: 9.4 MG/DL (ref 8.8–10.4)
CHLORIDE SERPL-SCNC: 103 MMOL/L (ref 98–107)
COLOR UR AUTO: ABNORMAL
CREAT SERPL-MCNC: 0.61 MG/DL (ref 0.51–0.95)
EGFRCR SERPLBLD CKD-EPI 2021: >90 ML/MIN/1.73M2
ERYTHROCYTE [DISTWIDTH] IN BLOOD BY AUTOMATED COUNT: 16.3 % (ref 10–15)
EST. AVERAGE GLUCOSE BLD GHB EST-MCNC: 91 MG/DL
FERRITIN SERPL-MCNC: 6 NG/ML (ref 6–175)
GLUCOSE SERPL-MCNC: 95 MG/DL (ref 70–99)
GLUCOSE UR STRIP-MCNC: NEGATIVE MG/DL
HBA1C MFR BLD: 4.8 %
HCO3 SERPL-SCNC: 21 MMOL/L (ref 22–29)
HCT VFR BLD AUTO: 31.7 % (ref 35–47)
HGB BLD-MCNC: 9.4 G/DL (ref 11.7–15.7)
HGB UR QL STRIP: NEGATIVE
IRON BINDING CAPACITY (ROCHE): 368 UG/DL (ref 240–430)
IRON SATN MFR SERPL: 4 % (ref 15–46)
IRON SERPL-MCNC: 15 UG/DL (ref 37–145)
KETONES UR STRIP-MCNC: NEGATIVE MG/DL
LEUKOCYTE ESTERASE UR QL STRIP: ABNORMAL
MCH RBC QN AUTO: 20.3 PG (ref 26.5–33)
MCHC RBC AUTO-ENTMCNC: 29.7 G/DL (ref 31.5–36.5)
MCV RBC AUTO: 69 FL (ref 78–100)
MUCOUS THREADS #/AREA URNS LPF: PRESENT /LPF
NITRATE UR QL: NEGATIVE
PH UR STRIP: 5 [PH] (ref 4.7–8)
PLATELET # BLD AUTO: 696 10E3/UL (ref 150–450)
POTASSIUM SERPL-SCNC: 4.1 MMOL/L (ref 3.4–5.3)
PROT SERPL-MCNC: 7.8 G/DL (ref 6.4–8.3)
RBC # BLD AUTO: 4.63 10E6/UL (ref 3.8–5.2)
RBC URINE: 1 /HPF
SODIUM SERPL-SCNC: 138 MMOL/L (ref 135–145)
SP GR UR STRIP: 1.01 (ref 1–1.03)
SQUAMOUS EPITHELIAL: 3 /HPF
UROBILINOGEN UR STRIP-MCNC: NORMAL MG/DL
VIT B12 SERPL-MCNC: 499 PG/ML (ref 232–1245)
VIT D+METAB SERPL-MCNC: 34 NG/ML (ref 20–50)
WBC # BLD AUTO: 11.9 10E3/UL (ref 4–11)
WBC URINE: 8 /HPF

## 2024-10-21 PROCEDURE — G2211 COMPLEX E/M VISIT ADD ON: HCPCS | Performed by: FAMILY MEDICINE

## 2024-10-21 PROCEDURE — 87088 URINE BACTERIA CULTURE: CPT

## 2024-10-21 PROCEDURE — 87186 SC STD MICRODIL/AGAR DIL: CPT | Mod: XU

## 2024-10-21 PROCEDURE — 82306 VITAMIN D 25 HYDROXY: CPT

## 2024-10-21 PROCEDURE — 85027 COMPLETE CBC AUTOMATED: CPT

## 2024-10-21 PROCEDURE — 81001 URINALYSIS AUTO W/SCOPE: CPT

## 2024-10-21 PROCEDURE — 83550 IRON BINDING TEST: CPT

## 2024-10-21 PROCEDURE — 82728 ASSAY OF FERRITIN: CPT

## 2024-10-21 PROCEDURE — 99215 OFFICE O/P EST HI 40 MIN: CPT | Performed by: FAMILY MEDICINE

## 2024-10-21 PROCEDURE — 82607 VITAMIN B-12: CPT

## 2024-10-21 PROCEDURE — 83036 HEMOGLOBIN GLYCOSYLATED A1C: CPT

## 2024-10-21 PROCEDURE — 80053 COMPREHEN METABOLIC PANEL: CPT

## 2024-10-21 PROCEDURE — 87184 SC STD DISK METHOD PER PLATE: CPT

## 2024-10-21 PROCEDURE — 87086 URINE CULTURE/COLONY COUNT: CPT

## 2024-10-21 PROCEDURE — 83540 ASSAY OF IRON: CPT

## 2024-10-21 PROCEDURE — 36415 COLL VENOUS BLD VENIPUNCTURE: CPT

## 2024-10-21 RX ORDER — LISDEXAMFETAMINE DIMESYLATE 10 MG/1
10 CAPSULE ORAL DAILY
Qty: 30 CAPSULE | Refills: 0 | Status: SHIPPED | OUTPATIENT
Start: 2024-10-21 | End: 2024-11-20

## 2024-10-21 ASSESSMENT — ANXIETY QUESTIONNAIRES
7. FEELING AFRAID AS IF SOMETHING AWFUL MIGHT HAPPEN: NOT AT ALL
GAD7 TOTAL SCORE: 5
3. WORRYING TOO MUCH ABOUT DIFFERENT THINGS: SEVERAL DAYS
5. BEING SO RESTLESS THAT IT IS HARD TO SIT STILL: SEVERAL DAYS
1. FEELING NERVOUS, ANXIOUS, OR ON EDGE: SEVERAL DAYS
2. NOT BEING ABLE TO STOP OR CONTROL WORRYING: NOT AT ALL
4. TROUBLE RELAXING: SEVERAL DAYS
IF YOU CHECKED OFF ANY PROBLEMS ON THIS QUESTIONNAIRE, HOW DIFFICULT HAVE THESE PROBLEMS MADE IT FOR YOU TO DO YOUR WORK, TAKE CARE OF THINGS AT HOME, OR GET ALONG WITH OTHER PEOPLE: SOMEWHAT DIFFICULT
GAD7 TOTAL SCORE: 5
GAD7 TOTAL SCORE: 5
7. FEELING AFRAID AS IF SOMETHING AWFUL MIGHT HAPPEN: NOT AT ALL
8. IF YOU CHECKED OFF ANY PROBLEMS, HOW DIFFICULT HAVE THESE MADE IT FOR YOU TO DO YOUR WORK, TAKE CARE OF THINGS AT HOME, OR GET ALONG WITH OTHER PEOPLE?: SOMEWHAT DIFFICULT
6. BECOMING EASILY ANNOYED OR IRRITABLE: SEVERAL DAYS

## 2024-10-21 ASSESSMENT — PATIENT HEALTH QUESTIONNAIRE - PHQ9
SUM OF ALL RESPONSES TO PHQ QUESTIONS 1-9: 8
10. IF YOU CHECKED OFF ANY PROBLEMS, HOW DIFFICULT HAVE THESE PROBLEMS MADE IT FOR YOU TO DO YOUR WORK, TAKE CARE OF THINGS AT HOME, OR GET ALONG WITH OTHER PEOPLE: SOMEWHAT DIFFICULT
SUM OF ALL RESPONSES TO PHQ QUESTIONS 1-9: 8

## 2024-10-21 ASSESSMENT — PAIN SCALES - GENERAL: PAINLEVEL: NO PAIN (0)

## 2024-10-21 NOTE — PROGRESS NOTES
"  Assessment & Plan     Type 2 diabetes mellitus with hyperglycemia, without long-term current use of insulin (H)  Follow-up 3-4 mos  - Hemoglobin A1c; Future    Elevated liver enzymes  Significant improvement  - Comprehensive metabolic panel; Future    MS (multiple sclerosis) (H)  Due for shot this week, very tired today    Vitamin B12 deficiency (non anemic)  Labs pending  - Vitamin B12; Future    Hypovitaminosis D  Labs pending  - Vitamin D Deficiency; Future    Iron deficiency  Low, needs to start venofer x 3 for IV iron  - Iron and iron binding capacity; Future  - Ferritin; Future    Slow transit constipation  Balancing what will work for her    Anemia due to blood loss, acute  Hgb is lower   - CBC with platelets; Future  - Iron and iron binding capacity; Future  - Ferritin; Future    Gastroesophageal reflux disease without esophagitis  struggling    Dysuria  - UA Macroscopic with reflex to Microscopic and Culture; Future    Bilateral hip pain  Will start with PT, may need ortho  - Physical Therapy  Referral; Future    Attention deficit hyperactivity disorder (ADHD), predominantly inattentive type  Would like to restart vyvanse, I do think that she will be able to concentrate a lot better with a normal hgb but ok for slight vyvanse dose in meantime and check liver enzymes in 2 weeks  - lisdexamfetamine (VYVANSE) 10 MG capsule; Take 1 capsule (10 mg) by mouth daily.          BMI  Estimated body mass index is 27.17 kg/m  as calculated from the following:    Height as of this encounter: 1.6 m (5' 3\").    Weight as of this encounter: 69.6 kg (153 lb 6.4 oz).   Weight management plan: Discussed healthy diet and exercise guidelines    Patient was agreeable to this plan and had no further questions.  There are no Patient Instructions on file for this visit.    41 minutes spent by me on the date of the encounter doing chart review, history and exam, documentation and further activities per the note    No " follow-ups on file.    Maya Garcia is a 34 year old, presenting for the following health issues:  No chief complaint on file.        10/21/2024    11:03 AM   Additional Questions   Roomed by Sue RODRIGUEZ   Accompanied by self     History of Present Illness       Diabetes:   She presents for follow up of diabetes.    She is not checking blood glucose.         She has no concerns regarding her diabetes at this time.   She is not experiencing numbness or burning in feet, excessive thirst, blurry vision, weight changes or redness, sores or blisters on feet.           Hypertension: She presents for follow up of hypertension.  She does not check blood pressure  regularly outside of the clinic. Outpatient blood pressures have not been over 140/90. She follows a low salt diet.     She eats 2-3 servings of fruits and vegetables daily.She consumes 1 sweetened beverage(s) daily.She exercises with enough effort to increase her heart rate 30 to 60 minutes per day.  She exercises with enough effort to increase her heart rate 4 days per week.   She is taking medications regularly.       Diabetes Follow-up    How often are you checking your blood sugar? Not at all  What concerns do you have today about your diabetes? None   Do you have any of these symptoms? (Select all that apply)  No numbness or tingling in feet.  No redness, sores or blisters on feet.  No complaints of excessive thirst.  No reports of blurry vision.  No significant changes to weight.      BP Readings from Last 2 Encounters:   10/21/24 (!) 148/101   07/01/24 127/80     Hemoglobin A1C (%)   Date Value   07/01/2024 5.5   03/27/2024 7.1 (H)   09/11/2020 6.4 (H)   06/12/2019 5.9 (H)     LDL Cholesterol Calculated (mg/dL)   Date Value   12/21/2023 147 (H)   12/15/2022 154 (H)   09/11/2020 134 (H)   06/12/2019 134 (H)         Medication Followup of Wegovy   Taking Medication as prescribed: yes  Side Effects:  not having an appetite.   Medication Helping  "Symptoms:  yes      Review of Systems  Constitutional, HEENT, cardiovascular, pulmonary, GI, , musculoskeletal, neuro, skin, endocrine and psych systems are negative, except as otherwise noted.      Objective    BP (!) 148/101 (BP Location: Right arm, Patient Position: Sitting, Cuff Size: Adult Regular)   Pulse 84   Temp 97.7  F (36.5  C) (Tympanic)   Ht 1.6 m (5' 3\")   Wt 69.6 kg (153 lb 6.4 oz)   LMP 10/19/2024 (Exact Date)   SpO2 99%   BMI 27.17 kg/m    Body mass index is 27.17 kg/m .  Physical Exam   GENERAL: alert and no distress  RESP: lungs clear to auscultation - no rales, rhonchi or wheezes  CV: regular rate and rhythm, normal S1 S2, no S3 or S4, no murmur, click or rub, no peripheral edema  ABDOMEN: soft, nontender, no hepatosplenomegaly, no masses and bowel sounds normal  MS: no gross musculoskeletal defects noted, no edema  PSYCH: mentation appears normal, affect normal/bright    Results for orders placed or performed in visit on 10/21/24   Hemoglobin A1c     Status: Normal   Result Value Ref Range    Estimated Average Glucose 91 <117 mg/dL    Hemoglobin A1C 4.8 <5.7 %   Comprehensive metabolic panel     Status: Abnormal   Result Value Ref Range    Sodium 138 135 - 145 mmol/L    Potassium 4.1 3.4 - 5.3 mmol/L    Carbon Dioxide (CO2) 21 (L) 22 - 29 mmol/L    Anion Gap 14 7 - 15 mmol/L    Urea Nitrogen 9.8 6.0 - 20.0 mg/dL    Creatinine 0.61 0.51 - 0.95 mg/dL    GFR Estimate >90 >60 mL/min/1.73m2    Calcium 9.4 8.8 - 10.4 mg/dL    Chloride 103 98 - 107 mmol/L    Glucose 95 70 - 99 mg/dL    Alkaline Phosphatase 66 40 - 150 U/L    AST 14 0 - 45 U/L    ALT 14 0 - 50 U/L    Protein Total 7.8 6.4 - 8.3 g/dL    Albumin 4.6 3.5 - 5.2 g/dL    Bilirubin Total 0.2 <=1.2 mg/dL   CBC with platelets     Status: Abnormal   Result Value Ref Range    WBC Count 11.9 (H) 4.0 - 11.0 10e3/uL    RBC Count 4.63 3.80 - 5.20 10e6/uL    Hemoglobin 9.4 (L) 11.7 - 15.7 g/dL    Hematocrit 31.7 (L) 35.0 - 47.0 %    MCV 69 (L) " 78 - 100 fL    MCH 20.3 (L) 26.5 - 33.0 pg    MCHC 29.7 (L) 31.5 - 36.5 g/dL    RDW 16.3 (H) 10.0 - 15.0 %    Platelet Count 696 (H) 150 - 450 10e3/uL   Iron and iron binding capacity     Status: Abnormal   Result Value Ref Range    Iron 15 (L) 37 - 145 ug/dL    Iron Binding Capacity 368 240 - 430 ug/dL    Iron Sat Index 4 (L) 15 - 46 %   Ferritin     Status: Normal   Result Value Ref Range    Ferritin 6 6 - 175 ng/mL   UA Macroscopic with reflex to Microscopic and Culture     Status: Abnormal    Specimen: Urine, Clean Catch   Result Value Ref Range    Color Urine Straw Colorless, Straw, Light Yellow, Yellow    Appearance Urine Clear Clear    Glucose Urine Negative Negative mg/dL    Bilirubin Urine Negative Negative    Ketones Urine Negative Negative mg/dL    Specific Gravity Urine 1.006 1.003 - 1.035    Blood Urine Negative Negative    pH Urine 5.0 4.7 - 8.0    Protein Albumin Urine Negative Negative mg/dL    Urobilinogen Urine Normal Normal, 2.0 mg/dL    Nitrite Urine Negative Negative    Leukocyte Esterase Urine Moderate (A) Negative    Bacteria Urine Few (A) None Seen /HPF    Mucus Urine Present (A) None Seen /LPF    RBC Urine 1 <=2 /HPF    WBC Urine 8 (H) <=5 /HPF    Squamous Epithelials Urine 3 (H) <=1 /HPF    Narrative    Urine Culture ordered based on laboratory criteria           Signed Electronically by: Muriel Jones MD        The longitudinal plan of care for the diagnosis(es)/condition(s) as documented were addressed during this visit. Due to the added complexity in care, I will continue to support Radha in the subsequent management and with ongoing continuity of care.

## 2024-10-22 ENCOUNTER — TELEPHONE (OUTPATIENT)
Dept: INFUSION THERAPY | Facility: OTHER | Age: 34
End: 2024-10-22

## 2024-10-22 ENCOUNTER — TELEPHONE (OUTPATIENT)
Dept: FAMILY MEDICINE | Facility: OTHER | Age: 34
End: 2024-10-22

## 2024-10-22 NOTE — TELEPHONE ENCOUNTER
Set up with chemo infusion nurse    _________________________________  Message  Received: Yesterday  Muriel Jones MD Cooley, Lisa, RN  Can we get Mal set up for venofer 300mg weekly x 3? Thx!

## 2024-10-22 NOTE — PROGRESS NOTES
Call received from Lana DOMINIQUE RN with Dr Jones patient to receive Venofer 300 mg IV weekly x3. No pretreats. Venofer therapy plan placed and sent to Dr Jones to sign.

## 2024-10-23 DIAGNOSIS — N30.00 ACUTE CYSTITIS WITHOUT HEMATURIA: Primary | ICD-10-CM

## 2024-10-23 RX ORDER — CEPHALEXIN 500 MG/1
500 CAPSULE ORAL 3 TIMES DAILY
Qty: 21 CAPSULE | Refills: 0 | Status: SHIPPED | OUTPATIENT
Start: 2024-10-23 | End: 2024-10-24

## 2024-10-24 DIAGNOSIS — N30.00 ACUTE CYSTITIS WITHOUT HEMATURIA: Primary | ICD-10-CM

## 2024-10-24 RX ORDER — DOXYCYCLINE HYCLATE 100 MG
100 TABLET ORAL 2 TIMES DAILY
Qty: 14 TABLET | Refills: 0 | Status: SHIPPED | OUTPATIENT
Start: 2024-10-24

## 2024-10-25 DIAGNOSIS — D64.89 ANEMIA DUE TO OTHER CAUSE, NOT CLASSIFIED: ICD-10-CM

## 2024-10-25 DIAGNOSIS — E61.1 IRON DEFICIENCY: Primary | ICD-10-CM

## 2024-10-25 LAB — BACTERIA UR CULT: ABNORMAL

## 2024-10-25 RX ORDER — MEPERIDINE HYDROCHLORIDE 25 MG/ML
25 INJECTION INTRAMUSCULAR; INTRAVENOUS; SUBCUTANEOUS EVERY 30 MIN PRN
OUTPATIENT
Start: 2024-10-25

## 2024-10-25 RX ORDER — HEPARIN SODIUM (PORCINE) LOCK FLUSH IV SOLN 100 UNIT/ML 100 UNIT/ML
5 SOLUTION INTRAVENOUS
OUTPATIENT
Start: 2024-10-25

## 2024-10-25 RX ORDER — ALBUTEROL SULFATE 0.83 MG/ML
2.5 SOLUTION RESPIRATORY (INHALATION)
OUTPATIENT
Start: 2024-10-25

## 2024-10-25 RX ORDER — DIPHENHYDRAMINE HYDROCHLORIDE 50 MG/ML
50 INJECTION INTRAMUSCULAR; INTRAVENOUS
Start: 2024-10-25

## 2024-10-25 RX ORDER — EPINEPHRINE 1 MG/ML
0.3 INJECTION, SOLUTION INTRAMUSCULAR; SUBCUTANEOUS EVERY 5 MIN PRN
OUTPATIENT
Start: 2024-10-25

## 2024-10-25 RX ORDER — HEPARIN SODIUM,PORCINE 10 UNIT/ML
5-20 VIAL (ML) INTRAVENOUS DAILY PRN
OUTPATIENT
Start: 2024-10-25

## 2024-10-25 RX ORDER — ALBUTEROL SULFATE 90 UG/1
1-2 INHALANT RESPIRATORY (INHALATION)
Start: 2024-10-25

## 2024-10-25 RX ORDER — METHYLPREDNISOLONE SODIUM SUCCINATE 125 MG/2ML
125 INJECTION INTRAMUSCULAR; INTRAVENOUS
Start: 2024-10-25

## 2024-11-08 ENCOUNTER — ALLIED HEALTH/NURSE VISIT (OUTPATIENT)
Dept: FAMILY MEDICINE | Facility: OTHER | Age: 34
End: 2024-11-08
Payer: COMMERCIAL

## 2024-11-08 DIAGNOSIS — Z23 ENCOUNTER FOR IMMUNIZATION: Primary | ICD-10-CM

## 2024-11-08 PROCEDURE — 90471 IMMUNIZATION ADMIN: CPT

## 2024-11-08 PROCEDURE — 90656 IIV3 VACC NO PRSV 0.5 ML IM: CPT

## 2024-11-20 DIAGNOSIS — E11.65 TYPE 2 DIABETES MELLITUS WITH HYPERGLYCEMIA, WITHOUT LONG-TERM CURRENT USE OF INSULIN (H): ICD-10-CM

## 2024-11-21 ENCOUNTER — THERAPY VISIT (OUTPATIENT)
Dept: PHYSICAL THERAPY | Facility: HOSPITAL | Age: 34
End: 2024-11-21
Attending: FAMILY MEDICINE
Payer: COMMERCIAL

## 2024-11-21 DIAGNOSIS — M25.551 BILATERAL HIP PAIN: ICD-10-CM

## 2024-11-21 DIAGNOSIS — M25.552 BILATERAL HIP PAIN: ICD-10-CM

## 2024-11-21 PROCEDURE — 97110 THERAPEUTIC EXERCISES: CPT | Mod: GP

## 2024-11-21 PROCEDURE — 97161 PT EVAL LOW COMPLEX 20 MIN: CPT | Mod: GP

## 2024-11-21 PROCEDURE — 97530 THERAPEUTIC ACTIVITIES: CPT | Mod: GP

## 2024-11-21 ASSESSMENT — ACTIVITIES OF DAILY LIVING (ADL)
GOING DOWN 1 FLIGHT OF STAIRS: MODERATE DIFFICULTY
SITTING_FOR_15_MINUTES: NO DIFFICULTY AT ALL
RECREATIONAL ACTIVITIES: SLIGHT DIFFICULTY
WALKING_UP_STEEP_HILLS: SLIGHT DIFFICULTY
STANDING_FOR_15_MINUTES: SLIGHT DIFFICULTY
STEPPING UP AND DOWN CURBS: SLIGHT DIFFICULTY
STANDING FOR 15 MINUTES: SLIGHT DIFFICULTY
STEPPING_UP_AND_DOWN_CURBS: SLIGHT DIFFICULTY
WALKING_15_MINUTES_OR_GREATER: MODERATE DIFFICULTY
WALKING_INITIALLY: MODERATE DIFFICULTY
ADL_TOTAL_ITEM_SCORE: 0
TWISTING/PIVOTING ON INVOLVED LEG: NO DIFFICULTY AT ALL
GETTING INTO AND OUT OF AN AVERAGE CAR: SLIGHT DIFFICULTY
DEEP_SQUATTING: SLIGHT DIFFICULTY
ADL_HIGHEST_POTENTIAL_SCORE: 68
WALKING_INITIALLY: MODERATE DIFFICULTY
ADL_COUNT: 17
HOW_WOULD_YOU_RATE_YOUR_CURRENT_LEVEL_OF_FUNCTION_DURING_YOUR_USUAL_ACTIVITIES_OF_DAILY_LIVING_FROM_0_TO_100_WITH_100_BEING_YOUR_LEVEL_OF_FUNCTION_PRIOR_TO_YOUR_HIP_PROBLEM_AND_0_BEING_THE_INABILITY_TO_PERFORM_ANY_OF_YOUR_USUAL_DAILY_ACTIVITIES?: 70
HEAVY_WORK: SLIGHT DIFFICULTY
ADL_SCORE: 0
ROLLING_OVER_IN_BED: NO DIFFICULTY AT ALL
HEAVY_WORK: SLIGHT DIFFICULTY
DEEP SQUATTING: SLIGHT DIFFICULTY
LIGHT_TO_MODERATE_WORK: MODERATE DIFFICULTY
WALKING_DOWN_STEEP_HILLS: SLIGHT DIFFICULTY
GOING_UP_1_FLIGHT_OF_STAIRS: MODERATE DIFFICULTY
HOS_ADL_HIGHEST_POTENTIAL_SCORE: 64
WALKING_UP_STEEP_HILLS: SLIGHT DIFFICULTY
GETTING_INTO_AND_OUT_OF_AN_AVERAGE_CAR: SLIGHT DIFFICULTY
HOS_ADL_ITEM_SCORE_TOTAL: 44
SITTING FOR 15 MINUTES: NO DIFFICULTY AT ALL
WALKING_DOWN_STEEP_HILLS: SLIGHT DIFFICULTY
RECREATIONAL_ACTIVITIES: SLIGHT DIFFICULTY
LIGHT_TO_MODERATE_WORK: MODERATE DIFFICULTY
GOING_DOWN_1_FLIGHT_OF_STAIRS: MODERATE DIFFICULTY
WALKING_APPROXIMATELY_10_MINUTES: MODERATE DIFFICULTY
WALKING_15_MINUTES_OR_GREATER: MODERATE DIFFICULTY
ROLLING OVER IN BED: NO DIFFICULTY AT ALL
HOW_WOULD_YOU_RATE_YOUR_CURRENT_LEVEL_OF_FUNCTION_DURING_YOUR_USUAL_ACTIVITIES_OF_DAILY_LIVING_FROM_0_TO_100_WITH_100_BEING_YOUR_LEVEL_OF_FUNCTION_PRIOR_TO_YOUR_HIP_PROBLEM_AND_0_BEING_THE_INABILITY_TO_PERFORM_ANY_OF_YOUR_USUAL_DAILY_ACTIVITIES?: 70
HOS_ADL_SCORE(%): 68.75
TWISTING/PIVOTING_ON_INVOLVED_LEG: NO DIFFICULTY AT ALL
WALKING_FOR_APPROXIMATELY_10_MINUTES: MODERATE DIFFICULTY
GOING UP 1 FLIGHT OF STAIRS: MODERATE DIFFICULTY

## 2024-12-02 NOTE — PROGRESS NOTES
PHYSICAL THERAPY EVALUATION  Type of Visit: Evaluation       Fall Risk Screen:  Fall screen completed by: PT  Have you fallen 2 or more times in the past year?: (Patient-Rptd) No  Have you fallen and had an injury in the past year?: (Patient-Rptd) No  Is patient a fall risk?: Yes; Department fall risk interventions implemented    Subjective         Presenting condition or subjective complaint: (Patient-Rptd) bilateral hip pain  Date of onset: 10/21/24    Relevant medical history:     PAST MEDICAL HISTORY:   Past Medical History:   Diagnosis Date    AC separation, left, sequela     saw Dr Andre    ADHD (attention deficit hyperactivity disorder)     Attention deficit hyperactivity disorder (ADHD), predominantly inattentive type 02/28/2014     Problem list name updated by automated process. Provider to review    Benign essential hypertension 02/14/2020    Cervicalgia 11/12/2021    Cold sore     Costochondral pain 11/29/2019    Diabetes mellitus, type 2 (H) 03/31/2023    Dysmenorrhea 08/18/2016    Elevated liver enzymes 2014    resolved 2015    Fibrocystic breast     MARIBETH (generalized anxiety disorder)     Hyperlipidemia with target LDL less than 100 08/08/2014     Diagnosis updated by automated process. Provider to review and confirm.    Hypovitaminosis D     Infertility, female, secondary 07/19/2016    Intramural leiomyoma of uterus 01/14/2022    Migraine     MS (multiple sclerosis) (H) 2012    Neuropathy     feet    Obesity     Other specified anemias 06/14/2023    PCOS (polycystic ovarian syndrome)     Tubal pregnancy without intrauterine pregnancy 05/24/2013    Visual field scotoma 05/24/2013    Vitamin B12 deficiency (non anemic) 06/25/2015       PAST SURGICAL HISTORY:   Past Surgical History:   Procedure Laterality Date    ESOPHAGOSCOPY, GASTROSCOPY, DUODENOSCOPY (EGD), COMBINED N/A 01/20/2023    Procedure: Upper endoscopy  with biopsy;  Surgeon: Rudi Romano MD;  Location: [facility]    GYN SURGERY  4/2010     HYSTEROSCOPY,DIAGNOSTIC  2016    done here and at [facility]    LAPAROSCOPIC SALPINGECTOMY Left 05/01/2010    ruptured ectopic pregnancy    wisdom teeth  2014       FAMILY HISTORY:   Family History   Problem Relation Age of Onset    Endometriosis Mother         s/p ablation    Osteoarthritis Father         due to work as a martin    Family History Negative Sister         +tobacco    Hypertension Sister     Obesity Sister         iron def    Hypertension Sister     Diabetes Sister         pre    Migraines Sister     Attention Deficit Disorder Sister     Family History Negative Brother         +tobacco    Breast Cancer Maternal Grandmother 64    Unknown/Adopted Maternal Grandfather         severe gerd    Lung Cancer Paternal Grandmother         +tobacco    Heart Disease Paternal Grandfather         AMI/CAD or CVA    [place] Disease Maternal Uncle        SOCIAL HISTORY:   Social History     Tobacco Use    Smoking status: Never     Passive exposure: Never    Smokeless tobacco: Never   Substance Use Topics    Alcohol use: Not Currently     Comment: rare         Prior diagnostic imaging/testing results: (Patient-Rptd) X-ray     Prior therapy history for the same diagnosis, illness or injury: (Patient-Rptd) No      Prior Level of Function  Transfers: Independent  Ambulation: Independent    Living Environment  Social support: (Patient-Rptd) With a significant other or spouse   Type of home: (Patient-Rptd) House   Ramp: (Patient-Rptd) No   Stairs inside the home: (Patient-Rptd) Yes (Patient-Rptd) 3 Is there a railing: (Patient-Rptd) No     Help at home: (Patient-Rptd) None    Employment: (Patient-Rptd) Yes (Patient-Rptd) RN    Patient goals for therapy: (Patient-Rptd) stand and walk longer    Pain assessment: Pain present, along inguinal fold of hip, worse throughout day and with extended walking/standing     Objective   HIP EVALUATION  INTEGUMENTARY (edema, incisions): WNL  POSTURE: Sitting Posture: Rounded  shoulders, Forward head  GAIT:   Weightbearing Status: WBAT  Assistive Device(s): None  Gait Deviations: Antalgic  Base of support increased  Renee decreased  Knee flexion decreased L, Knee flexion decreased R  ROM:   Lumbar AROM as percentage of expected range for age:  Flexion: 80  Extension: 60  Side Bending Right: 80  Side Bending Left: 80  Rotation Right: WNL  Rotation Left: WNL    PELVIC/SI SCREEN: WNL  STRENGTH: WFL  SPECIAL TESTS:    Left Right   CARLOS Positive Positive   FADIR/Labrum/LUIS FELIPE Negative  Negative    Femoral Nerve     Salvatore's Positive Positive   Piriformis Positive Positive   Quadrant Testing Negative  Negative    SLR Negative  Negative    Slump Negative  Negative    Stork with Extension     Maciej            FUNCTIONAL TESTS: Double Leg Squat: Anterior knee translation, Knee valgus, Hip internal rotation, Increased trunk lean, and Improper use of glutes/hips    Assessment & Plan   CLINICAL IMPRESSIONS  Medical Diagnosis: Bilateral hip pain (M25.551, M25.552)    Treatment Diagnosis: Bilateral hip pain (M25.551, M25.552)   Impression/Assessment: Patient is a 34 year old female with bilateral hip pain complaints.  The following significant findings have been identified: Pain, Decreased ROM/flexibility, Decreased joint mobility, Impaired gait, Impaired muscle performance, Decreased activity tolerance, and Impaired posture. These impairments interfere with their ability to perform self care tasks, work tasks, recreational activities, household chores, and community mobility as compared to previous level of function.     Clinical Decision Making (Complexity):  Clinical Presentation: Stable/Uncomplicated  Clinical Presentation Rationale: based on medical and personal factors listed in PT evaluation  Clinical Decision Making (Complexity): Low complexity    PLAN OF CARE  Treatment Interventions:  Modalities: Cryotherapy, Dry Needling, E-stim, Hot Pack, Vasoneumatic Device  Interventions: Gait Training,  Manual Therapy, Neuromuscular Re-education, Therapeutic Activity, Therapeutic Exercise, Self-Care/Home Management    Long Term Goals     PT Goal 1  Goal Identifier: STG 1  Goal Description: Patient will be independent with a short-term home exercise program.  Target Date: 12/19/24  PT Goal 2  Goal Identifier: STG 2  Goal Description: Patient will understand and demonstrate improved posture and techniques such that patient places less strain over spine and supporting musculature.  Target Date: 12/19/24  PT Goal 3  Goal Identifier: LTG 1  Goal Description: Improve score on utilized outcome measures to correlate with clinically significant change.  Target Date: 01/16/25  PT Goal 4  Goal Identifier: LTG 2  Goal Description: Patient will endorse confidence in ability to manage home exercise program independently to promote continued progression and management of back pain symptoms following discharge from PT services.  Target Date: 01/16/25      Frequency of Treatment: 1x/week tapered to discharge  Duration of Treatment: 8 weeks    Education Assessment:   Learner/Method: Patient;Listening;Reading;Demonstration;Pictures/Video;No Barriers to Learning    Risks and benefits of evaluation/treatment have been explained.   Patient/Family/caregiver agrees with Plan of Care.     Evaluation Time:     PT Eval, Low Complexity Minutes (43191): 20     Signing Clinician: Martine Florian, PT

## 2024-12-04 DIAGNOSIS — B00.1 COLD SORE: ICD-10-CM

## 2024-12-04 RX ORDER — VALACYCLOVIR HYDROCHLORIDE 500 MG/1
TABLET, FILM COATED ORAL
Qty: 30 TABLET | Refills: 5 | Status: SHIPPED | OUTPATIENT
Start: 2024-12-04

## 2024-12-05 ENCOUNTER — INFUSION THERAPY VISIT (OUTPATIENT)
Dept: INFUSION THERAPY | Facility: OTHER | Age: 34
End: 2024-12-05
Attending: FAMILY MEDICINE
Payer: COMMERCIAL

## 2024-12-05 ENCOUNTER — THERAPY VISIT (OUTPATIENT)
Dept: PHYSICAL THERAPY | Facility: HOSPITAL | Age: 34
End: 2024-12-05
Attending: FAMILY MEDICINE
Payer: COMMERCIAL

## 2024-12-05 VITALS
TEMPERATURE: 97.7 F | HEART RATE: 87 BPM | SYSTOLIC BLOOD PRESSURE: 153 MMHG | WEIGHT: 151 LBS | OXYGEN SATURATION: 99 % | RESPIRATION RATE: 20 BRPM | DIASTOLIC BLOOD PRESSURE: 92 MMHG | BODY MASS INDEX: 26.75 KG/M2

## 2024-12-05 DIAGNOSIS — E61.1 IRON DEFICIENCY: ICD-10-CM

## 2024-12-05 DIAGNOSIS — M25.551 BILATERAL HIP PAIN: Primary | ICD-10-CM

## 2024-12-05 DIAGNOSIS — D64.89 ANEMIA DUE TO OTHER CAUSE, NOT CLASSIFIED: Primary | ICD-10-CM

## 2024-12-05 DIAGNOSIS — M25.552 BILATERAL HIP PAIN: Primary | ICD-10-CM

## 2024-12-05 PROCEDURE — 97110 THERAPEUTIC EXERCISES: CPT | Mod: GP

## 2024-12-05 PROCEDURE — 97140 MANUAL THERAPY 1/> REGIONS: CPT | Mod: GP

## 2024-12-05 RX ORDER — HEPARIN SODIUM,PORCINE 10 UNIT/ML
5-20 VIAL (ML) INTRAVENOUS DAILY PRN
OUTPATIENT
Start: 2024-12-12

## 2024-12-05 RX ORDER — DIPHENHYDRAMINE HYDROCHLORIDE 50 MG/ML
50 INJECTION INTRAMUSCULAR; INTRAVENOUS
Start: 2024-12-12

## 2024-12-05 RX ORDER — HEPARIN SODIUM (PORCINE) LOCK FLUSH IV SOLN 100 UNIT/ML 100 UNIT/ML
5 SOLUTION INTRAVENOUS
OUTPATIENT
Start: 2024-12-12

## 2024-12-05 RX ORDER — EPINEPHRINE 1 MG/ML
0.3 INJECTION, SOLUTION, CONCENTRATE INTRAVENOUS EVERY 5 MIN PRN
OUTPATIENT
Start: 2024-12-12

## 2024-12-05 RX ORDER — MEPERIDINE HYDROCHLORIDE 25 MG/ML
25 INJECTION INTRAMUSCULAR; INTRAVENOUS; SUBCUTANEOUS EVERY 30 MIN PRN
OUTPATIENT
Start: 2024-12-12

## 2024-12-05 RX ORDER — ALBUTEROL SULFATE 0.83 MG/ML
2.5 SOLUTION RESPIRATORY (INHALATION)
OUTPATIENT
Start: 2024-12-12

## 2024-12-05 RX ORDER — METHYLPREDNISOLONE SODIUM SUCCINATE 125 MG/2ML
125 INJECTION INTRAMUSCULAR; INTRAVENOUS
Start: 2024-12-12

## 2024-12-05 RX ORDER — ALBUTEROL SULFATE 90 UG/1
1-2 INHALANT RESPIRATORY (INHALATION)
Start: 2024-12-12

## 2024-12-05 RX ADMIN — Medication 500 ML: at 14:11

## 2024-12-05 ASSESSMENT — PAIN SCALES - GENERAL: PAINLEVEL_OUTOF10: NO PAIN (0)

## 2024-12-05 NOTE — PROGRESS NOTES
Infusion Nursing Note:  Radha Carl presents today for Venofer.    Patient seen by provider today: No   present during visit today: Not Applicable.    Note: N/A.      Treatment Conditions:  Not Applicable.      Post Infusion Assessment:  Patient tolerated infusion without incident.  Site patent and intact, free from redness, edema or discomfort.  No evidence of extravasations.  Access discontinued per protocol.       Discharge Plan:   Patient discharged in stable condition accompanied by: self.  Departure Mode: Ambulatory.

## 2024-12-12 ENCOUNTER — THERAPY VISIT (OUTPATIENT)
Dept: PHYSICAL THERAPY | Facility: HOSPITAL | Age: 34
End: 2024-12-12
Attending: FAMILY MEDICINE
Payer: COMMERCIAL

## 2024-12-12 DIAGNOSIS — M25.551 BILATERAL HIP PAIN: Primary | ICD-10-CM

## 2024-12-12 DIAGNOSIS — M25.552 BILATERAL HIP PAIN: Primary | ICD-10-CM

## 2024-12-12 PROCEDURE — 97110 THERAPEUTIC EXERCISES: CPT | Mod: GP,CQ

## 2024-12-12 PROCEDURE — 97140 MANUAL THERAPY 1/> REGIONS: CPT | Mod: GP,CQ

## 2024-12-18 ENCOUNTER — THERAPY VISIT (OUTPATIENT)
Dept: PHYSICAL THERAPY | Facility: HOSPITAL | Age: 34
End: 2024-12-18
Attending: FAMILY MEDICINE
Payer: COMMERCIAL

## 2024-12-18 DIAGNOSIS — M25.551 BILATERAL HIP PAIN: Primary | ICD-10-CM

## 2024-12-18 DIAGNOSIS — M25.552 BILATERAL HIP PAIN: Primary | ICD-10-CM

## 2024-12-18 PROCEDURE — 97110 THERAPEUTIC EXERCISES: CPT | Mod: GP

## 2024-12-19 ENCOUNTER — INFUSION THERAPY VISIT (OUTPATIENT)
Dept: INFUSION THERAPY | Facility: OTHER | Age: 34
End: 2024-12-19
Attending: FAMILY MEDICINE
Payer: COMMERCIAL

## 2024-12-19 VITALS
TEMPERATURE: 98.3 F | WEIGHT: 150 LBS | HEIGHT: 63 IN | HEART RATE: 129 BPM | BODY MASS INDEX: 26.58 KG/M2 | OXYGEN SATURATION: 99 % | DIASTOLIC BLOOD PRESSURE: 94 MMHG | SYSTOLIC BLOOD PRESSURE: 147 MMHG

## 2024-12-19 DIAGNOSIS — D64.89 ANEMIA DUE TO OTHER CAUSE, NOT CLASSIFIED: Primary | ICD-10-CM

## 2024-12-19 DIAGNOSIS — E61.1 IRON DEFICIENCY: ICD-10-CM

## 2024-12-19 RX ORDER — MEPERIDINE HYDROCHLORIDE 25 MG/ML
25 INJECTION INTRAMUSCULAR; INTRAVENOUS; SUBCUTANEOUS EVERY 30 MIN PRN
OUTPATIENT
Start: 2024-12-19

## 2024-12-19 RX ORDER — HEPARIN SODIUM (PORCINE) LOCK FLUSH IV SOLN 100 UNIT/ML 100 UNIT/ML
5 SOLUTION INTRAVENOUS
OUTPATIENT
Start: 2024-12-19

## 2024-12-19 RX ORDER — DIPHENHYDRAMINE HYDROCHLORIDE 50 MG/ML
50 INJECTION INTRAMUSCULAR; INTRAVENOUS
Start: 2024-12-19

## 2024-12-19 RX ORDER — EPINEPHRINE 1 MG/ML
0.3 INJECTION, SOLUTION, CONCENTRATE INTRAVENOUS EVERY 5 MIN PRN
OUTPATIENT
Start: 2024-12-19

## 2024-12-19 RX ORDER — HEPARIN SODIUM,PORCINE 10 UNIT/ML
5-20 VIAL (ML) INTRAVENOUS DAILY PRN
OUTPATIENT
Start: 2024-12-19

## 2024-12-19 RX ORDER — ALBUTEROL SULFATE 0.83 MG/ML
2.5 SOLUTION RESPIRATORY (INHALATION)
OUTPATIENT
Start: 2024-12-19

## 2024-12-19 RX ORDER — METHYLPREDNISOLONE SODIUM SUCCINATE 125 MG/2ML
125 INJECTION INTRAMUSCULAR; INTRAVENOUS
Start: 2024-12-19

## 2024-12-19 RX ORDER — ALBUTEROL SULFATE 90 UG/1
1-2 INHALANT RESPIRATORY (INHALATION)
Start: 2024-12-19

## 2024-12-19 RX ADMIN — Medication 250 ML: at 10:38

## 2024-12-19 ASSESSMENT — PAIN SCALES - GENERAL: PAINLEVEL_OUTOF10: NO PAIN (0)

## 2024-12-19 NOTE — PROGRESS NOTES
Infusion Nursing Note:  Radha Carl presents today for Venofer.    Patient seen by provider today: No   present during visit today: Not Applicable.    Intravenous Access:  Peripheral IV placed.    Treatment Conditions:  Not Applicable.    Post Infusion Assessment:  Patient tolerated infusion without incident.  Site patent and intact, free from redness, edema or discomfort.  No evidence of extravasations.  Access discontinued per protocol.     Discharge Plan:   Discharge instructions reviewed with: Patient.

## 2024-12-26 ENCOUNTER — LAB (OUTPATIENT)
Dept: LAB | Facility: CLINIC | Age: 34
End: 2024-12-26
Payer: COMMERCIAL

## 2024-12-26 ENCOUNTER — OFFICE VISIT (OUTPATIENT)
Dept: NEUROLOGY | Facility: CLINIC | Age: 34
End: 2024-12-26
Attending: PSYCHIATRY & NEUROLOGY
Payer: COMMERCIAL

## 2024-12-26 VITALS
BODY MASS INDEX: 26.48 KG/M2 | WEIGHT: 158.9 LBS | OXYGEN SATURATION: 98 % | SYSTOLIC BLOOD PRESSURE: 164 MMHG | DIASTOLIC BLOOD PRESSURE: 99 MMHG | HEART RATE: 78 BPM | HEIGHT: 65 IN

## 2024-12-26 DIAGNOSIS — G47.19 EXCESSIVE DAYTIME SLEEPINESS: ICD-10-CM

## 2024-12-26 DIAGNOSIS — G35 MS (MULTIPLE SCLEROSIS) (H): ICD-10-CM

## 2024-12-26 DIAGNOSIS — G35 MS (MULTIPLE SCLEROSIS) (H): Primary | ICD-10-CM

## 2024-12-26 DIAGNOSIS — R74.8 ELEVATED LIVER ENZYMES: ICD-10-CM

## 2024-12-26 DIAGNOSIS — R53.82 CHRONIC FATIGUE: ICD-10-CM

## 2024-12-26 DIAGNOSIS — R26.9 GAIT DISTURBANCE: ICD-10-CM

## 2024-12-26 LAB
ALBUMIN SERPL BCG-MCNC: 4.5 G/DL (ref 3.5–5.2)
ALP SERPL-CCNC: 64 U/L (ref 40–150)
ALT SERPL W P-5'-P-CCNC: 10 U/L (ref 0–50)
AST SERPL W P-5'-P-CCNC: 13 U/L (ref 0–45)
BASOPHILS # BLD AUTO: 0.1 10E3/UL (ref 0–0.2)
BASOPHILS NFR BLD AUTO: 1 %
BILIRUB DIRECT SERPL-MCNC: <0.2 MG/DL (ref 0–0.3)
BILIRUB SERPL-MCNC: 0.2 MG/DL
CD19 B CELL COMMENT: ABNORMAL
CD19 CELLS # BLD: <1 CELLS/UL (ref 107–698)
CD19 CELLS NFR BLD: <1 % (ref 6–27)
EOSINOPHIL # BLD AUTO: 0.2 10E3/UL (ref 0–0.7)
EOSINOPHIL NFR BLD AUTO: 2 %
ERYTHROCYTE [DISTWIDTH] IN BLOOD BY AUTOMATED COUNT: 24.6 % (ref 10–15)
HCT VFR BLD AUTO: 35.7 % (ref 35–47)
HGB BLD-MCNC: 10.7 G/DL (ref 11.7–15.7)
IMM GRANULOCYTES # BLD: 0 10E3/UL
IMM GRANULOCYTES NFR BLD: 0 %
LYMPHOCYTES # BLD AUTO: 1.6 10E3/UL (ref 0.8–5.3)
LYMPHOCYTES NFR BLD AUTO: 17 %
MCH RBC QN AUTO: 21.7 PG (ref 26.5–33)
MCHC RBC AUTO-ENTMCNC: 30 G/DL (ref 31.5–36.5)
MCV RBC AUTO: 73 FL (ref 78–100)
MONOCYTES # BLD AUTO: 0.9 10E3/UL (ref 0–1.3)
MONOCYTES NFR BLD AUTO: 9 %
NEUTROPHILS # BLD AUTO: 6.7 10E3/UL (ref 1.6–8.3)
NEUTROPHILS NFR BLD AUTO: 70 %
NRBC # BLD AUTO: 0 10E3/UL
NRBC BLD AUTO-RTO: 0 /100
PLATELET # BLD AUTO: 663 10E3/UL (ref 150–450)
PROT SERPL-MCNC: 7.5 G/DL (ref 6.4–8.3)
RBC # BLD AUTO: 4.92 10E6/UL (ref 3.8–5.2)
TSH SERPL DL<=0.005 MIU/L-ACNC: 0.36 UIU/ML (ref 0.3–4.2)
WBC # BLD AUTO: 9.5 10E3/UL (ref 4–11)

## 2024-12-26 PROCEDURE — 36415 COLL VENOUS BLD VENIPUNCTURE: CPT | Performed by: PATHOLOGY

## 2024-12-26 PROCEDURE — 99214 OFFICE O/P EST MOD 30 MIN: CPT | Performed by: PSYCHIATRY & NEUROLOGY

## 2024-12-26 PROCEDURE — G2211 COMPLEX E/M VISIT ADD ON: HCPCS | Performed by: PSYCHIATRY & NEUROLOGY

## 2024-12-26 PROCEDURE — 84443 ASSAY THYROID STIM HORMONE: CPT | Performed by: PATHOLOGY

## 2024-12-26 PROCEDURE — 82784 ASSAY IGA/IGD/IGG/IGM EACH: CPT | Performed by: PSYCHIATRY & NEUROLOGY

## 2024-12-26 PROCEDURE — 85025 COMPLETE CBC W/AUTO DIFF WBC: CPT | Performed by: PATHOLOGY

## 2024-12-26 PROCEDURE — 80076 HEPATIC FUNCTION PANEL: CPT | Performed by: PATHOLOGY

## 2024-12-26 PROCEDURE — 99000 SPECIMEN HANDLING OFFICE-LAB: CPT | Performed by: PATHOLOGY

## 2024-12-26 PROCEDURE — 86355 B CELLS TOTAL COUNT: CPT | Performed by: PSYCHIATRY & NEUROLOGY

## 2024-12-26 PROCEDURE — 99214 OFFICE O/P EST MOD 30 MIN: CPT | Mod: GC | Performed by: PSYCHIATRY & NEUROLOGY

## 2024-12-26 ASSESSMENT — PAIN SCALES - GENERAL: PAINLEVEL_OUTOF10: NO PAIN (0)

## 2024-12-26 NOTE — PATIENT INSTRUCTIONS
Continue Kesimpta injections monthly    2.   Blood tests today    3.   We placed a referral to sleep medicine to investigate daytime fatigue and snoring    4.  We will arrange MRI scans of the brain and cervical spine in 6 months in [place] and see you back via a video visit to review those results

## 2024-12-26 NOTE — PROGRESS NOTES
Date of Service: 12/26/2024     [facility] Neurology   MS Clinic Evaluation     Subjective: 34-year-old female with type 2 diabetes, hyperlipidemia, hypertension, anemia, GERD, RRMS currently on Kesimpta presenting for follow-up visit in MS clinic today.    Today, the patient denies any new episodic changes in vision, balance, strength or sensation suggestive of new relapse of multiple sclerosis since they were last seen.  She denies any recent falls.  She is currently seeing PT about once a week to work on her ambulation/tight hips.  She does not think the exercises are specifically catered toward balance.  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  She participates in minimal physical activity, walking the halls at work.  Her sleep is inconsistent but she endorses approximately 6 hours a night.  Sometimes it takes her 1 to 2 hours to fall asleep.  She wakes up in the middle of the night to use the bathroom.    She states that she wakes up fatigued even after getting 6 hours of sleep at night and her partner endorses that she snores.  She does not wake up gasping for air. She denies any increased urinary frequency/urgency.  She endorses reduced appetite since starting Wegovy.  She also has some constipation but is she is working with her primary care.  She is taking fiber supplementation.  She also tells me that she has been feeling cold a lot more in the last few years, probably worsened in the last year.  She denies a numbness sensation states that her feet feel cold and then it spreads to the rest of her body.  She occasionally will take her temperature during these episodes and states that she is normal temperature.  She states that sometimes in the middle of the night, the coolness will wake her up and she has to put on socks.    Date of Onset:  5/2013  Date of Diagnosis:  dx with CIS around 2013, formally dx in 2018   Initial Clinical Course:  RRMS  Current  Clinical Course:  RRMS  Past Disease Modifying Therapy(ies):  Tecfidera: had GI side effects. Copaxone-MRI showed interval demyelinating changes.   Current Disease Modifying Therapy(ies): Kesimpta          Allergies   Allergen Reactions    Cats     Sulfa Antibiotics Rash     Sulfonamide antibiotics      Current Outpatient Medications   Medication Sig Dispense Refill    acetylcysteine (NAC) 600 MG CAPS capsule Take 600 mg by mouth 2 times daily      blood glucose monitoring (ONE TOUCH ULTRA 2) meter device kit See Admin Instructions      cyclobenzaprine (FLEXERIL) 10 MG tablet Take 0.5-1 tablets (5-10 mg) by mouth daily as needed for muscle spasms 30 tablet 1    diazepam (VALIUM) 2 MG tablet Take 1 tablet (2 mg) by mouth every 6 hours as needed for anxiety Take one to two tablets as needed prior to travel 4 tablet 0    famotidine (PEPCID) 40 MG tablet Take 1 tablet (40 mg) by mouth daily. 90 tablet 3    INOSITOL-D CHIRO-INOSITOL PO Take 2,000 mg by mouth daily.      KESIMPTA 20 MG/0.4ML injection INJECT THE CONTENTS OF 1 AUTOINJECTOR PEN UNDER THE SKIN EVERY 4 WEEKS 0.4 mL 12    Lancets (ONETOUCH DELICA PLUS FDNMAB96X) MISC       losartan (COZAAR) 25 MG tablet Take 1 tablet (25 mg) by mouth daily 90 tablet 1    Multiple Vitamin (MULTIVITAMIN ADULT PO) Take 1 tablet by mouth daily      nystatin (MYCOSTATIN) 174056 UNIT/GM external powder Apply topically 3 times daily 30 g 0    ondansetron (ZOFRAN ODT) 4 MG ODT tab Take 1 tablet (4 mg) by mouth every 8 hours as needed for nausea. 30 tablet 11    ONETOUCH ULTRA test strip       order for DME Right ankle ASO 1 Device 0    progesterone (PROMETRIUM) 100 MG capsule Take 1 capsule (100 mg) by mouth daily Starting on peak +3 for 10 days out of month 30 capsule 3    Semaglutide-Weight Management (WEGOVY) 2.4 MG/0.75ML pen Inject 2.4 mg subcutaneously once a week. 9 mL 3    valACYclovir (VALTREX) 500 MG tablet TAKE 1 TABLET BY MOUTH TWICE DAILY WITH  ACTIVE  LESION 30 tablet 5      No current facility-administered medications for this visit.            Past medical, surgical, social and family history was personally reviewed. Pertinent details noted above.      Physical Examination:   Neurologic  Mental Status:  alert, oriented x 3, follows commands, speech clear and fluent  Cranial Nerves:  visual fields intact, PERRL, EOMI with normal smooth pursuit, facial sensation intact and symmetric, facial movements symmetric, hearing not formally tested but intact to conversation, palate elevation symmetric and uvula midline, no dysarthria, shoulder shrug strong bilaterally, tongue protrusion midline  Motor:  normal muscle tone and bulk, no abnormal movements, able to move all limbs spontaneously, no pronator drift   Right Left   Arm abduction 5/5 5/5   Elbow Flex 5/5 5/5   Elbow Extension 5/5 5/5   Wrist Extension 5/5 5/5     5/5 5/5   Hip Flexion 4/5 5/5   Knee Flexion 5/5 5/5   Knee Extension 5/5 5/5   Dorsiflexion  5/5 5/5   Plantarflexion 5/5 5/5      Reflexes: brisk and symmetric in UE and LE. 2 beats clonus bilaterally. toes down-going  Sensory:  light touch sensation intact and symmetric throughout upper and lower extremities, no extinction on double simultaneous stimulation   Coordination:  normal finger-to-nose and heel-to-shin bilaterally without dysmetria, rapid alternating movements symmetric  Station/Gait:  walking without  assistive device. The patient is able to ambulate on a flat, level surface with no gross loss of postural stability, and able to walk on toes. Unable to walk to heels or perform tandem. Unable to stay steady balancing on one leg, did not perform one legged hops due to instability. Able to perform chair rise without push up on both legs    Assessment:     #Relapsing Remitting Multiple sclerosis  The patient remains clinically stable with no evidence of active inflammatory demyelination on current disease-modifying therapy with Kesimpta (self-administered  monthly) . She is tolerating the medication without any side effects. I will obtain routine laboratory studies to monitor for hematologic or immunologic toxicity, include complete blood counts with differential, hepatic panel, total antibody (IgG) level, and CD19 B-cell count.     Will plan on seeing the patient virtually in 6 months for a review, or sooner if the patient has any new symptoms that are of concern. Will plan for repeat MRI imaging (brain and cervical spine) prior to our next visit.    #Cold inintolerance  #Anemia 2/2 Iron deficiency  Patient reports that she just finished her last iron infusion for known anemia last week. There appears to be a 2pt drop in hgb from CBC in October compared to July. Will repeat CBC today. We discussed how autonomic symptoms can rarely be associated with MS. She tells me that she has a normal temperature during these cold spells. I suspect that these episodes may be due to other systemic issues including anemia. Will also repeat TSH serology.    #Daytime fatigue  #C/f sleep apnea  Patient reported persistent daytime fatigue.  She gets about 6 hours a night but will wake up periodically to use the bathroom at night.  sounds like there is also some sleep delay.  Her partner reports that she does snore at night.  She has not been evaluated with a sleep study in the past.  She is agreeable to referral today.        Plan:   -Continue Kesimpta injections monthly  -Blood work: TSH, CBC, total antibodies, CD19, hepatic function  -Referral for outpatient sleep study   - Follow-up in 6 months [facility] with repeat MRI of the brain and cervical spine ([place])  -continue Vit D supplementation     I saw and discussed this patient with neurology attending, Dr. Gracia.     Phoebe Betancourt MD  Neurology PGY-2    Attending physician: I saw and evaluated the patient with Dr. Betancourt and edited the note. I agree with the findings and plan of care as documented above, with the following  additions.     The patient is experiencing increasing fatigue; this may relate to poor sleep quality as well as MS-associated fatigue. I will refer her to sleep medicine to evaluate for sleep disordered breathing, and also check a TSH level.    Her examination shows some difficulty with balance (unable to balance on one leg or walk in tandem). Fortunately, she is not falling. I counseled her to use rails whenever available when going up and down stairs and to exercise particular caution in winter weather conditions. We will continue to keep an eye on her walking over time.    Remainder as above.    The longitudinal plans of care for the diagnoses as documented were addressed during this visit. Due to the added complexity in care, I will continue to support the patient in subsequent management and with ongoing continuity of care.    Santo Gracia MD    of Neurology  [facility]    Diagnoses:  1) Multiple sclerosis  2) Chronic fatigue  3) Gait disturbance

## 2024-12-26 NOTE — NURSING NOTE
Chief Complaint   Patient presents with    MS    RECHECK     MS follow up      Vitals were taken and medications were reconciled.   Rishabh Lancaster, EMT  10:14 AM

## 2024-12-26 NOTE — Clinical Note
12/26/2024       RE: Radha Carl  [address]  [address]  [facility] [id number]     Dear Colleague,    Thank you for referring your patient, Radha Carl, to the [facility] [place] at [facility]. Please see a copy of my visit note below.    Date of Service: 12/26/2024     [facility] Neurology   MS Clinic Evaluation     Subjective: 34-year-old female with type 2 diabetes, hyperlipidemia, hypertension, anemia, GERD, RRMS currently on Kesimpta presenting for follow-up visit in MS clinic today.    Today, the patient denies any new episodic changes in vision, balance, strength or sensation suggestive of new relapse of multiple sclerosis since they were last seen.  She denies any recent falls.  She is currently seeing PT about once a week to work on her ambulation/tight hips.  She does not think the exercises are specifically catered toward balance.  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  She participates in minimal physical activity, walking the halls at work.  Her sleep is inconsistent but she endorses approximately 6 hours a night.  Sometimes it takes her 1 to 2 hours to fall asleep.  She wakes up in the middle of the night to use the bathroom.    She states that she wakes up fatigued even after getting 6 hours of sleep at night and her partner endorses that she snores.  She does not wake up gasping for air. She denies any increased urinary frequency/urgency.  She endorses reduced appetite since starting Wegovy.  She also has some constipation but is she is working with her primary care.  She is taking fiber supplementation.  She also tells me that she has been feeling cold a lot more in the last few years, probably worsened in the last year.  She denies a numbness sensation states that her feet feel cold and then it spreads to the rest of her body.  She  occasionally will take her temperature during these episodes and states that she is normal temperature.  She states that sometimes in the middle of the night, the coolness will wake her up and she has to put on socks.    Date of Onset:  5/2013  Date of Diagnosis:  dx with CIS around 2013, formally dx in 2018   Initial Clinical Course:  RRMS  Current Clinical Course:  RRMS  Past Disease Modifying Therapy(ies):  Tecfidera: had GI side effects. Copaxone-MRI showed interval demyelinating changes.   Current Disease Modifying Therapy(ies): Kesimpta          Allergies   Allergen Reactions    Cats     Sulfa Antibiotics Rash     Sulfonamide antibiotics      Current Outpatient Medications   Medication Sig Dispense Refill    acetylcysteine (NAC) 600 MG CAPS capsule Take 600 mg by mouth 2 times daily      blood glucose monitoring (ONE TOUCH ULTRA 2) meter device kit See Admin Instructions      cyclobenzaprine (FLEXERIL) 10 MG tablet Take 0.5-1 tablets (5-10 mg) by mouth daily as needed for muscle spasms 30 tablet 1    diazepam (VALIUM) 2 MG tablet Take 1 tablet (2 mg) by mouth every 6 hours as needed for anxiety Take one to two tablets as needed prior to travel 4 tablet 0    famotidine (PEPCID) 40 MG tablet Take 1 tablet (40 mg) by mouth daily. 90 tablet 3    INOSITOL-D CHIRO-INOSITOL PO Take 2,000 mg by mouth daily.      KESIMPTA 20 MG/0.4ML injection INJECT THE CONTENTS OF 1 AUTOINJECTOR PEN UNDER THE SKIN EVERY 4 WEEKS 0.4 mL 12    Lancets (ONETOUCH DELICA PLUS [id number]) MISC       losartan (COZAAR) 25 MG tablet Take 1 tablet (25 mg) by mouth daily 90 tablet 1    Multiple Vitamin (MULTIVITAMIN ADULT PO) Take 1 tablet by mouth daily      nystatin (MYCOSTATIN) 183048 UNIT/GM external powder Apply topically 3 times daily 30 g 0    ondansetron (ZOFRAN ODT) 4 MG ODT tab Take 1 tablet (4 mg) by mouth every 8 hours as needed for nausea. 30 tablet 11    ONETOUCH ULTRA test strip       order for DME Right ankle ASO 1 Device 0     progesterone (PROMETRIUM) 100 MG capsule Take 1 capsule (100 mg) by mouth daily Starting on peak +3 for 10 days out of month 30 capsule 3    Semaglutide-Weight Management (WEGOVY) 2.4 MG/0.75ML pen Inject 2.4 mg subcutaneously once a week. 9 mL 3    valACYclovir (VALTREX) 500 MG tablet TAKE 1 TABLET BY MOUTH TWICE DAILY WITH  ACTIVE  LESION 30 tablet 5     No current facility-administered medications for this visit.            Past medical, surgical, social and family history was personally reviewed. Pertinent details noted above.      Physical Examination:   Neurologic  Mental Status:  alert, oriented x 3, follows commands, speech clear and fluent  Cranial Nerves:  visual fields intact, PERRL, EOMI with normal smooth pursuit, facial sensation intact and symmetric, facial movements symmetric, hearing not formally tested but intact to conversation, palate elevation symmetric and uvula midline, no dysarthria, shoulder shrug strong bilaterally, tongue protrusion midline  Motor:  normal muscle tone and bulk, no abnormal movements, able to move all limbs spontaneously, no pronator drift   Right Left   Arm abduction 5/5 5/5   Elbow Flex 5/5 5/5   Elbow Extension 5/5 5/5   Wrist Extension 5/5 5/5     5/5 5/5   Hip Flexion 4/5 5/5   Knee Flexion 5/5 5/5   Knee Extension 5/5 5/5   Dorsiflexion  5/5 5/5   Plantarflexion 5/5 5/5      Reflexes: brisk and symmetric in UE and LE. 2 beats clonus bilaterally. toes down-going  Sensory:  light touch sensation intact and symmetric throughout upper and lower extremities, no extinction on double simultaneous stimulation   Coordination:  normal finger-to-nose and heel-to-shin bilaterally without dysmetria, rapid alternating movements symmetric  Station/Gait:  walking without  assistive device. The patient is able to ambulate on a flat, level surface with no gross loss of postural stability, and able to walk on toes. Unable to walk to heels or perform tandem. Unable to stay steady  balancing on one leg, did not perform one legged hops due to instability. Able to perform chair rise without push up on both legs    Assessment:     #Relapsing Remitting Multiple sclerosis  The patient remains clinically stable with no evidence of active inflammatory demyelination on current disease-modifying therapy with Kesimpta (self-administered monthly) . She is tolerating the medication without any side effects. I will obtain routine laboratory studies to monitor for hematologic or immunologic toxicity, include complete blood counts with differential, hepatic panel, total antibody (IgG) level, and CD19 B-cell count.     Will plan on seeing the patient virtually in 6 months for a review, or sooner if the patient has any new symptoms that are of concern. Will plan for repeat MRI imaging (brain and cervical spine) prior to our next visit.    #Cold inintolerance  #Anemia 2/2 Iron deficiency  Patient reports that she just finished her last iron infusion for known anemia last week. There appears to be a 2pt drop in hgb from CBC in October compared to July. Will repeat CBC today. We discussed how autonomic symptoms can rarely be associated with MS. She tells me that she has a normal temperature during these cold spells. I suspect that these episodes may be due to other systemic issues including anemia. Will also repeat TSH serology.    #Daytime fatigue  #C/f sleep apnea  Patient reported persistent daytime fatigue.  She gets about 6 hours a night but will wake up periodically to use the bathroom at night.  sounds like there is also some sleep delay.  Her partner reports that she does snore at night.  She has not been evaluated with a sleep study in the past.  She is agreeable to referral today.        Plan:   -Continue Kesimpta injections monthly  -Blood work: TSH, CBC, total antibodies, CD19, hepatic function  -Referral for outpatient sleep study   - Follow-up in 6 months [facility] with repeat MRI of the brain and  cervical spine ([place])  -continue Vit D supplementation     I saw and discussed this patient with neurology attending, Dr. Gracia.     Phoebe Betancourt MD  Neurology PGY-2    Attending physician: I saw and evaluated the patient with Dr. Betancourt and edited the note. I agree with the findings and plan of care as documented above, with the following additions.     The patient is experiencing increasing fatigue; this may relate to poor sleep quality as well as MS-associated fatigue. I will refer her to sleep medicine to evaluate for sleep disordered breathing, and also check a TSH level.    Her examination shows some difficulty with balance (unable to balance on one leg or walk in tandem). Fortunately, she is not falling. I counseled her to use rails whenever available when going up and down stairs and to exercise particular caution in winter weather conditions. We will continue to keep an eye on her walking over time.    Remainder as above.    The longitudinal plans of care for the diagnoses as documented were addressed during this visit. Due to the added complexity in care, I will continue to support the patient in subsequent management and with ongoing continuity of care.    Santo Gracia MD    of Neurology  [facility]    Cc:  Muriel Jones MD (PCP)  Patient      Again, thank you for allowing me to participate in the care of your patient.      Sincerely,    Santo Gracia MD

## 2024-12-26 NOTE — LETTER
12/26/2024      RE: Radha DELAROSA Mericle  [address]  [address]  [place] [address]     Date of Service: 12/26/2024     [facility] Neurology   MS Clinic Evaluation     Subjective: 34-year-old female with type 2 diabetes, hyperlipidemia, hypertension, anemia, GERD, RRMS currently on Kesimpta presenting for follow-up visit in MS clinic today.    Today, the patient denies any new episodic changes in vision, balance, strength or sensation suggestive of new relapse of multiple sclerosis since they were last seen.  She denies any recent falls.  She is currently seeing PT about once a week to work on her ambulation/tight hips.  She does not think the exercises are specifically catered toward balance.  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  She participates in minimal physical activity, walking the halls at work.  Her sleep is inconsistent but she endorses approximately 6 hours a night.  Sometimes it takes her 1 to 2 hours to fall asleep.  She wakes up in the middle of the night to use the bathroom.    She states that she wakes up fatigued even after getting 6 hours of sleep at night and her partner endorses that she snores.  She does not wake up gasping for air. She denies any increased urinary frequency/urgency.  She endorses reduced appetite since starting Wegovy.  She also has some constipation but is she is working with her primary care.  She is taking fiber supplementation.  She also tells me that she has been feeling cold a lot more in the last few years, probably worsened in the last year.  She denies a numbness sensation states that her feet feel cold and then it spreads to the rest of her body.  She occasionally will take her temperature during these episodes and states that she is normal temperature.  She states that sometimes in the middle of the night, the coolness will wake her up and she has to put on socks.    Date of Onset:  5/2013  Date of  Diagnosis:  dx with CIS around 2013, formally dx in 2018   Initial Clinical Course:  RRMS  Current Clinical Course:  RRMS  Past Disease Modifying Therapy(ies):  Tecfidera: had GI side effects. Copaxone-MRI showed interval demyelinating changes.   Current Disease Modifying Therapy(ies): Kesimpta          Allergies   Allergen Reactions    Cats     Sulfa Antibiotics Rash     Sulfonamide antibiotics      Current Outpatient Medications   Medication Sig Dispense Refill    acetylcysteine (NAC) 600 MG CAPS capsule Take 600 mg by mouth 2 times daily      blood glucose monitoring (ONE TOUCH ULTRA 2) meter device kit See Admin Instructions      cyclobenzaprine (FLEXERIL) 10 MG tablet Take 0.5-1 tablets (5-10 mg) by mouth daily as needed for muscle spasms 30 tablet 1    diazepam (VALIUM) 2 MG tablet Take 1 tablet (2 mg) by mouth every 6 hours as needed for anxiety Take one to two tablets as needed prior to travel 4 tablet 0    famotidine (PEPCID) 40 MG tablet Take 1 tablet (40 mg) by mouth daily. 90 tablet 3    INOSITOL-D CHIRO-INOSITOL PO Take 2,000 mg by mouth daily.      KESIMPTA 20 MG/0.4ML injection INJECT THE CONTENTS OF 1 AUTOINJECTOR PEN UNDER THE SKIN EVERY 4 WEEKS 0.4 mL 12    Lancets (ONETOUCH DELICA PLUS [id number]) MISC       losartan (COZAAR) 25 MG tablet Take 1 tablet (25 mg) by mouth daily 90 tablet 1    Multiple Vitamin (MULTIVITAMIN ADULT PO) Take 1 tablet by mouth daily      nystatin (MYCOSTATIN) 708182 UNIT/GM external powder Apply topically 3 times daily 30 g 0    ondansetron (ZOFRAN ODT) 4 MG ODT tab Take 1 tablet (4 mg) by mouth every 8 hours as needed for nausea. 30 tablet 11    ONETOUCH ULTRA test strip       order for DME Right ankle ASO 1 Device 0    progesterone (PROMETRIUM) 100 MG capsule Take 1 capsule (100 mg) by mouth daily Starting on peak +3 for 10 days out of month 30 capsule 3    Semaglutide-Weight Management (WEGOVY) 2.4 MG/0.75ML pen Inject 2.4 mg subcutaneously once a week. 9 mL 3     valACYclovir (VALTREX) 500 MG tablet TAKE 1 TABLET BY MOUTH TWICE DAILY WITH  ACTIVE  LESION 30 tablet 5     No current facility-administered medications for this visit.            Past medical, surgical, social and family history was personally reviewed. Pertinent details noted above.      Physical Examination:   Neurologic  Mental Status:  alert, oriented x 3, follows commands, speech clear and fluent  Cranial Nerves:  visual fields intact, PERRL, EOMI with normal smooth pursuit, facial sensation intact and symmetric, facial movements symmetric, hearing not formally tested but intact to conversation, palate elevation symmetric and uvula midline, no dysarthria, shoulder shrug strong bilaterally, tongue protrusion midline  Motor:  normal muscle tone and bulk, no abnormal movements, able to move all limbs spontaneously, no pronator drift   Right Left   Arm abduction 5/5 5/5   Elbow Flex 5/5 5/5   Elbow Extension 5/5 5/5   Wrist Extension 5/5 5/5     5/5 5/5   Hip Flexion 4/5 5/5   Knee Flexion 5/5 5/5   Knee Extension 5/5 5/5   Dorsiflexion  5/5 5/5   Plantarflexion 5/5 5/5      Reflexes: brisk and symmetric in UE and LE. 2 beats clonus bilaterally. toes down-going  Sensory:  light touch sensation intact and symmetric throughout upper and lower extremities, no extinction on double simultaneous stimulation   Coordination:  normal finger-to-nose and heel-to-shin bilaterally without dysmetria, rapid alternating movements symmetric  Station/Gait:  walking without  assistive device. The patient is able to ambulate on a flat, level surface with no gross loss of postural stability, and able to walk on toes. Unable to walk to heels or perform tandem. Unable to stay steady balancing on one leg, did not perform one legged hops due to instability. Able to perform chair rise without push up on both legs    Assessment:     #Relapsing Remitting Multiple sclerosis  The patient remains clinically stable with no evidence of  active inflammatory demyelination on current disease-modifying therapy with Kesimpta (self-administered monthly) . She is tolerating the medication without any side effects. I will obtain routine laboratory studies to monitor for hematologic or immunologic toxicity, include complete blood counts with differential, hepatic panel, total antibody (IgG) level, and CD19 B-cell count.     Will plan on seeing the patient virtually in 6 months for a review, or sooner if the patient has any new symptoms that are of concern. Will plan for repeat MRI imaging (brain and cervical spine) prior to our next visit.    #Cold inintolerance  #Anemia 2/2 Iron deficiency  Patient reports that she just finished her last iron infusion for known anemia last week. There appears to be a 2pt drop in hgb from CBC in October compared to July. Will repeat CBC today. We discussed how autonomic symptoms can rarely be associated with MS. She tells me that she has a normal temperature during these cold spells. I suspect that these episodes may be due to other systemic issues including anemia. Will also repeat TSH serology.    #Daytime fatigue  #C/f sleep apnea  Patient reported persistent daytime fatigue.  She gets about 6 hours a night but will wake up periodically to use the bathroom at night.  sounds like there is also some sleep delay.  Her partner reports that she does snore at night.  She has not been evaluated with a sleep study in the past.  She is agreeable to referral today.        Plan:   -Continue Kesimpta injections monthly  -Blood work: TSH, CBC, total antibodies, CD19, hepatic function  -Referral for outpatient sleep study   - Follow-up in 6 months [facility] with repeat MRI of the brain and cervical spine ([place])  -continue Vit D supplementation     I saw and discussed this patient with neurology attending, Dr. Gracia.     Phoebe Betancourt MD  Neurology PGY-2    Attending physician: I saw and evaluated the patient with Dr. Betancourt  and edited the note. I agree with the findings and plan of care as documented above, with the following additions.     The patient is experiencing increasing fatigue; this may relate to poor sleep quality as well as MS-associated fatigue. I will refer her to sleep medicine to evaluate for sleep disordered breathing, and also check a TSH level.    Her examination shows some difficulty with balance (unable to balance on one leg or walk in tandem). Fortunately, she is not falling. I counseled her to use rails whenever available when going up and down stairs and to exercise particular caution in winter weather conditions. We will continue to keep an eye on her walking over time.    Remainder as above.    The longitudinal plans of care for the diagnoses as documented were addressed during this visit. Due to         the added complexity in care, I will continue to support the patient in subsequent management and with ongoing continuity of care.    Santo Gracia MD    of Neurology  [facility]    Cc:  Muriel Jones MD (PCP)  Patient

## 2024-12-27 LAB — IGG SERPL-MCNC: 1139 MG/DL (ref 610–1616)

## 2025-01-02 ENCOUNTER — MYC MEDICAL ADVICE (OUTPATIENT)
Dept: NEUROLOGY | Facility: CLINIC | Age: 35
End: 2025-01-02
Payer: COMMERCIAL

## 2025-01-13 ENCOUNTER — HOSPITAL ENCOUNTER (OUTPATIENT)
Dept: ULTRASOUND IMAGING | Facility: HOSPITAL | Age: 35
Discharge: HOME | End: 2025-01-13
Attending: FAMILY MEDICINE | Admitting: FAMILY MEDICINE
Payer: COMMERCIAL

## 2025-01-13 DIAGNOSIS — Z09 FOLLOW-UP EXAM, 3-6 MONTHS SINCE PREVIOUS EXAM: ICD-10-CM

## 2025-01-13 PROCEDURE — 76642 ULTRASOUND BREAST LIMITED: CPT | Mod: LT

## 2025-01-15 ENCOUNTER — THERAPY VISIT (OUTPATIENT)
Dept: PHYSICAL THERAPY | Facility: HOSPITAL | Age: 35
End: 2025-01-15
Attending: FAMILY MEDICINE
Payer: COMMERCIAL

## 2025-01-15 DIAGNOSIS — M25.552 BILATERAL HIP PAIN: Primary | ICD-10-CM

## 2025-01-15 DIAGNOSIS — M25.551 BILATERAL HIP PAIN: Primary | ICD-10-CM

## 2025-01-15 PROCEDURE — 97530 THERAPEUTIC ACTIVITIES: CPT | Mod: GP

## 2025-01-15 PROCEDURE — 97110 THERAPEUTIC EXERCISES: CPT | Mod: GP

## 2025-01-15 ASSESSMENT — ACTIVITIES OF DAILY LIVING (ADL)
HOS_ADL_COUNT: 17
WALKING_INITIALLY: NO DIFFICULTY AT ALL
RECREATIONAL_ACTIVITIES: SLIGHT DIFFICULTY
HEAVY_WORK: MODERATE DIFFICULTY
GOING_UP_1_FLIGHT_OF_STAIRS: SLIGHT DIFFICULTY
STANDING_FOR_15_MINUTES: SLIGHT DIFFICULTY
LIGHT_TO_MODERATE_WORK: SLIGHT DIFFICULTY
WALKING_UP_STEEP_HILLS: MODERATE DIFFICULTY
TWISTING/PIVOTING_ON_INVOLVED_LEG: SLIGHT DIFFICULTY
HOS_ADL_SCORE(%): 72.06
GETTING_INTO_AND_OUT_OF_AN_AVERAGE_CAR: NO DIFFICULTY AT ALL
GOING_DOWN_1_FLIGHT_OF_STAIRS: SLIGHT DIFFICULTY
ROLLING_OVER_IN_BED: NO DIFFICULTY AT ALL
WALKING_15_MINUTES_OR_GREATER: MODERATE DIFFICULTY
STEPPING_UP_AND_DOWN_CURBS: SLIGHT DIFFICULTY
PUTTING_ON_SOCKS_AND_SHOES: NO DIFFICULTY AT ALL
GETTING_INTO_AND_OUT_OF_A_BATHTUB: SLIGHT DIFFICULTY
DEEP_SQUATTING: MODERATE DIFFICULTY
HOS_ADL_HIGHEST_POTENTIAL_SCORE: 68
WALKING_DOWN_STEEP_HILLS: MODERATE DIFFICULTY
HOS_ADL_ITEM_SCORE_TOTAL: 49
WALKING_APPROXIMATELY_10_MINUTES: SLIGHT DIFFICULTY
HOW_WOULD_YOU_RATE_YOUR_CURRENT_LEVEL_OF_FUNCTION_DURING_YOUR_USUAL_ACTIVITIES_OF_DAILY_LIVING_FROM_0_TO_100_WITH_100_BEING_YOUR_LEVEL_OF_FUNCTION_PRIOR_TO_YOUR_HIP_PROBLEM_AND_0_BEING_THE_INABILITY_TO_PERFORM_ANY_OF_YOUR_USUAL_DAILY_ACTIVITIES?: 85
SITTING_FOR_15_MINUTES: NO DIFFICULTY AT ALL

## 2025-01-29 ENCOUNTER — OFFICE VISIT (OUTPATIENT)
Dept: FAMILY MEDICINE | Facility: OTHER | Age: 35
End: 2025-01-29
Attending: FAMILY MEDICINE
Payer: COMMERCIAL

## 2025-01-29 VITALS
TEMPERATURE: 98 F | RESPIRATION RATE: 16 BRPM | BODY MASS INDEX: 24.49 KG/M2 | HEIGHT: 65 IN | WEIGHT: 147 LBS | SYSTOLIC BLOOD PRESSURE: 156 MMHG | DIASTOLIC BLOOD PRESSURE: 93 MMHG | OXYGEN SATURATION: 100 % | HEART RATE: 81 BPM

## 2025-01-29 DIAGNOSIS — E55.9 HYPOVITAMINOSIS D: ICD-10-CM

## 2025-01-29 DIAGNOSIS — N94.6 DYSMENORRHEA: ICD-10-CM

## 2025-01-29 DIAGNOSIS — E11.65 TYPE 2 DIABETES MELLITUS WITH HYPERGLYCEMIA, WITHOUT LONG-TERM CURRENT USE OF INSULIN (H): ICD-10-CM

## 2025-01-29 DIAGNOSIS — R73.09 ELEVATED GLUCOSE: ICD-10-CM

## 2025-01-29 DIAGNOSIS — E53.8 VITAMIN B12 DEFICIENCY (NON ANEMIC): ICD-10-CM

## 2025-01-29 DIAGNOSIS — E78.5 HYPERLIPIDEMIA LDL GOAL <130: ICD-10-CM

## 2025-01-29 DIAGNOSIS — Z00.00 ANNUAL PHYSICAL EXAM: Primary | ICD-10-CM

## 2025-01-29 DIAGNOSIS — K21.9 GASTROESOPHAGEAL REFLUX DISEASE WITHOUT ESOPHAGITIS: ICD-10-CM

## 2025-01-29 DIAGNOSIS — G35 MS (MULTIPLE SCLEROSIS) (H): ICD-10-CM

## 2025-01-29 DIAGNOSIS — F41.1 GAD (GENERALIZED ANXIETY DISORDER): ICD-10-CM

## 2025-01-29 DIAGNOSIS — I73.00 RAYNAUD'S PHENOMENON WITHOUT GANGRENE: ICD-10-CM

## 2025-01-29 DIAGNOSIS — I10 BENIGN ESSENTIAL HYPERTENSION: ICD-10-CM

## 2025-01-29 DIAGNOSIS — E61.1 IRON DEFICIENCY: ICD-10-CM

## 2025-01-29 RX ORDER — LOSARTAN POTASSIUM 25 MG/1
25 TABLET ORAL DAILY
Qty: 90 TABLET | Refills: 1 | Status: SHIPPED | OUTPATIENT
Start: 2025-01-29 | End: 2025-01-29

## 2025-01-29 RX ORDER — AMLODIPINE BESYLATE 2.5 MG/1
2.5 TABLET ORAL DAILY
Qty: 30 TABLET | Refills: 1 | Status: SHIPPED | OUTPATIENT
Start: 2025-01-29

## 2025-01-29 SDOH — HEALTH STABILITY: PHYSICAL HEALTH: ON AVERAGE, HOW MANY DAYS PER WEEK DO YOU ENGAGE IN MODERATE TO STRENUOUS EXERCISE (LIKE A BRISK WALK)?: 5 DAYS

## 2025-01-29 SDOH — HEALTH STABILITY: PHYSICAL HEALTH: ON AVERAGE, HOW MANY MINUTES DO YOU ENGAGE IN EXERCISE AT THIS LEVEL?: 30 MIN

## 2025-01-29 ASSESSMENT — PATIENT HEALTH QUESTIONNAIRE - PHQ9
10. IF YOU CHECKED OFF ANY PROBLEMS, HOW DIFFICULT HAVE THESE PROBLEMS MADE IT FOR YOU TO DO YOUR WORK, TAKE CARE OF THINGS AT HOME, OR GET ALONG WITH OTHER PEOPLE: SOMEWHAT DIFFICULT
SUM OF ALL RESPONSES TO PHQ QUESTIONS 1-9: 6
SUM OF ALL RESPONSES TO PHQ QUESTIONS 1-9: 6

## 2025-01-29 ASSESSMENT — ANXIETY QUESTIONNAIRES
8. IF YOU CHECKED OFF ANY PROBLEMS, HOW DIFFICULT HAVE THESE MADE IT FOR YOU TO DO YOUR WORK, TAKE CARE OF THINGS AT HOME, OR GET ALONG WITH OTHER PEOPLE?: SOMEWHAT DIFFICULT
6. BECOMING EASILY ANNOYED OR IRRITABLE: NOT AT ALL
GAD7 TOTAL SCORE: 5
5. BEING SO RESTLESS THAT IT IS HARD TO SIT STILL: SEVERAL DAYS
4. TROUBLE RELAXING: SEVERAL DAYS
7. FEELING AFRAID AS IF SOMETHING AWFUL MIGHT HAPPEN: NOT AT ALL
1. FEELING NERVOUS, ANXIOUS, OR ON EDGE: SEVERAL DAYS
3. WORRYING TOO MUCH ABOUT DIFFERENT THINGS: SEVERAL DAYS
IF YOU CHECKED OFF ANY PROBLEMS ON THIS QUESTIONNAIRE, HOW DIFFICULT HAVE THESE PROBLEMS MADE IT FOR YOU TO DO YOUR WORK, TAKE CARE OF THINGS AT HOME, OR GET ALONG WITH OTHER PEOPLE: SOMEWHAT DIFFICULT
7. FEELING AFRAID AS IF SOMETHING AWFUL MIGHT HAPPEN: NOT AT ALL
2. NOT BEING ABLE TO STOP OR CONTROL WORRYING: SEVERAL DAYS

## 2025-01-29 ASSESSMENT — PAIN SCALES - GENERAL: PAINLEVEL_OUTOF10: SEVERE PAIN (7)

## 2025-01-29 ASSESSMENT — LIFESTYLE VARIABLES
SKIP TO QUESTIONS 9-10: 1
AUDIT-C TOTAL SCORE: 1
HOW OFTEN DO YOU HAVE SIX OR MORE DRINKS ON ONE OCCASION: NEVER
HOW OFTEN DO YOU HAVE A DRINK CONTAINING ALCOHOL: MONTHLY OR LESS
HOW MANY STANDARD DRINKS CONTAINING ALCOHOL DO YOU HAVE ON A TYPICAL DAY: PATIENT DOES NOT DRINK

## 2025-01-29 ASSESSMENT — SOCIAL DETERMINANTS OF HEALTH (SDOH): HOW OFTEN DO YOU GET TOGETHER WITH FRIENDS OR RELATIVES?: NEVER

## 2025-01-29 NOTE — PROGRESS NOTES
The longitudinal plan of care for the diagnosis(es)/condition(s) as documented were addressed during this visit. Due to the added complexity in care, I will continue to support Radha in the subsequent management and with ongoing continuity of care.      Preventive Care Visit  [facility]  Muriel Jones MD, Family Medicine  Jan 29, 2025      Assessment & Plan     Annual physical exam  Follow-up 1 year    Vitamin B12 deficiency (non anemic)  pending  - Vitamin B12; Future    MS (multiple sclerosis) (H)  Doing really well, gait has improved and is more smooth  Has some small fasciculations on strength testing    Dysmenorrhea  With menorrhagia, trying a different kind of inositol    Hypovitaminosis D  pending  - Vitamin D Deficiency; Future    Gastroesophageal reflux disease without esophagitis  stable    Iron deficiency  Labs tomorrow  - Iron and iron binding capacity; Future  - Ferritin; Future  - CBC with platelets; Future    MARIBETH (generalized anxiety disorder)  Doing well    Type 2 diabetes mellitus with hyperglycemia, without long-term current use of insulin (H)  Stable, really doesn't have diabetes anymore  - Hemoglobin A1c; Future    Hyperlipidemia LDL goal <130  stable  - Comprehensive metabolic panel (BMP + Alb, Alk Phos, ALT, AST, Total. Bili, TP); Future  - Lipid Profile (Chol, Trig, HDL, LDL calc); Future    Elevated glucose  Really good  - Hemoglobin A1c; Future    Benign essential hypertension  Start low dose, hasn't started losartan yet  - amLODIPine (NORVASC) 2.5 MG tablet; Take 1 tablet (2.5 mg) by mouth daily.    Raynaud's phenomenon without gangrene  Likely in part second to weight loss  - amLODIPine (NORVASC) 2.5 MG tablet; Take 1 tablet (2.5 mg) by mouth daily.    Patient has been advised of split billing requirements and indicates understanding: Yes    Counseling  Appropriate preventive services were addressed with this patient via screening, questionnaire, or discussion as  appropriate for fall prevention, nutrition, physical activity, Tobacco-use cessation, social engagement, weight loss and cognition.  Checklist reviewing preventive services available has been given to the patient.  Reviewed patient's diet, addressing concerns and/or questions.   Patient is at risk for social isolation and has been provided with information about the benefit of social connection.   The patient's PHQ-9 score is consistent with mild depression. She was provided with information regarding depression.     Patient was agreeable to this plan and had no further questions.  Patient Instructions   Dr Sacha irizarry  --   2.  Dr Aliya Schaefer -- [place]  3.  [facility] -- [place]    No follow-ups on file.    Maya Garcia is a 34 year old, presenting for the following:  Diabetes, Physical, Lipids, and Anxiety        1/29/2025    11:31 AM   Additional Questions   Roomed by Maria Isabel Pozo   Accompanied by None         1/29/2025    11:31 AM   Patient Reported Additional Medications   Patient reports taking the following new medications None          HPI  Diabetes Follow-up    How often are you checking your blood sugar? Not at all  What concerns do you have today about your diabetes? None   Do you have any of these symptoms? (Select all that apply)  No numbness or tingling in feet.  No redness, sores or blisters on feet.  No complaints of excessive thirst.  No reports of blurry vision.  No significant changes to weight.      BP Readings from Last 2 Encounters:   01/29/25 (!) 156/93   12/26/24 (!) 164/99     Hemoglobin A1C (%)   Date Value   10/21/2024 4.8   07/01/2024 5.5   09/11/2020 6.4 (H)   06/12/2019 5.9 (H)     LDL Cholesterol Calculated (mg/dL)   Date Value   12/21/2023 147 (H)   12/15/2022 154 (H)   09/11/2020 134 (H)   06/12/2019 134 (H)       Hyperlipidemia Follow-Up    Are you regularly taking any medication or supplement to lower your cholesterol?   No  Are you having muscle  aches or other side effects that you think could be caused by your cholesterol lowering medication?  No patient is not currently taking a cholesterol lowering medication     Anxiety   How are you doing with your anxiety since your last visit? No change  Are you having other symptoms that might be associated with anxiety? No  Have you had a significant life event? No   Are you feeling depressed? No  Do you have any concerns with your use of alcohol or other drugs? No    Social History     Tobacco Use    Smoking status: Never     Passive exposure: Never    Smokeless tobacco: Never   Vaping Use    Vaping status: Never Used   Substance Use Topics    Alcohol use: Not Currently     Comment: rare    Drug use: No         7/1/2024    12:56 PM 10/21/2024    10:11 AM 1/29/2025    11:08 AM   MARIBETH-7 SCORE   Total Score 3 (minimal anxiety) 5 (mild anxiety) 5 (mild anxiety)   Total Score 3 5 5        Patient-reported         7/1/2024    12:56 PM 10/21/2024    10:10 AM 1/29/2025    11:06 AM   PHQ   PHQ-9 Total Score 6 8 6    Q9: Thoughts of better off dead/self-harm past 2 weeks Not at all Not at all Not at all       Patient-reported         1/29/2025    11:06 AM   Last PHQ-9   1.  Little interest or pleasure in doing things 0   2.  Feeling down, depressed, or hopeless 0   3.  Trouble falling or staying asleep, or sleeping too much 2   4.  Feeling tired or having little energy 2   5.  Poor appetite or overeating 0   6.  Feeling bad about yourself 0   7.  Trouble concentrating 2   8.  Moving slowly or restless 0   Q9: Thoughts of better off dead/self-harm past 2 weeks 0   PHQ-9 Total Score 6        Patient-reported         1/29/2025    11:08 AM   MARIBETH-7    1. Feeling nervous, anxious, or on edge 1   2. Not being able to stop or control worrying 1   3. Worrying too much about different things 1   4. Trouble relaxing 1   5. Being so restless that it is hard to sit still 1   6. Becoming easily annoyed or irritable 0   7. Feeling afraid,  as if something awful might happen 0   MARIBETH-7 Total Score 5    If you checked any problems, how difficult have they made it for you to do your work, take care of things at home, or get along with other people? Somewhat difficult       Patient-reported     Health Care Directive  Patient does not have a Health Care Directive: Discussed advance care planning with patient; however, patient declined at this time.      1/29/2025   General Health   How would you rate your overall physical health? Good   Feel stress (tense, anxious, or unable to sleep) Not at all         1/29/2025   Nutrition   Three or more servings of calcium each day? Yes   Diet: Regular (no restrictions)   How many servings of fruit and vegetables per day? (!) 2-3   How many sweetened beverages each day? 0-1         1/29/2025   Exercise   Days per week of moderate/strenous exercise 5 days   Average minutes spent exercising at this level 30 min         1/29/2025   Social Factors   Frequency of gathering with friends or relatives Never   Worry food won't last until get money to buy more No   Food not last or not have enough money for food? No   Do you have housing? (Housing is defined as stable permanent housing and does not include staying ouside in a car, in a tent, in an abandoned building, in an overnight shelter, or couch-surfing.) Yes   Are you worried about losing your housing? No   Lack of transportation? No   Unable to get utilities (heat,electricity)? No   (!) SOCIAL CONNECTIONS CONCERN      1/29/2025   Dental   Dentist two times every year? Yes          Today's PHQ-9 Score:       1/29/2025    11:06 AM   PHQ-9 SCORE   PHQ-9 Total Score MyChart 6 (Mild depression)   PHQ-9 Total Score 6        Patient-reported         1/29/2025   Substance Use   Alcohol more than 3/day or more than 7/wk No   Do you use any other substances recreationally? No     Social History     Tobacco Use    Smoking status: Never     Passive exposure: Never    Smokeless  tobacco: Never   Vaping Use    Vaping status: Never Used   Substance Use Topics    Alcohol use: Not Currently     Comment: rare    Drug use: No           1/24/2024   LAST FHS-7 RESULTS   1st degree relative breast or ovarian cancer No   Any relative bilateral breast cancer No   Any male have breast cancer No   Any ONE woman have BOTH breast AND ovarian cancer No   Any woman with breast cancer before 50yrs No   2 or more relatives with breast AND/OR ovarian cancer Yes   2 or more relatives with breast AND/OR bowel cancer Yes        Mammogram Screening - Patient under 40 years of age: Routine Mammogram Screening not recommended.         1/29/2025   STI Screening   New sexual partner(s) since last STI/HIV test? No     History of abnormal Pap smear: No - age 30-64 HPV with reflex Pap every 5 years recommended        Latest Ref Rng & Units 12/14/2022     3:50 PM 10/11/2017     4:27 PM 4/15/2014    12:00 AM   PAP / HPV   PAP  Negative for Intraepithelial Lesion or Malignancy (NILM)      PAP (Historical)   NIL  NIL    HPV 16 DNA Negative Negative  Negative     HPV 18 DNA Negative Negative  Negative     Other HR HPV Negative Negative  Negative             1/29/2025   Contraception/Family Planning   Questions about contraception or family planning No        Reviewed and updated as needed this visit by Provider                    Past Medical History:   Diagnosis Date    AC separation, left, sequela     saw Dr Andre    ADHD (attention deficit hyperactivity disorder)     Attention deficit hyperactivity disorder (ADHD), predominantly inattentive type 02/28/2014     Problem list name updated by automated process. Provider to review    Benign essential hypertension 02/14/2020    Cervicalgia 11/12/2021    Cold sore     Costochondral pain 11/29/2019    Diabetes mellitus, type 2 (H) 03/31/2023    Dysmenorrhea 08/18/2016    Elevated liver enzymes 2014    resolved 2015    Fibrocystic breast     MARIBETH (generalized anxiety disorder)      Hyperlipidemia with target LDL less than 100 2014     Diagnosis updated by automated process. Provider to review and confirm.    Hypovitaminosis D     Infertility, female, secondary 2016    Intramural leiomyoma of uterus 2022    Migraine     MS (multiple sclerosis) (H)     Neuropathy     feet    Obesity     Other specified anemias 2023    PCOS (polycystic ovarian syndrome)     Tubal pregnancy without intrauterine pregnancy 2013    Visual field scotoma 2013    Vitamin B12 deficiency (non anemic) 2015     Past Surgical History:   Procedure Laterality Date    ESOPHAGOSCOPY, GASTROSCOPY, DUODENOSCOPY (EGD), COMBINED N/A 2023    Procedure: Upper endoscopy  with biopsy;  Surgeon: Rudi Romano MD;  Location: [facility] OR    GYN SURGERY  2010    HYSTEROSCOPY,DIAGNOSTIC  2016    done here and at [facility]    LAPAROSCOPIC SALPINGECTOMY Left 2010    ruptured ectopic pregnancy    wisdom teeth       OB History    Para Term  AB Living   2 0 0 0 2 0   SAB IAB Ectopic Multiple Live Births   1 0 1 0 0      # Outcome Date GA Lbr Sage/2nd Weight Sex Type Anes PTL Lv   2 SAB 2023    U SAB      1 Ectopic 05/01/10     ECTOPIC   FD      Birth Comments: tubal     Lab work is in process  Labs reviewed in EPIC  BP Readings from Last 3 Encounters:   25 (!) 156/93   24 (!) 164/99   24 (!) 147/94    Wt Readings from Last 3 Encounters:   25 66.7 kg (147 lb)   24 72.1 kg (158 lb 14.4 oz)   24 68 kg (150 lb)                  Patient Active Problem List   Diagnosis    Attention deficit hyperactivity disorder (ADHD), predominantly inattentive type    Hyperlipidemia LDL goal <130    PCOS (polycystic ovarian syndrome)    Elevated liver enzymes    MS (multiple sclerosis) (H)    Vitamin B12 deficiency (non anemic)    Adiposity    Skin sensation disturbance    ACP (advance care planning)    Dysmenorrhea    MARIBETH (generalized anxiety  disorder)    Hypovitaminosis D    Infertility, female, secondary    Stress    Benign essential hypertension    Prediabetes    Chronic tension-type headache, not intractable    Cold sore    Cervicalgia    Weight gain    Intramural leiomyoma of uterus    Pelvic pain in female    Right ovarian cyst    Tonsil stone    Iron deficiency    Gastroesophageal reflux disease without esophagitis    Procreative counseling and advice using natural family planning    Slow transit constipation    Candidiasis of skin    Other specified anemias    Menorrhagia with regular cycle    Chronic fatigue    Anemia due to blood loss, acute    Sprain of deltoid ligament of right ankle, initial encounter    Type 2 diabetes mellitus with hyperglycemia, without long-term current use of insulin (H)    AC separation, left, sequela    Bilateral hip pain    Dysuria     Past Surgical History:   Procedure Laterality Date    ESOPHAGOSCOPY, GASTROSCOPY, DUODENOSCOPY (EGD), COMBINED N/A 01/20/2023    Procedure: Upper endoscopy  with biopsy;  Surgeon: Rudi Romano MD;  Location: [facility]    GYN SURGERY  4/2010    HYSTEROSCOPY,DIAGNOSTIC  2016    done here and at [facility]    LAPAROSCOPIC SALPINGECTOMY Left 05/01/2010    ruptured ectopic pregnancy    wisdom teeth  2014       Social History     Tobacco Use    Smoking status: Never     Passive exposure: Never    Smokeless tobacco: Never   Substance Use Topics    Alcohol use: Not Currently     Comment: rare     Family History   Problem Relation Age of Onset    Endometriosis Mother         s/p ablation    Osteoarthritis Father         due to work as a martin    Family History Negative Sister         +tobacco    Hypertension Sister     Obesity Sister         iron def    Hypertension Sister     Diabetes Sister         pre    Migraines Sister     Attention Deficit Disorder Sister     Family History Negative Brother         +tobacco    Breast Cancer Maternal Grandmother 64    Unknown/Adopted Maternal  Grandfather         severe gerd    Lung Cancer Paternal Grandmother         +tobacco    Heart Disease Paternal Grandfather         AMI/CAD or CVA    Hendry Disease Maternal Uncle          Current Outpatient Medications   Medication Sig Dispense Refill    acetylcysteine (NAC) 600 MG CAPS capsule Take 600 mg by mouth 2 times daily      blood glucose monitoring (ONE TOUCH ULTRA 2) meter device kit See Admin Instructions      cyclobenzaprine (FLEXERIL) 10 MG tablet Take 0.5-1 tablets (5-10 mg) by mouth daily as needed for muscle spasms 30 tablet 1    diazepam (VALIUM) 2 MG tablet Take 1 tablet (2 mg) by mouth every 6 hours as needed for anxiety Take one to two tablets as needed prior to travel 4 tablet 0    famotidine (PEPCID) 40 MG tablet Take 1 tablet (40 mg) by mouth daily. 90 tablet 3    KESIMPTA 20 MG/0.4ML injection INJECT THE CONTENTS OF 1 AUTOINJECTOR PEN UNDER THE SKIN EVERY 4 WEEKS 0.4 mL 12    Lancets (ONETOUCH DELICA PLUS [id number]) MISC       losartan (COZAAR) 25 MG tablet Take 1 tablet (25 mg) by mouth daily. 90 tablet 1    Multiple Vitamin (MULTIVITAMIN ADULT PO) Take 1 tablet by mouth daily      nystatin (MYCOSTATIN) 497906 UNIT/GM external powder Apply topically 3 times daily 30 g 0    ondansetron (ZOFRAN ODT) 4 MG ODT tab Take 1 tablet (4 mg) by mouth every 8 hours as needed for nausea. 30 tablet 11    ONETOUCH ULTRA test strip       order for DME Right ankle ASO 1 Device 0    progesterone (PROMETRIUM) 100 MG capsule Take 1 capsule (100 mg) by mouth daily Starting on peak +3 for 10 days out of month 30 capsule 3    Semaglutide-Weight Management (WEGOVY) 2.4 MG/0.75ML pen Inject 2.4 mg subcutaneously once a week. 9 mL 3    valACYclovir (VALTREX) 500 MG tablet TAKE 1 TABLET BY MOUTH TWICE DAILY WITH  ACTIVE  LESION 30 tablet 5    INOSITOL-D CHIRO-INOSITOL PO Take 2,000 mg by mouth daily.       Allergies   Allergen Reactions    Cats     Sulfa Antibiotics Rash     Sulfonamide antibiotics     Recent  "Labs   Lab Test 12/26/24  1124 10/21/24  1033 07/01/24  1426 03/27/24  1638 12/21/23  1300 07/06/23  0750 03/31/23  1706 01/31/23  1351 12/15/22  0748 01/14/22  0627 11/15/21  0736 11/13/20  0840 09/11/20  0816   0000   A1C  --  4.8 5.5 7.1* 10.1*   < > 7.6*  --  7.4*  --  6.5*  --  6.4*   < >   LDL  --   --   --   --  147*  --   --   --  154*  --  131*  --  134*  --    HDL  --   --   --   --  34*  --   --   --  41*  --  41*  --  39*  --    TRIG  --   --   --   --  119  --   --   --  113  --  111  --  96  --    ALT 10 14  --  48 279*  --  130*   < > 121*   < > 63* 88* 101*  --    CR  --  0.61  --  0.66 0.56  --  0.50*   < > 0.52   < > 0.60 0.60 0.56  --    GFRESTIMATED  --  >90  --  >90 >90  --  >90   < > >90   < > >90 >90 >90  --    GFRESTBLACK  --   --   --   --   --   --   --   --   --   --   --  >90 >90  --    POTASSIUM  --  4.1  --  4.1 3.6  --  4.1   < > 4.1   < > 3.8 4.1 3.9  --    TSH 0.36  --   --   --   --   --  0.66  --   --   --  0.89  --   --   --     < > = values in this interval not displayed.          Review of Systems  Constitutional, HEENT, cardiovascular, pulmonary, GI, , musculoskeletal, neuro, skin, endocrine and psych systems are negative, except as otherwise noted.     Objective    Exam  BP (!) 156/93 (BP Location: Right arm, Patient Position: Sitting, Cuff Size: Adult Regular)   Pulse 81   Temp 98  F (36.7  C) (Tympanic)   Resp 16   Ht 1.639 m (5' 4.53\")   Wt 66.7 kg (147 lb)   SpO2 100%   BMI 24.82 kg/m     Estimated body mass index is 24.82 kg/m  as calculated from the following:    Height as of this encounter: 1.639 m (5' 4.53\").    Weight as of this encounter: 66.7 kg (147 lb).    Physical Exam  GENERAL: alert and no distress  EYES: Eyes grossly normal to inspection, PERRL and conjunctivae and sclerae normal  HENT: ear canals and TM's normal, nose and mouth without ulcers or lesions  NECK: no adenopathy, no asymmetry, masses, or scars  RESP: lungs clear to auscultation - no " rales, rhonchi or wheezes  CV: regular rate and rhythm, normal S1 S2, no S3 or S4, no murmur, click or rub, no peripheral edema  ABDOMEN: soft, nontender, no hepatosplenomegaly, no masses and bowel sounds normal   (female) w/bimanual: normal female external genitalia, normal urethral meatus, normal vaginal mucosa, and normal cervix/adnexa/uterus without masses or discharge  MS: no gross musculoskeletal defects noted, no edema  SKIN: no suspicious lesions or rashes  NEURO: Normal strength and tone, mentation intact and speech normal  PSYCH: mentation appears normal, affect normal/bright        Signed Electronically by: Muriel Jones MD    Answers submitted by the patient for this visit:  Patient Health Questionnaire (Submitted on 1/29/2025)  If you checked off any problems, how difficult have these problems made it for you to do your work, take care of things at home, or get along with other people?: Somewhat difficult  PHQ9 TOTAL SCORE: 6  Patient Health Questionnaire (G7) (Submitted on 1/29/2025)  MARIBETH 7 TOTAL SCORE: 5

## 2025-01-29 NOTE — PATIENT INSTRUCTIONS
Dr Goncalves program  --   2.  Dr Aliya Schaefer -- [place]  3.  [facility] -- [place]

## 2025-01-30 ENCOUNTER — LAB (OUTPATIENT)
Dept: LAB | Facility: OTHER | Age: 35
End: 2025-01-30
Payer: COMMERCIAL

## 2025-01-30 DIAGNOSIS — E61.1 IRON DEFICIENCY: ICD-10-CM

## 2025-01-30 DIAGNOSIS — R73.09 ELEVATED GLUCOSE: ICD-10-CM

## 2025-01-30 DIAGNOSIS — E11.65 TYPE 2 DIABETES MELLITUS WITH HYPERGLYCEMIA, WITHOUT LONG-TERM CURRENT USE OF INSULIN (H): ICD-10-CM

## 2025-01-30 DIAGNOSIS — R74.8 ELEVATED LIVER ENZYMES: ICD-10-CM

## 2025-01-30 DIAGNOSIS — E53.8 VITAMIN B12 DEFICIENCY (NON ANEMIC): ICD-10-CM

## 2025-01-30 DIAGNOSIS — E55.9 HYPOVITAMINOSIS D: ICD-10-CM

## 2025-01-30 DIAGNOSIS — E78.5 HYPERLIPIDEMIA LDL GOAL <130: ICD-10-CM

## 2025-01-30 LAB
ALBUMIN SERPL BCG-MCNC: 4.6 G/DL (ref 3.5–5.2)
ALP SERPL-CCNC: 62 U/L (ref 40–150)
ALT SERPL W P-5'-P-CCNC: 11 U/L (ref 0–50)
ANION GAP SERPL CALCULATED.3IONS-SCNC: 11 MMOL/L (ref 7–15)
AST SERPL W P-5'-P-CCNC: 13 U/L (ref 0–45)
BILIRUB DIRECT SERPL-MCNC: <0.2 MG/DL (ref 0–0.3)
BILIRUB SERPL-MCNC: 0.2 MG/DL
BUN SERPL-MCNC: 13.6 MG/DL (ref 6–20)
CALCIUM SERPL-MCNC: 9 MG/DL (ref 8.8–10.4)
CHLORIDE SERPL-SCNC: 104 MMOL/L (ref 98–107)
CHOLEST SERPL-MCNC: 160 MG/DL
CREAT SERPL-MCNC: 0.68 MG/DL (ref 0.51–0.95)
EGFRCR SERPLBLD CKD-EPI 2021: >90 ML/MIN/1.73M2
ERYTHROCYTE [DISTWIDTH] IN BLOOD BY AUTOMATED COUNT: 22.5 % (ref 10–15)
EST. AVERAGE GLUCOSE BLD GHB EST-MCNC: 91 MG/DL
FASTING STATUS PATIENT QL REPORTED: YES
FASTING STATUS PATIENT QL REPORTED: YES
FERRITIN SERPL-MCNC: 18 NG/ML (ref 6–175)
GLUCOSE SERPL-MCNC: 95 MG/DL (ref 70–99)
HBA1C MFR BLD: 4.8 %
HCO3 SERPL-SCNC: 22 MMOL/L (ref 22–29)
HCT VFR BLD AUTO: 38.2 % (ref 35–47)
HDLC SERPL-MCNC: 40 MG/DL
HGB BLD-MCNC: 12.1 G/DL (ref 11.7–15.7)
IRON BINDING CAPACITY (ROCHE): 309 UG/DL (ref 240–430)
IRON SATN MFR SERPL: 8 % (ref 15–46)
IRON SERPL-MCNC: 24 UG/DL (ref 37–145)
LDLC SERPL CALC-MCNC: 110 MG/DL
MCH RBC QN AUTO: 23.1 PG (ref 26.5–33)
MCHC RBC AUTO-ENTMCNC: 31.7 G/DL (ref 31.5–36.5)
MCV RBC AUTO: 73 FL (ref 78–100)
NONHDLC SERPL-MCNC: 120 MG/DL
PLATELET # BLD AUTO: 581 10E3/UL (ref 150–450)
POTASSIUM SERPL-SCNC: 4 MMOL/L (ref 3.4–5.3)
PROT SERPL-MCNC: 7.7 G/DL (ref 6.4–8.3)
RBC # BLD AUTO: 5.23 10E6/UL (ref 3.8–5.2)
SODIUM SERPL-SCNC: 137 MMOL/L (ref 135–145)
TRIGL SERPL-MCNC: 50 MG/DL
VIT B12 SERPL-MCNC: 478 PG/ML (ref 232–1245)
VIT D+METAB SERPL-MCNC: 34 NG/ML (ref 20–50)
WBC # BLD AUTO: 9.3 10E3/UL (ref 4–11)

## 2025-01-30 PROCEDURE — 82728 ASSAY OF FERRITIN: CPT

## 2025-01-30 PROCEDURE — 83540 ASSAY OF IRON: CPT

## 2025-01-30 PROCEDURE — 82607 VITAMIN B-12: CPT

## 2025-01-30 PROCEDURE — 85027 COMPLETE CBC AUTOMATED: CPT

## 2025-01-30 PROCEDURE — 82306 VITAMIN D 25 HYDROXY: CPT

## 2025-01-30 PROCEDURE — 83550 IRON BINDING TEST: CPT

## 2025-01-30 PROCEDURE — 80053 COMPREHEN METABOLIC PANEL: CPT

## 2025-01-30 PROCEDURE — 80061 LIPID PANEL: CPT

## 2025-01-30 PROCEDURE — 82248 BILIRUBIN DIRECT: CPT

## 2025-01-30 PROCEDURE — 83036 HEMOGLOBIN GLYCOSYLATED A1C: CPT

## 2025-01-30 PROCEDURE — 36415 COLL VENOUS BLD VENIPUNCTURE: CPT

## 2025-02-03 NOTE — PROGRESS NOTES
01/15/25 0500   Appointment Info   Signing clinician's name / credentials Martine Florian DPT ([place])   Total/Authorized Visits 365   Visits Used 5   Medical Diagnosis Bilateral hip pain (M25.551, M25.552)   PT Tx Diagnosis Bilateral hip pain (M25.551, M25.552)   Progress Note/Certification   Onset of illness/injury or Date of Surgery 10/21/24   Therapy Frequency 1x/week tapered to discharge   Predicted Duration 8 weeks   Progress Note Completed Date 01/15/25   GOALS   PT Goals 2;3;4   PT Goal 1   Goal Identifier STG 1   Goal Description Patient will be independent with a short-term home exercise program.   Goal Progress Meeting   Target Date 12/19/24   Date Met 12/18/24   PT Goal 2   Goal Identifier STG 2   Goal Description Patient will understand and demonstrate improved posture and techniques such that patient places less strain over spine and supporting musculature.   Goal Progress Meeting   Target Date 12/19/24   Date Met 12/18/24   PT Goal 3   Goal Identifier LTG 1   Goal Description Improve score on utilized outcome measures to correlate with clinically significant change.   Goal Progress HoS slightly improved today   Target Date 03/12/25   PT Goal 4   Goal Identifier LTG 2   Goal Description Patient will endorse confidence in ability to manage home exercise program independently to promote continued progression and management of back pain symptoms following discharge from PT services.   Target Date 03/12/25   Subjective Report   Subjective Report Pt arrives for continued treatment of hip pain, weakness and dysfunction; Pt notes a high compliance with HEP except for a few weeks of rest after Nataliia due to illness; Pt feels that she is ready to resume PT services now that she is feeling healthier.   Treatment Interventions (PT)   Interventions Therapeutic Procedure/Exercise;Therapeutic Activity   Therapeutic Procedure/Exercise   Therapeutic Procedures: strength, endurance, ROM, flexibility minutes (49039) 30    Ther Proc 1 - Details Access Code: [email or web address]  URL: [email or web address]  Prepared by: Bonilla Florian    Exercises  - Standing Hip Hinge with Dowel  - 1 x daily  - ITB Stretch at Wall  - 1 x daily - 6 reps - 20 second hold  - IT Band Mobilization with Small Ball  - 1 x daily - 3-5 minute hold  - Clam  - 1 x daily - 3 x weekly - 2 sets - 10 reps  - Sidelying Reverse Clamshell  - 1 x daily - 3 x weekly - 2 sets - 10 reps  - Supine March with Heel Taps  - 1 x daily - 3 x weekly - 2 sets - 5-15 reps - short hold  - Hip Hiking on Step  - 1 x daily - 4 x weekly - 2 sets - 5-15 reps - short hold  - Long Sitting Hamstring Stretch  - 1 x daily - 4 x weekly - 2 reps - 20-30 second hold  - Hooklying Gluteal Sets  - 1 x daily - 4 x weekly - 2 sets - 5-15 reps - 10 second hold   Skilled Intervention Ther Ex and HEP Dev   Patient Response/Progress Fatigues quickly, but tolerates well.   Therapeutic Activity   Therapeutic Activities: dynamic activities to improve functional performance minutes (46493) 10   Ther Act 1 - Details Education and demonstration with current HEP to implement eccentric exercise progression concepts with assistance from partner or UE assist as able   Skilled Intervention Education   Patient Response/Progress Good- endorses understanding   Plan   Home program See above   Updates to plan of care Added in eccentric focus as able   Plan for next session Review ROM progressions with eccentrics; Progress as able   Total Session Time   Timed Code Treatment Minutes 40   Total Treatment Time (sum of timed and untimed services) 40         PLAN  Continue therapy per current plan of care.    Beginning/End Dates of Progress Note Reporting Period:  11/15/2024 to 01/15/2025    Referring Provider:  Muriel Jones

## 2025-02-10 DIAGNOSIS — F41.9 ANXIETY: ICD-10-CM

## 2025-02-10 RX ORDER — DIAZEPAM 2 MG/1
2 TABLET ORAL EVERY 6 HOURS PRN
Qty: 2 TABLET | Refills: 0 | Status: SHIPPED | OUTPATIENT
Start: 2025-02-10

## 2025-02-12 ENCOUNTER — TELEPHONE (OUTPATIENT)
Dept: CARE COORDINATION | Facility: OTHER | Age: 35
End: 2025-02-12

## 2025-02-12 ENCOUNTER — OFFICE VISIT (OUTPATIENT)
Dept: FAMILY MEDICINE | Facility: OTHER | Age: 35
End: 2025-02-12
Attending: STUDENT IN AN ORGANIZED HEALTH CARE EDUCATION/TRAINING PROGRAM
Payer: COMMERCIAL

## 2025-02-12 VITALS
WEIGHT: 146 LBS | OXYGEN SATURATION: 98 % | TEMPERATURE: 97.1 F | SYSTOLIC BLOOD PRESSURE: 146 MMHG | HEIGHT: 65 IN | BODY MASS INDEX: 24.32 KG/M2 | HEART RATE: 80 BPM | DIASTOLIC BLOOD PRESSURE: 96 MMHG

## 2025-02-12 DIAGNOSIS — R30.0 DYSURIA: Primary | ICD-10-CM

## 2025-02-12 LAB
ALBUMIN UR-MCNC: NEGATIVE MG/DL
APPEARANCE UR: CLEAR
BACTERIA #/AREA URNS HPF: ABNORMAL /HPF
BILIRUB UR QL STRIP: NEGATIVE
COLOR UR AUTO: ABNORMAL
GLUCOSE UR STRIP-MCNC: NEGATIVE MG/DL
HGB UR QL STRIP: NEGATIVE
KETONES UR STRIP-MCNC: NEGATIVE MG/DL
LEUKOCYTE ESTERASE UR QL STRIP: NEGATIVE
MUCOUS THREADS #/AREA URNS LPF: PRESENT /LPF
NITRATE UR QL: NEGATIVE
PH UR STRIP: 5.5 [PH] (ref 4.7–8)
RBC URINE: 2 /HPF
SP GR UR STRIP: 1.01 (ref 1–1.03)
SQUAMOUS EPITHELIAL: 5 /HPF
UROBILINOGEN UR STRIP-MCNC: NORMAL MG/DL
WBC CLUMPS #/AREA URNS HPF: PRESENT /HPF
WBC URINE: 4 /HPF

## 2025-02-12 ASSESSMENT — PAIN SCALES - GENERAL: PAINLEVEL_OUTOF10: SEVERE PAIN (7)

## 2025-02-12 NOTE — PROGRESS NOTES
Assessment & Plan     Dysuria  UA is negative for any leukocyte esterase or nitrites. Encouraged increased fluids to maintain adequate hydration. Follow-up with any new or worsening symptoms.   - UA Macroscopic with reflex to Microscopic and Culture; Future  - UA Macroscopic with reflex to Microscopic and Culture        No follow-ups on file.    Maya Garcia is a 34 year old, presenting for the following health issues:  UTI  Has had a week of intermittent pain with urination, pelvic pressure and cloudy coloration. Has had some back pain on the right side but attributes that to current hip exercises. Denies any CVA tenderness. Notes mild tingling with feet which presented the last time she had a UTI. Has a history of UTI with the most recent being in October.. No blood in the urine. Denies any chest pain, shortness of breath, abdominal pain, nausea, vomiting or diarrhea. No recent fevers or sick contacts. Is planning to travel tomorrow and wanted to rule out an infection prior to traveling.       2/12/2025     9:53 AM   Additional Questions   Roomed by Sue RODRIGUEZ   Accompanied by self     HPI     Genitourinary - Female  Onset/Duration: few days ago   Description:   Painful urination (Dysuria): YES- comes and goes            Frequency: No  Blood in urine (Hematuria): No  Delay in urine (Hesitency): No  Intensity: mild  Progression of Symptoms:  same  Accompanying Signs & Symptoms:  Fever/chills: No  Flank pain: YES- on and off unknown if flank or hip pain   Nausea and vomiting: No  Vaginal symptoms: none- some cloudiness   Abdominal/Pelvic Pain: YES-   History:   History of frequent UTI s: YES  History of kidney stones: No  Sexually Active: YES  Possibility of pregnancy: No  Precipitating or alleviating factors: none   Therapies tried and outcome: Cranberry juice prn (contraindicated in Coumadin patients) and Increase fluid intake        Review of Systems  Constitutional, HEENT, cardiovascular, pulmonary,  "GI, , musculoskeletal, neuro, skin, endocrine and psych systems are negative, except as otherwise noted.      Objective    BP (!) 146/96 (BP Location: Right arm, Patient Position: Sitting, Cuff Size: Adult Regular)   Pulse 80   Temp 97.1  F (36.2  C) (Tympanic)   Ht 1.639 m (5' 4.53\")   Wt 66.2 kg (146 lb)   LMP 01/30/2025 (Exact Date)   SpO2 98%   BMI 24.65 kg/m    Body mass index is 24.65 kg/m .  Physical Exam   GENERAL: alert and no distress  EYES: Eyes grossly normal to inspection, PERRL and conjunctivae and sclerae normal  HENT: ear canals and TM's normal, nose and mouth without ulcers or lesions  NECK: no adenopathy, no asymmetry, masses, or scars  RESP: lungs clear to auscultation - no rales, rhonchi or wheezes  CV: regular rate and rhythm, normal S1 S2, no S3 or S4, no murmur, click or rub, no peripheral edema  ABDOMEN: soft, nontender, no hepatosplenomegaly, no masses and bowel sounds normal  MS: no gross musculoskeletal defects noted, no edema, no CVA tenderness  PSYCH: mentation appears normal, affect normal/bright    Results for orders placed or performed in visit on 02/12/25 (from the past 24 hours)   UA Macroscopic with reflex to Microscopic and Culture    Specimen: Urine, Clean Catch   Result Value Ref Range    Color Urine Light Yellow Colorless, Straw, Light Yellow, Yellow    Appearance Urine Clear Clear    Glucose Urine Negative Negative mg/dL    Bilirubin Urine Negative Negative    Ketones Urine Negative Negative mg/dL    Specific Gravity Urine 1.014 1.003 - 1.035    Blood Urine Negative Negative    pH Urine 5.5 4.7 - 8.0    Protein Albumin Urine Negative Negative mg/dL    Urobilinogen Urine Normal Normal, 2.0 mg/dL    Nitrite Urine Negative Negative    Leukocyte Esterase Urine Negative Negative    Bacteria Urine Few (A) None Seen /HPF    WBC Clumps Urine Present (A) None Seen /HPF    Mucus Urine Present (A) None Seen /LPF    RBC Urine 2 <=2 /HPF    WBC Urine 4 <=5 /HPF    Squamous " Epithelials Urine 5 (H) <=1 /HPF    Narrative    Microscopic not indicated  Urine Culture not indicated           Signed Electronically by: Jerri English PA-C

## 2025-02-19 ENCOUNTER — OFFICE VISIT (OUTPATIENT)
Dept: FAMILY MEDICINE | Facility: OTHER | Age: 35
End: 2025-02-19
Attending: STUDENT IN AN ORGANIZED HEALTH CARE EDUCATION/TRAINING PROGRAM
Payer: COMMERCIAL

## 2025-02-19 VITALS
RESPIRATION RATE: 16 BRPM | TEMPERATURE: 97.9 F | SYSTOLIC BLOOD PRESSURE: 138 MMHG | DIASTOLIC BLOOD PRESSURE: 96 MMHG | OXYGEN SATURATION: 100 % | HEIGHT: 63 IN | BODY MASS INDEX: 25.66 KG/M2 | HEART RATE: 84 BPM

## 2025-02-19 DIAGNOSIS — R30.0 DYSURIA: ICD-10-CM

## 2025-02-19 LAB
ALBUMIN UR-MCNC: NEGATIVE MG/DL
APPEARANCE UR: ABNORMAL
BACTERIA #/AREA URNS HPF: ABNORMAL /HPF
BILIRUB UR QL STRIP: NEGATIVE
COLOR UR AUTO: ABNORMAL
GLUCOSE UR STRIP-MCNC: NEGATIVE MG/DL
HGB UR QL STRIP: NEGATIVE
KETONES UR STRIP-MCNC: NEGATIVE MG/DL
LEUKOCYTE ESTERASE UR QL STRIP: ABNORMAL
MUCOUS THREADS #/AREA URNS LPF: PRESENT /LPF
NITRATE UR QL: NEGATIVE
PH UR STRIP: 6 [PH] (ref 4.7–8)
RBC URINE: 2 /HPF
SP GR UR STRIP: 1.02 (ref 1–1.03)
SQUAMOUS EPITHELIAL: 8 /HPF
UROBILINOGEN UR STRIP-MCNC: NORMAL MG/DL
WBC URINE: 4 /HPF

## 2025-02-19 ASSESSMENT — PAIN SCALES - GENERAL: PAINLEVEL_OUTOF10: MODERATE PAIN (6)

## 2025-02-19 NOTE — PROGRESS NOTES
Assessment & Plan     Dysuria  Has had continued urinary symptoms despite conservative management at home. Given continued pressure, odor and pain with urination, obtained a repeat UA and will send for a culture. Further recommendation pending culture results.   - Urine Culture; Future  - UA with Microscopic; Future  - Urine Culture  - UA with Microscopic      No follow-ups on file.    Maya Garcia is a 34 year old, presenting for the following health issues:      Has had continued pressure with urination over the past week. Continues to endorse intermittent odor and cramping. Has been drinking more water and cranberry juice. Denies any blood or back pain.  Recently was traveling over the weekend. Denies any chest pain, shortness of breath, abdominal pain, nausea, vomiting or diarrhea. No recent fevers.   Urinary Problem      2/19/2025    12:58 PM   Additional Questions   Roomed by Danielle RODRIGUEZ   Accompanied by self         2/19/2025   Declines Weight   Did patient decline having their weight taken? Yes         2/19/2025    12:58 PM   Patient Reported Additional Medications   Patient reports taking the following new medications none     History of Present Illness       Reason for visit:  Uti    She eats 2-3 servings of fruits and vegetables daily.She consumes 0 sweetened beverage(s) daily.She exercises with enough effort to increase her heart rate 30 to 60 minutes per day.  She exercises with enough effort to increase her heart rate 4 days per week.   She is taking medications regularly.       Genitourinary - Female  Onset/Duration: 2/8/25  Description: odor  Painful urination (Dysuria): No           Frequency: No  Blood in urine (Hematuria): No  Delay in urine (Hesitency): No  Intensity: mild  Progression of Symptoms:  same  Accompanying Signs & Symptoms:  Fever/chills: No  Flank pain: No  Nausea and vomiting: No  Vaginal symptoms: none  Abdominal/Pelvic Pain: YES  History:   History of frequent UTI s:  "YES  History of kidney stones: No  Sexually Active: YES  Possibility of pregnancy: No  Precipitating or alleviating factors: None  Therapies tried and outcome: Pyridium , Cranberry juice prn (contraindicated in Coumadin patients), and Increase fluid intake        Review of Systems  Constitutional, HEENT, cardiovascular, pulmonary, GI, , musculoskeletal, neuro, skin, endocrine and psych systems are negative, except as otherwise noted.      Objective    BP (!) 138/96 (BP Location: Left arm, Patient Position: Sitting, Cuff Size: Adult Regular)   Pulse 84   Temp 97.9  F (36.6  C) (Tympanic)   Resp 16   Ht 1.607 m (5' 3.25\")   LMP 01/30/2025 (Exact Date)   SpO2 100%   BMI 25.66 kg/m    Body mass index is 25.66 kg/m .  Physical Exam   GENERAL: alert and no distress  EYES: Eyes grossly normal to inspection, PERRL and conjunctivae and sclerae normal  RESP: lungs clear to auscultation - no rales, rhonchi or wheezes  CV: regular rate and rhythm, normal S1 S2, no S3 or S4, no murmur, click or rub, no peripheral edema  ABDOMEN: soft, nontender, no hepatosplenomegaly, no masses and bowel sounds normal  MS: no gross musculoskeletal defects noted, no edema  BACK: no CVA tenderness, no paralumbar tenderness  PSYCH: mentation appears normal, affect normal/bright    Results for orders placed or performed in visit on 02/19/25 (from the past 24 hours)   UA with Microscopic   Result Value Ref Range    Color Urine Light Yellow Colorless, Straw, Light Yellow, Yellow    Appearance Urine Slightly Cloudy (A) Clear    Glucose Urine Negative Negative mg/dL    Bilirubin Urine Negative Negative    Ketones Urine Negative Negative mg/dL    Specific Gravity Urine 1.016 1.003 - 1.035    Blood Urine Negative Negative    pH Urine 6.0 4.7 - 8.0    Protein Albumin Urine Negative Negative mg/dL    Urobilinogen Urine Normal Normal, 2.0 mg/dL    Nitrite Urine Negative Negative    Leukocyte Esterase Urine Moderate (A) Negative    Bacteria Urine Few " (A) None Seen /HPF    Mucus Urine Present (A) None Seen /LPF    RBC Urine 2 <=2 /HPF    WBC Urine 4 <=5 /HPF    Squamous Epithelials Urine 8 (H) <=1 /HPF           Signed Electronically by: Jerri English PA-C

## 2025-02-22 LAB — BACTERIA UR CULT: NORMAL

## 2025-03-03 ENCOUNTER — TELEPHONE (OUTPATIENT)
Dept: NEUROLOGY | Facility: CLINIC | Age: 35
End: 2025-03-03
Payer: COMMERCIAL

## 2025-03-03 NOTE — TELEPHONE ENCOUNTER
PA Initiation    Medication: KESIMPTA 20 MG/0.4ML SC SOAJ  Insurance Company: [employer] - Phone [phone] Fax [phone]  Pharmacy Filling the Rx: [facility] - [place], [place] - [address]   Filling Pharmacy Phone:    Filling Pharmacy Fax:    Start Date: 3/3/2025          Thank you,    Karina Leone [facility]  Specialty Pharmacy Clinic Liaison - [facility]iology[facility]urologyMultiple [address]  [address] [place] [address]  Ph: [phone] Fax: [phone]  [email or web address]

## 2025-03-04 NOTE — TELEPHONE ENCOUNTER
Carlyle Bruce is requesting a refill on the following medication(s):  Requested Prescriptions     Pending Prescriptions Disp Refills    potassium chloride (KLOR-CON M) 20 MEQ extended release tablet 30 tablet 1     Sig: Take 1 tablet by mouth daily    metoprolol succinate (TOPROL XL) 50 MG extended release tablet 90 tablet 1     Sig: Take 1 tablet by mouth daily       Last Visit Date (If Applicable):  5/6/7958    Next Visit Date:    6/29/2021 Prior Authorization Not Needed per Insurance    Medication: KESIMPTA 20 MG/0.4ML SC SOAJ  Insurance Company: [employer] - Phone [phone] Fax [phone]  Expected CoPay: $    Pharmacy Filling the Rx: [facility] - [place], [place] - [address]   Pharmacy Notified: No  Patient Notified: No          Thank you,    Karina Leone [facility]  Specialty Pharmacy Clinic Liaison - [facility]iology[facility]urologyMultiple [address] [address]  Ph: [phone] Fax: [phone]  [email or web address]

## 2025-03-12 ENCOUNTER — TELEPHONE (OUTPATIENT)
Dept: CARE COORDINATION | Facility: OTHER | Age: 35
End: 2025-03-12

## 2025-03-12 DIAGNOSIS — M25.552 BILATERAL HIP PAIN: ICD-10-CM

## 2025-03-12 DIAGNOSIS — M25.551 BILATERAL HIP PAIN: ICD-10-CM

## 2025-03-12 DIAGNOSIS — G35 MS (MULTIPLE SCLEROSIS) (H): Primary | ICD-10-CM

## 2025-03-12 NOTE — TELEPHONE ENCOUNTER
3/12/2025 11:31 AM  Patient called requesting chiropractor referral.  Referral pended PCP to advise on plan.     Jennifer Mitchell RN

## 2025-03-17 ENCOUNTER — THERAPY VISIT (OUTPATIENT)
Dept: PHYSICAL THERAPY | Facility: HOSPITAL | Age: 35
End: 2025-03-17
Attending: FAMILY MEDICINE
Payer: COMMERCIAL

## 2025-03-17 DIAGNOSIS — M25.552 BILATERAL HIP PAIN: Primary | ICD-10-CM

## 2025-03-17 DIAGNOSIS — M25.551 BILATERAL HIP PAIN: Primary | ICD-10-CM

## 2025-03-17 PROCEDURE — 97140 MANUAL THERAPY 1/> REGIONS: CPT | Mod: GP

## 2025-03-17 PROCEDURE — 97110 THERAPEUTIC EXERCISES: CPT | Mod: GP

## 2025-03-17 ASSESSMENT — ACTIVITIES OF DAILY LIVING (ADL)
HOS_ADL_HIGHEST_POTENTIAL_SCORE: 64
LIGHT_TO_MODERATE_WORK: SLIGHT DIFFICULTY
WALKING_INITIALLY: SLIGHT DIFFICULTY
GETTING_INTO_AND_OUT_OF_AN_AVERAGE_CAR: NO DIFFICULTY AT ALL
TWISTING/PIVOTING_ON_INVOLVED_LEG: SLIGHT DIFFICULTY
WALKING_15_MINUTES_OR_GREATER: MODERATE DIFFICULTY
SITTING_FOR_15_MINUTES: NO DIFFICULTY AT ALL
WALKING_APPROXIMATELY_10_MINUTES: SLIGHT DIFFICULTY
HOS_ADL_SCORE(%): 64.06
PUTTING_ON_SOCKS_AND_SHOES: SLIGHT DIFFICULTY
HOS_ADL_ITEM_SCORE_TOTAL: 41
ROLLING_OVER_IN_BED: NO DIFFICULTY AT ALL
STANDING_FOR_15_MINUTES: MODERATE DIFFICULTY
HEAVY_WORK: MODERATE DIFFICULTY
HOS_ADL_COUNT: 16
GOING_UP_1_FLIGHT_OF_STAIRS: MODERATE DIFFICULTY
RECREATIONAL_ACTIVITIES: MODERATE DIFFICULTY
HOW_WOULD_YOU_RATE_YOUR_CURRENT_LEVEL_OF_FUNCTION_DURING_YOUR_USUAL_ACTIVITIES_OF_DAILY_LIVING_FROM_0_TO_100_WITH_100_BEING_YOUR_LEVEL_OF_FUNCTION_PRIOR_TO_YOUR_HIP_PROBLEM_AND_0_BEING_THE_INABILITY_TO_PERFORM_ANY_OF_YOUR_USUAL_DAILY_ACTIVITIES?: 80
GOING_DOWN_1_FLIGHT_OF_STAIRS: MODERATE DIFFICULTY
WALKING_UP_STEEP_HILLS: MODERATE DIFFICULTY
DEEP_SQUATTING: SLIGHT DIFFICULTY
WALKING_DOWN_STEEP_HILLS: EXTREME DIFFICULTY
STEPPING_UP_AND_DOWN_CURBS: SLIGHT DIFFICULTY

## 2025-03-18 NOTE — PROGRESS NOTES
03/17/25 0500   Appointment Info   Signing clinician's name / credentials Martine Florian DPT (Tom)   Total/Authorized Visits 365   Visits Used 6   Medical Diagnosis Bilateral hip pain (M25.551, M25.552)   PT Tx Diagnosis Bilateral hip pain (M25.551, M25.552)   Progress Note/Certification   Onset of illness/injury or Date of Surgery 10/21/24   Therapy Frequency 1x/week tapered to discharge   Predicted Duration 8 weeks   Progress Note Completed Date 03/17/25   GOALS   PT Goals 2;3;4;5   PT Goal 1   Goal Identifier STG 1   Goal Description Patient will be independent with a short-term home exercise program.   Goal Progress Meeting   Target Date 12/19/24   Date Met 12/18/24   PT Goal 2   Goal Identifier STG 2   Goal Description Patient will understand and demonstrate improved posture and techniques such that patient places less strain over spine and supporting musculature.   Goal Progress Meeting   Target Date 12/19/24   Date Met 12/18/24   PT Goal 3   Goal Identifier LTG 1   Goal Description Improve score on utilized outcome measures to correlate with clinically significant change.   Goal Progress Resetting goal today   Target Date 05/12/25   PT Goal 4   Goal Identifier LTG 2   Goal Description Patient will endorse confidence in ability to manage home exercise program independently to promote continued progression and management of back pain symptoms following discharge from PT services.   Target Date 05/12/25   PT Goal 5   Goal Identifier LTG 3   Goal Description Pt will endorse hip pain <2/10 during 15 mins of walking on treadmill with BL UE support (analogue for walking with shopping cart).   Target Date 05/12/25   Subjective Report   Subjective Report Pt returns to address hip pain complaints; Pt notes that left is more painful that right; Pt states that she has been seeing chiropractor (Neptali Edmonds DCM);  He has been focusing on SI; Patient endorses several weeks of relief from sessions with him. Pt has had an  increase in pain following long car and plane trips to [facility] recently.   Objective Measure 1   Objective Measure Leg length discrepancy   Details WNL (.25cm variation between limbs)   Treatment Interventions (PT)   Interventions Therapeutic Procedure/Exercise;Manual Therapy   Therapeutic Procedure/Exercise   Therapeutic Procedures: strength, endurance, ROM, flexibility minutes (07577) 25   Ther Proc 1 - Details Access Code: [email or web address]  URL: [email or web address]  Date: 03/17/2025  Prepared by: Bonilla Florian    Exercises  - Standing Hip Hinge with Dowel  - 1 x daily  - Clam  - 1 x daily - 3 x weekly - 2 sets - 10 reps  - Sidelying Reverse Clamshell  - 1 x daily - 3 x weekly - 2 sets - 10 reps  - Supine March with Heel Taps  - 1 x daily - 3 x weekly - 2 sets - 5-15 reps - short hold  - Hip Hiking on Step  - 1 x daily - 3 x weekly - 2 sets - 5-15 reps - short hold  - Hamstring and calf stretch  - 1-2 x daily - 6 reps - 20 seconds hold  - Standing Hip Controlled Articular Rotations  - 1-2 x daily - 10 reps - short hold   Skilled Intervention Ther Ex and HEP Dev   Patient Response/Progress New   Manual Therapy   Manual Therapy: Mobilization, MFR, MLD, friction massage minutes (42397) 15   Manual Therapy 1 - Details  Patient in supine, inline traction to hip with 3 bouts of HVLA to improve hip flexion ease of motion; Inferior glides 10 pulses at Grade III-IV; SI correction technique in hooklying; opposing knee flex/extension pressure manually 5 rounds of 5 second isometric holds followed by hooklying manually resisted hip abduction then hip adduction   Skilled Intervention Joint mobs   Patient Response/Progress Pt endorsed increased comfort following treatment; Left leg markedly hypomobile with joint mobs despite treatment today.   Plan   Home program See ther pro above   Plan for next session Renewing LTG's today; Progress strengthening as able; Consider BFR addition to strength training if appropriate          PLAN  Continue therapy per current plan of care with alterations to goals noted above.    Beginning/End Dates of Progress Note Reporting Period:  11/21/2024 to 03/17/2025    Referring Provider:  Muirel Jones

## 2025-03-18 NOTE — TELEPHONE ENCOUNTER
Per pt this for insurance purpose only. Pt reports for bilateral hip pain, low back pain, and MS.

## 2025-04-02 ENCOUNTER — OFFICE VISIT (OUTPATIENT)
Dept: FAMILY MEDICINE | Facility: OTHER | Age: 35
End: 2025-04-02
Attending: FAMILY MEDICINE
Payer: COMMERCIAL

## 2025-04-02 VITALS
HEIGHT: 63 IN | DIASTOLIC BLOOD PRESSURE: 94 MMHG | SYSTOLIC BLOOD PRESSURE: 124 MMHG | HEART RATE: 86 BPM | WEIGHT: 144 LBS | OXYGEN SATURATION: 100 % | TEMPERATURE: 97.7 F | RESPIRATION RATE: 16 BRPM | BODY MASS INDEX: 25.52 KG/M2

## 2025-04-02 DIAGNOSIS — E28.2 PCOS (POLYCYSTIC OVARIAN SYNDROME): Primary | ICD-10-CM

## 2025-04-02 DIAGNOSIS — I10 BENIGN ESSENTIAL HYPERTENSION: ICD-10-CM

## 2025-04-02 ASSESSMENT — ANXIETY QUESTIONNAIRES
4. TROUBLE RELAXING: SEVERAL DAYS
1. FEELING NERVOUS, ANXIOUS, OR ON EDGE: MORE THAN HALF THE DAYS
7. FEELING AFRAID AS IF SOMETHING AWFUL MIGHT HAPPEN: NOT AT ALL
8. IF YOU CHECKED OFF ANY PROBLEMS, HOW DIFFICULT HAVE THESE MADE IT FOR YOU TO DO YOUR WORK, TAKE CARE OF THINGS AT HOME, OR GET ALONG WITH OTHER PEOPLE?: SOMEWHAT DIFFICULT
6. BECOMING EASILY ANNOYED OR IRRITABLE: SEVERAL DAYS
GAD7 TOTAL SCORE: 8
5. BEING SO RESTLESS THAT IT IS HARD TO SIT STILL: NOT AT ALL
GAD7 TOTAL SCORE: 8
3. WORRYING TOO MUCH ABOUT DIFFERENT THINGS: MORE THAN HALF THE DAYS
IF YOU CHECKED OFF ANY PROBLEMS ON THIS QUESTIONNAIRE, HOW DIFFICULT HAVE THESE PROBLEMS MADE IT FOR YOU TO DO YOUR WORK, TAKE CARE OF THINGS AT HOME, OR GET ALONG WITH OTHER PEOPLE: SOMEWHAT DIFFICULT
2. NOT BEING ABLE TO STOP OR CONTROL WORRYING: MORE THAN HALF THE DAYS
7. FEELING AFRAID AS IF SOMETHING AWFUL MIGHT HAPPEN: NOT AT ALL
GAD7 TOTAL SCORE: 8

## 2025-04-02 ASSESSMENT — PAIN SCALES - GENERAL: PAINLEVEL_OUTOF10: MODERATE PAIN (6)

## 2025-04-02 NOTE — PROGRESS NOTES
The longitudinal plan of care for the diagnosis(es)/condition(s) as documented were addressed during this visit. Due to the added complexity in care, I will continue to support Radha in the subsequent management and with ongoing continuity of care.    Assessment & Plan     PCOS (polycystic ovarian syndrome)  She has not started inositol yet, worried about having heavy menses  She is looking into beef liver supplement, discussed I really don't know anything about these supplements    Benign essential hypertension  Still borderline, she reports home numbers are very good, she will bring in a list of readings at varying times from home otherwise will start low dose of norvasc.    Patient was agreeable to this plan and had no further questions.  There are no Patient Instructions on file for this visit.    No follow-ups on file.    Maya Garcia is a 34 year old, presenting for the following health issues:  Hypertension and Abnormal Bleeding Problem        4/2/2025    11:27 AM   Additional Questions   Roomed by Danielle RODRIGUEZ   Accompanied by self         4/2/2025    11:27 AM   Patient Reported Additional Medications   Patient reports taking the following new medications none     Abnormal Bleeding Problem    History of Present Illness       Mental Health Follow-up:  Patient presents to follow-up on Anxiety.    Patient's anxiety since last visit has been:  No change  The patient is not having other symptoms associated with anxiety.  Any significant life events: No  Patient is not feeling anxious or having panic attacks.  Patient has no concerns about alcohol or drug use.    Hypertension: She presents for follow up of hypertension.  She does check blood pressure  regularly outside of the clinic. Outpatient blood pressures have not been over 140/90. She follows a low salt diet.     She eats 4 or more servings of fruits and vegetables daily.She consumes 0 sweetened beverage(s) daily.She exercises with enough effort to  increase her heart rate 30 to 60 minutes per day.  She exercises with enough effort to increase her heart rate 5 days per week.   She is taking medications regularly.        Hypertension Follow-up    Do you check your blood pressure regularly outside of the clinic? Yes   Are you following a low salt diet? Yes  Are your blood pressures ever more than 140 on the top number (systolic) OR more   than 90 on the bottom number (diastolic), for example 140/90? Yes    Clinic Visit  Date of visit: 2025   Chief Complaint:   Chief Complaint   Patient presents with    Hypertension    Abnormal Bleeding Problem       HPI:   Radha Carl is a 34 year old  female who presents to clinic today for follow up  for PCOS .     Menarche: age 8  Menses: irregular monthly . Patient's last menstrual period was 2025 (exact date).   Family Planning Chart: No.  Acne: No.  Hirsutism (facial hair): Yes: .  Male-pattern hair loss: No.  Elevated serum androgen:   Lab Results   Component Value Date    DHEA 206 2016    TESTOSTTOTAL 18 2016    FT 0.39 2016    BARTOLO 0.721 2016   ]  BMI: Body mass index is 25.31 kg/m .   Type 2 DM: No.    Gynecologic History:  Last Pap:   Lab Results   Component Value Date    PAP NIL 10/11/2017      History of abnormal pap: No.    Obstetric History:   OB History    Para Term  AB Living   2 0 0 0 2 0   SAB IAB Ectopic Multiple Live Births   1 0 1 0 0      # Outcome Date GA Lbr Sage/2nd Weight Sex Type Anes PTL Lv   2 SAB 2023    U SAB      1 Ectopic 05/01/10     ECTOPIC   FD      Birth Comments: tubal     Obstetric history significant for:  n/a    Medications:  Current Outpatient Medications   Medication Sig Dispense Refill    acetylcysteine (NAC) 600 MG CAPS capsule Take 600 mg by mouth 2 times daily      amLODIPine (NORVASC) 2.5 MG tablet Take 1 tablet (2.5 mg) by mouth daily. 30 tablet 1    blood glucose monitoring (ONE TOUCH ULTRA 2) meter device kit See  Admin Instructions      diazepam (VALIUM) 2 MG tablet Take 1 tablet (2 mg) by mouth every 6 hours as needed for anxiety. Take one to two tablets as needed prior to travel 2 tablet 0    famotidine (PEPCID) 40 MG tablet Take 1 tablet (40 mg) by mouth daily. 90 tablet 3    KESIMPTA 20 MG/0.4ML injection INJECT THE CONTENTS OF 1 AUTOINJECTOR PEN UNDER THE SKIN EVERY 4 WEEKS 0.4 mL 12    Lancets (ONETOUCH DELICA PLUS [id number]) MISC       Multiple Vitamin (MULTIVITAMIN ADULT PO) Take 1 tablet by mouth daily      nystatin (MYCOSTATIN) 658147 UNIT/GM external powder Apply topically 3 times daily 30 g 0    ondansetron (ZOFRAN ODT) 4 MG ODT tab Take 1 tablet (4 mg) by mouth every 8 hours as needed for nausea. 30 tablet 11    ONETOUCH ULTRA test strip       order for DME Right ankle ASO 1 Device 0    progesterone (PROMETRIUM) 100 MG capsule Take 1 capsule (100 mg) by mouth daily Starting on peak +3 for 10 days out of month 30 capsule 3    Semaglutide-Weight Management (WEGOVY) 2.4 MG/0.75ML pen Inject 2.4 mg subcutaneously once a week. 9 mL 3    valACYclovir (VALTREX) 500 MG tablet TAKE 1 TABLET BY MOUTH TWICE DAILY WITH  ACTIVE  LESION 30 tablet 5     No current facility-administered medications for this visit.       Allergy:  Allergies   Allergen Reactions    Cats     Sulfa Antibiotics Rash     Sulfonamide antibiotics       Medical History:  Past Medical History:   Diagnosis Date    AC separation, left, sequela     saw Dr Andre    ADHD (attention deficit hyperactivity disorder)     Anemia, iron deficiency     Attention deficit hyperactivity disorder (ADHD), predominantly inattentive type 02/28/2014     Problem list name updated by automated process. Provider to review    Benign essential hypertension 02/14/2020    Cervicalgia 11/12/2021    Cold sore     Costochondral pain 11/29/2019    Diabetes mellitus, type 2 (H) 03/31/2023    Dysmenorrhea 08/18/2016    Elevated liver enzymes 2014    resolved 2015    Fibrocystic breast   "   MARIBETH (generalized anxiety disorder)     Hyperlipidemia with target LDL less than 100 08/08/2014     Diagnosis updated by automated process. Provider to review and confirm.    Hypovitaminosis D     Infertility, female, secondary 07/19/2016    Intramural leiomyoma of uterus 01/14/2022    Migraine     MS (multiple sclerosis) (H) 2012    Neuropathy     feet    Obesity     PCOS (polycystic ovarian syndrome)     Tubal pregnancy without intrauterine pregnancy 05/24/2013    Visual field scotoma 05/24/2013    Vitamin B12 deficiency (non anemic) 06/25/2015       Surgical History:  Past Surgical History:   Procedure Laterality Date    ESOPHAGOSCOPY, GASTROSCOPY, DUODENOSCOPY (EGD), COMBINED N/A 01/20/2023    Procedure: Upper endoscopy  with biopsy;  Surgeon: Rudi Romano MD;  Location: [facility] OR    GYN SURGERY  4/2010    HYSTEROSCOPY,DIAGNOSTIC  2016    done here and at [facility]    LAPAROSCOPIC SALPINGECTOMY Left 05/01/2010    ruptured ectopic pregnancy    wisdom teeth  2014       Social History:  Social History    Substance and Sexual Activity      Alcohol use: Not Currently        Comment: rare    History   Smoking Status    Never   Smokeless Tobacco    Never     History   Drug Use No     History   Sexual Activity    Sexual activity: Yes    Partners: Male    Birth control/ protection: Natural Family Planning     Comment: FABM     Relationship status is: .        Review of Systems  Constitutional, neuro, ENT, endocrine, pulmonary, cardiac, gastrointestinal, genitourinary, musculoskeletal, integument and psychiatric systems are negative, except as otherwise noted.      Objective    BP (!) 124/94 (BP Location: Left arm, Patient Position: Sitting, Cuff Size: Adult Regular)   Pulse 86   Temp 97.7  F (36.5  C) (Tympanic)   Resp 16   Ht 1.607 m (5' 3.25\")   Wt 65.3 kg (144 lb)   LMP 03/22/2025 (Exact Date)   SpO2 100%   BMI 25.31 kg/m    Body mass index is 25.31 kg/m .  Physical Exam   GENERAL: alert and no " distress  RESP: lungs clear to auscultation - no rales, rhonchi or wheezes  CV: regular rate and rhythm, normal S1 S2, no S3 or S4, no murmur, click or rub, no peripheral edema  MS: no gross musculoskeletal defects noted, no edema  PSYCH: mentation appears normal, affect normal/bright    No results found for any visits on 04/02/25.        Signed Electronically by: Muriel Jones MD

## 2025-04-07 ENCOUNTER — THERAPY VISIT (OUTPATIENT)
Dept: PHYSICAL THERAPY | Facility: HOSPITAL | Age: 35
End: 2025-04-07
Attending: FAMILY MEDICINE
Payer: COMMERCIAL

## 2025-04-07 DIAGNOSIS — M25.551 BILATERAL HIP PAIN: Primary | ICD-10-CM

## 2025-04-07 DIAGNOSIS — M25.552 BILATERAL HIP PAIN: Primary | ICD-10-CM

## 2025-04-07 PROCEDURE — 97110 THERAPEUTIC EXERCISES: CPT | Mod: GP

## 2025-04-07 PROCEDURE — 97530 THERAPEUTIC ACTIVITIES: CPT | Mod: GP

## 2025-04-24 ENCOUNTER — THERAPY VISIT (OUTPATIENT)
Dept: PHYSICAL THERAPY | Facility: HOSPITAL | Age: 35
End: 2025-04-24
Attending: FAMILY MEDICINE
Payer: COMMERCIAL

## 2025-04-24 DIAGNOSIS — M25.551 BILATERAL HIP PAIN: Primary | ICD-10-CM

## 2025-04-24 DIAGNOSIS — M25.552 BILATERAL HIP PAIN: Primary | ICD-10-CM

## 2025-04-24 PROCEDURE — 97530 THERAPEUTIC ACTIVITIES: CPT | Mod: GP

## 2025-04-24 PROCEDURE — 97140 MANUAL THERAPY 1/> REGIONS: CPT | Mod: GP

## 2025-04-24 PROCEDURE — 97110 THERAPEUTIC EXERCISES: CPT | Mod: GP

## 2025-05-01 ENCOUNTER — THERAPY VISIT (OUTPATIENT)
Dept: PHYSICAL THERAPY | Facility: HOSPITAL | Age: 35
End: 2025-05-01
Attending: FAMILY MEDICINE
Payer: COMMERCIAL

## 2025-05-01 DIAGNOSIS — M25.552 BILATERAL HIP PAIN: Primary | ICD-10-CM

## 2025-05-01 DIAGNOSIS — M25.551 BILATERAL HIP PAIN: Primary | ICD-10-CM

## 2025-05-01 PROCEDURE — 97530 THERAPEUTIC ACTIVITIES: CPT | Mod: GP

## 2025-05-01 PROCEDURE — 97140 MANUAL THERAPY 1/> REGIONS: CPT | Mod: GP

## 2025-05-01 PROCEDURE — 97110 THERAPEUTIC EXERCISES: CPT | Mod: GP

## 2025-05-08 ENCOUNTER — THERAPY VISIT (OUTPATIENT)
Dept: PHYSICAL THERAPY | Facility: HOSPITAL | Age: 35
End: 2025-05-08
Attending: FAMILY MEDICINE
Payer: COMMERCIAL

## 2025-05-08 DIAGNOSIS — M25.551 BILATERAL HIP PAIN: Primary | ICD-10-CM

## 2025-05-08 DIAGNOSIS — M25.552 BILATERAL HIP PAIN: Primary | ICD-10-CM

## 2025-05-08 PROCEDURE — 97110 THERAPEUTIC EXERCISES: CPT | Mod: GP

## 2025-05-08 PROCEDURE — 97140 MANUAL THERAPY 1/> REGIONS: CPT | Mod: GP

## 2025-05-08 PROCEDURE — 97530 THERAPEUTIC ACTIVITIES: CPT | Mod: GP

## 2025-05-11 ENCOUNTER — HEALTH MAINTENANCE LETTER (OUTPATIENT)
Age: 35
End: 2025-05-11

## 2025-05-15 NOTE — PROGRESS NOTES
05/08/25 0500   Appointment Info   Signing clinician's name / credentials Martine Florian DPT (Tom)   Total/Authorized Visits 365   Visits Used 10   Medical Diagnosis Bilateral hip pain (M25.551, M25.552)   PT Tx Diagnosis Bilateral hip pain (M25.551, M25.552)   Progress Note/Certification   Onset of illness/injury or Date of Surgery 10/21/24   Therapy Frequency 1x/week tapered to discharge   Predicted Duration 12 weeks (will take off this next month and resume in June)   Progress Note Completed Date 05/08/25   GOALS   PT Goals 2;3;4;5   PT Goal 1   Goal Identifier STG 1   Goal Description Patient will be independent with a short-term home exercise program.   Goal Progress Meeting   Target Date 12/19/24   Date Met 12/18/24   PT Goal 2   Goal Identifier STG 2   Goal Description Patient will understand and demonstrate improved posture and techniques such that patient places less strain over spine and supporting musculature.   Goal Progress Meeting   Target Date 12/19/24   Date Met 12/18/24   PT Goal 3   Goal Identifier LTG 1   Goal Description Improve score on utilized outcome measures to correlate with clinically significant change.   Goal Progress Resetting goal today   Target Date 07/31/25   PT Goal 4   Goal Identifier LTG 2   Goal Description Patient will endorse confidence in ability to manage home exercise program independently to promote continued progression and management of back pain symptoms following discharge from PT services.   Goal Progress Nearing goal   Target Date 07/31/25   PT Goal 5   Goal Identifier LTG 3   Goal Description Pt will endorse hip pain <2/10 during 15 mins of walking on treadmill with BL UE support (analogue for walking with shopping cart).   Goal Progress 3/10 NPRS   Target Date 07/31/25   Subjective Report   Subjective Report Pt returns to address continued hip pain ; Pt notes that her left leg has been feeling better with exercises and similar treatment to the right.  Pt notes  "soreness that gets better with massage, ice or passive movement in right hip musculature. Pt endorses getting several compliments on her gait improving.   Treatment Interventions (PT)   Interventions Therapeutic Procedure/Exercise;Manual Therapy;Therapeutic Activity   Therapeutic Procedure/Exercise   Therapeutic Procedures: strength, endurance, ROM, flexibility minutes (68242) 32   Ther Proc 1 - Details Access Code: [email or web address]  URL: [email or web address]  Prepared by: Bonilla Florian    Exercises  - Sitting Slump LE Nerve Mobilization  - 8 x daily - 20-30 reps - short hold  - Supine Sciatic Nerve Glide  - 8 x daily - 20-30 reps - short hold  - Seated Sural Nerve Flossing  - 8 x daily - 20-30 reps - short hold  21 minute protocol of IPC blood flow restriction with concurrent quad sets in 10:20sec ratio; 3 rounds of 5minutes inflated at 100% LoP following calibration with 3minute deflated rests each round   Skilled Intervention Ther Ex Dev   Patient Response/Progress Good, endorses compliance intersession; Significant right hip/glut pain relief from nerve flossing intrasession during NuStep Sugar Canes   Therapeutic Activity   Therapeutic Activities: dynamic activities to improve functional performance minutes (58170) 18   Ther Act 1 - Details NuStep @ level 6 \"sugar cane\" workout is a high-intensity interval training (HIIT) protocol focused on improving VO2 max (the measure of how well your body uses oxygen during exercise). It involves three rounds of intense work, each targeting a specific aspect of cardiovascular endurance.   Workout Breakdown:  1. Warm-up (3-5 minutes): Perform a light activity like jogging or skipping rope to prepare your body.  2. Round 1 (2 minutes): Perform the chosen exercise (running, cycling, etc.) at maximum effort for 2 minutes, covering as much distance as possible.  3. Round 2 (2 minutes): Try to cover the same distance as in Round 1 as fast as possible.  4. Round 3: Attempt " "to cover the same distance as in Round 1 in the same time it took to complete Round 2.  5. Cooldown: Engage in a slow-paced cooldown to help your body recover.   Skilled Intervention RPE correlation   Patient Response/Progress RPE @7-8-7 :HR @ 122-131-122   Manual Therapy   Manual Therapy: Mobilization, MFR, MLD, friction massage minutes (61064) 15   Manual Therapy 1 - Details  Patient in supine, inline traction to hip with 3 bouts of HVLA to improve hip flexion ease of motion; Inferior glides 10 pulses at Grade III-IV; IASTM to R hip ER mm in varying degrees of hip flexion with concurrent ER \"indirect release approach\" with percussion massager   Skilled Intervention IASTM and Joint mobilization   Patient Response/Progress Pt endorsed increased comfort following treatment; Left leg moderately improved with joint mobs compared to last.   Plan   Home program See ther pro above   Plan for next session Review BFR effectiveness for pain relief; Continue to work on interval endurance and hip ER stability in multiple planes/variable surfaces   Total Session Time   Timed Code Treatment Minutes 65   Total Treatment Time (sum of timed and untimed services) 65         PLAN  Continue therapy per current plan of care.    Beginning/End Dates of Progress Note Reporting Period:  11/21/2024 to 05/08/2025    Referring Provider:  Muriel Jones    "

## 2025-05-29 ENCOUNTER — THERAPY VISIT (OUTPATIENT)
Dept: PHYSICAL THERAPY | Facility: HOSPITAL | Age: 35
End: 2025-05-29
Attending: FAMILY MEDICINE
Payer: COMMERCIAL

## 2025-05-29 DIAGNOSIS — M25.551 BILATERAL HIP PAIN: Primary | ICD-10-CM

## 2025-05-29 DIAGNOSIS — M25.552 BILATERAL HIP PAIN: Primary | ICD-10-CM

## 2025-05-29 PROCEDURE — 97530 THERAPEUTIC ACTIVITIES: CPT | Mod: GP

## 2025-05-29 PROCEDURE — 97110 THERAPEUTIC EXERCISES: CPT | Mod: GP

## 2025-05-29 PROCEDURE — 97140 MANUAL THERAPY 1/> REGIONS: CPT | Mod: GP

## 2025-06-04 ENCOUNTER — THERAPY VISIT (OUTPATIENT)
Dept: PHYSICAL THERAPY | Facility: HOSPITAL | Age: 35
End: 2025-06-04
Attending: FAMILY MEDICINE
Payer: COMMERCIAL

## 2025-06-04 DIAGNOSIS — M25.552 BILATERAL HIP PAIN: Primary | ICD-10-CM

## 2025-06-04 DIAGNOSIS — M25.551 BILATERAL HIP PAIN: Primary | ICD-10-CM

## 2025-06-04 PROCEDURE — 97140 MANUAL THERAPY 1/> REGIONS: CPT | Mod: GP,CQ

## 2025-06-04 PROCEDURE — 97110 THERAPEUTIC EXERCISES: CPT | Mod: GP,CQ

## 2025-06-04 PROCEDURE — 97530 THERAPEUTIC ACTIVITIES: CPT | Mod: GP,CQ

## 2025-06-25 ENCOUNTER — HOSPITAL ENCOUNTER (OUTPATIENT)
Dept: MRI IMAGING | Facility: HOSPITAL | Age: 35
Discharge: HOME | End: 2025-06-25
Attending: PSYCHIATRY & NEUROLOGY
Payer: COMMERCIAL

## 2025-06-25 DIAGNOSIS — G35 MS (MULTIPLE SCLEROSIS) (H): ICD-10-CM

## 2025-06-25 PROCEDURE — 72156 MRI NECK SPINE W/O & W/DYE: CPT

## 2025-06-25 PROCEDURE — 70553 MRI BRAIN STEM W/O & W/DYE: CPT

## 2025-06-25 PROCEDURE — 255N000002 HC RX 255 OP 636: Performed by: RADIOLOGY

## 2025-06-25 PROCEDURE — 72156 MRI NECK SPINE W/O & W/DYE: CPT | Mod: 26 | Performed by: RADIOLOGY

## 2025-06-25 PROCEDURE — A9585 GADOBUTROL INJECTION: HCPCS | Performed by: RADIOLOGY

## 2025-06-25 PROCEDURE — 70553 MRI BRAIN STEM W/O & W/DYE: CPT | Mod: 26 | Performed by: RADIOLOGY

## 2025-06-25 RX ORDER — GADOBUTROL 604.72 MG/ML
7.5 INJECTION INTRAVENOUS ONCE
Status: COMPLETED | OUTPATIENT
Start: 2025-06-25 | End: 2025-06-25

## 2025-06-25 RX ADMIN — GADOBUTROL 7.5 ML: 604.72 INJECTION INTRAVENOUS at 17:28

## 2025-06-26 ENCOUNTER — VIRTUAL VISIT (OUTPATIENT)
Dept: NEUROLOGY | Facility: CLINIC | Age: 35
End: 2025-06-26
Attending: PSYCHIATRY & NEUROLOGY
Payer: COMMERCIAL

## 2025-06-26 VITALS — BODY MASS INDEX: 25.52 KG/M2 | WEIGHT: 144 LBS | HEIGHT: 63 IN

## 2025-06-26 DIAGNOSIS — G35 MULTIPLE SCLEROSIS (H): Primary | ICD-10-CM

## 2025-06-26 DIAGNOSIS — R26.9 GAIT DISTURBANCE: ICD-10-CM

## 2025-06-26 DIAGNOSIS — R53.82 CHRONIC FATIGUE, UNSPECIFIED: ICD-10-CM

## 2025-06-26 PROCEDURE — G2211 COMPLEX E/M VISIT ADD ON: HCPCS | Performed by: PSYCHIATRY & NEUROLOGY

## 2025-06-26 PROCEDURE — 1126F AMNT PAIN NOTED NONE PRSNT: CPT | Performed by: PSYCHIATRY & NEUROLOGY

## 2025-06-26 PROCEDURE — 98006 SYNCH AUDIO-VIDEO EST MOD 30: CPT | Performed by: PSYCHIATRY & NEUROLOGY

## 2025-06-26 ASSESSMENT — PAIN SCALES - GENERAL: PAINLEVEL_OUTOF10: NO PAIN (0)

## 2025-06-26 NOTE — PATIENT INSTRUCTIONS
Continue Kesimpta injections monthly    2.   MRI scans of the brain and cervical spine in one year, return to clinic after

## 2025-06-26 NOTE — LETTER
6/26/2025      RE: Radha Carl  [address]  [address]  [facility] [address]     Referral source: Established patient    Chief complaint: Multiple sclerosis    History of the Present Illness: Ms. Radha Carl is a 34 year old woman who is evaluated in the Multiple Sclerosis Clinic today for scheduled follow up after earlier MRI scans of the brain and cervical spine.    The patient's history is as per my previous notes. Her history of symptoms of demyelinating disease dates back to May 2013, at which time she was admitted to hospital for ascending paresthesias. MRI imaging was suggestive of demyelinating disease of the type seen in multiple sclerosis. 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 A formal MS diagnosis was confirmed in 2018 after she developed increased weakness of the legs and MRI imaging showed multiple new lesions, but she continued to decline disease modifying treatment. Further radiologic progression was noted in December 2020 after she developed vertiginous symptoms, and she was referred to this clinic for an opinion on management. We initially started her on disease modifying therapy with dimethyl fumarate, but she discontinued that medication after a few weeks due to gastrointestinal side effects. She was next placed on glatiramer acetate, but subsequently stopped that medication as well.  After an MRI scan of the brain in April 2023 demonstrated further radiologic disease progression, we initiated treatment with ofatumumab in June 2023, and she remains on that medication up until the present time.    Today, she denies any new, episodic changes in vision, balance, strength, or sensation suggestive of new relapse of multiple sclerosis since she was last seen in this clinic.    Symptomatically, she has been attending physical therapy for her gait problems and does feel that her gait  is improving. She denies any recent falls.    When we last met in January, she was noting persistent daytime fatigue. I recommended a sleep evaluation, but this was never scheduled.    Investigations:  I reviewed images of MRI scan of the brain and cervical spine performed earlier on 6/25/2025, and the following is my independent interpretation of those images.  Multiple periventricular, juxtacortical, and infratentorial foci of T2 hyperintensity in the brain are unchanged in comparison to previous MRI of 2/26/2024. Short segment foci of T2 hyperintensity in the cervical cord are similarly unchanged from 2/26/2024. A lesion in the left middle cerebellar peduncle appears to be associated with persistent enhancement, although the presence of coil artifact in this location complicates interpretation. No other abnormal enhancement is seen in the brain or visualized cord parenchyma.    Assessment/plan:    1. Multiple sclerosis  The patient is clinically stable as regards any evidence of active inflammatory demyelination on current disease modifying therapy with ofatumumab.     Regarding suspected focus of infratentorial enhancement noted on MRI imaging today, this does not appear to be associated with a new or enlarging lesion. A small percentage of MS lesions will display persistent enhancement on imaging, presumably reflective of chronic, localized damage to the blood-brain barrier. I do not regard this finding as evidence of failure of disease modifying therapy; we will re-assess this findings on follow-up imaging in the future.    She will remain on ofatumumab, and I will see her back in one year with repeat MRI scans of the brain and cervical spine to be performed prior to that visit for ongoing monitoring of the radiologic stability of her condition.    2. Gait disturbance  She is finding physical therapy helpful, which is good to hear. We will continue to monitor her gait stability closely over time.    3. Chronic  fatigue  Her fatigue symptoms are not as bothersome as they were are the beginning of this year, and she would just like to observe for now. We can re-address at any point in the future, as needed.    The longitudinal plans of care for the diagnoses as documented were addressed during this visit. Due to the added complexity in care, I will continue to support the patient in subsequent management and with ongoing continuity of care.    Santo Gracia MD   of Neurology  [facility]    Cc:  Muriel Jones MD (PCP)  Patient

## 2025-06-26 NOTE — NURSING NOTE
Current patient location: [address]  [address]   [facility] [address]    Is the patient currently in the state of [place]? YES    Visit mode: VIDEO    If the visit is dropped, the patient can be reconnected by:VIDEO VISIT: Text to cell phone:   Telephone Information:   Mobile [phone]       Will anyone else be joining the visit? NO  (If patient encounters technical issues they should call [phone])    Are changes needed to the allergy or medication list? No    Are refills needed on medications prescribed by this physician? NO    Rooming Documentation:  Questionnaire(s) not pre-assigned    Reason for visit: Follow Up    Nara HERNANDEZ

## 2025-06-26 NOTE — Clinical Note
6/26/2025       RE: Radha Carl  [address]  [address]  [facility] [address]     Dear Colleague,    Thank you for referring your patient, Radha Carl, to the [facility] [place] at [facility]. Please see a copy of my visit note below.    Referral source: Established patient    Chief complaint: Multiple sclerosis    History of the Present Illness: Ms. Radha Carl is a 34 year old woman who is evaluated in the Multiple Sclerosis Clinic today for scheduled follow up after earlier MRI scans of the brain and cervical spine.    The patient's history is as per my previous notes. Her history of symptoms of demyelinating disease dates back to May 2013, at which time she was admitted to hospital for ascending paresthesias. MRI imaging was suggestive of demyelinating disease of the type seen in multiple sclerosis. 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 A formal MS diagnosis was confirmed in 2018 after she developed increased weakness of the legs and MRI imaging showed multiple new lesions, but she continued to decline disease modifying treatment. Further radiologic progression was noted in December 2020 after she developed vertiginous symptoms, and she was referred to this clinic for an opinion on management. We initially started her on disease modifying therapy with dimethyl fumarate, but she discontinued that medication after a few weeks due to gastrointestinal side effects. She was next placed on glatiramer acetate, but subsequently stopped that medication as well.  After an MRI scan of the brain in April 2023 demonstrated further radiologic disease progression, we initiated treatment with ofatumumab in June 2023, and she remains on that medication up until the present time.    Today, she denies any new, episodic  changes in vision, balance, strength, or sensation suggestive of new relapse of multiple sclerosis since she was last seen in this clinic.    Symptomatically, she has been attending physical therapy for her gait problems and does feel that her gait is improving. She denies any recent falls.    When we last met in January, she was noting persistent daytime fatigue. I recommended a sleep evaluation, but this was never scheduled.    Investigations:  I reviewed images of MRI scan of the brain and cervical spine performed earlier on 6/25/2025, and the following is my independent interpretation of those images.    Multiple periventricular, juxtacortical, and infratentorial foci of T2 hyperintensity in the brain are unchanged in comparison to previous MRI of 2/26/2024. Short segment foci of T2 hyperintensity in the cervical cord are similarly unchanged from 2/26/2024. A lesion in the left middle cerebellar peduncle appears to be associated with persistent enhancement, although the presence of coil artifact in this location complicates interpretation. No other abnormal enhancement is seen in the brain or visualized cord parenchyma.    Assessment/plan:    1. Multiple sclerosis  The patient is clinically stable as regards any evidence of active inflammatory demyelination on current disease modifying therapy with ofatumumab.     Regarding suspected focus of infratentorial enhancement noted on MRI imaging today, this does not appear to be associated with a new or enlarging lesion. A small percentage of MS lesions will display persistent enhancement on imaging, presumably reflective of chronic, localized damage to the blood-brain barrier. I do not regard this finding as evidence of failure of disease modifying therapy; we will re-assess this findings on follow-up imaging in the future.    She will remain on ofatumumab, and I will see her back in one year with repeat MRI scans of the brain and cervical spine to be performed prior  to that visit for ongoing monitoring of the radiologic stability of her condition.    2. Gait disturbance  She is finding physical therapy helpful, which is good to hear. We will continue to monitor her gait stability closely over time.    3. Chronic fatigue  Her fatigue symptoms are not as bothersome as they were are the beginning of this year, and she would just like to observe for now. We can re-address at any point in the future, as needed.    The longitudinal plans of care for the diagnoses as documented were addressed during this visit. Due to the added complexity in care, I will continue to support the patient in subsequent management and with ongoing continuity of care.    Video-Visit Details    Type of service: Video Visit    Video Start Time:11:02 am    Video End Time:11:16 am    Originating Location (pt. Location): Home    Distant Location (provider location): On-site ([facility], [facility], [address] [place] [address].    Platform used for Video Visit: [employer]      Again, thank you for allowing me to participate in the care of your patient.      Sincerely,    Santo Gracia MD

## 2025-06-26 NOTE — PROGRESS NOTES
"Virtual Visit Details    Type of service:  Video Visit     Originating Location (pt. Location): {video visit patient location:443711::\"Home\"}  {PROVIDER LOCATION On-site should be selected for visits conducted from your clinic location or adjoining [facility] hospital, academic office, or other nearby [facility] building. Off-site should be selected for all other provider locations, including home:[id number]}  Distant Location (provider location):  {virtual location provider:608873}  Platform used for Video Visit: {Virtual Visit Platforms:771665::\"[employer]\"}    " persistent daytime fatigue. I recommended a sleep evaluation, but this was never scheduled.    Investigations:  I reviewed images of MRI scan of the brain and cervical spine performed earlier on 6/25/2025, and the following is my independent interpretation of those images.    Multiple periventricular, juxtacortical, and infratentorial foci of T2 hyperintensity in the brain are unchanged in comparison to previous MRI of 2/26/2024. Short segment foci of T2 hyperintensity in the cervical cord are similarly unchanged from 2/26/2024. A lesion in the left middle cerebellar peduncle appears to be associated with persistent enhancement, although the presence of coil artifact in this location complicates interpretation. No other abnormal enhancement is seen in the brain or visualized cord parenchyma.    Assessment/plan:    1. Multiple sclerosis  The patient is clinically stable as regards any evidence of active inflammatory demyelination on current disease modifying therapy with ofatumumab.     Regarding suspected focus of infratentorial enhancement noted on MRI imaging today, this does not appear to be associated with a new or enlarging lesion. A small percentage of MS lesions will display persistent enhancement on imaging, presumably reflective of chronic, localized damage to the blood-brain barrier. I do not regard this finding as evidence of failure of disease modifying therapy; we will re-assess this findings on follow-up imaging in the future.    She will remain on ofatumumab, and I will see her back in one year with repeat MRI scans of the brain and cervical spine to be performed prior to that visit for ongoing monitoring of the radiologic stability of her condition.    2. Gait disturbance  She is finding physical therapy helpful, which is good to hear. We will continue to monitor her gait stability closely over time.    3. Chronic fatigue  Her fatigue symptoms are not as bothersome as they were are the beginning of  this year, and she would just like to observe for now. We can re-address at any point in the future, as needed.    The longitudinal plans of care for the diagnoses as documented were addressed during this visit. Due to the added complexity in care, I will continue to support the patient in subsequent management and with ongoing continuity of care.    Video-Visit Details    Type of service: Video Visit    Video Start Time:11:02 am    Video End Time:11:16 am    Originating Location (pt. Location): Home    Distant Location (provider location): On-site ([facility], [facility], [address] [place] [address].    Platform used for Video Visit: [employer]

## 2025-07-15 ENCOUNTER — LAB (OUTPATIENT)
Dept: LAB | Facility: OTHER | Age: 35
End: 2025-07-15
Payer: COMMERCIAL

## 2025-07-15 DIAGNOSIS — R35.0 URINARY FREQUENCY: ICD-10-CM

## 2025-07-15 DIAGNOSIS — R35.0 URINARY FREQUENCY: Primary | ICD-10-CM

## 2025-07-15 DIAGNOSIS — N30.01 ACUTE CYSTITIS WITH HEMATURIA: Primary | ICD-10-CM

## 2025-07-15 LAB
ALBUMIN UR-MCNC: NEGATIVE MG/DL
APPEARANCE UR: ABNORMAL
BACTERIA #/AREA URNS HPF: ABNORMAL /HPF
BILIRUB UR QL STRIP: NEGATIVE
COLOR UR AUTO: YELLOW
GLUCOSE UR STRIP-MCNC: NEGATIVE MG/DL
HGB UR QL STRIP: ABNORMAL
KETONES UR STRIP-MCNC: NEGATIVE MG/DL
LEUKOCYTE ESTERASE UR QL STRIP: ABNORMAL
MUCOUS THREADS #/AREA URNS LPF: PRESENT /LPF
NITRATE UR QL: POSITIVE
PH UR STRIP: 5.5 [PH] (ref 4.7–8)
RBC URINE: 8 /HPF
SP GR UR STRIP: 1.01 (ref 1–1.03)
SQUAMOUS EPITHELIAL: 3 /HPF
UROBILINOGEN UR STRIP-MCNC: NORMAL MG/DL
WBC CLUMPS #/AREA URNS HPF: PRESENT /HPF
WBC URINE: >182 /HPF

## 2025-07-15 PROCEDURE — 87186 SC STD MICRODIL/AGAR DIL: CPT

## 2025-07-15 PROCEDURE — 81001 URINALYSIS AUTO W/SCOPE: CPT

## 2025-07-15 PROCEDURE — 87086 URINE CULTURE/COLONY COUNT: CPT

## 2025-07-15 RX ORDER — NITROFURANTOIN 25; 75 MG/1; MG/1
100 CAPSULE ORAL 2 TIMES DAILY
Qty: 10 CAPSULE | Refills: 0 | Status: SHIPPED | OUTPATIENT
Start: 2025-07-15

## 2025-07-17 LAB — BACTERIA UR CULT: ABNORMAL

## 2025-08-24 ENCOUNTER — HEALTH MAINTENANCE LETTER (OUTPATIENT)
Age: 35
End: 2025-08-24

## (undated) DEVICE — CONNECTOR ERBEFLO 2 PORT 20325-215

## (undated) DEVICE — JELLY LUBRICATING SURGILUBE 2OZ TUBE

## (undated) DEVICE — SYR 30ML SLIP TIP W/O NDL 302833

## (undated) DEVICE — SOL WATER IRRIG 1000ML BOTTLE 2F7114

## (undated) DEVICE — ENDO BITE BLOCK ADULT OLYMPUS LATEX FREE MAJ-1632

## (undated) DEVICE — FORCEP COLON BIOPSY STD W/NEEDLE 160CM M00513390

## (undated) DEVICE — TUBING SUCTION 20FT N620A

## (undated) DEVICE — CANISTER SUCTION MEDI-VAC GUARDIAN 2000ML 90D 65651-220

## (undated) RX ORDER — PROPOFOL 10 MG/ML
INJECTION, EMULSION INTRAVENOUS
Status: DISPENSED
Start: 2023-01-20